# Patient Record
Sex: FEMALE | Race: WHITE | NOT HISPANIC OR LATINO | URBAN - METROPOLITAN AREA
[De-identification: names, ages, dates, MRNs, and addresses within clinical notes are randomized per-mention and may not be internally consistent; named-entity substitution may affect disease eponyms.]

---

## 2018-04-02 ENCOUNTER — INPATIENT (INPATIENT)
Facility: HOSPITAL | Age: 83
LOS: 6 days | Discharge: HOME | DRG: 552 | End: 2018-04-09
Attending: INTERNAL MEDICINE | Admitting: INTERNAL MEDICINE
Payer: MEDICARE

## 2018-04-02 VITALS
OXYGEN SATURATION: 94 % | TEMPERATURE: 98 F | WEIGHT: 130.07 LBS | RESPIRATION RATE: 19 BRPM | SYSTOLIC BLOOD PRESSURE: 203 MMHG | HEART RATE: 67 BPM | DIASTOLIC BLOOD PRESSURE: 84 MMHG

## 2018-04-02 DIAGNOSIS — Z90.89 ACQUIRED ABSENCE OF OTHER ORGANS: Chronic | ICD-10-CM

## 2018-04-02 DIAGNOSIS — Z98.89 OTHER SPECIFIED POSTPROCEDURAL STATES: Chronic | ICD-10-CM

## 2018-04-02 DIAGNOSIS — Z90.49 ACQUIRED ABSENCE OF OTHER SPECIFIED PARTS OF DIGESTIVE TRACT: Chronic | ICD-10-CM

## 2018-04-02 DIAGNOSIS — Z95.5 PRESENCE OF CORONARY ANGIOPLASTY IMPLANT AND GRAFT: Chronic | ICD-10-CM

## 2018-04-02 DIAGNOSIS — Z90.710 ACQUIRED ABSENCE OF BOTH CERVIX AND UTERUS: Chronic | ICD-10-CM

## 2018-04-02 PROCEDURE — 72131 CT LUMBAR SPINE W/O DYE: CPT | Mod: 26

## 2018-04-02 PROCEDURE — 70450 CT HEAD/BRAIN W/O DYE: CPT | Mod: 26

## 2018-04-02 PROCEDURE — 71045 X-RAY EXAM CHEST 1 VIEW: CPT | Mod: 26

## 2018-04-02 RX ORDER — SODIUM CHLORIDE 9 MG/ML
3 INJECTION INTRAMUSCULAR; INTRAVENOUS; SUBCUTANEOUS ONCE
Qty: 0 | Refills: 0 | Status: COMPLETED | OUTPATIENT
Start: 2018-04-02 | End: 2018-04-02

## 2018-04-02 RX ORDER — MORPHINE SULFATE 50 MG/1
4 CAPSULE, EXTENDED RELEASE ORAL ONCE
Qty: 0 | Refills: 0 | Status: DISCONTINUED | OUTPATIENT
Start: 2018-04-02 | End: 2018-04-02

## 2018-04-02 RX ADMIN — SODIUM CHLORIDE 3 MILLILITER(S): 9 INJECTION INTRAMUSCULAR; INTRAVENOUS; SUBCUTANEOUS at 23:43

## 2018-04-02 NOTE — ED PROVIDER NOTE - PSH
H/O abdominal hysterectomy    H/O heart artery stent  2004  History of appendectomy    History of tonsillectomy    S/P hernia repair  inguinal , right - February 2106

## 2018-04-02 NOTE — ED PROVIDER NOTE - OBJECTIVE STATEMENT
87 y/o F pt, who lives at home alone, with a significant PMHx of CAD, MI, HLD, stented coronary artery, and a significant PSHx of abd hysterectomy, appendectomy, tonsillectomy, hernia repair presents to the ED c/o back pain s/p mechanical slip and fall at 1200 today. Pt states she slipped and fell flat on her back, hitting her head. Pt notes she had a hard time getting up, but was sitting on the cough for the rest of the day, and was able to walk to the bathroom with difficulty. Pt states she feels as if she blacked out but denies LOC. Pt also denies dizziness, fever, chest pain, trouble breathing, abd pain or any other complaints. NKDA. 85 y/o F pt, who lives at home alone, with a significant PMHx of CAD, MI, HLD, stented coronary artery, and a significant PSHx of abd hysterectomy, appendectomy, tonsillectomy, hernia repair presents to the ED c/o back pain s/p mechanical slip and fall at 1200 today. Pt states she slipped and fell flat on her back, hitting her head. Pt notes she had a hard time getting up, but was sitting on the couch for the rest of the day, and was barely able to walk to the bathroom with difficulty. Pt states she feels as if she blacked out after fall. Pt denies dizziness, fever, chest pain, trouble breathing, abd pain or any other complaints. NKDA.

## 2018-04-02 NOTE — ED PROVIDER NOTE - PMH
CAD (coronary artery disease)    Heart attack  2004  HTN (hypertension)    Hyperlipidemia    Smoking greater than 25 pack years    Stenosis of left carotid artery    Stented coronary artery  x 1 - 2004  Vitamin D deficiency

## 2018-04-02 NOTE — ED PROVIDER NOTE - MEDICAL DECISION MAKING DETAILS
87 y/o F pt presents after mechanical fall with lower back pain and difficulty ambulating 2/2 pain. Will order CT head, CT spine, labs, pain control and reassess.

## 2018-04-02 NOTE — ED PROVIDER NOTE - PROGRESS NOTE DETAILS
Pt unable to ambulate secondary to lower back pain, no fracture noted on imaging, d/w Dr. Olivia who agreed to admit pt.

## 2018-04-03 DIAGNOSIS — I25.10 ATHEROSCLEROTIC HEART DISEASE OF NATIVE CORONARY ARTERY WITHOUT ANGINA PECTORIS: ICD-10-CM

## 2018-04-03 DIAGNOSIS — S39.92XA UNSPECIFIED INJURY OF LOWER BACK, INITIAL ENCOUNTER: ICD-10-CM

## 2018-04-03 DIAGNOSIS — I10 ESSENTIAL (PRIMARY) HYPERTENSION: ICD-10-CM

## 2018-04-03 DIAGNOSIS — F17.210 NICOTINE DEPENDENCE, CIGARETTES, UNCOMPLICATED: ICD-10-CM

## 2018-04-03 DIAGNOSIS — Z29.9 ENCOUNTER FOR PROPHYLACTIC MEASURES, UNSPECIFIED: ICD-10-CM
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
## 2018-04-03 DIAGNOSIS — R53.81 OTHER MALAISE: ICD-10-CM

## 2018-04-03 LAB
ALBUMIN SERPL ELPH-MCNC: 3.8 G/DL — SIGNIFICANT CHANGE UP (ref 3.5–5)
ALP SERPL-CCNC: 75 U/L — SIGNIFICANT CHANGE UP (ref 40–120)
ALT FLD-CCNC: 22 U/L DA — SIGNIFICANT CHANGE UP (ref 10–60)
ANION GAP SERPL CALC-SCNC: 7 MMOL/L — SIGNIFICANT CHANGE UP (ref 5–17)
ANION GAP SERPL CALC-SCNC: 8 MMOL/L — SIGNIFICANT CHANGE UP (ref 5–17)
APTT BLD: 29.1 SEC — SIGNIFICANT CHANGE UP (ref 27.5–37.4)
AST SERPL-CCNC: 24 U/L — SIGNIFICANT CHANGE UP (ref 10–40)
BASOPHILS # BLD AUTO: 0.1 K/UL — SIGNIFICANT CHANGE UP (ref 0–0.2)
BASOPHILS # BLD AUTO: 0.1 K/UL — SIGNIFICANT CHANGE UP (ref 0–0.2)
BASOPHILS NFR BLD AUTO: 0.8 % — SIGNIFICANT CHANGE UP (ref 0–2)
BASOPHILS NFR BLD AUTO: 1.1 % — SIGNIFICANT CHANGE UP (ref 0–2)
BILIRUB SERPL-MCNC: 0.5 MG/DL — SIGNIFICANT CHANGE UP (ref 0.2–1.2)
BUN SERPL-MCNC: 20 MG/DL — HIGH (ref 7–18)
BUN SERPL-MCNC: 21 MG/DL — HIGH (ref 7–18)
CALCIUM SERPL-MCNC: 9.1 MG/DL — SIGNIFICANT CHANGE UP (ref 8.4–10.5)
CALCIUM SERPL-MCNC: 9.4 MG/DL — SIGNIFICANT CHANGE UP (ref 8.4–10.5)
CHLORIDE SERPL-SCNC: 104 MMOL/L — SIGNIFICANT CHANGE UP (ref 96–108)
CHLORIDE SERPL-SCNC: 104 MMOL/L — SIGNIFICANT CHANGE UP (ref 96–108)
CHOLEST SERPL-MCNC: <50 MG/DL — SIGNIFICANT CHANGE UP (ref 10–199)
CK MB BLD-MCNC: 1.7 % — SIGNIFICANT CHANGE UP (ref 0–3.5)
CK MB BLD-MCNC: 1.9 % — SIGNIFICANT CHANGE UP (ref 0–3.5)
CK MB CFR SERPL CALC: 1.1 NG/ML — SIGNIFICANT CHANGE UP (ref 0–3.6)
CK MB CFR SERPL CALC: 1.3 NG/ML — SIGNIFICANT CHANGE UP (ref 0–3.6)
CK SERPL-CCNC: 66 U/L — SIGNIFICANT CHANGE UP (ref 21–215)
CK SERPL-CCNC: 70 U/L — SIGNIFICANT CHANGE UP (ref 21–215)
CO2 SERPL-SCNC: 27 MMOL/L — SIGNIFICANT CHANGE UP (ref 22–31)
CO2 SERPL-SCNC: 27 MMOL/L — SIGNIFICANT CHANGE UP (ref 22–31)
CREAT SERPL-MCNC: 0.71 MG/DL — SIGNIFICANT CHANGE UP (ref 0.5–1.3)
CREAT SERPL-MCNC: 0.85 MG/DL — SIGNIFICANT CHANGE UP (ref 0.5–1.3)
EOSINOPHIL # BLD AUTO: 0 K/UL — SIGNIFICANT CHANGE UP (ref 0–0.5)
EOSINOPHIL # BLD AUTO: 0.1 K/UL — SIGNIFICANT CHANGE UP (ref 0–0.5)
EOSINOPHIL NFR BLD AUTO: 0.4 % — SIGNIFICANT CHANGE UP (ref 0–6)
EOSINOPHIL NFR BLD AUTO: 0.7 % — SIGNIFICANT CHANGE UP (ref 0–6)
FOLATE SERPL-MCNC: >20 NG/ML — SIGNIFICANT CHANGE UP (ref 4.8–24.2)
GLUCOSE SERPL-MCNC: 89 MG/DL — SIGNIFICANT CHANGE UP (ref 70–99)
GLUCOSE SERPL-MCNC: 97 MG/DL — SIGNIFICANT CHANGE UP (ref 70–99)
HBA1C BLD-MCNC: 5.2 % — SIGNIFICANT CHANGE UP (ref 4–5.6)
HCT VFR BLD CALC: 37.1 % — SIGNIFICANT CHANGE UP (ref 34.5–45)
HCT VFR BLD CALC: 38.3 % — SIGNIFICANT CHANGE UP (ref 34.5–45)
HDLC SERPL-MCNC: 66 MG/DL — SIGNIFICANT CHANGE UP (ref 40–125)
HGB BLD-MCNC: 12.3 G/DL — SIGNIFICANT CHANGE UP (ref 11.5–15.5)
HGB BLD-MCNC: 12.7 G/DL — SIGNIFICANT CHANGE UP (ref 11.5–15.5)
INR BLD: 1.06 RATIO — SIGNIFICANT CHANGE UP (ref 0.88–1.16)
LIPID PNL WITH DIRECT LDL SERPL: SIGNIFICANT CHANGE UP MG/DL
LYMPHOCYTES # BLD AUTO: 1.6 K/UL — SIGNIFICANT CHANGE UP (ref 1–3.3)
LYMPHOCYTES # BLD AUTO: 1.7 K/UL — SIGNIFICANT CHANGE UP (ref 1–3.3)
LYMPHOCYTES # BLD AUTO: 13.3 % — SIGNIFICANT CHANGE UP (ref 13–44)
LYMPHOCYTES # BLD AUTO: 14.3 % — SIGNIFICANT CHANGE UP (ref 13–44)
MAGNESIUM SERPL-MCNC: 2 MG/DL — SIGNIFICANT CHANGE UP (ref 1.6–2.6)
MCHC RBC-ENTMCNC: 32.2 GM/DL — SIGNIFICANT CHANGE UP (ref 32–36)
MCHC RBC-ENTMCNC: 32.5 PG — SIGNIFICANT CHANGE UP (ref 27–34)
MCHC RBC-ENTMCNC: 33.9 PG — SIGNIFICANT CHANGE UP (ref 27–34)
MCHC RBC-ENTMCNC: 34.2 GM/DL — SIGNIFICANT CHANGE UP (ref 32–36)
MCV RBC AUTO: 101.2 FL — HIGH (ref 80–100)
MCV RBC AUTO: 99.1 FL — SIGNIFICANT CHANGE UP (ref 80–100)
MONOCYTES # BLD AUTO: 0.6 K/UL — SIGNIFICANT CHANGE UP (ref 0–0.9)
MONOCYTES # BLD AUTO: 1.1 K/UL — HIGH (ref 0–0.9)
MONOCYTES NFR BLD AUTO: 5.3 % — SIGNIFICANT CHANGE UP (ref 2–14)
MONOCYTES NFR BLD AUTO: 9.3 % — SIGNIFICANT CHANGE UP (ref 2–14)
NEUTROPHILS # BLD AUTO: 9.1 K/UL — HIGH (ref 1.8–7.4)
NEUTROPHILS # BLD AUTO: 9.4 K/UL — HIGH (ref 1.8–7.4)
NEUTROPHILS NFR BLD AUTO: 75.7 % — SIGNIFICANT CHANGE UP (ref 43–77)
NEUTROPHILS NFR BLD AUTO: 79.2 % — HIGH (ref 43–77)
PHOSPHATE SERPL-MCNC: 3.4 MG/DL — SIGNIFICANT CHANGE UP (ref 2.5–4.5)
PLATELET # BLD AUTO: 239 K/UL — SIGNIFICANT CHANGE UP (ref 150–400)
PLATELET # BLD AUTO: 252 K/UL — SIGNIFICANT CHANGE UP (ref 150–400)
POTASSIUM SERPL-MCNC: 3.7 MMOL/L — SIGNIFICANT CHANGE UP (ref 3.5–5.3)
POTASSIUM SERPL-MCNC: 4.1 MMOL/L — SIGNIFICANT CHANGE UP (ref 3.5–5.3)
POTASSIUM SERPL-SCNC: 3.7 MMOL/L — SIGNIFICANT CHANGE UP (ref 3.5–5.3)
POTASSIUM SERPL-SCNC: 4.1 MMOL/L — SIGNIFICANT CHANGE UP (ref 3.5–5.3)
PROT SERPL-MCNC: 8.2 G/DL — SIGNIFICANT CHANGE UP (ref 6–8.3)
PROTHROM AB SERPL-ACNC: 11.6 SEC — SIGNIFICANT CHANGE UP (ref 9.8–12.7)
RBC # BLD: 3.75 M/UL — LOW (ref 3.8–5.2)
RBC # BLD: 3.78 M/UL — LOW (ref 3.8–5.2)
RBC # FLD: 11.7 % — SIGNIFICANT CHANGE UP (ref 10.3–14.5)
RBC # FLD: 11.8 % — SIGNIFICANT CHANGE UP (ref 10.3–14.5)
SODIUM SERPL-SCNC: 138 MMOL/L — SIGNIFICANT CHANGE UP (ref 135–145)
SODIUM SERPL-SCNC: 139 MMOL/L — SIGNIFICANT CHANGE UP (ref 135–145)
TOTAL CHOLESTEROL/HDL RATIO MEASUREMENT: <0.8 RATIO — LOW (ref 3.3–7.1)
TRIGL SERPL-MCNC: 27 MG/DL — SIGNIFICANT CHANGE UP (ref 10–149)
TROPONIN I SERPL-MCNC: <0.015 NG/ML — SIGNIFICANT CHANGE UP (ref 0–0.04)
TROPONIN I SERPL-MCNC: <0.015 NG/ML — SIGNIFICANT CHANGE UP (ref 0–0.04)
TSH SERPL-MCNC: 2.38 UU/ML — SIGNIFICANT CHANGE UP (ref 0.34–4.82)
VIT B12 SERPL-MCNC: 1499 PG/ML — HIGH (ref 232–1245)
WBC # BLD: 11.9 K/UL — HIGH (ref 3.8–10.5)
WBC # BLD: 12.1 K/UL — HIGH (ref 3.8–10.5)
WBC # FLD AUTO: 11.9 K/UL — HIGH (ref 3.8–10.5)
WBC # FLD AUTO: 12.1 K/UL — HIGH (ref 3.8–10.5)

## 2018-04-03 PROCEDURE — 99285 EMERGENCY DEPT VISIT HI MDM: CPT | Mod: 25

## 2018-04-03 RX ORDER — SIMVASTATIN 20 MG/1
10 TABLET, FILM COATED ORAL AT BEDTIME
Qty: 0 | Refills: 0 | Status: DISCONTINUED | OUTPATIENT
Start: 2018-04-03 | End: 2018-04-09

## 2018-04-03 RX ORDER — TRAMADOL HYDROCHLORIDE 50 MG/1
25 TABLET ORAL
Qty: 0 | Refills: 0 | Status: DISCONTINUED | OUTPATIENT
Start: 2018-04-03 | End: 2018-04-09

## 2018-04-03 RX ORDER — CHOLECALCIFEROL (VITAMIN D3) 125 MCG
1000 CAPSULE ORAL DAILY
Qty: 0 | Refills: 0 | Status: DISCONTINUED | OUTPATIENT
Start: 2018-04-03 | End: 2018-04-09

## 2018-04-03 RX ORDER — METOPROLOL TARTRATE 50 MG
50 TABLET ORAL
Qty: 0 | Refills: 0 | Status: DISCONTINUED | OUTPATIENT
Start: 2018-04-03 | End: 2018-04-09

## 2018-04-03 RX ORDER — ACETAMINOPHEN 500 MG
650 TABLET ORAL EVERY 6 HOURS
Qty: 0 | Refills: 0 | Status: DISCONTINUED | OUTPATIENT
Start: 2018-04-03 | End: 2018-04-09

## 2018-04-03 RX ORDER — PREGABALIN 225 MG/1
1000 CAPSULE ORAL DAILY
Qty: 0 | Refills: 0 | Status: DISCONTINUED | OUTPATIENT
Start: 2018-04-03 | End: 2018-04-09

## 2018-04-03 RX ORDER — HYDRALAZINE HCL 50 MG
25 TABLET ORAL EVERY 8 HOURS
Qty: 0 | Refills: 0 | Status: DISCONTINUED | OUTPATIENT
Start: 2018-04-03 | End: 2018-04-04

## 2018-04-03 RX ORDER — HEPARIN SODIUM 5000 [USP'U]/ML
5000 INJECTION INTRAVENOUS; SUBCUTANEOUS EVERY 8 HOURS
Qty: 0 | Refills: 0 | Status: DISCONTINUED | OUTPATIENT
Start: 2018-04-03 | End: 2018-04-09

## 2018-04-03 RX ORDER — ASPIRIN/CALCIUM CARB/MAGNESIUM 324 MG
81 TABLET ORAL DAILY
Qty: 0 | Refills: 0 | Status: DISCONTINUED | OUTPATIENT
Start: 2018-04-03 | End: 2018-04-09

## 2018-04-03 RX ADMIN — HEPARIN SODIUM 5000 UNIT(S): 5000 INJECTION INTRAVENOUS; SUBCUTANEOUS at 06:25

## 2018-04-03 RX ADMIN — Medication 25 MILLIGRAM(S): at 06:25

## 2018-04-03 RX ADMIN — TRAMADOL HYDROCHLORIDE 25 MILLIGRAM(S): 50 TABLET ORAL at 06:28

## 2018-04-03 RX ADMIN — SIMVASTATIN 10 MILLIGRAM(S): 20 TABLET, FILM COATED ORAL at 21:19

## 2018-04-03 RX ADMIN — Medication 50 MILLIGRAM(S): at 06:25

## 2018-04-03 RX ADMIN — HEPARIN SODIUM 5000 UNIT(S): 5000 INJECTION INTRAVENOUS; SUBCUTANEOUS at 15:29

## 2018-04-03 RX ADMIN — Medication 25 MILLIGRAM(S): at 15:29

## 2018-04-03 RX ADMIN — Medication 81 MILLIGRAM(S): at 15:29

## 2018-04-03 RX ADMIN — PREGABALIN 1000 MICROGRAM(S): 225 CAPSULE ORAL at 15:28

## 2018-04-03 RX ADMIN — Medication 1000 UNIT(S): at 15:28

## 2018-04-03 RX ADMIN — Medication 25 MILLIGRAM(S): at 21:20

## 2018-04-03 RX ADMIN — MORPHINE SULFATE 4 MILLIGRAM(S): 50 CAPSULE, EXTENDED RELEASE ORAL at 01:32

## 2018-04-03 RX ADMIN — TRAMADOL HYDROCHLORIDE 25 MILLIGRAM(S): 50 TABLET ORAL at 07:03

## 2018-04-03 RX ADMIN — MORPHINE SULFATE 4 MILLIGRAM(S): 50 CAPSULE, EXTENDED RELEASE ORAL at 00:19

## 2018-04-03 RX ADMIN — HEPARIN SODIUM 5000 UNIT(S): 5000 INJECTION INTRAVENOUS; SUBCUTANEOUS at 21:20

## 2018-04-03 RX ADMIN — Medication 50 MILLIGRAM(S): at 18:44

## 2018-04-03 NOTE — H&P ADULT - NSHPPHYSICALEXAM_GEN_ALL_CORE
PHYSICAL EXAM:  GENERAL: NAD, well-groomed, well-developed  HEAD:  Atraumatic, Normocephalic  EYES: EOMI, PERRLA, conjunctiva and sclera clear  ENMT: No tonsillar erythema, exudates, or enlargement; dry mucous membranes, poor hearing ability   NECK: Supple, No JVD, Normal thyroid  NERVOUS SYSTEM:  Alert & Oriented X3, poor concentration; Motor Strength 5/5 B/L upper and lower extremities; DTRs 2+ intact and symmetric  CHEST/LUNG: Clear to percussion bilaterally; No rales, rhonchi, wheezing, or rubs  HEART: Regular rate and rhythm; No murmurs, rubs, or gallops  ABDOMEN: Soft, Nontender, Nondistended; Bowel sounds present  EXTREMITIES:  2+ Peripheral Pulses bilaterally, No clubbing, cyanosis, or edema. Tenderness of the L1, lower T-spinal area.  LYMPH: No lymphadenopathy noted  SKIN: No rashes or lesions    T(C): 36.9 (04-03-18 @ 00:55), Max: 36.9 (04-03-18 @ 00:55)  HR: 80 (04-03-18 @ 00:55) (67 - 80)  BP: 173/68 (04-03-18 @ 00:55) (173/68 - 203/84)  RR: 18 (04-03-18 @ 00:55) (18 - 19)  SpO2: 98% (04-03-18 @ 00:55) (94% - 98%)  Wt(kg): --  I&O's Summary

## 2018-04-03 NOTE — PATIENT PROFILE ADULT. - FALL HARM RISK
age(85 years old or older)/hx of fall/bones(Osteoporosis,prev fx,steroid use,metastatic bone ca/other

## 2018-04-03 NOTE — H&P ADULT - PROBLEM SELECTOR PLAN 7
-IMPROVE VTE Individual Risk Assessment          RISK                                                          Points  [  ] Previous VTE                                                3  [  ] Thrombophilia                                             2  [  ] Lower limb paralysis                                   2        (unable to hold up >15 seconds)    [  ] Current Cancer                                            2         (within 6 months)  [ x ] Immobilization > 24 hrs                             1  [  ] ICU/CCU stay > 24 hours                           1  [ x ] Age > 60                                                       1  IMPROVE VTE Score ____2_____  -Heparin subcu for DVT PPx.

## 2018-04-03 NOTE — PHYSICAL THERAPY INITIAL EVALUATION ADULT - CRITERIA FOR SKILLED THERAPEUTIC INTERVENTIONS
therapy frequency/anticipated equipment needs at discharge/risk reduction/prevention/rehab potential/functional limitations in following categories/predicted duration of therapy intervention/anticipated discharge recommendation/impairments found

## 2018-04-03 NOTE — H&P ADULT - ASSESSMENT
Patient is a 86y old  Female who presents with a chief complaint of     INTERVAL HPI/OVERNIGHT EVENTS: Patient is a 86y old  Female who presents with a chief complaint of back pain after having a mechanical fall. Pt is admitted to the medicine floor for back pain due to fall, and inability to take care of herself with no proper social support. Patient is a 86y old  Female who presents with a chief complaint of back pain after having a mechanical fall. Pt is admitted to the medicine floor for back pain due to fall, and inability to take care of herself with no proper social support.     ** Plans discussed with Dr. Alarcon, who is covering Dr. Amador(Dr. Amador covers Dr. Olivia)

## 2018-04-03 NOTE — H&P ADULT - PROBLEM SELECTOR PLAN 2
-Pt has unstable gait, will need physical therapy and possible short term placement.  -No acute lesion found on the head or lumbar spine CT scan. Will give pain management PRN  -Physical therapy

## 2018-04-03 NOTE — H&P ADULT - PROBLEM SELECTOR PLAN 1
-No acute lesion found on the lumbar spine CT scan. Will give pain management PRN  -Physical therapy

## 2018-04-03 NOTE — H&P ADULT - PROBLEM SELECTOR PLAN 4
-Pt seems to be forgetful and has unstable gait, lives alone, no family members, no HHA  -Will need proper social support, possible need for short term rehab given unstable gait and fall.  -Case management consult

## 2018-04-03 NOTE — PHYSICAL THERAPY INITIAL EVALUATION ADULT - LEVEL OF INDEPENDENCE: STAIR NEGOTIATION, REHAB EVAL
Stairs not assessed at this time as pt. does not require stairs to access/negotiate home environment. Pt. states he does not negotiate stairs at baseline.

## 2018-04-03 NOTE — H&P ADULT - PROBLEM SELECTOR PLAN 3
-Uncontrolled BP, likely due to pain. Also suspecting poor medicine compliance as pt does not know the name of the medications and seems be forgetful.  -Giving hydralazine and metoprolol per outside pharmacy record; will monitor BP  -Primary team to complete med rec.

## 2018-04-03 NOTE — H&P ADULT - HISTORY OF PRESENT ILLNESS
86 year-old female lives alone, no HHA, walks independently but with cane intermittently, with PMH of HTN, CAD s/p stent in 2004, current smoker, HLD, h/o syncope in 2015, and PSH of abd hysterectomy, appendectomy, tonsillectomy, hernia repair came to ED c/o lower back pain after having a mechanical fall at home. Pt is a poor historian though she is AAOx3, she is forgetful and speaks very slow, mumbles. Pt was in her usual state of health till this morning, however during the day when she was in the kitchen she slipped over the fluid on the floor and fell on her back. She denies LOC, dizziness, chest pain, SOB, diarrhea, fever, chills, or any other symptoms before/during/after the fall. She has baseline unstable gait. She was able to get up by herself, watched TV for a while, and came out to her apartment. She had poor oral intake today because there was little food to eat and it was snowing badly outside. When she was on the stairs, she met one of the residents of the apartment who asked her about what happened. She told him that she fell down, and he recommended she go to the ED for further evaluation.     In ED, pt was hypertensive to 203/84, was in severe back pain so IV morphine 4mg was given in ED, f/u BP improved to 173/68, other VS WNL. EKG; NSR at 71BPM. CT head negative, CT L-spine: No CT evidence of acute lumbar spine fracture or traumatic malalignment.A chronic mild compression fracture at L5 is stable since 04/25/2015.Small disc bulges throughout the lumbar spine.  Diffuse mild spinal canal stenosis at L1-L2 through L5-L4-L5.  Moderate neural foraminal narrowing at L5-S1 on the right. Labs mild leukocytosis of 11.9K likely from reactive, otherwise grossly normal including cardiac enzymes.    Pt is admitted to the medicine floor for back pain due to fall, and inability to take care of herself with no proper social support.     ** GOC: full code  ** Primary team please call [facility] pharmacy and complete med rec, as pt does not know what medications she takes. Med rec was done based on the EMR outside pharmacy record. 86 year-old female lives alone, no HHA, walks independently but with cane intermittently, with PMH of HTN, CAD s/p stent in 2004, current smoker, HLD, h/o syncope in 2015, and PSH of abd hysterectomy, appendectomy, tonsillectomy, hernia repair came to ED c/o lower back pain after having a mechanical fall at home. Pt is a poor historian though she is AAOx3, she is forgetful and speaks very slow, mumbles. Pt was in her usual state of health till this morning, however during the day when she was in the kitchen she slipped over the fluid on the floor and fell on her back. She denies LOC, dizziness, chest pain, SOB, diarrhea, fever, chills, or any other symptoms before/during/after the fall. She has baseline unstable gait. She was able to get up by herself, watched TV for a while, and came out to her apartment. She had poor oral intake today because there was little food to eat and it was snowing badly outside. When she was on the stairs, she met one of the residents of the apartment who asked her about what happened. She told him that she fell down, and he recommended she go to the ED for further evaluation.     In ED, pt was hypertensive to 203/84, was in severe back pain so IV morphine 4mg was given in ED, f/u BP improved to 173/68, other VS WNL. EKG; NSR at 71BPM. CT head negative, CT L-spine: No CT evidence of acute lumbar spine fracture or traumatic malalignment.A chronic mild compression fracture at L5 is stable since 04/25/2015.Small disc bulges throughout the lumbar spine.  Diffuse mild spinal canal stenosis at L1-L2 through L5-L4-L5. Moderate neural foraminal narrowing at L5-S1 on the right. Labs mild leukocytosis of 11.9K likely from reactive, otherwise grossly normal including cardiac enzymes.    Pt is admitted to the medicine floor for back pain due to fall, and inability to take care of herself with no proper social support.     ** GOC: full code  ** Primary team please call [facility] pharmacy and complete med rec, as pt does not know what medications she takes. Med rec was done based on the EMR outside pharmacy record.

## 2018-04-03 NOTE — H&P ADULT - PROBLEM SELECTOR PLAN 6
-EKG NSR, normal cardiac enzyme, no chest pain  -c/w home med aspirin, statin, and metoprolol at home dose

## 2018-04-03 NOTE — H&P ADULT - PROBLEM SELECTOR PLAN 5
-Pt is not willing to quit smoking and refuses nicotine patch  -Smoking cessation education was given at the bedside.

## 2018-04-03 NOTE — H&P ADULT - ATTENDING COMMENTS
cov dr johnson  hpi  hosp course reviewed  pt examined  labs reviewed  management reviewed  d/w redident

## 2018-04-03 NOTE — H&P ADULT - NSHPLABSRESULTS_GEN_ALL_CORE
LABS:                        12.7   11.9  )-----------( 239      ( 03 Apr 2018 00:12 )             37.1     04-03    138  |  104  |  20<H>  ----------------------------<  97  4.1   |  27  |  0.85    Ca    9.4      03 Apr 2018 00:12    TPro  8.2  /  Alb  3.8  /  TBili  0.5  /  DBili  x   /  AST  24  /  ALT  22  /  AlkPhos  75  04-03        CAPILLARY BLOOD GLUCOSE        LIVER FUNCTIONS - ( 03 Apr 2018 00:12 )  Alb: 3.8 g/dL / Pro: 8.2 g/dL / ALK PHOS: 75 U/L / ALT: 22 U/L DA / AST: 24 U/L / GGT: x             CARDIAC MARKERS ( 03 Apr 2018 00:12 )  <0.015 ng/mL / x     / 70 U/L / x     / 1.3 ng/mL      < from: CT Lumbar Spine No Cont (04.02.18 @ 23:56) >      IMPRESSION:  No CT evidence of acute lumbar spine fracture or traumatic malalignment.  A chronic mild compression fracture at L5 is stable since 04/25/2015.  Small disc bulges throughout the lumbar spine.  Diffuse mild spinal canal   stenosis at L1-L2 through L5-L4-L5.  Moderate neural foraminal narrowing   at L5-S1 on the right.  Disc osteophyte complex may contact the right L5   nerve root as it exits the L5-S1 neural foramen.    < end of copied text >    < from: CT Head No Cont (04.02.18 @ 23:55) >      FINDINGS: No intracranial hemorrhage is seen. The grey-white   differentiation is maintained. Mild periventricular and subcortical white   matter hypoattenuation without mass effect is noted, non-specific, but   likely related to chronic small vessel ischemic changes.    Cerebral volume loss is present with proportional prominence of the sulci   and ventricles. . No mass effect or midline shift is seen. Basal cisterns   are not effaced.    The visualizedparanasal sinuses and tympanomastoid cavities are clear.     No displaced calvarial fracture is seen.    IMPRESSION: No intracranial hemorrhage or mass effect.    < end of copied text >

## 2018-04-04 LAB
ANION GAP SERPL CALC-SCNC: 5 MMOL/L — SIGNIFICANT CHANGE UP (ref 5–17)
BASOPHILS # BLD AUTO: 0.1 K/UL — SIGNIFICANT CHANGE UP (ref 0–0.2)
BASOPHILS NFR BLD AUTO: 1.1 % — SIGNIFICANT CHANGE UP (ref 0–2)
BUN SERPL-MCNC: 22 MG/DL — HIGH (ref 7–18)
CALCIUM SERPL-MCNC: 8.9 MG/DL — SIGNIFICANT CHANGE UP (ref 8.4–10.5)
CHLORIDE SERPL-SCNC: 105 MMOL/L — SIGNIFICANT CHANGE UP (ref 96–108)
CO2 SERPL-SCNC: 28 MMOL/L — SIGNIFICANT CHANGE UP (ref 22–31)
CREAT SERPL-MCNC: 0.8 MG/DL — SIGNIFICANT CHANGE UP (ref 0.5–1.3)
EOSINOPHIL # BLD AUTO: 0.2 K/UL — SIGNIFICANT CHANGE UP (ref 0–0.5)
EOSINOPHIL NFR BLD AUTO: 2.7 % — SIGNIFICANT CHANGE UP (ref 0–6)
GLUCOSE SERPL-MCNC: 96 MG/DL — SIGNIFICANT CHANGE UP (ref 70–99)
HCT VFR BLD CALC: 39.8 % — SIGNIFICANT CHANGE UP (ref 34.5–45)
HGB BLD-MCNC: 12.6 G/DL — SIGNIFICANT CHANGE UP (ref 11.5–15.5)
LYMPHOCYTES # BLD AUTO: 2.2 K/UL — SIGNIFICANT CHANGE UP (ref 1–3.3)
LYMPHOCYTES # BLD AUTO: 24.6 % — SIGNIFICANT CHANGE UP (ref 13–44)
MAGNESIUM SERPL-MCNC: 2.2 MG/DL — SIGNIFICANT CHANGE UP (ref 1.6–2.6)
MCHC RBC-ENTMCNC: 31.6 GM/DL — LOW (ref 32–36)
MCHC RBC-ENTMCNC: 31.9 PG — SIGNIFICANT CHANGE UP (ref 27–34)
MCV RBC AUTO: 101.2 FL — HIGH (ref 80–100)
MONOCYTES # BLD AUTO: 0.9 K/UL — SIGNIFICANT CHANGE UP (ref 0–0.9)
MONOCYTES NFR BLD AUTO: 10.2 % — SIGNIFICANT CHANGE UP (ref 2–14)
NEUTROPHILS # BLD AUTO: 5.5 K/UL — SIGNIFICANT CHANGE UP (ref 1.8–7.4)
NEUTROPHILS NFR BLD AUTO: 61.4 % — SIGNIFICANT CHANGE UP (ref 43–77)
PHOSPHATE SERPL-MCNC: 3.3 MG/DL — SIGNIFICANT CHANGE UP (ref 2.5–4.5)
PLATELET # BLD AUTO: 239 K/UL — SIGNIFICANT CHANGE UP (ref 150–400)
POTASSIUM SERPL-MCNC: 4.1 MMOL/L — SIGNIFICANT CHANGE UP (ref 3.5–5.3)
POTASSIUM SERPL-SCNC: 4.1 MMOL/L — SIGNIFICANT CHANGE UP (ref 3.5–5.3)
RBC # BLD: 3.94 M/UL — SIGNIFICANT CHANGE UP (ref 3.8–5.2)
RBC # FLD: 12 % — SIGNIFICANT CHANGE UP (ref 10.3–14.5)
SODIUM SERPL-SCNC: 138 MMOL/L — SIGNIFICANT CHANGE UP (ref 135–145)
WBC # BLD: 8.9 K/UL — SIGNIFICANT CHANGE UP (ref 3.8–10.5)
WBC # FLD AUTO: 8.9 K/UL — SIGNIFICANT CHANGE UP (ref 3.8–10.5)

## 2018-04-04 PROCEDURE — 71250 CT THORAX DX C-: CPT | Mod: 26

## 2018-04-04 RX ORDER — HYDRALAZINE HCL 50 MG
50 TABLET ORAL EVERY 8 HOURS
Qty: 0 | Refills: 0 | Status: DISCONTINUED | OUTPATIENT
Start: 2018-04-04 | End: 2018-04-09

## 2018-04-04 RX ORDER — ACETAMINOPHEN 500 MG
1000 TABLET ORAL ONCE
Qty: 0 | Refills: 0 | Status: COMPLETED | OUTPATIENT
Start: 2018-04-05 | End: 2018-04-05

## 2018-04-04 RX ORDER — LIDOCAINE 4 G/100G
1 CREAM TOPICAL DAILY
Qty: 0 | Refills: 0 | Status: DISCONTINUED | OUTPATIENT
Start: 2018-04-04 | End: 2018-04-09

## 2018-04-04 RX ORDER — ACETAMINOPHEN 500 MG
1000 TABLET ORAL ONCE
Qty: 0 | Refills: 0 | Status: COMPLETED | OUTPATIENT
Start: 2018-04-04 | End: 2018-04-04

## 2018-04-04 RX ADMIN — Medication 50 MILLIGRAM(S): at 17:48

## 2018-04-04 RX ADMIN — Medication 650 MILLIGRAM(S): at 14:22

## 2018-04-04 RX ADMIN — HEPARIN SODIUM 5000 UNIT(S): 5000 INJECTION INTRAVENOUS; SUBCUTANEOUS at 21:08

## 2018-04-04 RX ADMIN — PREGABALIN 1000 MICROGRAM(S): 225 CAPSULE ORAL at 13:15

## 2018-04-04 RX ADMIN — Medication 1000 UNIT(S): at 13:15

## 2018-04-04 RX ADMIN — Medication 50 MILLIGRAM(S): at 05:28

## 2018-04-04 RX ADMIN — TRAMADOL HYDROCHLORIDE 25 MILLIGRAM(S): 50 TABLET ORAL at 10:19

## 2018-04-04 RX ADMIN — TRAMADOL HYDROCHLORIDE 25 MILLIGRAM(S): 50 TABLET ORAL at 09:51

## 2018-04-04 RX ADMIN — Medication 250 MILLIGRAM(S): at 21:53

## 2018-04-04 RX ADMIN — Medication 400 MILLIGRAM(S): at 18:28

## 2018-04-04 RX ADMIN — Medication 650 MILLIGRAM(S): at 13:16

## 2018-04-04 RX ADMIN — LIDOCAINE 1 PATCH: 4 CREAM TOPICAL at 17:44

## 2018-04-04 RX ADMIN — Medication 81 MILLIGRAM(S): at 13:15

## 2018-04-04 RX ADMIN — Medication 250 MILLIGRAM(S): at 21:07

## 2018-04-04 RX ADMIN — TRAMADOL HYDROCHLORIDE 25 MILLIGRAM(S): 50 TABLET ORAL at 19:02

## 2018-04-04 RX ADMIN — HEPARIN SODIUM 5000 UNIT(S): 5000 INJECTION INTRAVENOUS; SUBCUTANEOUS at 05:28

## 2018-04-04 RX ADMIN — Medication 1000 MILLIGRAM(S): at 19:45

## 2018-04-04 RX ADMIN — Medication 50 MILLIGRAM(S): at 21:07

## 2018-04-04 RX ADMIN — HEPARIN SODIUM 5000 UNIT(S): 5000 INJECTION INTRAVENOUS; SUBCUTANEOUS at 13:15

## 2018-04-04 RX ADMIN — TRAMADOL HYDROCHLORIDE 25 MILLIGRAM(S): 50 TABLET ORAL at 17:53

## 2018-04-04 RX ADMIN — SIMVASTATIN 10 MILLIGRAM(S): 20 TABLET, FILM COATED ORAL at 21:07

## 2018-04-04 RX ADMIN — Medication 50 MILLIGRAM(S): at 13:18

## 2018-04-04 RX ADMIN — Medication 25 MILLIGRAM(S): at 05:28

## 2018-04-04 NOTE — PROGRESS NOTE ADULT - SUBJECTIVE AND OBJECTIVE BOX
PGY-3 NOTE:    Patient is an 83 year-old female from home with PMH of HTN, CAD with stent, current smoker, HLD who presented with lower back pain after a mechanical fall and is admitted for further management.       INTERVAL HPI/OVERNIGHT EVENTS: No events overnight. Patient seen and examined at bedside. Denies complaints except for persistent back pain.       T(C): 36.3 (04-04-18 @ 12:25), Max: 37.7 (04-03-18 @ 21:07)  HR: 62 (04-04-18 @ 12:25) (61 - 68)  BP: 160/76 (04-04-18 @ 12:25) (151/68 - 171/70)  RR: 16 (04-04-18 @ 12:25) (16 - 16)  SpO2: 95% (04-04-18 @ 12:25) (95% - 97%)  Wt(kg): --  I&O's Summary      REVIEW OF SYSTEMS: denies fever, chills, SOB, palpitations, chest pain, abdominal pain, nausea, vomiting, diarrhea, constipation, dizziness    MEDICATIONS  (STANDING):  aspirin  chewable 81 milliGRAM(s) Oral daily  cholecalciferol 1000 Unit(s) Oral daily  cyanocobalamin 1000 MICROGram(s) Oral daily  heparin  Injectable 5000 Unit(s) SubCutaneous every 8 hours  hydrALAZINE 50 milliGRAM(s) Oral every 8 hours  lidocaine   Patch 1 Patch Transdermal daily  metoprolol tartrate 50 milliGRAM(s) Oral two times a day  simvastatin 10 milliGRAM(s) Oral at bedtime    MEDICATIONS  (PRN):  acetaminophen   Tablet. 650 milliGRAM(s) Oral every 6 hours PRN Moderate Pain (4 - 6)  traMADol 25 milliGRAM(s) Oral four times a day PRN Severe Pain (7 - 10)      PHYSICAL EXAM:  GENERAL: no distress, thin elderly female   HEAD:  Atraumatic, Normocephalic  EYES: PERRLA  ENMT: moist mucus membranes   NECK: Supple  NERVOUS SYSTEM:  Alert & Oriented X3  CHEST/LUNG: CTAB  HEART: Regular rate and rhythm; No murmurs, rubs, or gallops  ABDOMEN: Soft, Nontender, Nondistended; Bowel sounds present  EXTREMITIES:  no clubbing or cyanosis  BACK: no tenderness to palpation of spine or paraspinal muscles; ROM limited on flexion and extension due to lower back pain   LYMPH: No lymphadenopathy noted  SKIN: No rashes or lesions      LABS:                        12.6   8.9   )-----------( 239      ( 04 Apr 2018 12:47 )             39.8     04-04    138  |  105  |  22<H>  ----------------------------<  96  4.1   |  28  |  0.80    Ca    8.9      04 Apr 2018 12:47  Phos  3.3     04-04  Mg     2.2     04-04    TPro  8.2  /  Alb  3.8  /  TBili  0.5  /  DBili  x   /  AST  24  /  ALT  22  /  AlkPhos  75  04-03    PT/INR - ( 03 Apr 2018 06:12 )   PT: 11.6 sec;   INR: 1.06 ratio         PTT - ( 03 Apr 2018 06:12 )  PTT:29.1 sec    CAPILLARY BLOOD GLUCOSE

## 2018-04-04 NOTE — PROGRESS NOTE ADULT - PROBLEM SELECTOR PLAN 3
-Pt seems to be forgetful and has unstable gait, lives alone, no family members, no HHA  -Will need proper social support, possible need for short term rehab given unstable gait and fall.  -Case management consult -Pt seems to be forgetful and has unstable gait, lives alone, no family members, no HHA  -Will need proper social support unstable gait and fall.  -Case management consult

## 2018-04-04 NOTE — PROGRESS NOTE ADULT - ASSESSMENT
Patient is an 86 year-old female with PMH as above with persistent back pain after mechanical fall, no fractures.

## 2018-04-04 NOTE — PROGRESS NOTE ADULT - SUBJECTIVE AND OBJECTIVE BOX
Patient is a 86y old  Female who presents with a chief complaint of s/p fall, back pain (03 Apr 2018 02:21)      INTERVAL HPI/OVERNIGHT EVENTS:  low back discomfort    VITAL SIGNS:  T(F): 97.4 (04-04-18 @ 04:53)  HR: 61 (04-04-18 @ 04:53)  BP: 171/70 (04-04-18 @ 04:53)  RR: 16 (04-04-18 @ 04:53)  SpO2: 97% (04-04-18 @ 04:53)  Wt(kg): --  I&O's Detail          REVIEW OF SYSTEMS:    CONSTITUTIONAL:  No fevers, chills, sweats    HEENT:  Eyes:  No diplopia or blurred vision. ENT:  No earache, sore throat or runny nose.    CARDIOVASCULAR:  No pressure, squeezing, tightness, or heaviness about the chest; no palpitations.    RESPIRATORY:  no sob    GASTROINTESTINAL:  No abdominal pain, nausea, vomiting or diarrhea.    GENITOURINARY:  No dysuria, frequency or urgency.    NEUROLOGIC:  No paresthesias, fasciculations, seizures or weakness.    PSYCHIATRIC:  No disorder of thought or mood.      PHYSICAL EXAM:    Constitutional:no complaints  ENMT:atnc  Neck:supple  Respiratory:clear  Cardiovascular:rr  Gastrointestinal:soft  Extremities:no edema  Vascular:intact  Neurological:non focal  Musculoskeletal:no edema, normal rom    MEDICATIONS  (STANDING):  aspirin  chewable 81 milliGRAM(s) Oral daily  cholecalciferol 1000 Unit(s) Oral daily  cyanocobalamin 1000 MICROGram(s) Oral daily  heparin  Injectable 5000 Unit(s) SubCutaneous every 8 hours  hydrALAZINE 50 milliGRAM(s) Oral every 8 hours  metoprolol tartrate 50 milliGRAM(s) Oral two times a day  simvastatin 10 milliGRAM(s) Oral at bedtime    MEDICATIONS  (PRN):  acetaminophen   Tablet. 650 milliGRAM(s) Oral every 6 hours PRN Moderate Pain (4 - 6)  traMADol 25 milliGRAM(s) Oral four times a day PRN Severe Pain (7 - 10)      Allergies    No Known Allergies            LABS:                        12.3   12.1  )-----------( 252      ( 03 Apr 2018 06:12 )             38.3     04-03    139  |  104  |  21<H>  ----------------------------<  89  3.7   |  27  |  0.71    Ca    9.1      03 Apr 2018 06:12  Phos  3.4     04-03  Mg     2.0     04-03    TPro  8.2  /  Alb  3.8  /  TBili  0.5  /  DBili  x   /  AST  24  /  ALT  22  /  AlkPhos  75  04-03    PT/INR - ( 03 Apr 2018 06:12 )   PT: 11.6 sec;   INR: 1.06 ratio         PTT - ( 03 Apr 2018 06:12 )  PTT:29.1 sec      CARDIAC MARKERS ( 03 Apr 2018 06:12 )  <0.015 ng/mL / x     / 66 U/L / x     / 1.1 ng/mL  CARDIAC MARKERS ( 03 Apr 2018 00:12 )  <0.015 ng/mL / x     / 70 U/L / x     / 1.3 ng/mL                RADIOLOGY & ADDITIONAL TESTS:    CXR:  EXAM:  XR CHEST AP OR PA 1V                            PROCEDURE DATE:  04/02/2018          INTERPRETATION:  PORTABLE CHEST X-RAY    HISTORY: Chest Pain.    COMPARISON: Chest CT 4/26/2015.    FINDINGS:  Mild bibasilar atelectasis or scarring. Left midlung opacity and pleural   calcification.  No focal lung consolidation.  No pneumothorax or pleural effusion.   The cardiac silhouette is not accurately assessed by AP technique.  Calcifications project over both breasts.  Chronic deformity of the right humeral head.    IMPRESSION:  Left midlung opacity and pleural calcification.      ct l-s spine;  IMPRESSION:  No CT evidence of acute lumbar spine fracture or traumatic malalignment.  A chronic mild compression fracture at L5 is stable since 04/25/2015.  Small disc bulges throughout the lumbar spine.  Diffuse mild spinal canal   stenosis at L1-L2 through L5-L4-L5.  Moderate neural foraminal narrowing   at L5-S1 on the right.  Disc osteophyte complex may contact the right L5   nerve root as it exits the L5-S1 neural foramen.        echo:

## 2018-04-04 NOTE — PROGRESS NOTE ADULT - PROBLEM SELECTOR PLAN 1
- no fracture seen on CT lumbar spine  - was getting tylenol and tramadol; added lidocaine patch  - pain management consulted  - physical therapy

## 2018-04-04 NOTE — PROGRESS NOTE ADULT - PROBLEM SELECTOR PLAN 2
-Uncontrolled BP, likely due to pain. Also suspecting poor medicine compliance as pt does not know the name of the medications and seems be forgetful.  -Giving hydralazine and metoprolol per outside pharmacy record; will monitor BP  -Primary team to complete med rec. -Uncontrolled BP, likely due to pain. Also suspecting poor medicine compliance as pt does not know the name of the medications and seems be forgetful.  -increased hydralazine to 50mg three times daily; suspect poor compliance at home  - continue metoprolol   -Primary team to complete med rec.

## 2018-04-04 NOTE — CHART NOTE - NSCHARTNOTEFT_GEN_A_CORE
PGY-3 NOTE:    Patient reports that she does not have next of kin or friends and has no one to appoint as a health care proxy. Her friend that is listed in her contacts, Tracy Huffman, lives in [place] and they do not keep in touch. Discussed advanced directives and what she would want her healthcare providers to do if she was unable to make decisions for herself regarding life-sustaining measures- patient says that she does not know and will think about it. Will consider getting psychiatric evaluation for depression.

## 2018-04-05 ENCOUNTER — TRANSCRIPTION ENCOUNTER (OUTPATIENT)
Age: 83
End: 2018-04-05

## 2018-04-05 DIAGNOSIS — M54.5 LOW BACK PAIN: ICD-10-CM

## 2018-04-05 PROCEDURE — 99223 1ST HOSP IP/OBS HIGH 75: CPT

## 2018-04-05 RX ORDER — PREGABALIN 225 MG/1
1 CAPSULE ORAL
Qty: 0 | Refills: 0 | COMMUNITY

## 2018-04-05 RX ORDER — EZETIMIBE 10 MG/1
1 TABLET ORAL
Qty: 30 | Refills: 0 | OUTPATIENT
Start: 2018-04-05 | End: 2018-05-04

## 2018-04-05 RX ORDER — CHOLECALCIFEROL (VITAMIN D3) 125 MCG
1 CAPSULE ORAL
Qty: 0 | Refills: 0 | COMMUNITY

## 2018-04-05 RX ORDER — HYDRALAZINE HCL 50 MG
1 TABLET ORAL
Qty: 90 | Refills: 0 | OUTPATIENT
Start: 2018-04-05 | End: 2018-05-04

## 2018-04-05 RX ORDER — CHOLECALCIFEROL (VITAMIN D3) 125 MCG
1 CAPSULE ORAL
Qty: 30 | Refills: 0 | OUTPATIENT
Start: 2018-04-05 | End: 2018-05-04

## 2018-04-05 RX ORDER — METOPROLOL TARTRATE 50 MG
1 TABLET ORAL
Qty: 60 | Refills: 0 | OUTPATIENT
Start: 2018-04-05 | End: 2018-05-04

## 2018-04-05 RX ORDER — EZETIMIBE 10 MG/1
1 TABLET ORAL
Qty: 0 | Refills: 0 | COMMUNITY

## 2018-04-05 RX ORDER — SIMVASTATIN 20 MG/1
0 TABLET, FILM COATED ORAL
Qty: 90 | Refills: 0 | COMMUNITY

## 2018-04-05 RX ORDER — METOPROLOL TARTRATE 50 MG
1 TABLET ORAL
Qty: 0 | Refills: 0 | COMMUNITY

## 2018-04-05 RX ORDER — ASPIRIN/CALCIUM CARB/MAGNESIUM 324 MG
0 TABLET ORAL
Qty: 0 | Refills: 0 | COMMUNITY

## 2018-04-05 RX ORDER — PREGABALIN 225 MG/1
1 CAPSULE ORAL
Qty: 30 | Refills: 0 | OUTPATIENT
Start: 2018-04-05 | End: 2018-05-04

## 2018-04-05 RX ORDER — EZETIMIBE 10 MG/1
0 TABLET ORAL
Qty: 90 | Refills: 0 | COMMUNITY

## 2018-04-05 RX ORDER — HYDRALAZINE HCL 50 MG
1 TABLET ORAL
Qty: 0 | Refills: 0 | COMMUNITY

## 2018-04-05 RX ORDER — SIMVASTATIN 20 MG/1
1 TABLET, FILM COATED ORAL
Qty: 30 | Refills: 0 | OUTPATIENT
Start: 2018-04-05 | End: 2018-05-04

## 2018-04-05 RX ORDER — HYDRALAZINE HCL 50 MG
0 TABLET ORAL
Qty: 90 | Refills: 0 | COMMUNITY

## 2018-04-05 RX ORDER — METOPROLOL TARTRATE 50 MG
0 TABLET ORAL
Qty: 180 | Refills: 0 | COMMUNITY

## 2018-04-05 RX ORDER — SIMVASTATIN 20 MG/1
1 TABLET, FILM COATED ORAL
Qty: 0 | Refills: 0 | COMMUNITY

## 2018-04-05 RX ORDER — LIDOCAINE 4 G/100G
1 CREAM TOPICAL
Qty: 1 | Refills: 0 | OUTPATIENT
Start: 2018-04-05 | End: 2018-04-11

## 2018-04-05 RX ORDER — TRAMADOL HYDROCHLORIDE 50 MG/1
0.5 TABLET ORAL
Qty: 14 | Refills: 0 | OUTPATIENT
Start: 2018-04-05 | End: 2018-04-11

## 2018-04-05 RX ORDER — ASPIRIN/CALCIUM CARB/MAGNESIUM 324 MG
1 TABLET ORAL
Qty: 30 | Refills: 0 | OUTPATIENT
Start: 2018-04-05 | End: 2018-05-04

## 2018-04-05 RX ADMIN — TRAMADOL HYDROCHLORIDE 25 MILLIGRAM(S): 50 TABLET ORAL at 18:04

## 2018-04-05 RX ADMIN — Medication 400 MILLIGRAM(S): at 01:59

## 2018-04-05 RX ADMIN — Medication 250 MILLIGRAM(S): at 14:03

## 2018-04-05 RX ADMIN — Medication 250 MILLIGRAM(S): at 05:45

## 2018-04-05 RX ADMIN — LIDOCAINE 1 PATCH: 4 CREAM TOPICAL at 05:09

## 2018-04-05 RX ADMIN — Medication 250 MILLIGRAM(S): at 22:16

## 2018-04-05 RX ADMIN — Medication 50 MILLIGRAM(S): at 05:05

## 2018-04-05 RX ADMIN — Medication 1000 UNIT(S): at 12:26

## 2018-04-05 RX ADMIN — Medication 81 MILLIGRAM(S): at 12:26

## 2018-04-05 RX ADMIN — Medication 1000 MILLIGRAM(S): at 02:16

## 2018-04-05 RX ADMIN — Medication 400 MILLIGRAM(S): at 09:43

## 2018-04-05 RX ADMIN — Medication 250 MILLIGRAM(S): at 22:53

## 2018-04-05 RX ADMIN — HEPARIN SODIUM 5000 UNIT(S): 5000 INJECTION INTRAVENOUS; SUBCUTANEOUS at 22:16

## 2018-04-05 RX ADMIN — PREGABALIN 1000 MICROGRAM(S): 225 CAPSULE ORAL at 12:26

## 2018-04-05 RX ADMIN — HEPARIN SODIUM 5000 UNIT(S): 5000 INJECTION INTRAVENOUS; SUBCUTANEOUS at 05:05

## 2018-04-05 RX ADMIN — Medication 50 MILLIGRAM(S): at 13:33

## 2018-04-05 RX ADMIN — Medication 50 MILLIGRAM(S): at 18:04

## 2018-04-05 RX ADMIN — Medication 1000 MILLIGRAM(S): at 12:27

## 2018-04-05 RX ADMIN — SIMVASTATIN 10 MILLIGRAM(S): 20 TABLET, FILM COATED ORAL at 22:16

## 2018-04-05 RX ADMIN — HEPARIN SODIUM 5000 UNIT(S): 5000 INJECTION INTRAVENOUS; SUBCUTANEOUS at 13:33

## 2018-04-05 RX ADMIN — Medication 50 MILLIGRAM(S): at 22:16

## 2018-04-05 RX ADMIN — LIDOCAINE 1 PATCH: 4 CREAM TOPICAL at 13:33

## 2018-04-05 RX ADMIN — Medication 250 MILLIGRAM(S): at 05:05

## 2018-04-05 RX ADMIN — TRAMADOL HYDROCHLORIDE 25 MILLIGRAM(S): 50 TABLET ORAL at 16:56

## 2018-04-05 RX ADMIN — Medication 250 MILLIGRAM(S): at 13:33

## 2018-04-05 NOTE — DISCHARGE NOTE ADULT - CARE PROVIDER_API CALL
Jonn Olivia), Internal Medicine  [address] [address] [place], [place] [address]  Phone: [phone]  Fax: [phone]

## 2018-04-05 NOTE — PROGRESS NOTE ADULT - SUBJECTIVE AND OBJECTIVE BOX
Patient is a 86y old  Female who presents with a chief complaint of s/p fall, back pain (03 Apr 2018 02:21)      INTERVAL HPI/OVERNIGHT EVENTS:  comfortable in bed    VITAL SIGNS:  T(F): 97.8 (04-05-18 @ 04:44)  HR: 57 (04-05-18 @ 04:44)  BP: 150/57 (04-05-18 @ 04:44)  RR: 18 (04-05-18 @ 04:44)  SpO2: 95% (04-05-18 @ 04:44)  Wt(kg): --  I&O's Detail          REVIEW OF SYSTEMS:    CONSTITUTIONAL:  No fevers, chills, sweats    HEENT:  Eyes:  No diplopia or blurred vision. ENT:  No earache, sore throat or runny nose.    CARDIOVASCULAR:  No pressure, squeezing, tightness, or heaviness about the chest; no palpitations.    RESPIRATORY:  no sob,cough    GASTROINTESTINAL:  No abdominal pain, nausea, vomiting or diarrhea.    GENITOURINARY:  No dysuria, frequency or urgency.    NEUROLOGIC:  No paresthesias, fasciculations, seizures or weakness.    PSYCHIATRIC:  No disorder of thought or mood.      PHYSICAL EXAM:    Constitutional:no complaints  ENMT:atnc  Neck:supple  Respiratory:clear  Cardiovascular:rr  Gastrointestinal:soft  Extremities:no edema  Vascular:intact  Neurological:non focal  Musculoskeletal:no edema, normal rom    MEDICATIONS  (STANDING):  acetaminophen  IVPB. 1000 milliGRAM(s) IV Intermittent once  aspirin  chewable 81 milliGRAM(s) Oral daily  cholecalciferol 1000 Unit(s) Oral daily  cyanocobalamin 1000 MICROGram(s) Oral daily  heparin  Injectable 5000 Unit(s) SubCutaneous every 8 hours  hydrALAZINE 50 milliGRAM(s) Oral every 8 hours  lidocaine   Patch 1 Patch Transdermal daily  metoprolol tartrate 50 milliGRAM(s) Oral two times a day  naproxen 250 milliGRAM(s) Oral three times a day  simvastatin 10 milliGRAM(s) Oral at bedtime    MEDICATIONS  (PRN):  acetaminophen   Tablet. 650 milliGRAM(s) Oral every 6 hours PRN Moderate Pain (4 - 6)  traMADol 25 milliGRAM(s) Oral four times a day PRN Severe Pain (7 - 10)      Allergies    No Known Allergies            LABS:                        12.6   8.9   )-----------( 239      ( 04 Apr 2018 12:47 )             39.8     04-04    138  |  105  |  22<H>  ----------------------------<  96  4.1   |  28  |  0.80    Ca    8.9      04 Apr 2018 12:47  Phos  3.3     04-04  Mg     2.2     04-04                CAPILLARY BLOOD GLUCOSE            RADIOLOGY & ADDITIONAL TESTS:      Ct scan chest:  EXAM:  CT CHEST                            PROCEDURE DATE:  04/04/2018          INTERPRETATION:  EXAMINATION: CT CHEST    CLINICAL INDICATION: Smoking, lung opacity.    TECHNIQUE: Noncontrast CT of the chest was obtained.      COMPARISON: 4/26/2015.    FINDINGS:     AIRWAYS AND LUNGS: The central tracheobronchial tree is patent.    Emphysema.  New right upper lobe subpleural 6 mm nodule (2, 37). Biapical   fibronodular scarring. Unchanged left lower lobe pleural   thickening/nodularity and calcification.    MEDIASTINUM AND PLEURA: There are no enlarged mediastinal, hilar or   axillary lymph nodes. The visualized portion of the thyroid gland is   heterogeneous. There is no pleural effusion. There is no pneumothorax.      HEART AND VESSELS: The heart is normal in size.  There are   atherosclerotic calcifications of the aorta and coronary arteries.  There   is no pericardial effusion.  Aortic valve calcifications.    UPPER ABDOMEN: Images of the upper abdomen demonstrate no abnormality.     BONES AND SOFT TISSUES: There are mild degenerative changes of the spine.    The soft tissues are unremarkable.    TUBES/LINES: None.    IMPRESSION:   Emphysema with new right upper lobe 6 mm nodule which is indeterminate.   Three-month follow-up CTneeded for complete evaluation.

## 2018-04-05 NOTE — DISCHARGE NOTE ADULT - PLAN OF CARE
to treat You do not have any fractures. Please take medications as prescribed- lidocaine patch once daily, tramadol as needed- for 7 days. Please follow up with your PCP within 3-5 days of discharge. You will have a visiting nurse come to your home to assess your needs for home care. Please increase hydralazine to 50mg three times daily instead of your usual 25. STOP taking furosemide. Continue metoprolol.

## 2018-04-05 NOTE — DISCHARGE NOTE ADULT - MEDICATION SUMMARY - MEDICATIONS TO CHANGE
I will SWITCH the dose or number of times a day I take the medications listed below when I get home from the hospital:    HYDRALAZINE HCL 25 MG TABS    hydrALAZINE 25 mg oral tablet  -- 1 tab(s) by mouth 3 times a day

## 2018-04-05 NOTE — CONSULT NOTE ADULT - SUBJECTIVE AND OBJECTIVE BOX
HPI:  86 year-old female lives alone, no HHA, walks independently but with cane intermittently, with PMH of HTN, CAD s/p stent in 2004, current smoker, HLD, h/o syncope in 2015, and PSH of abd hysterectomy, appendectomy, tonsillectomy, hernia repair came to ED c/o lower back pain after having a mechanical fall at home. Pt is a poor historian though she is AAOx3, she is forgetful and speaks very slow, mumbles. Pt was in her usual state of health till this morning, however during the day when she was in the kitchen she slipped over the fluid on the floor and fell on her back. She denies LOC, dizziness, chest pain, SOB, diarrhea, fever, chills, or any other symptoms before/during/after the fall. She has baseline unstable gait. She was able to get up by herself, watched TV for a while, and came out to her apartment. She had poor oral intake today because there was little food to eat and it was snowing badly outside. When she was on the stairs, she met one of the residents of the apartment who asked her about what happened. She told him that she fell down, and he recommended she go to the ED for further evaluation.     In ED, pt was hypertensive to 203/84, was in severe back pain so IV morphine 4mg was given in ED, f/u BP improved to 173/68, other VS WNL. EKG; NSR at 71BPM. CT head negative, CT L-spine: No CT evidence of acute lumbar spine fracture or traumatic malalignment. A chronic mild compression fracture at L5 is stable since 04/25/2015.Small disc bulges throughout the lumbar spine.  Diffuse mild spinal canal stenosis at L1-L2 through L5-L4-L5. Moderate neural foraminal narrowing at L5-S1 on the right. Labs mild leukocytosis of 11.9K likely from reactive, otherwise grossly normal including cardiac enzymes.    Pt is admitted to the medicine floor for back pain due to fall, and inability to take care of herself with no proper social support.     ** GOC: full code  ** Primary team please call [facility] pharmacy and complete med rec, as pt does not know what medications she takes. Med rec was done based on the EMR outside pharmacy record. (03 Apr 2018 02:21)        4/5/18 - 86 year old female lying in bed, nad.  Pt states that she has no pain at this time.  Pt was complaining of lower back pain after a mechanical fall.  Pt had a compression fracture in past.  Pt is a poor historian.  She lives alone at home and has no family or support system.  Pt was oob with pt.  Currently, pt has no HHA at this time.      PAIN SCORE:    5/10     SCALE USED: (1-10 VNRS)      PAST MEDICAL & SURGICAL HISTORY:  Smoking greater than 25 pack years  Stenosis of left carotid artery  Vitamin D deficiency  Heart attack: 2004  Hyperlipidemia  HTN (hypertension)  CAD (coronary artery disease)  Stented coronary artery: x 1 - 2004  S/P hernia repair: inguinal , right - February 2106  H/O heart artery stent: 2004  History of tonsillectomy  History of appendectomy  H/O abdominal hysterectomy      FAMILY HISTORY:  No pertinent family history in first degree relatives      SOCIAL HISTORY:  [ ] Denies Smoking, Alcohol, or Drug Use    Allergies    No Known Allergies    Intolerances        PAIN MEDICATIONS:  acetaminophen   Tablet. 650 milliGRAM(s) Oral every 6 hours PRN  naproxen 250 milliGRAM(s) Oral three times a day  traMADol 25 milliGRAM(s) Oral four times a day PRN    Heme:  aspirin  chewable 81 milliGRAM(s) Oral daily  heparin  Injectable 5000 Unit(s) SubCutaneous every 8 hours    Antibiotics:    Cardiovascular:  hydrALAZINE 50 milliGRAM(s) Oral every 8 hours  metoprolol tartrate 50 milliGRAM(s) Oral two times a day    GI:    Endocrine:  simvastatin 10 milliGRAM(s) Oral at bedtime    All Other Medications:  cholecalciferol 1000 Unit(s) Oral daily  cyanocobalamin 1000 MICROGram(s) Oral daily  lidocaine   Patch 1 Patch Transdermal daily          Vital Signs Last 24 Hrs  T(C): 36.6 (05 Apr 2018 04:44), Max: 37.1 (04 Apr 2018 20:51)  T(F): 97.8 (05 Apr 2018 04:44), Max: 98.8 (04 Apr 2018 20:51)  HR: 57 (05 Apr 2018 04:44) (57 - 83)  BP: 150/57 (05 Apr 2018 04:44) (150/57 - 179/63)  BP(mean): --  RR: 18 (05 Apr 2018 04:44) (16 - 18)  SpO2: 95% (05 Apr 2018 04:44) (95% - 95%)                       LABS:                          12.6   8.9   )-----------( 239      ( 04 Apr 2018 12:47 )             39.8     04-04    138  |  105  |  22<H>  ----------------------------<  96  4.1   |  28  |  0.80    Ca    8.9      04 Apr 2018 12:47  Phos  3.3     04-04  Mg     2.2     04-04            RADIOLOGY:  < from: CT Chest No Cont (04.04.18 @ 09:44) >    EXAM:  CT CHEST                            PROCEDURE DATE:  04/04/2018          INTERPRETATION:  EXAMINATION: CT CHEST    CLINICAL INDICATION: Smoking, lung opacity.    TECHNIQUE: Noncontrast CT of the chest was obtained.      COMPARISON: 4/26/2015.    FINDINGS:     AIRWAYS AND LUNGS: The central tracheobronchial tree is patent.    Emphysema.  New right upper lobe subpleural 6 mm nodule (2, 37). Biapical   fibronodular scarring. Unchanged left lower lobe pleural   thickening/nodularity and calcification.    MEDIASTINUM AND PLEURA: There are no enlarged mediastinal, hilar or   axillary lymph nodes. The visualized portion of the thyroid gland is   heterogeneous. There is no pleural effusion. There is no pneumothorax.      HEART AND VESSELS: The heart is normal in size.  There are   atherosclerotic calcifications of the aorta and coronary arteries.  There   is no pericardial effusion.  Aortic valve calcifications.    UPPER ABDOMEN: Images of the upper abdomen demonstrate no abnormality.     BONES AND SOFT TISSUES: There are mild degenerative changes of the spine.    The soft tissues are unremarkable.    TUBES/LINES: None.    IMPRESSION:   Emphysema with new right upper lobe 6 mm nodule which is indeterminate.   Three-month follow-up CTneeded for complete evaluation.    < end of copied text >      Drug Screen:        < from: CT Lumbar Spine No Cont (04.02.18 @ 23:56) >    EXAM:  CT LUMBAR SPINE                            PROCEDURE DATE:  04/02/2018          INTERPRETATION:  CLINICAL INFORMATION: Trauma.  Status post mechanical   fall.  Midline low back pain.    TECHNIQUE:  Axial CT images were part of the lumbar spine.  Intravenous contrast: None  2-dimensional reformats were generated.    COMPARISON STUDY: CT of the abdomen pelvis from 04/25/2015..    FINDINGS:   There are five lumbar-type bodies.  There is straightening of the lumbar lordosis.  Mild compression fracture in the superior endplate of L5 is unchanged   from 04/25/2015.  There is no CT evidence of acute lumbar spine fracture or traumatic   malalignment.  There is no evidence of a six insufficiency fracture.  The bones are diffusely osteopenic.There is no suspicious lytic or   blastic lesion.  the paraspinous soft tissues are grossly unremarkable, within limits of   CT scan.    Degenerative changes:  L1-L2: Small disc bulge.  Mild spinal canal stenosis.  Mild neural   foraminal narrowing bilaterally.    L2-L3: Small disc bulge.  Mild spinal canal stenosis.  Mild neural   foraminal narrowing bilaterally.    L3-L4: Small disc bulge.  Mild spinal canal stenosis.  Mild neural   foraminal narrowing bilaterally.    L4-L5: Small disc bulge.  Mild spinal canal stenosis.  Mild neural   foraminal narrowing bilaterally.   L5-S1:Small disc bulge with right-sided disc osteophyte complex   remission.  No clinically significant spinal canal stenosis.  Moderate   right and mild left neural foraminal narrowing.  Disc osteophyte complex   may contact the right L5 nerve root as it exits the L5-S1 neural foramen.    Incidental findings:  Sigmoid diverticulosis colon.  At this chronic calcifications of the visualized aortoiliac tree.    IMPRESSION:  No CT evidence of acute lumbar spine fracture or traumatic malalignment.  A chronic mild compression fracture at L5 is stable since 04/25/2015.  Small disc bulges throughout the lumbar spine.  Diffuse mild spinal canal   stenosis at L1-L2 through L5-L4-L5.  Moderate neural foraminal narrowing   at L5-S1 on the right.  Disc osteophyte complex may contact the right L5   nerve root as it exits the L5-S1 neural foramen.    < end of copied text >        [ ]  [place]  Reviewed and Copied to Chart

## 2018-04-05 NOTE — DISCHARGE NOTE ADULT - MEDICATION SUMMARY - MEDICATIONS TO TAKE
I will START or STAY ON the medications listed below when I get home from the hospital:    aspirin 81 mg oral tablet  -- 1 tab(s) by mouth once a day   -- Indication: For prophylaxis     traMADol 50 mg oral tablet  -- 0.5 tab(s) by mouth 4 times a day, As needed, Severe Pain (7 - 10) MDD:1 patch  -- Indication: For pain     ezetimibe 10 mg oral tablet  -- 1 tab(s) by mouth once a day  -- Indication: For Hypercholesterolemia    simvastatin 10 mg oral tablet  -- 1 tab(s) by mouth once a day (at bedtime)  -- Indication: For Hypercholesterolemia     metoprolol tartrate 50 mg oral tablet  -- 1 tab(s) by mouth 2 times a day  -- Indication: For Hypertension    lidocaine 5% topical film  -- Apply on skin to affected area once a day MDD:1 patch  -- Indication: For pain    hydrALAZINE 50 mg oral tablet  -- 1 tab(s) by mouth every 8 hours  -- Indication: For Hypertension    Vitamin D3 1000 intl units oral tablet  -- 1 tab(s) by mouth once a day  -- Indication: For Supplement    Vitamin B12 1000 mcg oral tablet  -- 1 tab(s) by mouth once a day  -- Indication: For Supplement

## 2018-04-05 NOTE — PROGRESS NOTE ADULT - ASSESSMENT
-s/p mechanical fall  -smoker  -rul 6 mm nodule-new since 4/2015  -hx; htn, cad      Plan; phy tx          pain consult          f/u ct 3 mo.

## 2018-04-05 NOTE — DISCHARGE NOTE ADULT - MEDICATION SUMMARY - MEDICATIONS TO STOP TAKING
I will STOP taking the medications listed below when I get home from the hospital:    furosemide 20 mg oral tablet  -- 1 tab(s) by mouth once a day    acetaminophen-codeine 300 mg-30 mg oral tablet  -- 1 tab(s) by mouth every 4 hours, As Needed, Moderate Pain MDD:4

## 2018-04-05 NOTE — CONSULT NOTE ADULT - PROBLEM SELECTOR RECOMMENDATION 9
- CT- No CT evidence of acute lumbar spine fracture or traumatic malalignment.  A chronic mild compression fracture at L5 is stable since 04/25/2015.  Small disc bulges throughout the lumbar spine.  Diffuse mild spinal canal   stenosis at L1-L2 through L5-L4-L5.  Moderate neural foraminal narrowing   at L5-S1 on the right.  Disc osteophyte complex may contact the right L5   nerve root as it exits the L5-S1 neural foramen.  - pt was put on 4 doses of IV acetaminophen   - continue with naproxen for 3 more days  - tylenol prn  - oob  - emotional support  -   - lidocaine patch td daily

## 2018-04-05 NOTE — DISCHARGE NOTE ADULT - HOSPITAL COURSE
Patient is an 86 year-old female (lives alone, no HHA) with PMH of HTN, CAD s/p stent in 2004, current smoker, HLD who presented with lower back pain after a mechanical fall at home. She reported that she slipped on something she spilled on the floor but denies hitting her head or losing consciousness before or after the fall. In the ED, she was noted to be hypertensive to 204/84 which improved to 173/68 after getting morphine for pain. CT of the head was negative. CT of the lumbar spine shows no evidence of lumbar fracture or traumatic malalignment. She did have a chronic mild compression fracture at L5 that has been stable since a scan in April 2015. Small disc bulges were noted throughout the lumbar spine and patient has mild spinal canal stenosis at L1-L2, and L4-L5. She was admitted to medicine for management of back pain and  as patient does not have any support.    Blood pressure remained stable on medical floor. Pain management was consulted- recommended tramadol, lidocaine patch, tylenol as needed. She also received 2 days of naprosyn.     Social work and case management set up visiting nurse service to assess patient's needs at home. Discussed advanced directives with patient and she does not know what she would want regarding end of life care and extraordinary life-sustaining measures. She does not have anyone to make decisions for her in the event that she lacks capacity. The friend, Tracy Huffman, in her contacts has moved to [place] and they are not in touch.    Patient is medically stable for discharge. Patient is an 86 year-old female (lives alone, no HHA) with PMH of HTN, CAD s/p stent in 2004, current smoker, HLD who presented with lower back pain after a mechanical fall at home. She reported that she slipped on something she spilled on the floor but denies hitting her head or losing consciousness before or after the fall. In the ED, she was noted to be hypertensive to 204/84 which improved to 173/68 after getting morphine for pain. CT of the head was negative. CT of the lumbar spine shows no evidence of lumbar fracture or traumatic malalignment. She did have a chronic mild compression fracture at L5 that has been stable since a scan in April 2015. Small disc bulges were noted throughout the lumbar spine and patient has mild spinal canal stenosis at L1-L2, and L4-L5. She was admitted to medicine for management of back pain and  as patient does not have any support.    Blood pressure remained stable on medical floor. Pain management was consulted- recommended tramadol, lidocaine patch, tylenol as needed. She also received 2 days of naprosyn.     Social work and case management set up visiting nurse service to assess patient's needs at home. Discussed advanced directives with patient and she does not know what she would want regarding end of life care and extraordinary life-sustaining measures. She does not have anyone to make decisions for her in the event that she lacks capacity. The friend, Tracy Huffman, in her contacts has moved to [place] and they are not in touch. She was evaluated by psychiatry and determined to have capacity to make decisions regarding her care.     Patient is medically stable for discharge.

## 2018-04-05 NOTE — DISCHARGE NOTE ADULT - PATIENT PORTAL LINK FT
You can access the [facility] Patient Portal, offered by [facility], by registering with the following website: [email or web address]

## 2018-04-05 NOTE — DISCHARGE NOTE ADULT - CARE PLAN
Principal Discharge DX:	Acute low back pain  Goal:	to treat  Assessment and plan of treatment:	You do not have any fractures. Please take medications as prescribed- lidocaine patch once daily, tramadol as needed- for 7 days. Please follow up with your PCP within 3-5 days of discharge. You will have a visiting nurse come to your home to assess your needs for home care.  Secondary Diagnosis:	HTN (hypertension)  Assessment and plan of treatment:	Please increase hydralazine to 50mg three times daily instead of your usual 25. STOP taking furosemide. Continue metoprolol.

## 2018-04-06 RX ORDER — TRAMADOL HYDROCHLORIDE 50 MG/1
0.5 TABLET ORAL
Qty: 14 | Refills: 0
Start: 2018-04-06 | End: 2018-04-12

## 2018-04-06 RX ORDER — LIDOCAINE 4 G/100G
1 CREAM TOPICAL
Qty: 7 | Refills: 0
Start: 2018-04-06 | End: 2018-04-12

## 2018-04-06 RX ORDER — PREGABALIN 225 MG/1
1 CAPSULE ORAL
Qty: 30 | Refills: 0
Start: 2018-04-06 | End: 2018-05-05

## 2018-04-06 RX ORDER — EZETIMIBE 10 MG/1
1 TABLET ORAL
Qty: 30 | Refills: 0
Start: 2018-04-06 | End: 2018-05-05

## 2018-04-06 RX ORDER — SIMVASTATIN 20 MG/1
1 TABLET, FILM COATED ORAL
Qty: 30 | Refills: 0
Start: 2018-04-06 | End: 2018-05-05

## 2018-04-06 RX ORDER — ASPIRIN/CALCIUM CARB/MAGNESIUM 324 MG
1 TABLET ORAL
Qty: 30 | Refills: 0
Start: 2018-04-06 | End: 2018-05-05

## 2018-04-06 RX ADMIN — Medication 250 MILLIGRAM(S): at 14:49

## 2018-04-06 RX ADMIN — HEPARIN SODIUM 5000 UNIT(S): 5000 INJECTION INTRAVENOUS; SUBCUTANEOUS at 13:18

## 2018-04-06 RX ADMIN — HEPARIN SODIUM 5000 UNIT(S): 5000 INJECTION INTRAVENOUS; SUBCUTANEOUS at 06:43

## 2018-04-06 RX ADMIN — Medication 250 MILLIGRAM(S): at 07:12

## 2018-04-06 RX ADMIN — LIDOCAINE 1 PATCH: 4 CREAM TOPICAL at 13:18

## 2018-04-06 RX ADMIN — HEPARIN SODIUM 5000 UNIT(S): 5000 INJECTION INTRAVENOUS; SUBCUTANEOUS at 22:02

## 2018-04-06 RX ADMIN — PREGABALIN 1000 MICROGRAM(S): 225 CAPSULE ORAL at 13:18

## 2018-04-06 RX ADMIN — Medication 50 MILLIGRAM(S): at 06:43

## 2018-04-06 RX ADMIN — LIDOCAINE 1 PATCH: 4 CREAM TOPICAL at 02:33

## 2018-04-06 RX ADMIN — Medication 50 MILLIGRAM(S): at 22:02

## 2018-04-06 RX ADMIN — SIMVASTATIN 10 MILLIGRAM(S): 20 TABLET, FILM COATED ORAL at 22:02

## 2018-04-06 RX ADMIN — Medication 1000 UNIT(S): at 13:18

## 2018-04-06 RX ADMIN — Medication 250 MILLIGRAM(S): at 13:19

## 2018-04-06 RX ADMIN — Medication 50 MILLIGRAM(S): at 13:27

## 2018-04-06 RX ADMIN — Medication 250 MILLIGRAM(S): at 06:42

## 2018-04-06 RX ADMIN — Medication 81 MILLIGRAM(S): at 13:18

## 2018-04-06 RX ADMIN — Medication 50 MILLIGRAM(S): at 17:36

## 2018-04-06 RX ADMIN — Medication 50 MILLIGRAM(S): at 06:44

## 2018-04-06 NOTE — PROGRESS NOTE ADULT - SUBJECTIVE AND OBJECTIVE BOX
Patient is a 86y old  Female who presents with a chief complaint of s/p fall, back pain (05 Apr 2018 14:58)      INTERVAL HPI/OVERNIGHT EVENTS:  improved    VITAL SIGNS:  T(F): 98.1 (04-06-18 @ 05:15)  HR: 56 (04-06-18 @ 05:15)  BP: 126/41 (04-06-18 @ 05:15)  RR: 18 (04-06-18 @ 05:15)  SpO2: 96% (04-06-18 @ 05:15)  Wt(kg): --  I&O's Detail          REVIEW OF SYSTEMS:    CONSTITUTIONAL:  No fevers, chills, sweats    HEENT:  Eyes:  No diplopia or blurred vision. ENT:  No earache, sore throat or runny nose.    CARDIOVASCULAR:  No pressure, squeezing, tightness, or heaviness about the chest; no palpitations.    RESPIRATORY:  Per HPI    GASTROINTESTINAL:  No abdominal pain, nausea, vomiting or diarrhea.    GENITOURINARY:  No dysuria, frequency or urgency.    NEUROLOGIC:  No paresthesias, fasciculations, seizures or weakness.    PSYCHIATRIC:  No disorder of thought or mood.      PHYSICAL EXAM:    Constitutional:no complaints  ENMT:atnc  Neck:supple  Respiratory:clear  Cardiovascular:rr  Gastrointestinal:soft  Extremities:no edema  Vascular:intact  Neurological:non focal  Musculoskeletal:no edema, normal rom    MEDICATIONS  (STANDING):  aspirin  chewable 81 milliGRAM(s) Oral daily  cholecalciferol 1000 Unit(s) Oral daily  cyanocobalamin 1000 MICROGram(s) Oral daily  heparin  Injectable 5000 Unit(s) SubCutaneous every 8 hours  hydrALAZINE 50 milliGRAM(s) Oral every 8 hours  lidocaine   Patch 1 Patch Transdermal daily  metoprolol tartrate 50 milliGRAM(s) Oral two times a day  naproxen 250 milliGRAM(s) Oral three times a day  simvastatin 10 milliGRAM(s) Oral at bedtime    MEDICATIONS  (PRN):  acetaminophen   Tablet. 650 milliGRAM(s) Oral every 6 hours PRN Moderate Pain (4 - 6)  traMADol 25 milliGRAM(s) Oral four times a day PRN Severe Pain (7 - 10)      Allergies    No Known Allergies            LABS:                        12.6   8.9   )-----------( 239      ( 04 Apr 2018 12:47 )             39.8     04-04    138  |  105  |  22<H>  ----------------------------<  96  4.1   |  28  |  0.80    Ca    8.9      04 Apr 2018 12:47  Phos  3.3     04-04  Mg     2.2     04-04

## 2018-04-06 NOTE — PROGRESS NOTE ADULT - ASSESSMENT
-s/p mechanical fall  -smoker  -rul 6 mm nodule-new since 4/2015  -hx; htn, cad  -s/p pain consult    Plan; phy tx          f/u ct 3 mo.- spoke to pcp          discharge today with home services

## 2018-04-07 RX ADMIN — Medication 50 MILLIGRAM(S): at 17:27

## 2018-04-07 RX ADMIN — HEPARIN SODIUM 5000 UNIT(S): 5000 INJECTION INTRAVENOUS; SUBCUTANEOUS at 21:34

## 2018-04-07 RX ADMIN — LIDOCAINE 1 PATCH: 4 CREAM TOPICAL at 11:26

## 2018-04-07 RX ADMIN — Medication 50 MILLIGRAM(S): at 05:26

## 2018-04-07 RX ADMIN — PREGABALIN 1000 MICROGRAM(S): 225 CAPSULE ORAL at 11:26

## 2018-04-07 RX ADMIN — Medication 81 MILLIGRAM(S): at 11:26

## 2018-04-07 RX ADMIN — HEPARIN SODIUM 5000 UNIT(S): 5000 INJECTION INTRAVENOUS; SUBCUTANEOUS at 13:53

## 2018-04-07 RX ADMIN — Medication 1000 UNIT(S): at 11:26

## 2018-04-07 RX ADMIN — LIDOCAINE 1 PATCH: 4 CREAM TOPICAL at 23:06

## 2018-04-07 RX ADMIN — TRAMADOL HYDROCHLORIDE 25 MILLIGRAM(S): 50 TABLET ORAL at 13:52

## 2018-04-07 RX ADMIN — TRAMADOL HYDROCHLORIDE 25 MILLIGRAM(S): 50 TABLET ORAL at 10:22

## 2018-04-07 RX ADMIN — Medication 50 MILLIGRAM(S): at 21:33

## 2018-04-07 RX ADMIN — Medication 50 MILLIGRAM(S): at 13:53

## 2018-04-07 RX ADMIN — LIDOCAINE 1 PATCH: 4 CREAM TOPICAL at 02:17

## 2018-04-07 RX ADMIN — SIMVASTATIN 10 MILLIGRAM(S): 20 TABLET, FILM COATED ORAL at 21:34

## 2018-04-07 RX ADMIN — HEPARIN SODIUM 5000 UNIT(S): 5000 INJECTION INTRAVENOUS; SUBCUTANEOUS at 05:26

## 2018-04-07 NOTE — PROGRESS NOTE ADULT - SUBJECTIVE AND OBJECTIVE BOX
HPI:  86 year-old female lives alone, no HHA, walks independently but with cane intermittently, with PMH of HTN, CAD s/p stent in 2004, current smoker, HLD, h/o syncope in 2015, and PSH of abd hysterectomy, appendectomy, tonsillectomy, hernia repair came to ED c/o lower back pain after having a mechanical fall at home. Pt is a poor historian though she is AAOx3, she is forgetful and speaks very slow, mumbles. Pt was in her usual state of health till this morning, however during the day when she was in the kitchen she slipped over the fluid on the floor and fell on her back. She denies LOC, dizziness, chest pain, SOB, diarrhea, fever, chills, or any other symptoms before/during/after the fall. She has baseline unstable gait. She was able to get up by herself, watched TV for a while, and came out to her apartment. She had poor oral intake today because there was little food to eat and it was snowing badly outside. When she was on the stairs, she met one of the residents of the apartment who asked her about what happened. She told him that she fell down, and he recommended she go to the ED for further evaluation.     In ED, pt was hypertensive to 203/84, was in severe back pain so IV morphine 4mg was given in ED, f/u BP improved to 173/68, other VS WNL. EKG; NSR at 71BPM. CT head negative, CT L-spine: No CT evidence of acute lumbar spine fracture or traumatic malalignment.A chronic mild compression fracture at L5 is stable since 04/25/2015.Small disc bulges throughout the lumbar spine.  Diffuse mild spinal canal stenosis at L1-L2 through L5-L4-L5. Moderate neural foraminal narrowing at L5-S1 on the right. Labs mild leukocytosis of 11.9K likely from reactive, otherwise grossly normal including cardiac enzymes.    Pt is admitted to the medicine floor for back pain due to fall, and inability to take care of herself with no proper social support.     ** GOC: full code  ** Primary team please call [email or web address] pharmacy and complete med rec, as pt does not know what medications she takes. Med rec was done based on the EMR outside pharmacy record. (03 Apr 2018 02:21)      Meds:    Allergies:  Allergies    No Known Allergies    Intolerances        REVIEW OF SYSTEMS:    CONSTITUTIONAL: No weakness, fevers or chills  EYES/ENT: No visual changes;  No vertigo or throat pain   NECK: No pain or stiffness  RESPIRATORY: No cough, wheezing, hemoptysis; No shortness of breath  CARDIOVASCULAR: No chest pain or palpitations  GASTROINTESTINAL: No abdominal or epigastric pain. No nausea, vomiting, or hematemesis; No diarrhea or constipation. No melena or hematochezia.  GENITOURINARY: No dysuria, frequency or hematuria  NEUROLOGICAL: No numbness or weakness  SKIN: No itching, burning, rashes, or lesions   All other review of systems is negative unless indicated above.    PHYSICAL EXAM:    Vital Signs Last 24 Hrs  T(C): 36.5 (07 Apr 2018 14:54), Max: 37.1 (07 Apr 2018 05:15)  T(F): 97.7 (07 Apr 2018 14:54), Max: 98.7 (07 Apr 2018 05:15)  HR: 57 (07 Apr 2018 14:54) (50 - 59)  BP: 149/48 (07 Apr 2018 14:54) (127/71 - 161/66)  BP(mean): --  RR: 17 (07 Apr 2018 14:54) (17 - 18)  SpO2: 98% (07 Apr 2018 14:54) (95% - 98%)    HEENT:    Neck:  [  ] Supple  [  ] Lymphadenopathy  [  ] JVD  [  ] Masses  [  ] WNL    CHEST/Respiratory: [ ] Clear to auscultation     [  ] Rales      [  ] Rhonchi      [  ] Wheezing       [  ] Chest Tenderness     [  ] WNL    Cardiovascular:  [  ]S1 S2  [  ] Reg  [  ] Irreg   [  ] No Murmurs   [  ] Murmurs  [  ] Systolic [  ] Diastolic    Gastrointestinal:  [  ] Bowel Sounds  [   ] ABD Soft  [  ] ABD Distention   [  ] No Tenderness [  ] Tenderness  [  ] Organomegaly  [  ] Guarding rigidity  [  ] No Guarding rigidity  [  ] Rebound Tenderness [  ] No Rebound Tenderness    Extremities: [  ] Edema  [  ] No Edema  [  ] Clubbing   [  ] Cyanosis  [  ] Palpable peripheral pulses                          [  ] Tender calf muscles    [  ] No Tender Calf Muscles    Neurological:  [  ] Alert  [  ] Awake  [  ] Oriented  x                              [  ] Focal weakness  [  ] No Focal Weakness    Skin:  [  ] Thrombophlebitis  [  ] Rashes  [  ] Dry  [  ] Ulcers    Ortho:  [  ] Joint Swelling  [  ] Joint erythema [  ] DJD [  ] Increased Temperature to Touch      LABS/DIAGNOSTIC TESTS                  CAPILLARY BLOOD GLUCOSE            Hemoglobin A1C, Whole Blood: 5.2 % (04-03 @ 08:40)    Thyroid Stimulating Hormone, Serum: 2.38 uU/mL (04-03 @ 06:12)    CULTURES:       RADIOLOGY  CXR:    acetaminophen   Tablet. 650 milliGRAM(s) Oral every 6 hours PRN  aspirin  chewable 81 milliGRAM(s) Oral daily  cholecalciferol 1000 Unit(s) Oral daily  cyanocobalamin 1000 MICROGram(s) Oral daily  heparin  Injectable 5000 Unit(s) SubCutaneous every 8 hours  hydrALAZINE 50 milliGRAM(s) Oral every 8 hours  lidocaine   Patch 1 Patch Transdermal daily  metoprolol tartrate 50 milliGRAM(s) Oral two times a day  simvastatin 10 milliGRAM(s) Oral at bedtime  traMADol 25 milliGRAM(s) Oral four times a day PRN

## 2018-04-08 RX ORDER — DOCUSATE SODIUM 100 MG
100 CAPSULE ORAL DAILY
Qty: 0 | Refills: 0 | Status: DISCONTINUED | OUTPATIENT
Start: 2018-04-08 | End: 2018-04-09

## 2018-04-08 RX ORDER — SENNA PLUS 8.6 MG/1
1 TABLET ORAL AT BEDTIME
Qty: 0 | Refills: 0 | Status: DISCONTINUED | OUTPATIENT
Start: 2018-04-08 | End: 2018-04-09

## 2018-04-08 RX ORDER — POLYETHYLENE GLYCOL 3350 17 G/17G
17 POWDER, FOR SOLUTION ORAL DAILY
Qty: 0 | Refills: 0 | Status: DISCONTINUED | OUTPATIENT
Start: 2018-04-08 | End: 2018-04-09

## 2018-04-08 RX ADMIN — HEPARIN SODIUM 5000 UNIT(S): 5000 INJECTION INTRAVENOUS; SUBCUTANEOUS at 06:01

## 2018-04-08 RX ADMIN — PREGABALIN 1000 MICROGRAM(S): 225 CAPSULE ORAL at 13:03

## 2018-04-08 RX ADMIN — SENNA PLUS 1 TABLET(S): 8.6 TABLET ORAL at 22:20

## 2018-04-08 RX ADMIN — Medication 50 MILLIGRAM(S): at 06:01

## 2018-04-08 RX ADMIN — Medication 100 MILLIGRAM(S): at 18:15

## 2018-04-08 RX ADMIN — Medication 50 MILLIGRAM(S): at 18:15

## 2018-04-08 RX ADMIN — Medication 81 MILLIGRAM(S): at 13:03

## 2018-04-08 RX ADMIN — Medication 50 MILLIGRAM(S): at 22:20

## 2018-04-08 RX ADMIN — LIDOCAINE 1 PATCH: 4 CREAM TOPICAL at 13:04

## 2018-04-08 RX ADMIN — Medication 1000 UNIT(S): at 13:03

## 2018-04-08 RX ADMIN — HEPARIN SODIUM 5000 UNIT(S): 5000 INJECTION INTRAVENOUS; SUBCUTANEOUS at 22:20

## 2018-04-08 RX ADMIN — Medication 50 MILLIGRAM(S): at 13:04

## 2018-04-08 RX ADMIN — SIMVASTATIN 10 MILLIGRAM(S): 20 TABLET, FILM COATED ORAL at 22:20

## 2018-04-08 RX ADMIN — HEPARIN SODIUM 5000 UNIT(S): 5000 INJECTION INTRAVENOUS; SUBCUTANEOUS at 13:03

## 2018-04-08 NOTE — PROGRESS NOTE ADULT - ASSESSMENT
1.s/p fall  lbp  pain control  pt  2.lives alone  social service eval  seen by psych  3.cad/htn  cont tx

## 2018-04-08 NOTE — PROGRESS NOTE ADULT - SUBJECTIVE AND OBJECTIVE BOX
HPI:  86 year-old female lives alone, no HHA, walks independently but with cane intermittently, with PMH of HTN, CAD s/p stent in 2004, current smoker, HLD, h/o syncope in 2015, and PSH of abd hysterectomy, appendectomy, tonsillectomy, hernia repair came to ED c/o lower back pain after having a mechanical fall at home. Pt is a poor historian though she is AAOx3, she is forgetful and speaks very slow, mumbles. Pt was in her usual state of health till this morning, however during the day when she was in the kitchen she slipped over the fluid on the floor and fell on her back. She denies LOC, dizziness, chest pain, SOB, diarrhea, fever, chills, or any other symptoms before/during/after the fall. She has baseline unstable gait. She was able to get up by herself, watched TV for a while, and came out to her apartment. She had poor oral intake today because there was little food to eat and it was snowing badly outside. When she was on the stairs, she met one of the residents of the apartment who asked her about what happened. She told him that she fell down, and he recommended she go to the ED for further evaluation.     In ED, pt was hypertensive to 203/84, was in severe back pain so IV morphine 4mg was given in ED, f/u BP improved to 173/68, other VS WNL. EKG; NSR at 71BPM. CT head negative, CT L-spine: No CT evidence of acute lumbar spine fracture or traumatic malalignment.A chronic mild compression fracture at L5 is stable since 04/25/2015.Small disc bulges throughout the lumbar spine.  Diffuse mild spinal canal stenosis at L1-L2 through L5-L4-L5. Moderate neural foraminal narrowing at L5-S1 on the right. Labs mild leukocytosis of 11.9K likely from reactive, otherwise grossly normal including cardiac enzymes.    Pt is admitted to the medicine floor for back pain due to fall, and inability to take care of herself with no proper social support.     ** GOC: full code  ** Primary team please call [facility] pharmacy and complete med rec, as pt does not know what medications she takes. Med rec was done based on the EMR outside pharmacy record. (03 Apr 2018 02:21)      Meds:    Allergies:  Allergies    No Known Allergies    Intolerances        REVIEW OF SYSTEMS:still has some pain  imani diet  no vomiting    CONSTITUTIONAL: No weakness, fevers or chills  EYES/ENT: No visual changes;  No vertigo or throat pain   NECK: No pain or stiffness  RESPIRATORY: No cough, wheezing, hemoptysis; No shortness of breath  CARDIOVASCULAR: No chest pain or palpitations  GASTROINTESTINAL: No abdominal or epigastric pain. No nausea, vomiting, or hematemesis; No diarrhea or constipation. No melena or hematochezia.  GENITOURINARY: No dysuria, frequency or hematuria  NEUROLOGICAL: No numbness or weakness  SKIN: No itching, burning, rashes, or lesions   All other review of systems is negative unless indicated above.    PHYSICAL EXAM:    Vital Signs Last 24 Hrs  T(C): 36.6 (08 Apr 2018 14:47), Max: 36.6 (08 Apr 2018 05:24)  T(F): 97.9 (08 Apr 2018 14:47), Max: 97.9 (08 Apr 2018 14:47)  HR: 52 (08 Apr 2018 14:47) (52 - 62)  BP: 166/80 (08 Apr 2018 14:47) (127/67 - 166/80)  BP(mean): --  RR: 16 (08 Apr 2018 14:47) (16 - 18)  SpO2: 97% (08 Apr 2018 14:47) (96% - 97%)    HEENT:elderly female  awake  in nad  lungs clear  cardia reg  abd soft  neuro moves all limbs    Neck:  [  ] Supple  [  ] Lymphadenopathy  [  ] JVD  [  ] Masses  [  ] WNL    CHEST/Respiratory: [ ] Clear to auscultation     [  ] Rales      [  ] Rhonchi      [  ] Wheezing       [  ] Chest Tenderness     [  ] WNL    Cardiovascular:  [  ]S1 S2  [  ] Reg  [  ] Irreg   [  ] No Murmurs   [  ] Murmurs  [  ] Systolic [  ] Diastolic    Gastrointestinal:  [  ] Bowel Sounds  [   ] ABD Soft  [  ] ABD Distention   [  ] No Tenderness [  ] Tenderness  [  ] Organomegaly  [  ] Guarding rigidity  [  ] No Guarding rigidity  [  ] Rebound Tenderness [  ] No Rebound Tenderness    Extremities: [  ] Edema  [  ] No Edema  [  ] Clubbing   [  ] Cyanosis  [  ] Palpable peripheral pulses                          [  ] Tender calf muscles    [  ] No Tender Calf Muscles    Neurological:  [  ] Alert  [  ] Awake  [  ] Oriented  x                              [  ] Focal weakness  [  ] No Focal Weakness    Skin:  [  ] Thrombophlebitis  [  ] Rashes  [  ] Dry  [  ] Ulcers    Ortho:  [  ] Joint Swelling  [  ] Joint erythema [  ] DJD [  ] Increased Temperature to Touch      LABS/DIAGNOSTIC TESTS                  CAPILLARY BLOOD GLUCOSE            Hemoglobin A1C, Whole Blood: 5.2 % (04-03 @ 08:40)    Thyroid Stimulating Hormone, Serum: 2.38 uU/mL (04-03 @ 06:12)    CULTURES:       RADIOLOGY  CXR:    acetaminophen   Tablet. 650 milliGRAM(s) Oral every 6 hours PRN  aspirin  chewable 81 milliGRAM(s) Oral daily  cholecalciferol 1000 Unit(s) Oral daily  cyanocobalamin 1000 MICROGram(s) Oral daily  heparin  Injectable 5000 Unit(s) SubCutaneous every 8 hours  hydrALAZINE 50 milliGRAM(s) Oral every 8 hours  lidocaine   Patch 1 Patch Transdermal daily  metoprolol tartrate 50 milliGRAM(s) Oral two times a day  simvastatin 10 milliGRAM(s) Oral at bedtime  traMADol 25 milliGRAM(s) Oral four times a day PRN

## 2018-04-08 NOTE — BEHAVIORAL HEALTH ASSESSMENT NOTE - SUMMARY
This is a 85 y/o single,w/f, from home with PMH of CAD,HTN was admitted with s/p fall.  Patient was interviewed,chart was reviewed,case was discussed with MD.  Patient is c/o back pain due to having a fall at home.  Stated living alone.has no children.  Denied psych history.  Denied using alcohol  Patient is a/o x2,cooperarive verbal,behaviorally controlled.  Speech is low pitched goal directed logical.  Mood-OK, affect-somewhat anxious.  Denied s/h thought,denied a/v hallucinations.  memory and cognition-mildly impaired.I&J-fair.

## 2018-04-09 VITALS
DIASTOLIC BLOOD PRESSURE: 65 MMHG | OXYGEN SATURATION: 96 % | HEART RATE: 56 BPM | SYSTOLIC BLOOD PRESSURE: 123 MMHG | RESPIRATION RATE: 17 BRPM | TEMPERATURE: 98 F

## 2018-04-09 PROCEDURE — 85610 PROTHROMBIN TIME: CPT

## 2018-04-09 PROCEDURE — 71250 CT THORAX DX C-: CPT

## 2018-04-09 PROCEDURE — 85027 COMPLETE CBC AUTOMATED: CPT

## 2018-04-09 PROCEDURE — 80061 LIPID PANEL: CPT

## 2018-04-09 PROCEDURE — 96374 THER/PROPH/DIAG INJ IV PUSH: CPT

## 2018-04-09 PROCEDURE — 82550 ASSAY OF CK (CPK): CPT

## 2018-04-09 PROCEDURE — 80053 COMPREHEN METABOLIC PANEL: CPT

## 2018-04-09 PROCEDURE — 83036 HEMOGLOBIN GLYCOSYLATED A1C: CPT

## 2018-04-09 PROCEDURE — 93005 ELECTROCARDIOGRAM TRACING: CPT

## 2018-04-09 PROCEDURE — 84443 ASSAY THYROID STIM HORMONE: CPT

## 2018-04-09 PROCEDURE — 84484 ASSAY OF TROPONIN QUANT: CPT

## 2018-04-09 PROCEDURE — 71045 X-RAY EXAM CHEST 1 VIEW: CPT

## 2018-04-09 PROCEDURE — 83735 ASSAY OF MAGNESIUM: CPT

## 2018-04-09 PROCEDURE — 70450 CT HEAD/BRAIN W/O DYE: CPT

## 2018-04-09 PROCEDURE — 80048 BASIC METABOLIC PNL TOTAL CA: CPT

## 2018-04-09 PROCEDURE — 85730 THROMBOPLASTIN TIME PARTIAL: CPT

## 2018-04-09 PROCEDURE — 82553 CREATINE MB FRACTION: CPT

## 2018-04-09 PROCEDURE — 97162 PT EVAL MOD COMPLEX 30 MIN: CPT

## 2018-04-09 PROCEDURE — 84100 ASSAY OF PHOSPHORUS: CPT

## 2018-04-09 PROCEDURE — 97530 THERAPEUTIC ACTIVITIES: CPT

## 2018-04-09 PROCEDURE — 72131 CT LUMBAR SPINE W/O DYE: CPT

## 2018-04-09 PROCEDURE — 82607 VITAMIN B-12: CPT

## 2018-04-09 PROCEDURE — 82746 ASSAY OF FOLIC ACID SERUM: CPT

## 2018-04-09 PROCEDURE — 99285 EMERGENCY DEPT VISIT HI MDM: CPT | Mod: 25

## 2018-04-09 PROCEDURE — G0378: CPT

## 2018-04-09 PROCEDURE — 97116 GAIT TRAINING THERAPY: CPT

## 2018-04-09 RX ADMIN — Medication 81 MILLIGRAM(S): at 11:45

## 2018-04-09 RX ADMIN — Medication 50 MILLIGRAM(S): at 05:18

## 2018-04-09 RX ADMIN — HEPARIN SODIUM 5000 UNIT(S): 5000 INJECTION INTRAVENOUS; SUBCUTANEOUS at 13:58

## 2018-04-09 RX ADMIN — Medication 1000 UNIT(S): at 14:00

## 2018-04-09 RX ADMIN — TRAMADOL HYDROCHLORIDE 25 MILLIGRAM(S): 50 TABLET ORAL at 12:58

## 2018-04-09 RX ADMIN — Medication 650 MILLIGRAM(S): at 05:18

## 2018-04-09 RX ADMIN — TRAMADOL HYDROCHLORIDE 25 MILLIGRAM(S): 50 TABLET ORAL at 11:46

## 2018-04-09 RX ADMIN — Medication 100 MILLIGRAM(S): at 11:45

## 2018-04-09 RX ADMIN — PREGABALIN 1000 MICROGRAM(S): 225 CAPSULE ORAL at 11:45

## 2018-04-09 RX ADMIN — LIDOCAINE 1 PATCH: 4 CREAM TOPICAL at 11:51

## 2018-04-09 RX ADMIN — Medication 50 MILLIGRAM(S): at 13:58

## 2018-04-09 RX ADMIN — Medication 650 MILLIGRAM(S): at 05:59

## 2018-04-09 RX ADMIN — LIDOCAINE 1 PATCH: 4 CREAM TOPICAL at 02:01

## 2018-04-09 RX ADMIN — HEPARIN SODIUM 5000 UNIT(S): 5000 INJECTION INTRAVENOUS; SUBCUTANEOUS at 05:18

## 2018-04-09 NOTE — PROGRESS NOTE ADULT - SUBJECTIVE AND OBJECTIVE BOX
Patient is a 86y old  Female who presents with a chief complaint of s/p fall, back pain (05 Apr 2018 14:58)      INTERVAL HPI/OVERNIGHT EVENTS:  c/o intermittent pain  forgetful as per resident    VITAL SIGNS:  T(F): 97.5 (04-09-18 @ 05:20)  HR: 53 (04-09-18 @ 05:20)  BP: 126/53 (04-09-18 @ 05:20)  RR: 17 (04-09-18 @ 05:20)  SpO2: 94% (04-09-18 @ 05:20)  Wt(kg): --  I&O's Detail          REVIEW OF SYSTEMS:    CONSTITUTIONAL:  No fevers, chills, sweats    HEENT:  Eyes:  No diplopia or blurred vision. ENT:  No earache, sore throat or runny nose.    CARDIOVASCULAR:  No pressure, squeezing, tightness, or heaviness about the chest; no palpitations.    RESPIRATORY:  no sob,cp    GASTROINTESTINAL:  No abdominal pain, nausea, vomiting or diarrhea.    GENITOURINARY:  No dysuria, frequency or urgency.    NEUROLOGIC:  No paresthesias, fasciculations, seizures or weakness.    PSYCHIATRIC:  No disorder of thought or mood.      PHYSICAL EXAM:    Constitutional:no complaints  ENMT:atnc  Neck:supple  Respiratory:clear  Cardiovascular:rr  Gastrointestinal:soft  Extremities:no edema  Vascular:intact  Neurological:non focal  Musculoskeletal:no edema, normal rom    MEDICATIONS  (STANDING):  aspirin  chewable 81 milliGRAM(s) Oral daily  cholecalciferol 1000 Unit(s) Oral daily  cyanocobalamin 1000 MICROGram(s) Oral daily  docusate sodium 100 milliGRAM(s) Oral daily  heparin  Injectable 5000 Unit(s) SubCutaneous every 8 hours  hydrALAZINE 50 milliGRAM(s) Oral every 8 hours  lidocaine   Patch 1 Patch Transdermal daily  metoprolol tartrate 50 milliGRAM(s) Oral two times a day  senna 1 Tablet(s) Oral at bedtime  simvastatin 10 milliGRAM(s) Oral at bedtime    MEDICATIONS  (PRN):  acetaminophen   Tablet. 650 milliGRAM(s) Oral every 6 hours PRN Moderate Pain (4 - 6)  polyethylene glycol 3350 17 Gram(s) Oral daily PRN Constipation  traMADol 25 milliGRAM(s) Oral four times a day PRN Severe Pain (7 - 10)      Allergies    No Known Allergies

## 2018-04-09 NOTE — PROGRESS NOTE ADULT - ASSESSMENT
-s/p mechanical fall  -smoker  -rul 6 mm nodule-new since 4/2015  -hx; htn, cad  -s/p pain consult    Plan; phy tx          f/u ct 3 mo.- spoke to pcp          psych eval re ? safety of discharge given forgetfullness  discussed with resident

## 2018-04-13 ENCOUNTER — INPATIENT (INPATIENT)
Facility: HOSPITAL | Age: 83
LOS: 24 days | Discharge: NURSING, REHAB OR LONG-TERM CARE | DRG: 544 | End: 2018-05-08
Attending: INTERNAL MEDICINE | Admitting: INTERNAL MEDICINE
Payer: MEDICARE

## 2018-04-13 VITALS
DIASTOLIC BLOOD PRESSURE: 72 MMHG | OXYGEN SATURATION: 100 % | SYSTOLIC BLOOD PRESSURE: 165 MMHG | RESPIRATION RATE: 17 BRPM | WEIGHT: 119.93 LBS | TEMPERATURE: 98 F | HEART RATE: 54 BPM

## 2018-04-13 DIAGNOSIS — Z90.49 ACQUIRED ABSENCE OF OTHER SPECIFIED PARTS OF DIGESTIVE TRACT: Chronic | ICD-10-CM

## 2018-04-13 DIAGNOSIS — Z29.9 ENCOUNTER FOR PROPHYLACTIC MEASURES, UNSPECIFIED: ICD-10-CM

## 2018-04-13 DIAGNOSIS — I25.10 ATHEROSCLEROTIC HEART DISEASE OF NATIVE CORONARY ARTERY WITHOUT ANGINA PECTORIS: ICD-10-CM

## 2018-04-13 DIAGNOSIS — R62.7 ADULT FAILURE TO THRIVE: ICD-10-CM

## 2018-04-13 DIAGNOSIS — E78.5 HYPERLIPIDEMIA, UNSPECIFIED: ICD-10-CM

## 2018-04-13 DIAGNOSIS — Z90.89 ACQUIRED ABSENCE OF OTHER ORGANS: Chronic | ICD-10-CM

## 2018-04-13 DIAGNOSIS — F17.210 NICOTINE DEPENDENCE, CIGARETTES, UNCOMPLICATED: ICD-10-CM

## 2018-04-13 DIAGNOSIS — Z98.89 OTHER SPECIFIED POSTPROCEDURAL STATES: Chronic | ICD-10-CM

## 2018-04-13 DIAGNOSIS — Z90.710 ACQUIRED ABSENCE OF BOTH CERVIX AND UTERUS: Chronic | ICD-10-CM

## 2018-04-13 DIAGNOSIS — Z95.5 PRESENCE OF CORONARY ANGIOPLASTY IMPLANT AND GRAFT: Chronic | ICD-10-CM

## 2018-04-13 DIAGNOSIS — I10 ESSENTIAL (PRIMARY) HYPERTENSION: ICD-10-CM

## 2018-04-13 DIAGNOSIS — M54.9 DORSALGIA, UNSPECIFIED: ICD-10-CM

## 2018-04-13 LAB
ALBUMIN SERPL ELPH-MCNC: 3.9 G/DL — SIGNIFICANT CHANGE UP (ref 3.5–5)
ALP SERPL-CCNC: 103 U/L — SIGNIFICANT CHANGE UP (ref 40–120)
ALT FLD-CCNC: 42 U/L DA — SIGNIFICANT CHANGE UP (ref 10–60)
ANION GAP SERPL CALC-SCNC: 10 MMOL/L — SIGNIFICANT CHANGE UP (ref 5–17)
APPEARANCE UR: CLEAR — SIGNIFICANT CHANGE UP
AST SERPL-CCNC: 20 U/L — SIGNIFICANT CHANGE UP (ref 10–40)
BASE EXCESS BLDV CALC-SCNC: 1.3 MMOL/L — SIGNIFICANT CHANGE UP (ref -2–2)
BASOPHILS # BLD AUTO: 0.1 K/UL — SIGNIFICANT CHANGE UP (ref 0–0.2)
BASOPHILS NFR BLD AUTO: 1 % — SIGNIFICANT CHANGE UP (ref 0–2)
BILIRUB SERPL-MCNC: 0.5 MG/DL — SIGNIFICANT CHANGE UP (ref 0.2–1.2)
BILIRUB UR-MCNC: NEGATIVE — SIGNIFICANT CHANGE UP
BUN SERPL-MCNC: 36 MG/DL — HIGH (ref 7–18)
CALCIUM SERPL-MCNC: 9.3 MG/DL — SIGNIFICANT CHANGE UP (ref 8.4–10.5)
CHLORIDE SERPL-SCNC: 105 MMOL/L — SIGNIFICANT CHANGE UP (ref 96–108)
CO2 SERPL-SCNC: 25 MMOL/L — SIGNIFICANT CHANGE UP (ref 22–31)
COLOR SPEC: YELLOW — SIGNIFICANT CHANGE UP
CREAT SERPL-MCNC: 1 MG/DL — SIGNIFICANT CHANGE UP (ref 0.5–1.3)
DIFF PNL FLD: NEGATIVE — SIGNIFICANT CHANGE UP
EOSINOPHIL # BLD AUTO: 0.1 K/UL — SIGNIFICANT CHANGE UP (ref 0–0.5)
EOSINOPHIL NFR BLD AUTO: 0.5 % — SIGNIFICANT CHANGE UP (ref 0–6)
GLUCOSE SERPL-MCNC: 103 MG/DL — HIGH (ref 70–99)
GLUCOSE UR QL: NEGATIVE — SIGNIFICANT CHANGE UP
HCO3 BLDV-SCNC: 27 MMOL/L — SIGNIFICANT CHANGE UP (ref 21–29)
HCT VFR BLD CALC: 38.7 % — SIGNIFICANT CHANGE UP (ref 34.5–45)
HGB BLD-MCNC: 12.8 G/DL — SIGNIFICANT CHANGE UP (ref 11.5–15.5)
HOROWITZ INDEX BLDV+IHG-RTO: 21 — SIGNIFICANT CHANGE UP
KETONES UR-MCNC: ABNORMAL
LACTATE SERPL-SCNC: 0.7 MMOL/L — SIGNIFICANT CHANGE UP (ref 0.7–2)
LEUKOCYTE ESTERASE UR-ACNC: NEGATIVE — SIGNIFICANT CHANGE UP
LIDOCAIN IGE QN: 82 U/L — SIGNIFICANT CHANGE UP (ref 73–393)
LYMPHOCYTES # BLD AUTO: 1.7 K/UL — SIGNIFICANT CHANGE UP (ref 1–3.3)
LYMPHOCYTES # BLD AUTO: 12.7 % — LOW (ref 13–44)
MCHC RBC-ENTMCNC: 33.2 GM/DL — SIGNIFICANT CHANGE UP (ref 32–36)
MCHC RBC-ENTMCNC: 33.7 PG — SIGNIFICANT CHANGE UP (ref 27–34)
MCV RBC AUTO: 101.6 FL — HIGH (ref 80–100)
MONOCYTES # BLD AUTO: 1 K/UL — HIGH (ref 0–0.9)
MONOCYTES NFR BLD AUTO: 7.3 % — SIGNIFICANT CHANGE UP (ref 2–14)
NEUTROPHILS # BLD AUTO: 10.3 K/UL — HIGH (ref 1.8–7.4)
NEUTROPHILS NFR BLD AUTO: 78.5 % — HIGH (ref 43–77)
NITRITE UR-MCNC: NEGATIVE — SIGNIFICANT CHANGE UP
PCO2 BLDV: 48 MMHG — SIGNIFICANT CHANGE UP (ref 35–50)
PH BLDV: 7.36 — SIGNIFICANT CHANGE UP (ref 7.35–7.45)
PH UR: 6 — SIGNIFICANT CHANGE UP (ref 5–8)
PLATELET # BLD AUTO: 268 K/UL — SIGNIFICANT CHANGE UP (ref 150–400)
PO2 BLDV: <44 MMHG — SIGNIFICANT CHANGE UP (ref 25–45)
POTASSIUM SERPL-MCNC: 4.2 MMOL/L — SIGNIFICANT CHANGE UP (ref 3.5–5.3)
POTASSIUM SERPL-SCNC: 4.2 MMOL/L — SIGNIFICANT CHANGE UP (ref 3.5–5.3)
PROT SERPL-MCNC: 8.1 G/DL — SIGNIFICANT CHANGE UP (ref 6–8.3)
PROT UR-MCNC: NEGATIVE — SIGNIFICANT CHANGE UP
RBC # BLD: 3.81 M/UL — SIGNIFICANT CHANGE UP (ref 3.8–5.2)
RBC # FLD: 12.3 % — SIGNIFICANT CHANGE UP (ref 10.3–14.5)
SAO2 % BLDV: 22 % — LOW (ref 67–88)
SODIUM SERPL-SCNC: 140 MMOL/L — SIGNIFICANT CHANGE UP (ref 135–145)
SP GR SPEC: 1.01 — SIGNIFICANT CHANGE UP (ref 1.01–1.02)
UROBILINOGEN FLD QL: NEGATIVE — SIGNIFICANT CHANGE UP
WBC # BLD: 13.2 K/UL — HIGH (ref 3.8–10.5)
WBC # FLD AUTO: 13.2 K/UL — HIGH (ref 3.8–10.5)

## 2018-04-13 PROCEDURE — 99285 EMERGENCY DEPT VISIT HI MDM: CPT

## 2018-04-13 PROCEDURE — 99222 1ST HOSP IP/OBS MODERATE 55: CPT

## 2018-04-13 PROCEDURE — 74177 CT ABD & PELVIS W/CONTRAST: CPT | Mod: 26

## 2018-04-13 PROCEDURE — 71045 X-RAY EXAM CHEST 1 VIEW: CPT | Mod: 26

## 2018-04-13 RX ORDER — NICOTINE POLACRILEX 2 MG
1 GUM BUCCAL DAILY
Qty: 0 | Refills: 0 | Status: DISCONTINUED | OUTPATIENT
Start: 2018-04-13 | End: 2018-05-08

## 2018-04-13 RX ORDER — SODIUM CHLORIDE 9 MG/ML
1000 INJECTION INTRAMUSCULAR; INTRAVENOUS; SUBCUTANEOUS ONCE
Qty: 0 | Refills: 0 | Status: COMPLETED | OUTPATIENT
Start: 2018-04-13 | End: 2018-04-13

## 2018-04-13 RX ORDER — HYDRALAZINE HCL 50 MG
50 TABLET ORAL EVERY 8 HOURS
Qty: 0 | Refills: 0 | Status: DISCONTINUED | OUTPATIENT
Start: 2018-04-13 | End: 2018-05-08

## 2018-04-13 RX ORDER — ASPIRIN/CALCIUM CARB/MAGNESIUM 324 MG
81 TABLET ORAL DAILY
Qty: 0 | Refills: 0 | Status: DISCONTINUED | OUTPATIENT
Start: 2018-04-13 | End: 2018-05-08

## 2018-04-13 RX ORDER — SENNA PLUS 8.6 MG/1
2 TABLET ORAL AT BEDTIME
Qty: 0 | Refills: 0 | Status: DISCONTINUED | OUTPATIENT
Start: 2018-04-13 | End: 2018-05-08

## 2018-04-13 RX ORDER — LIDOCAINE 4 G/100G
1 CREAM TOPICAL DAILY
Qty: 0 | Refills: 0 | Status: DISCONTINUED | OUTPATIENT
Start: 2018-04-13 | End: 2018-05-08

## 2018-04-13 RX ORDER — SIMVASTATIN 20 MG/1
10 TABLET, FILM COATED ORAL AT BEDTIME
Qty: 0 | Refills: 0 | Status: DISCONTINUED | OUTPATIENT
Start: 2018-04-13 | End: 2018-05-08

## 2018-04-13 RX ORDER — ENOXAPARIN SODIUM 100 MG/ML
40 INJECTION SUBCUTANEOUS DAILY
Qty: 0 | Refills: 0 | Status: DISCONTINUED | OUTPATIENT
Start: 2018-04-13 | End: 2018-05-08

## 2018-04-13 RX ORDER — POLYETHYLENE GLYCOL 3350 17 G/17G
17 POWDER, FOR SOLUTION ORAL DAILY
Qty: 0 | Refills: 0 | Status: DISCONTINUED | OUTPATIENT
Start: 2018-04-13 | End: 2018-05-08

## 2018-04-13 RX ORDER — METOPROLOL TARTRATE 50 MG
50 TABLET ORAL
Qty: 0 | Refills: 0 | Status: DISCONTINUED | OUTPATIENT
Start: 2018-04-13 | End: 2018-05-08

## 2018-04-13 RX ORDER — ACETAMINOPHEN 500 MG
1000 TABLET ORAL ONCE
Qty: 0 | Refills: 0 | Status: COMPLETED | OUTPATIENT
Start: 2018-04-14 | End: 2018-04-14

## 2018-04-13 RX ORDER — ACETAMINOPHEN 500 MG
1000 TABLET ORAL ONCE
Qty: 0 | Refills: 0 | Status: COMPLETED | OUTPATIENT
Start: 2018-04-13 | End: 2018-04-13

## 2018-04-13 RX ORDER — DOCUSATE SODIUM 100 MG
100 CAPSULE ORAL THREE TIMES A DAY
Qty: 0 | Refills: 0 | Status: DISCONTINUED | OUTPATIENT
Start: 2018-04-13 | End: 2018-05-08

## 2018-04-13 RX ORDER — ACETAMINOPHEN 500 MG
650 TABLET ORAL EVERY 6 HOURS
Qty: 0 | Refills: 0 | Status: DISCONTINUED | OUTPATIENT
Start: 2018-04-13 | End: 2018-04-13

## 2018-04-13 RX ADMIN — LIDOCAINE 1 PATCH: 4 CREAM TOPICAL at 19:05

## 2018-04-13 RX ADMIN — Medication 50 MILLIGRAM(S): at 19:03

## 2018-04-13 RX ADMIN — Medication 1000 MILLIGRAM(S): at 17:50

## 2018-04-13 RX ADMIN — Medication 50 MILLIGRAM(S): at 21:33

## 2018-04-13 RX ADMIN — SODIUM CHLORIDE 4000 MILLILITER(S): 9 INJECTION INTRAMUSCULAR; INTRAVENOUS; SUBCUTANEOUS at 13:43

## 2018-04-13 RX ADMIN — Medication 400 MILLIGRAM(S): at 16:56

## 2018-04-13 RX ADMIN — SENNA PLUS 2 TABLET(S): 8.6 TABLET ORAL at 21:33

## 2018-04-13 RX ADMIN — Medication 1 PATCH: at 19:05

## 2018-04-13 RX ADMIN — SODIUM CHLORIDE 4000 MILLILITER(S): 9 INJECTION INTRAMUSCULAR; INTRAVENOUS; SUBCUTANEOUS at 11:16

## 2018-04-13 RX ADMIN — Medication 400 MILLIGRAM(S): at 12:39

## 2018-04-13 RX ADMIN — Medication 81 MILLIGRAM(S): at 19:02

## 2018-04-13 RX ADMIN — SIMVASTATIN 10 MILLIGRAM(S): 20 TABLET, FILM COATED ORAL at 21:33

## 2018-04-13 RX ADMIN — Medication 375 MILLIGRAM(S): at 19:02

## 2018-04-13 RX ADMIN — Medication 375 MILLIGRAM(S): at 19:35

## 2018-04-13 RX ADMIN — Medication 100 MILLIGRAM(S): at 21:33

## 2018-04-13 RX ADMIN — ENOXAPARIN SODIUM 40 MILLIGRAM(S): 100 INJECTION SUBCUTANEOUS at 19:02

## 2018-04-13 RX ADMIN — Medication 1000 MILLIGRAM(S): at 14:07

## 2018-04-13 NOTE — H&P ADULT - HISTORY OF PRESENT ILLNESS
Patient is an 86 year-old female (lives alone, no HHA) with PMH of HTN, CAD s/p stent in 2004, current smoker, HLD s/p discharge from [facility] 4/9/18 for s/p fall, after CT of the head was negative, CT of the lumbar spine shows no evidence of lumbar fracture or traumatic malalignment, chronic mild compression fracture at L5 that has been stable since a scan in April 2015, small disc bulges were noted throughout the lumbar spine and mild spinal canal stenosis at L1-L2, and L4-L5, sent by visiting nurse for patient reporting diffuse back pain not resolving since prior admission. ROS + poor po intake since last admission and suprapubic pain as well as feeling like she has a sore throat. She denies that back pain has worsened, but it has not improved. Denies fever, chills, SOB, palpitations, chest pain, nausea, vomiting, diarrhea or constipation. Reports no other complaints. Denies urinary incontinence, constipation or diarrhea. Does not have family, only 1 friend, was deemed to have capacity on last admission to make medical decision, and is full code.

## 2018-04-13 NOTE — CONSULT NOTE ADULT - SUBJECTIVE AND OBJECTIVE BOX
87yo with LBP. She was recently discharger from [facility] on 4/4/18 with back pain which continued and came back.  Denies any new trauma. Denies radiating leg pains.  Walks with cane or walker independently.    PMH: HTN, CAD s/p stent 2004, HLD, Hysterectomy, Appendectomy, inguinal hernia repair, tonsillectomy    PE: Awake and alert following commands and answering questions.       Back with some diffuse lower back tenderness.       No groin pain on ROM of hips.       Good general strength and sensation legs        No clonus, Neg [place]    X-ray: CT of Abd and Pelvis shows a biconcave T12 compression fx ( increased from previous CT Chest and CT LS Spine) and Small superior endplate fx of L5 (seen previously)    A/P: Persistent LBP with CT showing old L5 compression fracture and old T12 compression fracture now increased with involvement in inferior endplate.        Recommend pain control and mobilization OOB to chair.        PT ambulation WBAT        MRI of LS Spine pending.        Discussed with pt and showed her some bed exercises for arms and legs.

## 2018-04-13 NOTE — ED ADULT NURSE NOTE - OBJECTIVE STATEMENT
pt is here for pain back.  pt stated that "My nurse sent me here".  c/o pain 9/10, pt calm at this time.

## 2018-04-13 NOTE — H&P ADULT - NSHPLABSRESULTS_GEN_ALL_CORE
LABS:                        12.8   13.2  )-----------( 268      ( 13 Apr 2018 11:24 )             38.7     04-13    140  |  105  |  36<H>  ----------------------------<  103<H>  4.2   |  25  |  1.00    Ca    9.3      13 Apr 2018 11:24    TPro  8.1  /  Alb  3.9  /  TBili  0.5  /  DBili  x   /  AST  20  /  ALT  42  /  AlkPhos  103  04-13    < from: Xray Chest 1 View-PORTABLE IMMEDIATE (04.13.18 @ 11:30) >    FINDINGS:   The heart is magnified by projection.   Persistent, mild bibasilar atelectasis or scarring.  Left mid chest pleural related calcifications are stable.  No bilateral focal infiltrates noted significant pleural effusion. No   pneumothorax.  Bilateral breast calcifications again noted  Chronic deformity of the right humeral head.    IMPRESSION:   Stable chest.  Negative for acute cardiopulmonary process.  Please refer to 4/4/18 report of CT chest - for discussion of small   bilateral lung nodules.    < end of copied text > LABS:                        12.8   13.2  )-----------( 268      ( 13 Apr 2018 11:24 )             38.7     04-13    140  |  105  |  36<H>  ----------------------------<  103<H>  4.2   |  25  |  1.00    Ca    9.3      13 Apr 2018 11:24    TPro  8.1  /  Alb  3.9  /  TBili  0.5  /  DBili  x   /  AST  20  /  ALT  42  /  AlkPhos  103  04-13    < from: Xray Chest 1 View-PORTABLE IMMEDIATE (04.13.18 @ 11:30) >    FINDINGS:   The heart is magnified by projection.   Persistent, mild bibasilar atelectasis or scarring.  Left mid chest pleural related calcifications are stable.  No bilateral focal infiltrates noted significant pleural effusion. No   pneumothorax.  Bilateral breast calcifications again noted  Chronic deformity of the right humeral head.    IMPRESSION:   Stable chest.  Negative for acute cardiopulmonary process.  Please refer to 4/4/18 report of CT chest - for discussion of small   bilateral lung nodules.    < end of copied text >    < from: CT Abdomen and Pelvis w/ IV Cont (04.13.18 @ 14:47) >    IMPRESSION:    1. There is wall thickening of the transverse colon without significant   infiltration of the pericolic fat. Differential considerations would   include infectious, ischemic and inflammatory etiologies. Correlate   clinically; consider endoscopic evaluation upon clinical improvement to   exclude an underlying lesion.  Fecalized distal small bowel loops are   identified which demonstrate prominent caliber, which may be related to a   paralytic ileus secondary to the suspected pathology of the transverse   colon.  2. Multiple gallstones, without evidence for gallbladder wall thickening   or pericholecystic fluid.  3. There is stable calcified plaque like thickening of the left pleura   with adjacent nodular opacity with comparison to prior evaluation dated   4/25/2015.  4. Compression fracture deformity of T12 identified, which has progressed   from prior evaluation 4/4/2018.     < end of copied text >

## 2018-04-13 NOTE — H&P ADULT - ASSESSMENT
Patient is an 86 year-old female (lives alone, no HHA) with PMH of HTN, CAD s/p stent in 2004, current smoker, HLD s/p discharge from [facility] 4/9/18 for s/p fall, after CT of the head was negative, CT of the lumbar spine shows no evidence of lumbar fracture or traumatic malalignment, chronic mild compression fracture at L5 that has been stable since a scan in April 2015, small disc bulges were noted throughout the lumbar spine and mild spinal canal stenosis at L1-L2, and L4-L5, sent by visiting nurse for patient reporting diffuse back pain not resolving since prior admission. Admitted for back pain and poor po intake.

## 2018-04-13 NOTE — H&P ADULT - PROBLEM SELECTOR PLAN 7
IMPROVE VTE Individual Risk Assessment          RISK                                                          Points  [  ] Previous VTE                                                3  [  ] Thrombophilia                                             2  [  ] Lower limb paralysis                                   2        (unable to hold up >15 seconds)    [  ] Current Cancer                                             2         (within 6 months)  [ x ] Immobilization > 24 hrs                              1  [  ] ICU/CCU stay > 24 hours                             1  [x  ] Age > 60                                                         1    IMPROVE VTE Score: 2, lovenox for dvt ppx

## 2018-04-13 NOTE — H&P ADULT - NSHPPHYSICALEXAM_GEN_ALL_CORE
INTERVAL HPI/OVERNIGHT EVENTS:  T(C): 36.6 (04-13-18 @ 10:21), Max: 36.6 (04-13-18 @ 10:21)  HR: 54 (04-13-18 @ 10:21) (54 - 54)  BP: 165/72 (04-13-18 @ 10:21) (165/72 - 165/72)  RR: 17 (04-13-18 @ 10:21) (17 - 17)  SpO2: 100% (04-13-18 @ 10:21) (100% - 100%)    Physical Exam:  GENERAL: NAD, well-groomed, well-developed  HEAD:  Atraumatic, Normocephalic  EYES: EOMI, PERRLA, conjunctiva and sclera clear  ENMT: No tonsillar erythema, exudates, or enlargement; Moist mucous membranes  NECK: Supple, No JVD, Normal thyroid  NERVOUS SYSTEM:  Alert & Oriented X3, Good concentration; Motor Strength 5/5 B/L upper and lower extremities; DTRs 2+ intact and symmetric  CHEST/LUNG: Clear to percussion bilaterally; No rales, rhonchi, wheezing, or rubs  HEART: Regular rate and rhythm; No murmurs, rubs, or gallops  ABDOMEN: Soft, suprapubic tenderness, Nondistended; Bowel sounds present; lower back tenderness along lumbar spine  EXTREMITIES:  2+ Peripheral Pulses, No clubbing, cyanosis, or edema  LYMPH: No lymphadenopathy noted  SKIN: No rashes or lesions

## 2018-04-13 NOTE — ED PROVIDER NOTE - OBJECTIVE STATEMENT
85 y/o F pt with a significant PMHx of CAD, MI (2004), HTN, HLD, coronary artery stent placement and a significant PSHx of abd hysterectomy and appendectomy sent to the ED by visiting nurse c/o back pain and R flank pain. Pt was recently in the ED for a fall. Pt denies fever, chills, nausea, vomiting, trouble urinating or any other complaints. NKDA.

## 2018-04-13 NOTE — H&P ADULT - PROBLEM SELECTOR PLAN 2
albumin 3.9  reports reduced appetite since last admission  in setting of leukocytosis and suprapubic tenderness may be 2/2 infection  cxr does not show acute process  f/u UA and urine lytes  f/u CT abd/pelvis  f/u RVP albumin 3.9  reports reduced appetite since last admission  in setting of leukocytosis and suprapubic tenderness may be 2/2 infection  cxr does not show acute process  neg UA  f/u urine lytes  f/u RVP

## 2018-04-13 NOTE — H&P ADULT - PROBLEM SELECTOR PLAN 1
s/p discharge from [facility] 4/9/18 for s/p fall, after CT of the head was negative, CT of the lumbar spine shows no evidence of lumbar fracture or traumatic malalignment, chronic mild compression fracture at L5 that has been stable since a scan in April 2015, small disc bulges were noted throughout the lumbar spine and mild spinal canal stenosis at L1-L2, and L4-L5  on exam: no neuro deficit, lumbar spine tenderness, + suprapubic tenderness  f/u UA and urine culture  CT abd/pelvis with contrast  MRI LS spine  pain management eval  may be 2/2 constipation as patient was on tramadol last admission  will start stool softeners however patient reports last BM was this morning  good rectal tone  PT eval, may need SANDHYA placement s/p discharge from [facility] 4/9/18 for s/p fall, after CT of the head was negative, CT of the lumbar spine shows no evidence of lumbar fracture or traumatic malalignment, chronic mild compression fracture at L5 that has been stable since a scan in April 2015, small disc bulges were noted throughout the lumbar spine and mild spinal canal stenosis at L1-L2, and L4-L5  on exam: no neuro deficit, lumbar spine tenderness, + suprapubic tenderness  f/u UA and urine culture  CT abd/pelvis: There is wall thickening of the transverse colon without significant   infiltration of the pericolic fat. Differential considerations would include infectious, ischemic and inflammatory etiologies. Correlate clinically; consider endoscopic evaluation upon clinical improvement to exclude an underlying lesion.  Fecalized distal small bowel loops are identified which demonstrate prominent caliber, which may be related to a paralytic ileus secondary to the suspected pathology of the transverse colon. Multiple gallstones, without evidence for gallbladder wall thickening or pericholecystic fluid. There is stable calcified plaque like thickening of the left pleura with adjacent nodular opacity with comparison to prior evaluation dated 4/25/2015. Compression fracture deformity of T12 identified, which has progressed from prior evaluation 4/4/2018.   f/u MRI LS spine  pain management eval  will start stool softeners however patient reports last BM was this morning  good rectal tone  PT eval, may need SANDHYA placement  ortho eval

## 2018-04-14 LAB
24R-OH-CALCIDIOL SERPL-MCNC: 29 NG/ML — LOW (ref 30–80)
ALBUMIN SERPL ELPH-MCNC: 3.1 G/DL — LOW (ref 3.5–5)
ALP SERPL-CCNC: 80 U/L — SIGNIFICANT CHANGE UP (ref 40–120)
ALT FLD-CCNC: 24 U/L DA — SIGNIFICANT CHANGE UP (ref 10–60)
ANION GAP SERPL CALC-SCNC: 11 MMOL/L — SIGNIFICANT CHANGE UP (ref 5–17)
AST SERPL-CCNC: 14 U/L — SIGNIFICANT CHANGE UP (ref 10–40)
BASOPHILS # BLD AUTO: 0.1 K/UL — SIGNIFICANT CHANGE UP (ref 0–0.2)
BASOPHILS NFR BLD AUTO: 1.5 % — SIGNIFICANT CHANGE UP (ref 0–2)
BILIRUB SERPL-MCNC: 0.5 MG/DL — SIGNIFICANT CHANGE UP (ref 0.2–1.2)
BUN SERPL-MCNC: 27 MG/DL — HIGH (ref 7–18)
CALCIUM SERPL-MCNC: 8.4 MG/DL — SIGNIFICANT CHANGE UP (ref 8.4–10.5)
CHLORIDE SERPL-SCNC: 108 MMOL/L — SIGNIFICANT CHANGE UP (ref 96–108)
CHOLEST SERPL-MCNC: 111 MG/DL — SIGNIFICANT CHANGE UP (ref 10–199)
CO2 SERPL-SCNC: 20 MMOL/L — LOW (ref 22–31)
CREAT SERPL-MCNC: 0.87 MG/DL — SIGNIFICANT CHANGE UP (ref 0.5–1.3)
EOSINOPHIL # BLD AUTO: 0.4 K/UL — SIGNIFICANT CHANGE UP (ref 0–0.5)
EOSINOPHIL NFR BLD AUTO: 3.6 % — SIGNIFICANT CHANGE UP (ref 0–6)
FOLATE SERPL-MCNC: >20 NG/ML — SIGNIFICANT CHANGE UP (ref 4.8–24.2)
GLUCOSE SERPL-MCNC: 68 MG/DL — LOW (ref 70–99)
HBA1C BLD-MCNC: 5.3 % — SIGNIFICANT CHANGE UP (ref 4–5.6)
HCT VFR BLD CALC: 32.1 % — LOW (ref 34.5–45)
HDLC SERPL-MCNC: 51 MG/DL — SIGNIFICANT CHANGE UP (ref 40–125)
HGB BLD-MCNC: 10.6 G/DL — LOW (ref 11.5–15.5)
INR BLD: 1.04 RATIO — SIGNIFICANT CHANGE UP (ref 0.88–1.16)
LIPID PNL WITH DIRECT LDL SERPL: 37 MG/DL — SIGNIFICANT CHANGE UP
LYMPHOCYTES # BLD AUTO: 2.9 K/UL — SIGNIFICANT CHANGE UP (ref 1–3.3)
LYMPHOCYTES # BLD AUTO: 29.4 % — SIGNIFICANT CHANGE UP (ref 13–44)
MAGNESIUM SERPL-MCNC: 2.4 MG/DL — SIGNIFICANT CHANGE UP (ref 1.6–2.6)
MCHC RBC-ENTMCNC: 32.9 GM/DL — SIGNIFICANT CHANGE UP (ref 32–36)
MCHC RBC-ENTMCNC: 33.7 PG — SIGNIFICANT CHANGE UP (ref 27–34)
MCV RBC AUTO: 102.3 FL — HIGH (ref 80–100)
MONOCYTES # BLD AUTO: 0.9 K/UL — SIGNIFICANT CHANGE UP (ref 0–0.9)
MONOCYTES NFR BLD AUTO: 9.2 % — SIGNIFICANT CHANGE UP (ref 2–14)
NEUTROPHILS # BLD AUTO: 5.6 K/UL — SIGNIFICANT CHANGE UP (ref 1.8–7.4)
NEUTROPHILS NFR BLD AUTO: 56.4 % — SIGNIFICANT CHANGE UP (ref 43–77)
PHOSPHATE SERPL-MCNC: 3.8 MG/DL — SIGNIFICANT CHANGE UP (ref 2.5–4.5)
PLATELET # BLD AUTO: 240 K/UL — SIGNIFICANT CHANGE UP (ref 150–400)
POTASSIUM SERPL-MCNC: 3.4 MMOL/L — LOW (ref 3.5–5.3)
POTASSIUM SERPL-SCNC: 3.4 MMOL/L — LOW (ref 3.5–5.3)
PROCALCITONIN SERPL-MCNC: <0.05 NG/ML — SIGNIFICANT CHANGE UP (ref 0–0.04)
PROT SERPL-MCNC: 6.3 G/DL — SIGNIFICANT CHANGE UP (ref 6–8.3)
PROTHROM AB SERPL-ACNC: 11.4 SEC — SIGNIFICANT CHANGE UP (ref 9.8–12.7)
RBC # BLD: 3.14 M/UL — LOW (ref 3.8–5.2)
RBC # FLD: 12.6 % — SIGNIFICANT CHANGE UP (ref 10.3–14.5)
SODIUM SERPL-SCNC: 139 MMOL/L — SIGNIFICANT CHANGE UP (ref 135–145)
TOTAL CHOLESTEROL/HDL RATIO MEASUREMENT: 2.2 RATIO — LOW (ref 3.3–7.1)
TRIGL SERPL-MCNC: 113 MG/DL — SIGNIFICANT CHANGE UP (ref 10–149)
TSH SERPL-MCNC: 1.62 UU/ML — SIGNIFICANT CHANGE UP (ref 0.34–4.82)
VIT B12 SERPL-MCNC: >2000 PG/ML — HIGH (ref 232–1245)
WBC # BLD: 9.9 K/UL — SIGNIFICANT CHANGE UP (ref 3.8–10.5)
WBC # FLD AUTO: 9.9 K/UL — SIGNIFICANT CHANGE UP (ref 3.8–10.5)

## 2018-04-14 PROCEDURE — 99232 SBSQ HOSP IP/OBS MODERATE 35: CPT

## 2018-04-14 RX ORDER — POTASSIUM CHLORIDE 20 MEQ
20 PACKET (EA) ORAL ONCE
Qty: 0 | Refills: 0 | Status: COMPLETED | OUTPATIENT
Start: 2018-04-14 | End: 2018-04-14

## 2018-04-14 RX ADMIN — Medication 400 MILLIGRAM(S): at 03:48

## 2018-04-14 RX ADMIN — Medication 100 MILLIGRAM(S): at 05:56

## 2018-04-14 RX ADMIN — ENOXAPARIN SODIUM 40 MILLIGRAM(S): 100 INJECTION SUBCUTANEOUS at 11:53

## 2018-04-14 RX ADMIN — Medication 375 MILLIGRAM(S): at 17:53

## 2018-04-14 RX ADMIN — Medication 1 PATCH: at 11:53

## 2018-04-14 RX ADMIN — LIDOCAINE 1 PATCH: 4 CREAM TOPICAL at 12:00

## 2018-04-14 RX ADMIN — SENNA PLUS 2 TABLET(S): 8.6 TABLET ORAL at 22:17

## 2018-04-14 RX ADMIN — Medication 375 MILLIGRAM(S): at 18:49

## 2018-04-14 RX ADMIN — Medication 400 MILLIGRAM(S): at 10:00

## 2018-04-14 RX ADMIN — Medication 50 MILLIGRAM(S): at 05:56

## 2018-04-14 RX ADMIN — LIDOCAINE 1 PATCH: 4 CREAM TOPICAL at 07:00

## 2018-04-14 RX ADMIN — Medication 1000 MILLIGRAM(S): at 10:01

## 2018-04-14 RX ADMIN — Medication 50 MILLIGRAM(S): at 15:20

## 2018-04-14 RX ADMIN — Medication 100 MILLIGRAM(S): at 22:17

## 2018-04-14 RX ADMIN — Medication 50 MILLIGRAM(S): at 22:17

## 2018-04-14 RX ADMIN — Medication 50 MILLIGRAM(S): at 17:53

## 2018-04-14 RX ADMIN — Medication 375 MILLIGRAM(S): at 05:56

## 2018-04-14 RX ADMIN — POLYETHYLENE GLYCOL 3350 17 GRAM(S): 17 POWDER, FOR SOLUTION ORAL at 12:01

## 2018-04-14 RX ADMIN — Medication 81 MILLIGRAM(S): at 11:53

## 2018-04-14 RX ADMIN — Medication 20 MILLIEQUIVALENT(S): at 22:17

## 2018-04-14 RX ADMIN — Medication 375 MILLIGRAM(S): at 07:00

## 2018-04-14 RX ADMIN — SIMVASTATIN 10 MILLIGRAM(S): 20 TABLET, FILM COATED ORAL at 22:17

## 2018-04-14 RX ADMIN — Medication 1 PATCH: at 17:54

## 2018-04-14 RX ADMIN — Medication 1000 MILLIGRAM(S): at 04:02

## 2018-04-14 NOTE — PROGRESS NOTE ADULT - SUBJECTIVE AND OBJECTIVE BOX
HPI:  Patient is an 86 year-old female (lives alone, no HHA) with PMH of HTN, CAD s/p stent in 2004, current smoker, HLD s/p discharge from [facility] 4/9/18 for s/p fall, after CT of the head was negative, CT of the lumbar spine shows no evidence of lumbar fracture or traumatic malalignment, chronic mild compression fracture at L5 that has been stable since a scan in April 2015, small disc bulges were noted throughout the lumbar spine and mild spinal canal stenosis at L1-L2, and L4-L5, sent by visiting nurse for patient reporting diffuse back pain not resolving since prior admission. ROS + poor po intake since last admission and suprapubic pain as well as feeling like she has a sore throat. She denies that back pain has worsened, but it has not improved. Denies fever, chills, SOB, palpitations, chest pain, nausea, vomiting, diarrhea or constipation. Reports no other complaints. Denies urinary incontinence, constipation or diarrhea. Does not have family, only 1 friend, was deemed to have capacity on last admission to make medical decision, and is full code. (13 Apr 2018 14:11)      Meds:    Allergies:  Allergies    No Known Allergies    Intolerances        REVIEW OF SYSTEMS:  feels better  imani diet  no vomiting  no cp  CONSTITUTIONAL: No weakness, fevers or chills  EYES/ENT: No visual changes;  No vertigo or throat pain   NECK: No pain or stiffness  RESPIRATORY: No cough, wheezing, hemoptysis; No shortness of breath  CARDIOVASCULAR: No chest pain or palpitations  GASTROINTESTINAL: No abdominal or epigastric pain. No nausea, vomiting, or hematemesis; No diarrhea or constipation. No melena or hematochezia.  GENITOURINARY: No dysuria, frequency or hematuria  NEUROLOGICAL: No numbness or weakness  SKIN: No itching, burning, rashes, or lesions   All other review of systems is negative unless indicated above.    PHYSICAL EXAM:    Vital Signs Last 24 Hrs  T(C): 36.8 (14 Apr 2018 13:52), Max: 36.8 (14 Apr 2018 13:52)  T(F): 98.3 (14 Apr 2018 13:52), Max: 98.3 (14 Apr 2018 13:52)  HR: 68 (14 Apr 2018 13:52) (50 - 68)  BP: 173/61 (14 Apr 2018 13:52) (115/60 - 173/61)  BP(mean): --  RR: 17 (14 Apr 2018 13:52) (17 - 17)  SpO2: 100% (14 Apr 2018 13:52) (96% - 100%)    HEENT:lderly female  awake responsive  in nad  lungs ae fair  cardia reg  abd soft  non tender  neuro moves all limbs  Neck:  [  ] Supple  [  ] Lymphadenopathy  [  ] JVD  [  ] Masses  [  ] WNL    CHEST/Respiratory: [ ] Clear to auscultation     [  ] Rales      [  ] Rhonchi      [  ] Wheezing       [  ] Chest Tenderness     [  ] WNL    Cardiovascular:  [  ]S1 S2  [  ] Reg  [  ] Irreg   [  ] No Murmurs   [  ] Murmurs  [  ] Systolic [  ] Diastolic    Gastrointestinal:  [  ] Bowel Sounds  [   ] ABD Soft  [  ] ABD Distention   [  ] No Tenderness [  ] Tenderness  [  ] Organomegaly  [  ] Guarding rigidity  [  ] No Guarding rigidity  [  ] Rebound Tenderness [  ] No Rebound Tenderness    Extremities: [  ] Edema  [  ] No Edema  [  ] Clubbing   [  ] Cyanosis  [  ] Palpable peripheral pulses                          [  ] Tender calf muscles    [  ] No Tender Calf Muscles    Neurological:  [  ] Alert  [  ] Awake  [  ] Oriented  x                              [  ] Focal weakness  [  ] No Focal Weakness    Skin:  [  ] Thrombophlebitis  [  ] Rashes  [  ] Dry  [  ] Ulcers    Ortho:  [  ] Joint Swelling  [  ] Joint erythema [  ] DJD [  ] Increased Temperature to Touch      LABS/DIAGNOSTIC TESTS                          10.6   9.9   )-----------( 240      ( 14 Apr 2018 07:53 )             32.1         04-14    139  |  108  |  27<H>  ----------------------------<  68<L>  3.4<L>   |  20<L>  |  0.87    Ca    8.4      14 Apr 2018 07:53  Phos  3.8     04-14  Mg     2.4     04-14    TPro  6.3  /  Alb  3.1<L>  /  TBili  0.5  /  DBili  x   /  AST  14  /  ALT  24  /  AlkPhos  80  04-14      CAPILLARY BLOOD GLUCOSE          LIVER FUNCTIONS - ( 14 Apr 2018 07:53 )  Alb: 3.1 g/dL / Pro: 6.3 g/dL / ALK PHOS: 80 U/L / ALT: 24 U/L DA / AST: 14 U/L / GGT: x           Hemoglobin A1C, Whole Blood: 5.3 % (04-14 @ 09:55)  Hemoglobin A1C, Whole Blood: 5.2 % (04-03 @ 08:40)    Thyroid Stimulating Hormone, Serum: 1.62 uU/mL (04-14 @ 07:53)  Thyroid Stimulating Hormone, Serum: 2.38 uU/mL (04-03 @ 06:12)    CULTURES:       RADIOLOGY  CXR:    aspirin  chewable 81 milliGRAM(s) Oral daily  docusate sodium 100 milliGRAM(s) Oral three times a day  enoxaparin Injectable 40 milliGRAM(s) SubCutaneous daily  hydrALAZINE 50 milliGRAM(s) Oral every 8 hours  lidocaine   Patch 1 Patch Transdermal daily  metoprolol tartrate 50 milliGRAM(s) Oral two times a day  naproxen 375 milliGRAM(s) Oral two times a day  nicotine -  14 mG/24Hr(s) Patch 1 patch Transdermal daily  polyethylene glycol 3350 17 Gram(s) Oral daily  senna 2 Tablet(s) Oral at bedtime  simvastatin 10 milliGRAM(s) Oral at bedtime

## 2018-04-14 NOTE — PROGRESS NOTE ADULT - ASSESSMENT
1.low back pain  acute vert fracture  pain control  ortho eval  2.s/p abd pain  ct findings noted  clinically improving  follow clinical course  3.gall stones  4.cad  5.low potassium  replace

## 2018-04-15 LAB
ALBUMIN SERPL ELPH-MCNC: 3.1 G/DL — LOW (ref 3.5–5)
ALP SERPL-CCNC: 81 U/L — SIGNIFICANT CHANGE UP (ref 40–120)
ALT FLD-CCNC: 25 U/L DA — SIGNIFICANT CHANGE UP (ref 10–60)
ANION GAP SERPL CALC-SCNC: 6 MMOL/L — SIGNIFICANT CHANGE UP (ref 5–17)
AST SERPL-CCNC: 19 U/L — SIGNIFICANT CHANGE UP (ref 10–40)
BASOPHILS # BLD AUTO: 0.1 K/UL — SIGNIFICANT CHANGE UP (ref 0–0.2)
BASOPHILS NFR BLD AUTO: 2 % — SIGNIFICANT CHANGE UP (ref 0–2)
BILIRUB SERPL-MCNC: 0.5 MG/DL — SIGNIFICANT CHANGE UP (ref 0.2–1.2)
BUN SERPL-MCNC: 22 MG/DL — HIGH (ref 7–18)
CALCIUM SERPL-MCNC: 8.6 MG/DL — SIGNIFICANT CHANGE UP (ref 8.4–10.5)
CHLORIDE SERPL-SCNC: 110 MMOL/L — HIGH (ref 96–108)
CO2 SERPL-SCNC: 24 MMOL/L — SIGNIFICANT CHANGE UP (ref 22–31)
CREAT SERPL-MCNC: 0.94 MG/DL — SIGNIFICANT CHANGE UP (ref 0.5–1.3)
CULTURE RESULTS: NO GROWTH — SIGNIFICANT CHANGE UP
EOSINOPHIL # BLD AUTO: 0.5 K/UL — SIGNIFICANT CHANGE UP (ref 0–0.5)
EOSINOPHIL NFR BLD AUTO: 6.6 % — HIGH (ref 0–6)
GLUCOSE SERPL-MCNC: 85 MG/DL — SIGNIFICANT CHANGE UP (ref 70–99)
HCT VFR BLD CALC: 32.2 % — LOW (ref 34.5–45)
HGB BLD-MCNC: 11.2 G/DL — LOW (ref 11.5–15.5)
LYMPHOCYTES # BLD AUTO: 2.1 K/UL — SIGNIFICANT CHANGE UP (ref 1–3.3)
LYMPHOCYTES # BLD AUTO: 31.2 % — SIGNIFICANT CHANGE UP (ref 13–44)
MAGNESIUM SERPL-MCNC: 2.4 MG/DL — SIGNIFICANT CHANGE UP (ref 1.6–2.6)
MCHC RBC-ENTMCNC: 34.8 GM/DL — SIGNIFICANT CHANGE UP (ref 32–36)
MCHC RBC-ENTMCNC: 35.3 PG — HIGH (ref 27–34)
MCV RBC AUTO: 101.6 FL — HIGH (ref 80–100)
MONOCYTES # BLD AUTO: 0.6 K/UL — SIGNIFICANT CHANGE UP (ref 0–0.9)
MONOCYTES NFR BLD AUTO: 8.2 % — SIGNIFICANT CHANGE UP (ref 2–14)
NEUTROPHILS # BLD AUTO: 3.6 K/UL — SIGNIFICANT CHANGE UP (ref 1.8–7.4)
NEUTROPHILS NFR BLD AUTO: 52 % — SIGNIFICANT CHANGE UP (ref 43–77)
PHOSPHATE SERPL-MCNC: 2.9 MG/DL — SIGNIFICANT CHANGE UP (ref 2.5–4.5)
PLATELET # BLD AUTO: 240 K/UL — SIGNIFICANT CHANGE UP (ref 150–400)
POTASSIUM SERPL-MCNC: 4.2 MMOL/L — SIGNIFICANT CHANGE UP (ref 3.5–5.3)
POTASSIUM SERPL-SCNC: 4.2 MMOL/L — SIGNIFICANT CHANGE UP (ref 3.5–5.3)
PROT SERPL-MCNC: 6.4 G/DL — SIGNIFICANT CHANGE UP (ref 6–8.3)
RBC # BLD: 3.17 M/UL — LOW (ref 3.8–5.2)
RBC # FLD: 12.2 % — SIGNIFICANT CHANGE UP (ref 10.3–14.5)
SODIUM SERPL-SCNC: 140 MMOL/L — SIGNIFICANT CHANGE UP (ref 135–145)
SPECIMEN SOURCE: SIGNIFICANT CHANGE UP
WBC # BLD: 6.9 K/UL — SIGNIFICANT CHANGE UP (ref 3.8–10.5)
WBC # FLD AUTO: 6.9 K/UL — SIGNIFICANT CHANGE UP (ref 3.8–10.5)

## 2018-04-15 PROCEDURE — 99232 SBSQ HOSP IP/OBS MODERATE 35: CPT

## 2018-04-15 RX ADMIN — ENOXAPARIN SODIUM 40 MILLIGRAM(S): 100 INJECTION SUBCUTANEOUS at 13:35

## 2018-04-15 RX ADMIN — SENNA PLUS 2 TABLET(S): 8.6 TABLET ORAL at 22:39

## 2018-04-15 RX ADMIN — Medication 50 MILLIGRAM(S): at 05:40

## 2018-04-15 RX ADMIN — LIDOCAINE 1 PATCH: 4 CREAM TOPICAL at 13:35

## 2018-04-15 RX ADMIN — LIDOCAINE 1 PATCH: 4 CREAM TOPICAL at 01:00

## 2018-04-15 RX ADMIN — Medication 100 MILLIGRAM(S): at 13:35

## 2018-04-15 RX ADMIN — Medication 100 MILLIGRAM(S): at 05:40

## 2018-04-15 RX ADMIN — Medication 375 MILLIGRAM(S): at 05:40

## 2018-04-15 RX ADMIN — Medication 375 MILLIGRAM(S): at 07:06

## 2018-04-15 RX ADMIN — SIMVASTATIN 10 MILLIGRAM(S): 20 TABLET, FILM COATED ORAL at 22:39

## 2018-04-15 RX ADMIN — Medication 100 MILLIGRAM(S): at 22:39

## 2018-04-15 RX ADMIN — Medication 50 MILLIGRAM(S): at 18:13

## 2018-04-15 RX ADMIN — Medication 50 MILLIGRAM(S): at 22:39

## 2018-04-15 RX ADMIN — Medication 81 MILLIGRAM(S): at 13:35

## 2018-04-15 RX ADMIN — Medication 50 MILLIGRAM(S): at 13:39

## 2018-04-15 NOTE — PHYSICAL THERAPY INITIAL EVALUATION ADULT - LIVES WITH, PROFILE
in an apartment, elevator present, no stairs to enter. Pt. stated she does not negotiate stairs at baseline/alone

## 2018-04-15 NOTE — PHYSICAL THERAPY INITIAL EVALUATION ADULT - GENERAL OBSERVATIONS, REHAB EVAL
Consult received, chart reviewed. Patient received ambulating independently in room. NAD. Patient agreed to EVALUATION from Physical Therapist.

## 2018-04-15 NOTE — PROGRESS NOTE ADULT - SUBJECTIVE AND OBJECTIVE BOX
HPI:  Patient is an 86 year-old female (lives alone, no HHA) with PMH of HTN, CAD s/p stent in 2004, current smoker, HLD s/p discharge from [facility] 4/9/18 for s/p fall, after CT of the head was negative, CT of the lumbar spine shows no evidence of lumbar fracture or traumatic malalignment, chronic mild compression fracture at L5 that has been stable since a scan in April 2015, small disc bulges were noted throughout the lumbar spine and mild spinal canal stenosis at L1-L2, and L4-L5, sent by visiting nurse for patient reporting diffuse back pain not resolving since prior admission. ROS + poor po intake since last admission and suprapubic pain as well as feeling like she has a sore throat. She denies that back pain has worsened, but it has not improved. Denies fever, chills, SOB, palpitations, chest pain, nausea, vomiting, diarrhea or constipation. Reports no other complaints. Denies urinary incontinence, constipation or diarrhea. Does not have family, only 1 friend, was deemed to have capacity on last admission to make medical decision, and is full code. (13 Apr 2018 14:11)      Meds:    Allergies:  Allergies    No Known Allergies    Intolerances        REVIEW OF SYSTEMS:imani diet  still symptomatic  no vomiting    CONSTITUTIONAL: No weakness, fevers or chills  EYES/ENT: No visual changes;  No vertigo or throat pain   NECK: No pain or stiffness  RESPIRATORY: No cough, wheezing, hemoptysis; No shortness of breath  CARDIOVASCULAR: No chest pain or palpitations  GASTROINTESTINAL: No abdominal or epigastric pain. No nausea, vomiting, or hematemesis; No diarrhea or constipation. No melena or hematochezia.  GENITOURINARY: No dysuria, frequency or hematuria  NEUROLOGICAL: No numbness or weakness  SKIN: No itching, burning, rashes, or lesions   All other review of systems is negative unless indicated above.    PHYSICAL EXAM:    Vital Signs Last 24 Hrs  T(C): 36.4 (15 Apr 2018 13:39), Max: 37.1 (15 Apr 2018 04:30)  T(F): 97.5 (15 Apr 2018 13:39), Max: 98.8 (15 Apr 2018 04:30)  HR: 57 (15 Apr 2018 13:39) (57 - 64)  BP: 146/44 (15 Apr 2018 13:39) (139/51 - 180/45)  BP(mean): --  RR: 17 (15 Apr 2018 13:39) (16 - 17)  SpO2: 100% (15 Apr 2018 13:39) (98% - 100%)    HEENT: awake  lungs ae fair  cardia reg  abd soft/non tender  neuro moves all limbs    Neck:  [  ] Supple  [  ] Lymphadenopathy  [  ] JVD  [  ] Masses  [  ] WNL    CHEST/Respiratory: [ ] Clear to auscultation     [  ] Rales      [  ] Rhonchi      [  ] Wheezing       [  ] Chest Tenderness     [  ] WNL    Cardiovascular:  [  ]S1 S2  [  ] Reg  [  ] Irreg   [  ] No Murmurs   [  ] Murmurs  [  ] Systolic [  ] Diastolic    Gastrointestinal:  [  ] Bowel Sounds  [   ] ABD Soft  [  ] ABD Distention   [  ] No Tenderness [  ] Tenderness  [  ] Organomegaly  [  ] Guarding rigidity  [  ] No Guarding rigidity  [  ] Rebound Tenderness [  ] No Rebound Tenderness    Extremities: [  ] Edema  [  ] No Edema  [  ] Clubbing   [  ] Cyanosis  [  ] Palpable peripheral pulses                          [  ] Tender calf muscles    [  ] No Tender Calf Muscles    Neurological:  [  ] Alert  [  ] Awake  [  ] Oriented  x                              [  ] Focal weakness  [  ] No Focal Weakness    Skin:  [  ] Thrombophlebitis  [  ] Rashes  [  ] Dry  [  ] Ulcers    Ortho:  [  ] Joint Swelling  [  ] Joint erythema [  ] DJD [  ] Increased Temperature to Touch      LABS/DIAGNOSTIC TESTS                          11.2   6.9   )-----------( 240      ( 15 Apr 2018 07:23 )             32.2         04-15    140  |  110<H>  |  22<H>  ----------------------------<  85  4.2   |  24  |  0.94    Ca    8.6      15 Apr 2018 07:23  Phos  2.9     04-15  Mg     2.4     04-15    TPro  6.4  /  Alb  3.1<L>  /  TBili  0.5  /  DBili  x   /  AST  19  /  ALT  25  /  AlkPhos  81  04-15      CAPILLARY BLOOD GLUCOSE          LIVER FUNCTIONS - ( 15 Apr 2018 07:23 )  Alb: 3.1 g/dL / Pro: 6.4 g/dL / ALK PHOS: 81 U/L / ALT: 25 U/L DA / AST: 19 U/L / GGT: x           Hemoglobin A1C, Whole Blood: 5.3 % (04-14 @ 09:55)  Hemoglobin A1C, Whole Blood: 5.2 % (04-03 @ 08:40)    Thyroid Stimulating Hormone, Serum: 1.62 uU/mL (04-14 @ 07:53)  Thyroid Stimulating Hormone, Serum: 2.38 uU/mL (04-03 @ 06:12)    CULTURES:       RADIOLOGY  CXR:    aspirin  chewable 81 milliGRAM(s) Oral daily  docusate sodium 100 milliGRAM(s) Oral three times a day  enoxaparin Injectable 40 milliGRAM(s) SubCutaneous daily  hydrALAZINE 50 milliGRAM(s) Oral every 8 hours  lidocaine   Patch 1 Patch Transdermal daily  metoprolol tartrate 50 milliGRAM(s) Oral two times a day  nicotine -  14 mG/24Hr(s) Patch 1 patch Transdermal daily  polyethylene glycol 3350 17 Gram(s) Oral daily  senna 2 Tablet(s) Oral at bedtime  simvastatin 10 milliGRAM(s) Oral at bedtime

## 2018-04-15 NOTE — PROGRESS NOTE ADULT - ASSESSMENT
1.low back pain  vert fx  pain rx  ortho f/up  rehab  2.htn  cont rx  3.gall stones  4.s/p abd pain  ct findings noted  clinically stable

## 2018-04-15 NOTE — PHYSICAL THERAPY INITIAL EVALUATION ADULT - PERTINENT HX OF CURRENT PROBLEM, REHAB EVAL
Previous T12 and L5 comp. fx. Failure to thrive, recent fall leading to previous admission in April.

## 2018-04-15 NOTE — PHYSICAL THERAPY INITIAL EVALUATION ADULT - CRITERIA FOR SKILLED THERAPEUTIC INTERVENTIONS
anticipated discharge recommendation/rehab potential/impairments found/therapy frequency/functional limitations in following categories/predicted duration of therapy intervention

## 2018-04-15 NOTE — PHYSICAL THERAPY INITIAL EVALUATION ADULT - LEVEL OF INDEPENDENCE: STAIR NEGOTIATION, REHAB EVAL
Stairs not assessed at this time as pt. does not require stairs to access/negotiate home environment and pt. states she does not negotiate stairs at baseline. May assess at future session

## 2018-04-16 LAB
ALBUMIN SERPL ELPH-MCNC: 3.3 G/DL — LOW (ref 3.5–5)
ALP SERPL-CCNC: 84 U/L — SIGNIFICANT CHANGE UP (ref 40–120)
ALT FLD-CCNC: 25 U/L DA — SIGNIFICANT CHANGE UP (ref 10–60)
ANION GAP SERPL CALC-SCNC: 7 MMOL/L — SIGNIFICANT CHANGE UP (ref 5–17)
AST SERPL-CCNC: 17 U/L — SIGNIFICANT CHANGE UP (ref 10–40)
BASOPHILS # BLD AUTO: 0.1 K/UL — SIGNIFICANT CHANGE UP (ref 0–0.2)
BASOPHILS NFR BLD AUTO: 1.8 % — SIGNIFICANT CHANGE UP (ref 0–2)
BILIRUB SERPL-MCNC: 0.4 MG/DL — SIGNIFICANT CHANGE UP (ref 0.2–1.2)
BUN SERPL-MCNC: 11 MG/DL — SIGNIFICANT CHANGE UP (ref 7–18)
CALCIUM SERPL-MCNC: 9 MG/DL — SIGNIFICANT CHANGE UP (ref 8.4–10.5)
CHLORIDE SERPL-SCNC: 107 MMOL/L — SIGNIFICANT CHANGE UP (ref 96–108)
CO2 SERPL-SCNC: 26 MMOL/L — SIGNIFICANT CHANGE UP (ref 22–31)
CREAT SERPL-MCNC: 0.83 MG/DL — SIGNIFICANT CHANGE UP (ref 0.5–1.3)
EOSINOPHIL # BLD AUTO: 0.5 K/UL — SIGNIFICANT CHANGE UP (ref 0–0.5)
EOSINOPHIL NFR BLD AUTO: 7 % — HIGH (ref 0–6)
GLUCOSE SERPL-MCNC: 86 MG/DL — SIGNIFICANT CHANGE UP (ref 70–99)
HCT VFR BLD CALC: 34.6 % — SIGNIFICANT CHANGE UP (ref 34.5–45)
HGB BLD-MCNC: 11.2 G/DL — LOW (ref 11.5–15.5)
LYMPHOCYTES # BLD AUTO: 2.2 K/UL — SIGNIFICANT CHANGE UP (ref 1–3.3)
LYMPHOCYTES # BLD AUTO: 28.1 % — SIGNIFICANT CHANGE UP (ref 13–44)
MAGNESIUM SERPL-MCNC: 2.3 MG/DL — SIGNIFICANT CHANGE UP (ref 1.6–2.6)
MCHC RBC-ENTMCNC: 32.5 GM/DL — SIGNIFICANT CHANGE UP (ref 32–36)
MCHC RBC-ENTMCNC: 33.1 PG — SIGNIFICANT CHANGE UP (ref 27–34)
MCV RBC AUTO: 101.8 FL — HIGH (ref 80–100)
MONOCYTES # BLD AUTO: 0.7 K/UL — SIGNIFICANT CHANGE UP (ref 0–0.9)
MONOCYTES NFR BLD AUTO: 8.8 % — SIGNIFICANT CHANGE UP (ref 2–14)
NEUTROPHILS # BLD AUTO: 4.2 K/UL — SIGNIFICANT CHANGE UP (ref 1.8–7.4)
NEUTROPHILS NFR BLD AUTO: 54.2 % — SIGNIFICANT CHANGE UP (ref 43–77)
PHOSPHATE SERPL-MCNC: 2.6 MG/DL — SIGNIFICANT CHANGE UP (ref 2.5–4.5)
PLATELET # BLD AUTO: 275 K/UL — SIGNIFICANT CHANGE UP (ref 150–400)
POTASSIUM SERPL-MCNC: 4.3 MMOL/L — SIGNIFICANT CHANGE UP (ref 3.5–5.3)
POTASSIUM SERPL-SCNC: 4.3 MMOL/L — SIGNIFICANT CHANGE UP (ref 3.5–5.3)
PROT SERPL-MCNC: 6.9 G/DL — SIGNIFICANT CHANGE UP (ref 6–8.3)
RBC # BLD: 3.39 M/UL — LOW (ref 3.8–5.2)
RBC # FLD: 12.3 % — SIGNIFICANT CHANGE UP (ref 10.3–14.5)
SODIUM SERPL-SCNC: 140 MMOL/L — SIGNIFICANT CHANGE UP (ref 135–145)
WBC # BLD: 7.7 K/UL — SIGNIFICANT CHANGE UP (ref 3.8–10.5)
WBC # FLD AUTO: 7.7 K/UL — SIGNIFICANT CHANGE UP (ref 3.8–10.5)

## 2018-04-16 RX ADMIN — Medication 50 MILLIGRAM(S): at 13:17

## 2018-04-16 RX ADMIN — LIDOCAINE 1 PATCH: 4 CREAM TOPICAL at 13:16

## 2018-04-16 RX ADMIN — Medication 50 MILLIGRAM(S): at 18:01

## 2018-04-16 RX ADMIN — SIMVASTATIN 10 MILLIGRAM(S): 20 TABLET, FILM COATED ORAL at 22:05

## 2018-04-16 RX ADMIN — Medication 100 MILLIGRAM(S): at 13:16

## 2018-04-16 RX ADMIN — Medication 50 MILLIGRAM(S): at 22:05

## 2018-04-16 RX ADMIN — Medication 50 MILLIGRAM(S): at 05:36

## 2018-04-16 RX ADMIN — POLYETHYLENE GLYCOL 3350 17 GRAM(S): 17 POWDER, FOR SOLUTION ORAL at 13:17

## 2018-04-16 RX ADMIN — SENNA PLUS 2 TABLET(S): 8.6 TABLET ORAL at 22:05

## 2018-04-16 RX ADMIN — LIDOCAINE 1 PATCH: 4 CREAM TOPICAL at 01:00

## 2018-04-16 RX ADMIN — Medication 81 MILLIGRAM(S): at 13:17

## 2018-04-16 RX ADMIN — ENOXAPARIN SODIUM 40 MILLIGRAM(S): 100 INJECTION SUBCUTANEOUS at 13:16

## 2018-04-16 RX ADMIN — Medication 100 MILLIGRAM(S): at 05:36

## 2018-04-16 RX ADMIN — Medication 100 MILLIGRAM(S): at 22:05

## 2018-04-16 NOTE — DIETITIAN INITIAL EVALUATION ADULT. - PROBLEM SELECTOR PLAN 2
albumin 3.9  reports reduced appetite since last admission  in setting of leukocytosis and suprapubic tenderness may be 2/2 infection  cxr does not show acute process  neg UA  f/u urine lytes  f/u RVP

## 2018-04-16 NOTE — DIETITIAN INITIAL EVALUATION ADULT. - MD RECOMMEND
Advance diet to solids, if diet cannot be advanced, consider nutrition support , spoke with MD.  in the interim, add ensure clear to clear liquid diet

## 2018-04-16 NOTE — PROGRESS NOTE ADULT - ASSESSMENT
-low back pain  -s mm nodule rt ul  -transverse colon thickening  -hx; cad/stenting,htn,hld,smoker    plan; mri l-s spine-pain re consult          ct chest 3 mo          colonoscopy in near future          phys tx

## 2018-04-16 NOTE — PROGRESS NOTE ADULT - SUBJECTIVE AND OBJECTIVE BOX
Patient is a 86y old  Female who presents with a chief complaint of back pain (13 Apr 2018 14:11)      INTERVAL HPI/OVERNIGHT EVENTS:  lying comfortably in bed, no complaints    VITAL SIGNS:  T(F): 97.6 (04-16-18 @ 05:25)  HR: 72 (04-16-18 @ 05:25)  BP: 156/51 (04-16-18 @ 05:25)  RR: 17 (04-16-18 @ 05:25)  SpO2: 100% (04-16-18 @ 05:25)  Wt(kg): --  I&O's Detail          REVIEW OF SYSTEMS:    CONSTITUTIONAL:  No fevers, chills, sweats    HEENT:  Eyes:  No diplopia or blurred vision. ENT:  No earache, sore throat or runny nose.    CARDIOVASCULAR:  No pressure, squeezing, tightness, or heaviness about the chest; no palpitations.    RESPIRATORY:  no sob/cough    GASTROINTESTINAL:  No abdominal pain, nausea, vomiting or diarrhea.    GENITOURINARY:  No dysuria, frequency or urgency.    NEUROLOGIC:  No paresthesias, fasciculations, seizures or weakness.    PSYCHIATRIC:  No disorder of thought or mood.      PHYSICAL EXAM:    Constitutional:no complaints  ENMT:atnc  Neck:supple  Respiratory:clear  Cardiovascular:rr  Gastrointestinal:soft  Extremities:no edema  Vascular:intact  Neurological:non focal  Musculoskeletal:no edema, normal rom    MEDICATIONS  (STANDING):  aspirin  chewable 81 milliGRAM(s) Oral daily  docusate sodium 100 milliGRAM(s) Oral three times a day  enoxaparin Injectable 40 milliGRAM(s) SubCutaneous daily  hydrALAZINE 50 milliGRAM(s) Oral every 8 hours  lidocaine   Patch 1 Patch Transdermal daily  metoprolol tartrate 50 milliGRAM(s) Oral two times a day  nicotine -  14 mG/24Hr(s) Patch 1 patch Transdermal daily  polyethylene glycol 3350 17 Gram(s) Oral daily  senna 2 Tablet(s) Oral at bedtime  simvastatin 10 milliGRAM(s) Oral at bedtime    MEDICATIONS  (PRN):      Allergies    No Known Allergies            LABS:                        11.2   7.7   )-----------( 275      ( 16 Apr 2018 06:59 )             34.6     04-16    140  |  107  |  11  ----------------------------<  86  4.3   |  26  |  0.83    Ca    9.0      16 Apr 2018 06:59  Phos  2.6     04-16  Mg     2.3     04-16    TPro  6.9  /  Alb  3.3<L>  /  TBili  0.4  /  DBili  x   /  AST  17  /  ALT  25  /  AlkPhos  84  04-16                    RADIOLOGY & ADDITIONAL TESTS:    CXR:  EXAM:  XR CHEST PORTABLE IMMED 1V                            PROCEDURE DATE:  04/13/2018          INTERPRETATION:    DATE OF STUDY: 4/13/18.    PRIOR: 4/3/18 plain chest; had 4/4/18 CT scan of chest.    CLINICAL INDICATION: 86-yo-female with abnormal chest sounds.    TECHNIQUE: portable chest.    FINDINGS:   The heart is magnified by projection.   Persistent, mild bibasilar atelectasis or scarring.  Left mid chest pleural related calcifications are stable.  No bilateral focal infiltrates noted significant pleural effusion. No   pneumothorax.  Bilateral breast calcifications again noted  Chronic deformity of the right humeral head.    IMPRESSION:   Stable chest.  Negative for acute cardiopulmonary process.  Please refer to 4/4/18 report of CT chest - for discussion of small   bilateral lung nodules.        Ct scan chest:  NTERPRETATION:  EXAMINATION: CT CHEST    CLINICAL INDICATION: Smoking, lung opacity.    TECHNIQUE: Noncontrast CT of the chest was obtained.      COMPARISON: 4/26/2015.    FINDINGS:     AIRWAYS AND LUNGS: The central tracheobronchial tree is patent.    Emphysema.  New right upper lobe subpleural 6 mm nodule (2, 37). Biapical   fibronodular scarring. Unchanged left lower lobe pleural   thickening/nodularity and calcification.    MEDIASTINUM AND PLEURA: There are no enlarged mediastinal, hilar or   axillary lymph nodes. The visualized portion of the thyroid gland is   heterogeneous. There is no pleural effusion. There is no pneumothorax.      HEART AND VESSELS: The heart is normal in size.  There are   atherosclerotic calcifications of the aorta and coronary arteries.  There   is no pericardial effusion.  Aortic valve calcifications.    UPPER ABDOMEN: Images of the upper abdomen demonstrate no abnormality.     BONES AND SOFT TISSUES: There are mild degenerative changes of the spine.    The soft tissues are unremarkable.    TUBES/LINES: None.    IMPRESSION:   Emphysema with new right upper lobe 6 mm nodule which is indeterminate.   Three-month follow-up CTneeded for complete evaluation.      ct abd;  COMPARISON: 4/25/2015    FINDINGS:    The liver is normal in attenuation and size. No focal hepatic masses are   identified. There is no evidence for intrahepatic or extrahepatic biliary   dilatation. Multiple gallstones are identified, without evidence for   gallbladder wall thickening or pericholecystic fluid. The spleen is not   visualized. The pancreas and adrenal glands appear within normal limits.    There is no evidence for hydronephrosis. No renal calculi are identified.   Cystic type foci are identified within the bilateral renal parenchyma   measuring up to 1.5 cm on the right and 1.9 cm on the left, increased in   size. There are additional scattered subcentimeter hypodense foci noted   bilaterally within the renal parenchyma, too small to characterize. The   abdominal aorta isnormal in course and caliber. No abnormally enlarged   retroperitoneal or pelvic lymphadenopathy is noted.    Abundant fecal material is scattered throughout the colon. Colonic   diverticulosis is noted. There is no evidence for definite mechanical   bowel obstruction. There is wall thickening of the transverse colon   without significant infiltration of the pericolic fat. Fecalized distal   small bowel loops are identified which demonstrate prominent caliber,   which may be related to a paralytic ileus secondary to the suspected   pathology of the transverse colon. There is no evidence for free   intraperitoneal air or fluid.    The is not visualized, likely related to prior hysterectomy. The urinary   bladder appears unremarkable.     Degenerative changes of the thoracic and lumbar spine noted. Compression   fracture deformity of T12 identified, which appears progressed from prior   evaluation 4/4/2018. There is stable calcified plaque like thickening of   the left pleura with adjacent nodular opacity with comparison to prior   evaluation dated 4/25/2015.     IMPRESSION:    1. There is wall thickening of the transverse colon without significant   infiltration of the pericolic fat. Differential considerations would   include infectious, ischemic and inflammatory etiologies. Correlate   clinically; consider endoscopic evaluation upon clinical improvement to   exclude an underlying lesion.  Fecalized distal small bowel loops are   identified which demonstrate prominent caliber, which may be related to a   paralytic ileus secondary to the suspected pathology of the transverse   colon.

## 2018-04-16 NOTE — PROGRESS NOTE ADULT - SUBJECTIVE AND OBJECTIVE BOX
PGY-3 NOTE:    Patient is an 86 year-old female with PMH of HTN, CAD, current smoker, HLD,  chronic compression fracture of L5, recent admission for fall/back pain who presents with back pain.         INTERVAL HPI/OVERNIGHT EVENTS: No events overnight. Patient seen and examined at bedside. Reports back pain. Denies other complaints.       T(C): 36.4 (04-16-18 @ 05:25), Max: 36.4 (04-15-18 @ 13:39)  HR: 72 (04-16-18 @ 05:25) (57 - 72)  BP: 156/51 (04-16-18 @ 05:25) (139/45 - 180/45)  RR: 17 (04-16-18 @ 05:25) (17 - 18)  SpO2: 100% (04-16-18 @ 05:25) (96% - 100%)  Wt(kg): --  I&O's Summary      REVIEW OF SYSTEMS: denies fever, chills, SOB, palpitations, chest pain, abdominal pain, nausea, vomiting, diarrhea, constipation, dizziness    MEDICATIONS  (STANDING):  aspirin  chewable 81 milliGRAM(s) Oral daily  docusate sodium 100 milliGRAM(s) Oral three times a day  enoxaparin Injectable 40 milliGRAM(s) SubCutaneous daily  hydrALAZINE 50 milliGRAM(s) Oral every 8 hours  lidocaine   Patch 1 Patch Transdermal daily  metoprolol tartrate 50 milliGRAM(s) Oral two times a day  nicotine -  14 mG/24Hr(s) Patch 1 patch Transdermal daily  polyethylene glycol 3350 17 Gram(s) Oral daily  senna 2 Tablet(s) Oral at bedtime  simvastatin 10 milliGRAM(s) Oral at bedtime        PHYSICAL EXAM:  GENERAL: no distress, elderly female   HEAD:  Atraumatic, Normocephalic  EYES: PERRLA  ENMT: moist mucus membranes   NECK: Supple  NERVOUS SYSTEM:  Alert & Oriented X3  CHEST/LUNG: CTAB  HEART: Regular rate and rhythm; No murmurs, rubs, or gallops  ABDOMEN: Soft, Nontender, Nondistended; Bowel sounds present  EXTREMITIES:  2+ Peripheral Pulses, No clubbing, cyanosis, or edema  LYMPH: No lymphadenopathy noted  SKIN: No rashes or lesions  BACK: tenderness to palpation of lumbar spine       LABS:                        11.2   7.7   )-----------( 275      ( 16 Apr 2018 06:59 )             34.6     04-16    140  |  107  |  11  ----------------------------<  86  4.3   |  26  |  0.83    Ca    9.0      16 Apr 2018 06:59  Phos  2.6     04-16  Mg     2.3     04-16    TPro  6.9  /  Alb  3.3<L>  /  TBili  0.4  /  DBili  x   /  AST  17  /  ALT  25  /  AlkPhos  84  04-16    CAPILLARY BLOOD GLUCOSE

## 2018-04-16 NOTE — PROGRESS NOTE ADULT - PROBLEM SELECTOR PLAN 1
- follow up MRI lumbar spine  - will consider pain management reconsult if pain does not improve  - continue lidocaine patch

## 2018-04-17 DIAGNOSIS — M54.5 LOW BACK PAIN: ICD-10-CM

## 2018-04-17 DIAGNOSIS — S32.050D WEDGE COMPRESSION FRACTURE OF FIFTH LUMBAR VERTEBRA, SUBSEQUENT ENCOUNTER FOR FRACTURE WITH ROUTINE HEALING: ICD-10-CM

## 2018-04-17 DIAGNOSIS — S22.080A WEDGE COMPRESSION FRACTURE OF T11-T12 VERTEBRA, INITIAL ENCOUNTER FOR CLOSED FRACTURE: ICD-10-CM

## 2018-04-17 LAB
ANION GAP SERPL CALC-SCNC: 8 MMOL/L — SIGNIFICANT CHANGE UP (ref 5–17)
BASOPHILS # BLD AUTO: 0.1 K/UL — SIGNIFICANT CHANGE UP (ref 0–0.2)
BASOPHILS NFR BLD AUTO: 1.5 % — SIGNIFICANT CHANGE UP (ref 0–2)
BUN SERPL-MCNC: 12 MG/DL — SIGNIFICANT CHANGE UP (ref 7–18)
CALCIUM SERPL-MCNC: 8.7 MG/DL — SIGNIFICANT CHANGE UP (ref 8.4–10.5)
CHLORIDE SERPL-SCNC: 106 MMOL/L — SIGNIFICANT CHANGE UP (ref 96–108)
CO2 SERPL-SCNC: 23 MMOL/L — SIGNIFICANT CHANGE UP (ref 22–31)
CREAT SERPL-MCNC: 0.93 MG/DL — SIGNIFICANT CHANGE UP (ref 0.5–1.3)
EOSINOPHIL # BLD AUTO: 0.3 K/UL — SIGNIFICANT CHANGE UP (ref 0–0.5)
EOSINOPHIL NFR BLD AUTO: 4.4 % — SIGNIFICANT CHANGE UP (ref 0–6)
GLUCOSE SERPL-MCNC: 86 MG/DL — SIGNIFICANT CHANGE UP (ref 70–99)
HCT VFR BLD CALC: 35 % — SIGNIFICANT CHANGE UP (ref 34.5–45)
HGB BLD-MCNC: 11.3 G/DL — LOW (ref 11.5–15.5)
LYMPHOCYTES # BLD AUTO: 2.3 K/UL — SIGNIFICANT CHANGE UP (ref 1–3.3)
LYMPHOCYTES # BLD AUTO: 29.1 % — SIGNIFICANT CHANGE UP (ref 13–44)
MAGNESIUM SERPL-MCNC: 2.2 MG/DL — SIGNIFICANT CHANGE UP (ref 1.6–2.6)
MCHC RBC-ENTMCNC: 32.2 GM/DL — SIGNIFICANT CHANGE UP (ref 32–36)
MCHC RBC-ENTMCNC: 32.8 PG — SIGNIFICANT CHANGE UP (ref 27–34)
MCV RBC AUTO: 102.1 FL — HIGH (ref 80–100)
MONOCYTES # BLD AUTO: 0.8 K/UL — SIGNIFICANT CHANGE UP (ref 0–0.9)
MONOCYTES NFR BLD AUTO: 10.2 % — SIGNIFICANT CHANGE UP (ref 2–14)
NEUTROPHILS # BLD AUTO: 4.3 K/UL — SIGNIFICANT CHANGE UP (ref 1.8–7.4)
NEUTROPHILS NFR BLD AUTO: 54.7 % — SIGNIFICANT CHANGE UP (ref 43–77)
PHOSPHATE SERPL-MCNC: 2.8 MG/DL — SIGNIFICANT CHANGE UP (ref 2.5–4.5)
PLATELET # BLD AUTO: 273 K/UL — SIGNIFICANT CHANGE UP (ref 150–400)
POTASSIUM SERPL-MCNC: 4.5 MMOL/L — SIGNIFICANT CHANGE UP (ref 3.5–5.3)
POTASSIUM SERPL-SCNC: 4.5 MMOL/L — SIGNIFICANT CHANGE UP (ref 3.5–5.3)
RBC # BLD: 3.43 M/UL — LOW (ref 3.8–5.2)
RBC # FLD: 12.1 % — SIGNIFICANT CHANGE UP (ref 10.3–14.5)
SODIUM SERPL-SCNC: 137 MMOL/L — SIGNIFICANT CHANGE UP (ref 135–145)
WBC # BLD: 7.8 K/UL — SIGNIFICANT CHANGE UP (ref 3.8–10.5)
WBC # FLD AUTO: 7.8 K/UL — SIGNIFICANT CHANGE UP (ref 3.8–10.5)

## 2018-04-17 PROCEDURE — 72148 MRI LUMBAR SPINE W/O DYE: CPT | Mod: 26

## 2018-04-17 PROCEDURE — 99223 1ST HOSP IP/OBS HIGH 75: CPT

## 2018-04-17 RX ORDER — ACETAMINOPHEN 500 MG
1000 TABLET ORAL ONCE
Qty: 0 | Refills: 0 | Status: COMPLETED | OUTPATIENT
Start: 2018-04-18 | End: 2018-04-18

## 2018-04-17 RX ORDER — GABAPENTIN 400 MG/1
100 CAPSULE ORAL AT BEDTIME
Qty: 0 | Refills: 0 | Status: DISCONTINUED | OUTPATIENT
Start: 2018-04-17 | End: 2018-04-18

## 2018-04-17 RX ORDER — TRAMADOL HYDROCHLORIDE 50 MG/1
25 TABLET ORAL EVERY 6 HOURS
Qty: 0 | Refills: 0 | Status: DISCONTINUED | OUTPATIENT
Start: 2018-04-17 | End: 2018-04-17

## 2018-04-17 RX ORDER — ACETAMINOPHEN 500 MG
1000 TABLET ORAL ONCE
Qty: 0 | Refills: 0 | Status: COMPLETED | OUTPATIENT
Start: 2018-04-17 | End: 2018-04-17

## 2018-04-17 RX ORDER — TRAMADOL HYDROCHLORIDE 50 MG/1
50 TABLET ORAL EVERY 6 HOURS
Qty: 0 | Refills: 0 | Status: DISCONTINUED | OUTPATIENT
Start: 2018-04-17 | End: 2018-04-23

## 2018-04-17 RX ADMIN — Medication 100 MILLIGRAM(S): at 13:03

## 2018-04-17 RX ADMIN — ENOXAPARIN SODIUM 40 MILLIGRAM(S): 100 INJECTION SUBCUTANEOUS at 12:58

## 2018-04-17 RX ADMIN — LIDOCAINE 1 PATCH: 4 CREAM TOPICAL at 12:58

## 2018-04-17 RX ADMIN — LIDOCAINE 1 PATCH: 4 CREAM TOPICAL at 01:01

## 2018-04-17 RX ADMIN — Medication 50 MILLIGRAM(S): at 05:06

## 2018-04-17 RX ADMIN — Medication 100 MILLIGRAM(S): at 05:06

## 2018-04-17 RX ADMIN — Medication 1000 MILLIGRAM(S): at 18:59

## 2018-04-17 RX ADMIN — Medication 400 MILLIGRAM(S): at 18:10

## 2018-04-17 RX ADMIN — Medication 50 MILLIGRAM(S): at 18:13

## 2018-04-17 RX ADMIN — GABAPENTIN 100 MILLIGRAM(S): 400 CAPSULE ORAL at 21:37

## 2018-04-17 RX ADMIN — SIMVASTATIN 10 MILLIGRAM(S): 20 TABLET, FILM COATED ORAL at 21:37

## 2018-04-17 RX ADMIN — Medication 50 MILLIGRAM(S): at 21:37

## 2018-04-17 RX ADMIN — Medication 50 MILLIGRAM(S): at 13:04

## 2018-04-17 RX ADMIN — POLYETHYLENE GLYCOL 3350 17 GRAM(S): 17 POWDER, FOR SOLUTION ORAL at 12:54

## 2018-04-17 RX ADMIN — Medication 81 MILLIGRAM(S): at 12:58

## 2018-04-17 RX ADMIN — SENNA PLUS 2 TABLET(S): 8.6 TABLET ORAL at 21:37

## 2018-04-17 RX ADMIN — Medication 100 MILLIGRAM(S): at 21:37

## 2018-04-17 NOTE — CONSULT NOTE ADULT - SUBJECTIVE AND OBJECTIVE BOX
HPI:  Patient is an 86 year-old female (lives alone, no HHA) with PMH of HTN, CAD s/p stent in 2004, current smoker, HLD s/p discharge from [facility] 4/9/18 for s/p fall, after CT of the head was negative, CT of the lumbar spine shows no evidence of lumbar fracture or traumatic malalignment, chronic mild compression fracture at L5 that has been stable since a scan in April 2015, small disc bulges were noted throughout the lumbar spine and mild spinal canal stenosis at L1-L2, and L4-L5, sent by visiting nurse for patient reporting diffuse back pain not resolving since prior admission. ROS + poor po intake since last admission and suprapubic pain as well as feeling like she has a sore throat. She denies that back pain has worsened, but it has not improved. Denies fever, chills, SOB, palpitations, chest pain, nausea, vomiting, diarrhea or constipation. Reports no other complaints. Denies urinary incontinence, constipation or diarrhea. Does not have family, only 1 friend, was deemed to have capacity on last admission to make medical decision, and is full code. (13 Apr 2018 14:11)      4/17/18 - 86 year old female s/p fall on 4/9/18, lying in bed, nad.  + complaints of lower back pain. No radiation of pain down legs.  On previous admission, pt complained of lower back pain which worsens on exertion.  CT showed mild compression fracture at L5.  Pt also lives at home alone and does not have family.  Pt was sent home with home care services.  Pt recently came back due to worsening back pain.  MRI L/S shows acute T12 compression fracture. Pt denies upper back pain.      PAIN SCORE:    5/10     SCALE USED: (1-10 VNRS)      PAST MEDICAL & SURGICAL HISTORY:  Smoking greater than 25 pack years  Stenosis of left carotid artery  Vitamin D deficiency  Heart attack: 2004  Hyperlipidemia  HTN (hypertension)  CAD (coronary artery disease)  Stented coronary artery: x 1 - 2004  S/P hernia repair: inguinal , right - February 2106  H/O heart artery stent: 2004  History of tonsillectomy  History of appendectomy  H/O abdominal hysterectomy      FAMILY HISTORY:  No pertinent family history in first degree relatives      SOCIAL HISTORY:  [ ] Denies Smoking, Alcohol, or Drug Use    Allergies    No Known Allergies    Intolerances        PAIN MEDICATIONS:  gabapentin 100 milliGRAM(s) Oral at bedtime  traMADol 25 milliGRAM(s) Oral every 6 hours PRN    Heme:  aspirin  chewable 81 milliGRAM(s) Oral daily  enoxaparin Injectable 40 milliGRAM(s) SubCutaneous daily    Antibiotics:    Cardiovascular:  hydrALAZINE 50 milliGRAM(s) Oral every 8 hours  metoprolol tartrate 50 milliGRAM(s) Oral two times a day    GI:  docusate sodium 100 milliGRAM(s) Oral three times a day  polyethylene glycol 3350 17 Gram(s) Oral daily  senna 2 Tablet(s) Oral at bedtime    Endocrine:  simvastatin 10 milliGRAM(s) Oral at bedtime    All Other Medications:  lidocaine   Patch 1 Patch Transdermal daily  nicotine -  14 mG/24Hr(s) Patch 1 patch Transdermal daily          Vital Signs Last 24 Hrs  T(C): 36.4 (17 Apr 2018 13:02), Max: 37.1 (16 Apr 2018 20:53)  T(F): 97.6 (17 Apr 2018 13:02), Max: 98.7 (16 Apr 2018 20:53)  HR: 81 (17 Apr 2018 18:14) (61 - 85)  BP: 140/52 (17 Apr 2018 18:14) (140/52 - 149/45)  BP(mean): --  RR: 17 (17 Apr 2018 13:02) (17 - 18)  SpO2: 97% (17 Apr 2018 13:02) (93% - 99%)                       LABS:                          11.3   7.8   )-----------( 273      ( 17 Apr 2018 07:47 )             35.0     04-17    137  |  106  |  12  ----------------------------<  86  4.5   |  23  |  0.93    Ca    8.7      17 Apr 2018 07:47  Phos  2.8     04-17  Mg     2.2     04-17    TPro  6.9  /  Alb  3.3<L>  /  TBili  0.4  /  DBili  x   /  AST  17  /  ALT  25  /  AlkPhos  84  04-16          RADIOLOGY:  < from: MR Lumbar Spine No Cont (04.17.18 @ 12:29) >  EXAM:  MR SPINE LUMBAR                            PROCEDURE DATE:  04/17/2018          INTERPRETATION:  CLINICAL STATEMENT: Fracture    TECHNIQUE: MRI of the lumbar spine was performed without gadolinium.    COMPARISON: CT abdomen pelvis 4/13/2018    FINDINGS:  Severe acute compression fracture T12 vertebral body noted. No   significant posterior retropulsion.    Mild chronic compression fracture L5 noted. Sagittal alignment intact    Indeterminate 8 mm iso-T1, high T2 signal lesion noted in the L5   vertebral body    Partially visualized bone marrow edema noted in the lower sacrum.    The conus terminates at L1-2    Multilevel osteophytes multilevel degenerative disc disease with loss of   signal. Mild disc space narrowing T11-T12. Mild disc space narrowing   L1-2, L2-3, L5-S1.    T11-T12: Mild disc bulge noted without significant spinal canal stenosis.   Incidental elongated low T1 high T2 signal noted in the left neural   foramen likely related to nerve root cyst.    T12-L1: Disc osteophyte complex noted resulting in effacement of ventral   thecal sac. No significant neural foraminal narrowing.    L1-2: Disc bulge and facet hypertrophy/ligament flavum enfolding noted   resulting in mild-to-moderate spinal canal stenosis. Mild bilateral   neural foraminal narrowing.    L2-3: Disc bulge with posterior annular tear and facet   hypertrophy/ligamentum flavum enfolding noted resulting in   mild-to-moderate spinal canal stenosis. Mild bilateral neural foraminal   narrowing.    L3-4:Disc bulge with posterior annular tear and facet   hypertrophy/ligamentum flavum enfolding noted resulting in mild spinal   canal stenosis.    L4-5: Disc bulge and facet hypertrophy/ligament flavum enfolding noted   resulting in mild spinal canal stenosis. Mild to moderate bilateral   neural foraminal narrowing.    L5-S1: Disc bulge and facet hypertrophy noted without significant spinal   canal stenosis. Moderate right, mild left neural foraminal narrowing.   Small nerve root cyst noted in the left neural foramen.    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    Small high T2 signal lesions noted in the kidneys which may represent   cysts.    IMPRESSION:  Acute severe T12 compression fracture.    Indeterminate subcentimeter lesion in the posterior L5 vertebral body.   Although this may be related to atypical hemangioma, other lesion such as   metastasis not excluded. Follow-up MRI exam could be obtained in 3 months   to ensure stability.    Partially visualized edema in the lower sacrum may representfracture.    < end of copied text >      Drug Screen:            [ ]  [place]  Reviewed and Copied to Chart

## 2018-04-17 NOTE — PATIENT PROFILE ADULT. - FALL HARM RISK
hx of fall/bones(Osteoporosis,prev fx,steroid use,metastatic bone ca/age(85 years old or older)/other

## 2018-04-17 NOTE — PROGRESS NOTE ADULT - SUBJECTIVE AND OBJECTIVE BOX
Patient is a 86y old  Female who presents with a chief complaint of back pain (13 Apr 2018 14:11)      INTERVAL HPI/OVERNIGHT EVENTS:  less back pain    VITAL SIGNS:  T(F): 97.3 (04-17-18 @ 05:05)  HR: 61 (04-17-18 @ 05:05)  BP: 148/68 (04-17-18 @ 05:05)  RR: 17 (04-17-18 @ 05:05)  SpO2: 99% (04-17-18 @ 05:05)  Wt(kg): --  I&O's Detail          REVIEW OF SYSTEMS:    CONSTITUTIONAL:  No fevers, chills, sweats    HEENT:  Eyes:  No diplopia or blurred vision. ENT:  No earache, sore throat or runny nose.    CARDIOVASCULAR:  No pressure, squeezing, tightness, or heaviness about the chest; no palpitations.    RESPIRATORY:  Per HPI    GASTROINTESTINAL:  No abdominal pain, nausea, vomiting or diarrhea.    GENITOURINARY:  No dysuria, frequency or urgency.    NEUROLOGIC:  No paresthesias, fasciculations, seizures or weakness.    PSYCHIATRIC:  No disorder of thought or mood.      PHYSICAL EXAM:    Constitutional:no complaints  ENMT:atnc  Neck:supple  Respiratory:clear  Cardiovascular:rr  Gastrointestinal:soft  Extremities:no edema  Vascular:intact  Neurological:non focal  Musculoskeletal:no edema, normal rom    MEDICATIONS  (STANDING):  aspirin  chewable 81 milliGRAM(s) Oral daily  docusate sodium 100 milliGRAM(s) Oral three times a day  enoxaparin Injectable 40 milliGRAM(s) SubCutaneous daily  hydrALAZINE 50 milliGRAM(s) Oral every 8 hours  lidocaine   Patch 1 Patch Transdermal daily  metoprolol tartrate 50 milliGRAM(s) Oral two times a day  nicotine -  14 mG/24Hr(s) Patch 1 patch Transdermal daily  polyethylene glycol 3350 17 Gram(s) Oral daily  senna 2 Tablet(s) Oral at bedtime  simvastatin 10 milliGRAM(s) Oral at bedtime    MEDICATIONS  (PRN):      Allergies    No Known Allergies            LABS:                        11.2   7.7   )-----------( 275      ( 16 Apr 2018 06:59 )             34.6     04-17    137  |  106  |  12  ----------------------------<  86  4.5   |  23  |  0.93    Ca    8.7      17 Apr 2018 07:47  Phos  2.8     04-17  Mg     2.2     04-17    TPro  6.9  /  Alb  3.3<L>  /  TBili  0.4  /  DBili  x   /  AST  17  /  ALT  25  /  AlkPhos  84  04-16              CAPILLARY BLOOD GLUCOSE            RADIOLOGY & ADDITIONAL TESTS:

## 2018-04-17 NOTE — PROGRESS NOTE ADULT - SUBJECTIVE AND OBJECTIVE BOX
PGY-3 NOTE:    Patient is an 86 year-old female with PMH of HTN, CAD, current smoker, HLD,  chronic compression fracture of L5, recent admission for fall/back pain who presents with back pain.       INTERVAL HPI/OVERNIGHT EVENTS: No events overnight. Patient seen and examined at bedside. Reports back pain. Denies other complaints.       Vital Signs Last 24 Hrs  T(C): 36.3 (17 Apr 2018 05:05), Max: 37.1 (16 Apr 2018 20:53)  T(F): 97.3 (17 Apr 2018 05:05), Max: 98.7 (16 Apr 2018 20:53)  HR: 61 (17 Apr 2018 05:05) (61 - 73)  BP: 148/68 (17 Apr 2018 05:05) (133/47 - 148/68)  BP(mean): --  RR: 17 (17 Apr 2018 05:05) (17 - 18)  SpO2: 99% (17 Apr 2018 05:05) (93% - 99%)      REVIEW OF SYSTEMS: denies fever, chills, SOB, palpitations, chest pain, abdominal pain, nausea, vomiting, diarrhea, constipation, dizziness    MEDICATIONS  (STANDING):  aspirin  chewable 81 milliGRAM(s) Oral daily  docusate sodium 100 milliGRAM(s) Oral three times a day  enoxaparin Injectable 40 milliGRAM(s) SubCutaneous daily  hydrALAZINE 50 milliGRAM(s) Oral every 8 hours  lidocaine   Patch 1 Patch Transdermal daily  metoprolol tartrate 50 milliGRAM(s) Oral two times a day  nicotine -  14 mG/24Hr(s) Patch 1 patch Transdermal daily  polyethylene glycol 3350 17 Gram(s) Oral daily  senna 2 Tablet(s) Oral at bedtime  simvastatin 10 milliGRAM(s) Oral at bedtime        PHYSICAL EXAM:  GENERAL: no distress, elderly female   HEAD:  Atraumatic, Normocephalic  EYES: PERRLA  ENMT: moist mucus membranes   NECK: Supple  NERVOUS SYSTEM:  Alert & Oriented X3  CHEST/LUNG: CTAB  HEART: Regular rate and rhythm; No murmurs, rubs, or gallops  ABDOMEN: Soft, Nontender, Nondistended; Bowel sounds present  EXTREMITIES:  2+ Peripheral Pulses, No clubbing, cyanosis, or edema  LYMPH: No lymphadenopathy noted  SKIN: No rashes or lesions  BACK: tenderness to palpation of lumbar spine       LABS:                                11.3   7.8   )-----------( 273      ( 17 Apr 2018 07:47 )             35.0             04-17    137  |  106  |  12  ----------------------------<  86  4.5   |  23  |  0.93    Ca    8.7      17 Apr 2018 07:47  Phos  2.8     04-17  Mg     2.2     04-17    TPro  6.9  /  Alb  3.3<L>  /  TBili  0.4  /  DBili  x   /  AST  17  /  ALT  25  /  AlkPhos  84  04-16

## 2018-04-17 NOTE — PROGRESS NOTE ADULT - ASSESSMENT
-low back pain  -s mm nodule rt ul  -transverse colon thickening  -hx; cad/stenting,htn,hld,smoker    plan; mri l-s spine-pain re consult          ct chest 3 mo          colonoscopy in near future          phys tx          oob          discharge planning

## 2018-04-17 NOTE — CONSULT NOTE ADULT - PROBLEM SELECTOR RECOMMENDATION 9
- pt with chronic compression fracture of L5.  - +acute T12 compression fracture  - ortho spine consulted  - recommend TLSO brace and possible follow up with ortho spine surgeon or pain mgt for kyphoplasty. (pain mgt - dr. cabral)  - iv acetaminophen for 2 doses  - gabapentin 100mg po q hs  - lidocaine patch daily  - pt was on naproxen for 2 days   - consider increasing tramadol to 50mg po q 6 hours prn  - stool softeners

## 2018-04-18 ENCOUNTER — TRANSCRIPTION ENCOUNTER (OUTPATIENT)
Age: 83
End: 2018-04-18

## 2018-04-18 DIAGNOSIS — F02.80 DEMENTIA IN OTHER DISEASES CLASSIFIED ELSEWHERE, UNSPECIFIED SEVERITY, WITHOUT BEHAVIORAL DISTURBANCE, PSYCHOTIC DISTURBANCE, MOOD DISTURBANCE, AND ANXIETY: ICD-10-CM

## 2018-04-18 PROCEDURE — 99233 SBSQ HOSP IP/OBS HIGH 50: CPT

## 2018-04-18 RX ORDER — GABAPENTIN 400 MG/1
200 CAPSULE ORAL AT BEDTIME
Qty: 0 | Refills: 0 | Status: DISCONTINUED | OUTPATIENT
Start: 2018-04-18 | End: 2018-04-20

## 2018-04-18 RX ORDER — CYCLOBENZAPRINE HYDROCHLORIDE 10 MG/1
5 TABLET, FILM COATED ORAL THREE TIMES A DAY
Qty: 0 | Refills: 0 | Status: DISCONTINUED | OUTPATIENT
Start: 2018-04-18 | End: 2018-05-08

## 2018-04-18 RX ADMIN — Medication 1000 MILLIGRAM(S): at 12:33

## 2018-04-18 RX ADMIN — Medication 50 MILLIGRAM(S): at 22:31

## 2018-04-18 RX ADMIN — ENOXAPARIN SODIUM 40 MILLIGRAM(S): 100 INJECTION SUBCUTANEOUS at 13:46

## 2018-04-18 RX ADMIN — Medication 400 MILLIGRAM(S): at 11:33

## 2018-04-18 RX ADMIN — Medication 375 MILLIGRAM(S): at 17:38

## 2018-04-18 RX ADMIN — Medication 50 MILLIGRAM(S): at 05:43

## 2018-04-18 RX ADMIN — SENNA PLUS 2 TABLET(S): 8.6 TABLET ORAL at 22:30

## 2018-04-18 RX ADMIN — POLYETHYLENE GLYCOL 3350 17 GRAM(S): 17 POWDER, FOR SOLUTION ORAL at 13:47

## 2018-04-18 RX ADMIN — LIDOCAINE 1 PATCH: 4 CREAM TOPICAL at 13:46

## 2018-04-18 RX ADMIN — LIDOCAINE 1 PATCH: 4 CREAM TOPICAL at 00:58

## 2018-04-18 RX ADMIN — SIMVASTATIN 10 MILLIGRAM(S): 20 TABLET, FILM COATED ORAL at 22:30

## 2018-04-18 RX ADMIN — GABAPENTIN 200 MILLIGRAM(S): 400 CAPSULE ORAL at 22:30

## 2018-04-18 RX ADMIN — Medication 100 MILLIGRAM(S): at 22:30

## 2018-04-18 RX ADMIN — Medication 100 MILLIGRAM(S): at 13:46

## 2018-04-18 RX ADMIN — Medication 100 MILLIGRAM(S): at 05:43

## 2018-04-18 RX ADMIN — Medication 50 MILLIGRAM(S): at 13:46

## 2018-04-18 RX ADMIN — Medication 81 MILLIGRAM(S): at 13:46

## 2018-04-18 RX ADMIN — Medication 50 MILLIGRAM(S): at 19:02

## 2018-04-18 RX ADMIN — Medication 375 MILLIGRAM(S): at 18:03

## 2018-04-18 NOTE — PROGRESS NOTE ADULT - SUBJECTIVE AND OBJECTIVE BOX
F/U for LBP and T12 and L5 compression fractures.  Reports some improvement with back pain and still no radiating leg pain.    PE: Awake and alert       Moving much better in bed, sitting up and twisting.        Moving legs with good gross strength and sensation.    X-rays: MRI of LS SPine shows acute T12 fx with edema and not causing any canal compromise.             L5 older compression fx with no edema but area of increased signal posterior body possible hemangioma recommend repeat MRI in 3 months. (See report)    A/P: Acute increased LBP with acute T12 compression fx and chronic L5 copression fx.        Lesion posterior L5 body possible hemangioma F/U MRI 3 months        No radiculopathy        Recommend pain control and mobilization with PT ambulation        Pt told about T12 fracture and pain should improve as fracture heals.

## 2018-04-18 NOTE — DISCHARGE NOTE ADULT - PATIENT PORTAL LINK FT
You can access the [facility], offered by [facility], by registering with the following website: [email or web address]

## 2018-04-18 NOTE — PROGRESS NOTE ADULT - PROBLEM SELECTOR PLAN 1
- MRI spine has acute T12 compression fracture  - continue lidocaine patch, tramadol, gabapentin, IV tylenol  - back brace placed  - follow up with orthopedics - MRI spine has acute T12 compression fracture  - continue lidocaine patch, tramadol, gabapentin, IV tylenol  - back brace placed  - follow up PT reevaluation   - follow up with orthopedic consult

## 2018-04-18 NOTE — DISCHARGE NOTE ADULT - HOSPITAL COURSE
Patient is an 86 year-old female with PMH of HTN, CAD (stent in 2004), current smoker, HLD who presented with back pain, abdominal pain. She had recently been discharged from [facility] on 4/9/18 after a mechanical fall, found to have chronic mild compression fracture of L5 that had been stable since April 2015, small disc bulges in the lumbar spine, mild spinal canal stenosis at L1-L2 and L4-L5. This time, she was sent to the hospital by visiting nurse for unresolving back pain. She was admitted to medicine for further management. Patient is an 86 year-old female with PMH of HTN, CAD (stent in 2004), current smoker, HLD who presented with back pain, abdominal pain. She had recently been discharged from [facility] on 4/9/18 after a mechanical fall, found to have chronic mild compression fracture of L5 that had been stable since April 2015, small disc bulges in the lumbar spine, mild spinal canal stenosis at L1-L2 and L4-L5. This time, she was sent to the hospital by visiting nurse for unresolving back pain. She was admitted to medicine for further management. MRI lumbar spine showed new finding of acute T12 compression fracture. Patient was evaluated by orthopedic surgery who recommended nonsurgical management with physical therapy, pain control. Pain management optimized pain medications and symptoms improved. Physical therapy evaluated her and she was deemed appropriate for outpatient physical therapy. Social work had concerns that patient is unable to take care of herself, her finances, food shopping, ADLs etc. Psychiatry was consulted and patient was deemed not to have capacity.

## 2018-04-18 NOTE — PROGRESS NOTE ADULT - ASSESSMENT
-low back pain 2/2 acute t 12 fx  -s mm nodule rt ul  -transverse colon thickening  -hx; cad/stenting,htn,hld,smoker    plan;  ortho/spine--brace          f/u with ortho-? kyphoplasty          ct chest 3 mo          colonoscopy in near future          phys tx          analgesia per pain consult          oob          discharge planning

## 2018-04-18 NOTE — DISCHARGE NOTE ADULT - CARE PLAN
Principal Discharge DX:	Compression fracture of T12 vertebra  Goal:	pain control  Assessment and plan of treatment:	you have cord compression fracture in t12 vertebre ; Orthopedic doctor does not recommend surgery for that, c/w pain meds as prescribed and c/w physical therapy treatment  Secondary Diagnosis:	Failure to thrive in adult  Assessment and plan of treatment:	c/w diet with protein supplements  Secondary Diagnosis:	Vitamin D deficiency  Assessment and plan of treatment:	last vitamin d level - 29.0  c/w supplements 1000 units daily  Secondary Diagnosis:	Hyperlipidemia  Assessment and plan of treatment:	f/u Lipid profile 3-4 months  low salt and low chol Diet compliance  c/w above meds as prescribed  Secondary Diagnosis:	HTN (hypertension)  Assessment and plan of treatment:	c/w home meds for high BP  c/w above meds as prescribed

## 2018-04-18 NOTE — PROGRESS NOTE ADULT - PROBLEM SELECTOR PLAN 1
- pt finishing up iv acetaminophen doses  - tramadol increased to 50mg q 6  - lidocaine patch daily  - gabapentin increased to 200mg po q hs  - naproxen 375mg po q 12 hours  - TLSO brace as pt can tolerate  - stool softeners  - oob - pt finishing up iv acetaminophen doses  - tramadol increased to 50mg q 6  - lidocaine patch daily  - gabapentin increased to 200mg po q hs  - naproxen 375mg po q 12 hours  - TLSO brace as pt can tolerate  - stool softeners  - oob  - if pain cannot be controlled - consider kyphoplasty

## 2018-04-18 NOTE — DISCHARGE NOTE ADULT - MEDICATION SUMMARY - MEDICATIONS TO TAKE
I will START or STAY ON the medications listed below when I get home from the hospital:    aspirin 81 mg oral tablet  -- 1 tab(s) by mouth once a day   -- Indication: For CAD (coronary artery disease)    traMADol 50 mg oral tablet  -- 0.5 tab(s) by mouth 4 times a day, As needed, Severe Pain (7 - 10) MDD:1 patch  -- Indication: For Back pain    gabapentin 300 mg oral capsule  -- 1 cap(s) by mouth every 12 hours  -- Indication: For Back pain    ezetimibe 10 mg oral tablet  -- 1 tab(s) by mouth once a day  -- Indication: For Hyperlipidemia    simvastatin 10 mg oral tablet  -- 1 tab(s) by mouth once a day (at bedtime)  -- Indication: For Hyperlipidemia    metoprolol tartrate 50 mg oral tablet  -- 1 tab(s) by mouth 2 times a day  -- Indication: For HTN (hypertension)    lidocaine 5% topical film  -- Apply on skin to affected area once a day MDD:1 patch  -- Indication: For Back pain    senna oral tablet  -- 2 tab(s) by mouth once a day (at bedtime)  -- Indication: For Constipation     docusate sodium 100 mg oral capsule  -- 1 cap(s) by mouth 3 times a day  -- Indication: For Constipation     cyclobenzaprine 5 mg oral tablet  -- 1 tab(s) by mouth 3 times a day, As needed, Muscle Spasm  -- Indication: For Back pain    nicotine 14 mg/24 hr transdermal film, extended release  --  by transdermal patch   -- Indication: For Smoking greater than 25 pack years    hydrALAZINE 50 mg oral tablet  -- 1 tab(s) by mouth every 8 hours  -- Indication: For HTN (hypertension)    Vitamin D3 1000 intl units oral tablet  -- 1 tab(s) by mouth once a day  -- Indication: For vitamin d deficiency    Vitamin B12 1000 mcg oral tablet  -- 1 tab(s) by mouth once a day  -- Indication: For vitamin b12 deficiency

## 2018-04-18 NOTE — PROGRESS NOTE ADULT - SUBJECTIVE AND OBJECTIVE BOX
PGY-3 NOTE:    Patient is an 86 year-old female with PMH of HTN, CAD, current smoker, HLD,  chronic compression fracture of L5, recent admission for fall/back pain who presents with back pain.       INTERVAL HPI/OVERNIGHT EVENTS: No events overnight. Back brace placed. Patient seen and examined at bedside. Reports back pain is improved.       Vital Signs Last 24 Hrs  T(C): 36.7 (18 Apr 2018 05:22), Max: 36.7 (17 Apr 2018 20:20)  T(F): 98.1 (18 Apr 2018 05:22), Max: 98.1 (18 Apr 2018 05:22)  HR: 59 (18 Apr 2018 05:22) (59 - 85)  BP: 148/59 (18 Apr 2018 05:22) (140/52 - 151/59)  BP(mean): --  RR: 17 (18 Apr 2018 05:22) (17 - 18)  SpO2: 99% (18 Apr 2018 05:22) (94% - 99%)      REVIEW OF SYSTEMS: denies fever, chills, SOB, palpitations, chest pain, abdominal pain, nausea, vomiting, diarrhea, constipation, dizziness    MEDICATIONS  (STANDING):  acetaminophen  IVPB. 1000 milliGRAM(s) IV Intermittent once  aspirin  chewable 81 milliGRAM(s) Oral daily  docusate sodium 100 milliGRAM(s) Oral three times a day  enoxaparin Injectable 40 milliGRAM(s) SubCutaneous daily  gabapentin 100 milliGRAM(s) Oral at bedtime  hydrALAZINE 50 milliGRAM(s) Oral every 8 hours  lidocaine   Patch 1 Patch Transdermal daily  metoprolol tartrate 50 milliGRAM(s) Oral two times a day  nicotine -  14 mG/24Hr(s) Patch 1 patch Transdermal daily  polyethylene glycol 3350 17 Gram(s) Oral daily  senna 2 Tablet(s) Oral at bedtime  simvastatin 10 milliGRAM(s) Oral at bedtime    MEDICATIONS  (PRN):  traMADol 50 milliGRAM(s) Oral every 6 hours PRN Severe Pain (7 - 10)      PHYSICAL EXAM:  GENERAL: no distress, elderly female   HEAD:  Atraumatic, Normocephalic  EYES: PERRLA  ENMT: moist mucus membranes   NECK: Supple  NERVOUS SYSTEM:  Alert & Oriented X3  CHEST/LUNG: CTAB  HEART: Regular rate and rhythm; No murmurs, rubs, or gallops  ABDOMEN: Soft, Nontender, Nondistended; Bowel sounds present  EXTREMITIES:  2+ Peripheral Pulses, No clubbing, cyanosis, or edema  LYMPH: No lymphadenopathy noted  SKIN: No rashes or lesions  BACK: back brace      LABS:                                                     11.3   7.8   )-----------( 273      ( 17 Apr 2018 07:47 )             35.0             04-17    137  |  106  |  12  ----------------------------<  86  4.5   |  23  |  0.93    Ca    8.7      17 Apr 2018 07:47  Phos  2.8     04-17  Mg     2.2     04-17

## 2018-04-18 NOTE — DISCHARGE NOTE ADULT - PLAN OF CARE
pain control you have cord compression fracture in t12 vertebre ; Orthopedic doctor does not recommend surgery for that, c/w pain meds as prescribed and c/w physical therapy treatment c/w diet with protein supplements last vitamin d level - 29.0  c/w supplements 1000 units daily f/u Lipid profile 3-4 months  low salt and low chol Diet compliance  c/w above meds as prescribed c/w home meds for high BP  c/w above meds as prescribed

## 2018-04-18 NOTE — BEHAVIORAL HEALTH ASSESSMENT NOTE - SUMMARY
This is a 85 y/o  ,retired,w/f from home with PMH of CAD,s/p stent placement,HTN was admitted with failure to thrive.  Patient was evaluated ,chart was reviewed,case was discussed with MD.  Patient stated:" I don't know why they brought me here".  She is unable to give her PMH,name her meds,reason for curent hospitalisation.  Patient is a/o x2,minimally verbal,behaviorally controlled.  Speech is low pitched goal diredted.Denied feeling depressedm or anxious.  Denied s/h thought.Memory and cognition-limited.I&J-limited.

## 2018-04-18 NOTE — PROGRESS NOTE ADULT - SUBJECTIVE AND OBJECTIVE BOX
Patient is a 86y old  Female who presents with a chief complaint of back pain (13 Apr 2018 14:11)      INTERVAL HPI/OVERNIGHT EVENTS:  still has some pain    VITAL SIGNS:  T(F): 98.1 (04-18-18 @ 05:22)  HR: 59 (04-18-18 @ 05:22)  BP: 148/59 (04-18-18 @ 05:22)  RR: 17 (04-18-18 @ 05:22)  SpO2: 99% (04-18-18 @ 05:22)  Wt(kg): --  I&O's Detail          REVIEW OF SYSTEMS:    CONSTITUTIONAL:  No fevers, chills, sweats    HEENT:  Eyes:  No diplopia or blurred vision. ENT:  No earache, sore throat or runny nose.    CARDIOVASCULAR:  No pressure, squeezing, tightness, or heaviness about the chest; no palpitations.    RESPIRATORY:  no sob    GASTROINTESTINAL:  No abdominal pain, nausea, vomiting or diarrhea.    GENITOURINARY:  No dysuria, frequency or urgency.    NEUROLOGIC:  No paresthesias, fasciculations, seizures or weakness.    PSYCHIATRIC:  No disorder of thought or mood.      PHYSICAL EXAM:    Constitutional:no complaints  ENMT:atnc  Neck:supple  Respiratory:clear  Cardiovascular:rr  Gastrointestinal:soft  Extremities:no edema  Vascular:intact  Neurological:non focal  Musculoskeletal:no edema, normal rom    MEDICATIONS  (STANDING):  acetaminophen  IVPB. 1000 milliGRAM(s) IV Intermittent once  aspirin  chewable 81 milliGRAM(s) Oral daily  docusate sodium 100 milliGRAM(s) Oral three times a day  enoxaparin Injectable 40 milliGRAM(s) SubCutaneous daily  gabapentin 100 milliGRAM(s) Oral at bedtime  hydrALAZINE 50 milliGRAM(s) Oral every 8 hours  lidocaine   Patch 1 Patch Transdermal daily  metoprolol tartrate 50 milliGRAM(s) Oral two times a day  nicotine -  14 mG/24Hr(s) Patch 1 patch Transdermal daily  polyethylene glycol 3350 17 Gram(s) Oral daily  senna 2 Tablet(s) Oral at bedtime  simvastatin 10 milliGRAM(s) Oral at bedtime    MEDICATIONS  (PRN):  traMADol 50 milliGRAM(s) Oral every 6 hours PRN Severe Pain (7 - 10)      Allergies    No Known Allergies        LABS:                        11.3   7.8   )-----------( 273      ( 17 Apr 2018 07:47 )             35.0     04-17    137  |  106  |  12  ----------------------------<  86  4.5   |  23  |  0.93    Ca    8.7      17 Apr 2018 07:47  Phos  2.8     04-17  Mg     2.2     04-17                    RADIOLOGY & ADDITIONAL TESTS  EXAM:  MR SPINE LUMBAR                            PROCEDURE DATE:  04/17/2018          INTERPRETATION:  CLINICAL STATEMENT: Fracture    TECHNIQUE: MRI of the lumbar spine was performed without gadolinium.    COMPARISON: CT abdomen pelvis 4/13/2018    FINDINGS:  Severe acute compression fracture T12 vertebral body noted. No   significant posterior retropulsion.    Mild chronic compression fracture L5 noted. Sagittal alignment intact    Indeterminate 8 mm iso-T1, high T2 signal lesion noted in the L5   vertebral body    Partially visualized bone marrow edema noted in the lower sacrum.    The conus terminates at L1-2    Multilevel osteophytes multilevel degenerative disc disease with loss of   signal. Mild disc space narrowing T11-T12. Mild disc space narrowing   L1-2, L2-3, L5-S1.    T11-T12: Mild disc bulge noted without significant spinal canal stenosis.   Incidental elongated low T1 high T2 signal noted in the left neural   foramen likely related to nerve root cyst.    T12-L1: Disc osteophyte complex noted resulting in effacement of ventral   thecal sac. No significant neural foraminal narrowing.    L1-2: Disc bulge and facet hypertrophy/ligament flavum enfolding noted   resulting in mild-to-moderate spinal canal stenosis. Mild bilateral   neural foraminal narrowing.    L2-3: Disc bulge with posterior annular tear and facet   hypertrophy/ligamentum flavum enfolding noted resulting in   mild-to-moderate spinal canal stenosis. Mild bilateral neural foraminal   narrowing.    L3-4:Disc bulge with posterior annular tear and facet   hypertrophy/ligamentum flavum enfolding noted resulting in mild spinal   canal stenosis.    L4-5: Disc bulge and facet hypertrophy/ligament flavum enfolding noted   resulting in mild spinal canal stenosis. Mild to moderate bilateral   neural foraminal narrowing.    L5-S1: Disc bulge and facet hypertrophy noted without significant spinal   canal stenosis. Moderate right, mild left neural foraminal narrowing.   Small nerve root cyst noted in the left neural foramen.    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    Small high T2 signal lesions noted in the kidneys which may represent   cysts.    IMPRESSION:  Acute severe T12 compression fracture.    Indeterminate subcentimeter lesion in the posterior L5 vertebral body.   Although this may be related to atypical hemangioma, other lesion such as   metastasis not excluded. Follow-up MRI exam could be obtained in 3 months   to ensure stability.    Partially visualized edema in the lower sacrum may representfracture.

## 2018-04-18 NOTE — PROGRESS NOTE ADULT - SUBJECTIVE AND OBJECTIVE BOX
Chief Complaint: lower back pain    HPI:   86y Female with chronic compression fracture L5 and acute T12 compression fracture, sitting up in chair, nad. + complaints of lower back pain.  Pain does not radiate down legs.  Pt received TLSO brace today and has it in place.       PAIN SCORE:    5/10     SCALE USED: (1-10 VNRS)    Allergies    No Known Allergies    Intolerances      MEDICATIONS  (STANDING):  acetaminophen  IVPB. 1000 milliGRAM(s) IV Intermittent once  aspirin  chewable 81 milliGRAM(s) Oral daily  docusate sodium 100 milliGRAM(s) Oral three times a day  enoxaparin Injectable 40 milliGRAM(s) SubCutaneous daily  gabapentin 200 milliGRAM(s) Oral at bedtime  hydrALAZINE 50 milliGRAM(s) Oral every 8 hours  lidocaine   Patch 1 Patch Transdermal daily  metoprolol tartrate 50 milliGRAM(s) Oral two times a day  nicotine -  14 mG/24Hr(s) Patch 1 patch Transdermal daily  polyethylene glycol 3350 17 Gram(s) Oral daily  senna 2 Tablet(s) Oral at bedtime  simvastatin 10 milliGRAM(s) Oral at bedtime    MEDICATIONS  (PRN):  cyclobenzaprine 5 milliGRAM(s) Oral three times a day PRN Muscle Spasm  traMADol 50 milliGRAM(s) Oral every 6 hours PRN Severe Pain (7 - 10)      PHYSICAL EXAM:    Vital Signs Last 24 Hrs  T(C): 36.7 (18 Apr 2018 05:22), Max: 36.7 (17 Apr 2018 20:20)  T(F): 98.1 (18 Apr 2018 05:22), Max: 98.1 (18 Apr 2018 05:22)  HR: 79 (18 Apr 2018 11:11) (59 - 85)  BP: 149/66 (18 Apr 2018 11:11) (140/52 - 151/59)  BP(mean): --  RR: 17 (18 Apr 2018 05:22) (17 - 18)  SpO2: 99% (18 Apr 2018 05:22) (94% - 99%)             LABS:                          11.3   7.8   )-----------( 273      ( 17 Apr 2018 07:47 )             35.0     04-17    137  |  106  |  12  ----------------------------<  86  4.5   |  23  |  0.93    Ca    8.7      17 Apr 2018 07:47  Phos  2.8     04-17  Mg     2.2     04-17            Drug Screen:        RADIOLOGY:

## 2018-04-19 RX ADMIN — Medication 375 MILLIGRAM(S): at 06:04

## 2018-04-19 RX ADMIN — LIDOCAINE 1 PATCH: 4 CREAM TOPICAL at 23:21

## 2018-04-19 RX ADMIN — Medication 100 MILLIGRAM(S): at 05:33

## 2018-04-19 RX ADMIN — Medication 50 MILLIGRAM(S): at 22:03

## 2018-04-19 RX ADMIN — Medication 50 MILLIGRAM(S): at 13:47

## 2018-04-19 RX ADMIN — Medication 100 MILLIGRAM(S): at 22:03

## 2018-04-19 RX ADMIN — SIMVASTATIN 10 MILLIGRAM(S): 20 TABLET, FILM COATED ORAL at 22:03

## 2018-04-19 RX ADMIN — Medication 81 MILLIGRAM(S): at 11:17

## 2018-04-19 RX ADMIN — Medication 375 MILLIGRAM(S): at 17:24

## 2018-04-19 RX ADMIN — GABAPENTIN 200 MILLIGRAM(S): 400 CAPSULE ORAL at 22:03

## 2018-04-19 RX ADMIN — Medication 1 PATCH: at 11:17

## 2018-04-19 RX ADMIN — Medication 375 MILLIGRAM(S): at 05:34

## 2018-04-19 RX ADMIN — POLYETHYLENE GLYCOL 3350 17 GRAM(S): 17 POWDER, FOR SOLUTION ORAL at 11:17

## 2018-04-19 RX ADMIN — Medication 375 MILLIGRAM(S): at 19:24

## 2018-04-19 RX ADMIN — Medication 50 MILLIGRAM(S): at 17:25

## 2018-04-19 RX ADMIN — LIDOCAINE 1 PATCH: 4 CREAM TOPICAL at 01:10

## 2018-04-19 RX ADMIN — ENOXAPARIN SODIUM 40 MILLIGRAM(S): 100 INJECTION SUBCUTANEOUS at 11:22

## 2018-04-19 RX ADMIN — LIDOCAINE 1 PATCH: 4 CREAM TOPICAL at 11:21

## 2018-04-19 RX ADMIN — Medication 100 MILLIGRAM(S): at 13:46

## 2018-04-19 RX ADMIN — SENNA PLUS 2 TABLET(S): 8.6 TABLET ORAL at 22:03

## 2018-04-19 RX ADMIN — Medication 50 MILLIGRAM(S): at 05:34

## 2018-04-19 NOTE — PROGRESS NOTE ADULT - PROBLEM SELECTOR PLAN 1
- MRI spine has acute T12 compression fracture  - continue lidocaine patch, tramadol, gabapentin, IV tylenol  - back brace placed  - PT recommended outpatient PT  - no surgical intervention as per orthopedics

## 2018-04-19 NOTE — PROGRESS NOTE ADULT - SUBJECTIVE AND OBJECTIVE BOX
PGY-3 NOTE:    Patient is an 86 year-old female with PMH of HTN, CAD, current smoker, HLD,  chronic compression fracture of L5, recent admission for fall/back pain who presents with back pain.       INTERVAL HPI/OVERNIGHT EVENTS: No events overnight. Back brace placed. Patient seen and examined at bedside. Reports back pain is "better".     Vital Signs Last 24 Hrs  T(C): 36.4 (19 Apr 2018 05:24), Max: 36.7 (18 Apr 2018 20:21)  T(F): 97.6 (19 Apr 2018 05:24), Max: 98.1 (18 Apr 2018 20:21)  HR: 63 (19 Apr 2018 05:24) (63 - 102)  BP: 129/53 (19 Apr 2018 05:24) (123/54 - 149/66)  BP(mean): --  RR: 17 (19 Apr 2018 05:24) (17 - 19)  SpO2: 100% (19 Apr 2018 05:24) (96% - 100%)      REVIEW OF SYSTEMS: denies fever, chills, SOB, palpitations, chest pain, abdominal pain, nausea, vomiting, diarrhea, constipation, dizziness    MEDICATIONS  (STANDING):  aspirin  chewable 81 milliGRAM(s) Oral daily  docusate sodium 100 milliGRAM(s) Oral three times a day  enoxaparin Injectable 40 milliGRAM(s) SubCutaneous daily  gabapentin 200 milliGRAM(s) Oral at bedtime  hydrALAZINE 50 milliGRAM(s) Oral every 8 hours  lidocaine   Patch 1 Patch Transdermal daily  metoprolol tartrate 50 milliGRAM(s) Oral two times a day  naproxen 375 milliGRAM(s) Oral every 12 hours  nicotine -  14 mG/24Hr(s) Patch 1 patch Transdermal daily  polyethylene glycol 3350 17 Gram(s) Oral daily  senna 2 Tablet(s) Oral at bedtime  simvastatin 10 milliGRAM(s) Oral at bedtime    MEDICATIONS  (PRN):  cyclobenzaprine 5 milliGRAM(s) Oral three times a day PRN Muscle Spasm  traMADol 50 milliGRAM(s) Oral every 6 hours PRN Severe Pain (7 - 10)        PHYSICAL EXAM:  GENERAL: no distress, elderly female   HEAD:  Atraumatic, Normocephalic  EYES: PERRLA  ENMT: moist mucus membranes   NECK: Supple  NERVOUS SYSTEM:  Alert & Oriented X3  CHEST/LUNG: CTAB  HEART: Regular rate and rhythm; No murmurs, rubs, or gallops  ABDOMEN: Soft, Nontender, Nondistended; Bowel sounds present  EXTREMITIES:  2+ Peripheral Pulses, No clubbing, cyanosis, or edema  LYMPH: No lymphadenopathy noted  SKIN: No rashes or lesions  BACK: back brace      LABS:       no labs today

## 2018-04-19 NOTE — PROGRESS NOTE ADULT - SUBJECTIVE AND OBJECTIVE BOX
Patient is a 86y old  Female who presents with a chief complaint of back pain (18 Apr 2018 16:21)      INTERVAL HPI/OVERNIGHT EVENTS:  some localized pain    VITAL SIGNS:  T(F): 97.6 (04-19-18 @ 05:24)  HR: 63 (04-19-18 @ 05:24)  BP: 129/53 (04-19-18 @ 05:24)  RR: 17 (04-19-18 @ 05:24)  SpO2: 100% (04-19-18 @ 05:24)  Wt(kg): --  I&O's Detail          REVIEW OF SYSTEMS:    CONSTITUTIONAL:  No fevers, chills, sweats    HEENT:  Eyes:  No diplopia or blurred vision. ENT:  No earache, sore throat or runny nose.    CARDIOVASCULAR:  No pressure, squeezing, tightness, or heaviness about the chest; no palpitations.    RESPIRATORY:  no sob    GASTROINTESTINAL:  No abdominal pain, nausea, vomiting or diarrhea.    GENITOURINARY:  No dysuria, frequency or urgency.    NEUROLOGIC:  No paresthesias, fasciculations, seizures or weakness.    PSYCHIATRIC:  No disorder of thought or mood.      PHYSICAL EXAM:    Constitutional:no complaints  ENMT:atnc  Neck:supple  Respiratory:clear  Cardiovascular:rr  Gastrointestinal:soft  Extremities:no edema  Vascular:intact  Neurological:non focal  Musculoskeletal:no edema, normal rom    MEDICATIONS  (STANDING):  aspirin  chewable 81 milliGRAM(s) Oral daily  docusate sodium 100 milliGRAM(s) Oral three times a day  enoxaparin Injectable 40 milliGRAM(s) SubCutaneous daily  gabapentin 200 milliGRAM(s) Oral at bedtime  hydrALAZINE 50 milliGRAM(s) Oral every 8 hours  lidocaine   Patch 1 Patch Transdermal daily  metoprolol tartrate 50 milliGRAM(s) Oral two times a day  naproxen 375 milliGRAM(s) Oral every 12 hours  nicotine -  14 mG/24Hr(s) Patch 1 patch Transdermal daily  polyethylene glycol 3350 17 Gram(s) Oral daily  senna 2 Tablet(s) Oral at bedtime  simvastatin 10 milliGRAM(s) Oral at bedtime    MEDICATIONS  (PRN):  cyclobenzaprine 5 milliGRAM(s) Oral three times a day PRN Muscle Spasm  traMADol 50 milliGRAM(s) Oral every 6 hours PRN Severe Pain (7 - 10)

## 2018-04-19 NOTE — PROGRESS NOTE ADULT - ASSESSMENT
-low back pain 2/2 acute t 12 fx  -s mm nodule rt ul  -transverse colon thickening  -hx; cad/stenting,htn,hld,smoker    plan; mr lumbar spine 3 mo -per ortho          ct chest 3 mo          colonoscopy in near future          phys tx          analgesia per pain consult          oob          discharge planning

## 2018-04-20 PROCEDURE — 99233 SBSQ HOSP IP/OBS HIGH 50: CPT

## 2018-04-20 RX ORDER — GABAPENTIN 400 MG/1
200 CAPSULE ORAL EVERY 12 HOURS
Qty: 0 | Refills: 0 | Status: DISCONTINUED | OUTPATIENT
Start: 2018-04-20 | End: 2018-04-24

## 2018-04-20 RX ADMIN — Medication 1 PATCH: at 11:22

## 2018-04-20 RX ADMIN — Medication 100 MILLIGRAM(S): at 13:50

## 2018-04-20 RX ADMIN — Medication 50 MILLIGRAM(S): at 13:50

## 2018-04-20 RX ADMIN — Medication 50 MILLIGRAM(S): at 05:18

## 2018-04-20 RX ADMIN — Medication 1 PATCH: at 11:16

## 2018-04-20 RX ADMIN — SIMVASTATIN 10 MILLIGRAM(S): 20 TABLET, FILM COATED ORAL at 23:23

## 2018-04-20 RX ADMIN — Medication 375 MILLIGRAM(S): at 05:48

## 2018-04-20 RX ADMIN — Medication 100 MILLIGRAM(S): at 05:18

## 2018-04-20 RX ADMIN — LIDOCAINE 1 PATCH: 4 CREAM TOPICAL at 11:16

## 2018-04-20 RX ADMIN — ENOXAPARIN SODIUM 40 MILLIGRAM(S): 100 INJECTION SUBCUTANEOUS at 11:15

## 2018-04-20 RX ADMIN — GABAPENTIN 200 MILLIGRAM(S): 400 CAPSULE ORAL at 18:21

## 2018-04-20 RX ADMIN — Medication 50 MILLIGRAM(S): at 23:23

## 2018-04-20 RX ADMIN — Medication 50 MILLIGRAM(S): at 17:35

## 2018-04-20 RX ADMIN — Medication 81 MILLIGRAM(S): at 11:15

## 2018-04-20 RX ADMIN — Medication 375 MILLIGRAM(S): at 05:18

## 2018-04-20 RX ADMIN — POLYETHYLENE GLYCOL 3350 17 GRAM(S): 17 POWDER, FOR SOLUTION ORAL at 11:15

## 2018-04-20 RX ADMIN — LIDOCAINE 1 PATCH: 4 CREAM TOPICAL at 23:16

## 2018-04-20 RX ADMIN — SENNA PLUS 2 TABLET(S): 8.6 TABLET ORAL at 23:23

## 2018-04-20 RX ADMIN — Medication 100 MILLIGRAM(S): at 23:23

## 2018-04-20 RX ADMIN — CYCLOBENZAPRINE HYDROCHLORIDE 5 MILLIGRAM(S): 10 TABLET, FILM COATED ORAL at 23:25

## 2018-04-20 NOTE — PROGRESS NOTE ADULT - SUBJECTIVE AND OBJECTIVE BOX
Chief Complaint: lower back pain    HPI:   86y Female with acute T12 compression fracture and chronic L5 compression fracture, lying in bed, nad.  + lower back pain.  Pt states that pain is well controlled at this time.  No nausea or vomiting.  Pt is able to get oob to chair.  Pt currently has no capacity and dispo is under review.        PAIN SCORE:   3/10  SCALE USED: (1-10 VNRS)    Allergies    No Known Allergies    Intolerances      MEDICATIONS  (STANDING):  aspirin  chewable 81 milliGRAM(s) Oral daily  docusate sodium 100 milliGRAM(s) Oral three times a day  enoxaparin Injectable 40 milliGRAM(s) SubCutaneous daily  gabapentin 200 milliGRAM(s) Oral at bedtime  hydrALAZINE 50 milliGRAM(s) Oral every 8 hours  lidocaine   Patch 1 Patch Transdermal daily  metoprolol tartrate 50 milliGRAM(s) Oral two times a day  nicotine -  14 mG/24Hr(s) Patch 1 patch Transdermal daily  polyethylene glycol 3350 17 Gram(s) Oral daily  senna 2 Tablet(s) Oral at bedtime  simvastatin 10 milliGRAM(s) Oral at bedtime    MEDICATIONS  (PRN):  cyclobenzaprine 5 milliGRAM(s) Oral three times a day PRN Muscle Spasm  traMADol 50 milliGRAM(s) Oral every 6 hours PRN Severe Pain (7 - 10)      PHYSICAL EXAM:    Vital Signs Last 24 Hrs  T(C): 36.7 (20 Apr 2018 05:10), Max: 36.8 (19 Apr 2018 20:51)  T(F): 98.1 (20 Apr 2018 05:10), Max: 98.3 (19 Apr 2018 20:51)  HR: 73 (20 Apr 2018 05:10) (60 - 83)  BP: 133/53 (20 Apr 2018 05:10) (123/46 - 144/49)  BP(mean): --  RR: 17 (20 Apr 2018 05:10) (17 - 18)  SpO2: 96% (20 Apr 2018 05:10) (95% - 96%)             LABS:                  Drug Screen:        RADIOLOGY:

## 2018-04-20 NOTE — PROGRESS NOTE ADULT - SUBJECTIVE AND OBJECTIVE BOX
Patient is a 86y old  Female who presents with a chief complaint of back pain (18 Apr 2018 16:21)      INTERVAL HPI/OVERNIGHT EVENTS:  less pain    VITAL SIGNS:  T(F): 98.1 (04-20-18 @ 05:10)  HR: 73 (04-20-18 @ 05:10)  BP: 133/53 (04-20-18 @ 05:10)  RR: 17 (04-20-18 @ 05:10)  SpO2: 96% (04-20-18 @ 05:10)  Wt(kg): --  I&O's Detail    19 Apr 2018 07:01  -  20 Apr 2018 07:00  --------------------------------------------------------  IN:  Total IN: 0 mL    OUT:    Stool: 1 mL  Total OUT: 1 mL    Total NET: -1 mL              REVIEW OF SYSTEMS:    CONSTITUTIONAL:  No fevers, chills, sweats    HEENT:  Eyes:  No diplopia or blurred vision. ENT:  No earache, sore throat or runny nose.    CARDIOVASCULAR:  No pressure, squeezing, tightness, or heaviness about the chest; no palpitations.    RESPIRATORY:  Per HPI    GASTROINTESTINAL:  No abdominal pain, nausea, vomiting or diarrhea.    GENITOURINARY:  No dysuria, frequency or urgency.    NEUROLOGIC:  No paresthesias, fasciculations, seizures or weakness.    PSYCHIATRIC:  No disorder of thought or mood.      PHYSICAL EXAM:    Constitutional:no complaints  ENMT:atnc  Neck:supple  Respiratory:clear  Cardiovascular:rr  Gastrointestinal:soft  Extremities:no edema  Vascular:intact  Neurological:non focal  Musculoskeletal:no edema, normal rom    MEDICATIONS  (STANDING):  aspirin  chewable 81 milliGRAM(s) Oral daily  docusate sodium 100 milliGRAM(s) Oral three times a day  enoxaparin Injectable 40 milliGRAM(s) SubCutaneous daily  gabapentin 200 milliGRAM(s) Oral at bedtime  hydrALAZINE 50 milliGRAM(s) Oral every 8 hours  lidocaine   Patch 1 Patch Transdermal daily  metoprolol tartrate 50 milliGRAM(s) Oral two times a day  nicotine -  14 mG/24Hr(s) Patch 1 patch Transdermal daily  polyethylene glycol 3350 17 Gram(s) Oral daily  senna 2 Tablet(s) Oral at bedtime  simvastatin 10 milliGRAM(s) Oral at bedtime    MEDICATIONS  (PRN):  cyclobenzaprine 5 milliGRAM(s) Oral three times a day PRN Muscle Spasm  traMADol 50 milliGRAM(s) Oral every 6 hours PRN Severe Pain (7 - 10)      Allergies    No Known Allergies

## 2018-04-20 NOTE — PROGRESS NOTE ADULT - SUBJECTIVE AND OBJECTIVE BOX
PGY-3 NOTE:    Patient is an 86 year-old female with PMH of HTN, CAD, current smoker, HLD,  chronic compression fracture of L5, recent admission for fall/back pain who presents with back pain.       INTERVAL HPI/OVERNIGHT EVENTS: No events overnight. Back brace placed. Patient seen and examined at bedside. Reports back pain is resolved.     Vital Signs Last 24 Hrs  T(C): 36.7 (20 Apr 2018 05:10), Max: 36.8 (19 Apr 2018 20:51)  T(F): 98.1 (20 Apr 2018 05:10), Max: 98.3 (19 Apr 2018 20:51)  HR: 73 (20 Apr 2018 05:10) (60 - 83)  BP: 133/53 (20 Apr 2018 05:10) (123/46 - 144/49)  BP(mean): --  RR: 17 (20 Apr 2018 05:10) (17 - 18)  SpO2: 96% (20 Apr 2018 05:10) (95% - 96%)    REVIEW OF SYSTEMS: denies fever, chills, SOB, palpitations, chest pain, abdominal pain, nausea, vomiting, diarrhea, constipation, dizziness    MEDICATIONS  (STANDING):  aspirin  chewable 81 milliGRAM(s) Oral daily  docusate sodium 100 milliGRAM(s) Oral three times a day  enoxaparin Injectable 40 milliGRAM(s) SubCutaneous daily  gabapentin 200 milliGRAM(s) Oral at bedtime  hydrALAZINE 50 milliGRAM(s) Oral every 8 hours  lidocaine   Patch 1 Patch Transdermal daily  metoprolol tartrate 50 milliGRAM(s) Oral two times a day  nicotine -  14 mG/24Hr(s) Patch 1 patch Transdermal daily  polyethylene glycol 3350 17 Gram(s) Oral daily  senna 2 Tablet(s) Oral at bedtime  simvastatin 10 milliGRAM(s) Oral at bedtime    MEDICATIONS  (PRN):  cyclobenzaprine 5 milliGRAM(s) Oral three times a day PRN Muscle Spasm  traMADol 50 milliGRAM(s) Oral every 6 hours PRN Severe Pain (7 - 10)        PHYSICAL EXAM:  GENERAL: no distress, elderly female   HEAD:  Atraumatic, Normocephalic  EYES: PERRLA  ENMT: moist mucus membranes   NECK: Supple  NERVOUS SYSTEM:  Alert & Oriented X3  CHEST/LUNG: CTAB  HEART: Regular rate and rhythm; No murmurs, rubs, or gallops  ABDOMEN: Soft, Nontender, Nondistended; Bowel sounds present  EXTREMITIES:  2+ Peripheral Pulses, No clubbing, cyanosis, or edema  LYMPH: No lymphadenopathy noted  SKIN: No rashes or lesions        LABS:       no labs today

## 2018-04-20 NOTE — PROGRESS NOTE ADULT - ASSESSMENT
-low back pain 2/2 acute t 12 fx  -s mm nodule rt ul  -transverse colon thickening  -hx; cad/stenting,htn,hld,smoker    plan; mr lumbar spine 3 mo -per ortho          ct chest 3 mo          colonoscopy in near future          phys tx          analgesia per pain consult          discharge  today with outpt f/u

## 2018-04-20 NOTE — PROGRESS NOTE ADULT - PROBLEM SELECTOR PLAN 1
- MRI spine has acute T12 compression fracture  - continue lidocaine patch, tramadol, gabapentin, IV tylenol  - PT recommended outpatient PT  - no surgical intervention as per orthopedics

## 2018-04-21 PROCEDURE — 99232 SBSQ HOSP IP/OBS MODERATE 35: CPT

## 2018-04-21 RX ADMIN — ENOXAPARIN SODIUM 40 MILLIGRAM(S): 100 INJECTION SUBCUTANEOUS at 12:14

## 2018-04-21 RX ADMIN — POLYETHYLENE GLYCOL 3350 17 GRAM(S): 17 POWDER, FOR SOLUTION ORAL at 12:14

## 2018-04-21 RX ADMIN — Medication 50 MILLIGRAM(S): at 13:59

## 2018-04-21 RX ADMIN — Medication 1 PATCH: at 12:15

## 2018-04-21 RX ADMIN — GABAPENTIN 200 MILLIGRAM(S): 400 CAPSULE ORAL at 05:48

## 2018-04-21 RX ADMIN — Medication 50 MILLIGRAM(S): at 05:48

## 2018-04-21 RX ADMIN — Medication 100 MILLIGRAM(S): at 22:06

## 2018-04-21 RX ADMIN — SENNA PLUS 2 TABLET(S): 8.6 TABLET ORAL at 22:06

## 2018-04-21 RX ADMIN — SIMVASTATIN 10 MILLIGRAM(S): 20 TABLET, FILM COATED ORAL at 22:06

## 2018-04-21 RX ADMIN — GABAPENTIN 200 MILLIGRAM(S): 400 CAPSULE ORAL at 18:46

## 2018-04-21 RX ADMIN — Medication 100 MILLIGRAM(S): at 13:59

## 2018-04-21 RX ADMIN — Medication 81 MILLIGRAM(S): at 12:14

## 2018-04-21 RX ADMIN — Medication 50 MILLIGRAM(S): at 18:46

## 2018-04-21 RX ADMIN — LIDOCAINE 1 PATCH: 4 CREAM TOPICAL at 12:14

## 2018-04-21 RX ADMIN — Medication 100 MILLIGRAM(S): at 05:48

## 2018-04-21 NOTE — PROGRESS NOTE ADULT - ASSESSMENT
1.low back pain  vert fx  pain rx  pt  2.dementia  failure to thrive  seen by psych  will need placement  3.htn/cad

## 2018-04-21 NOTE — PROGRESS NOTE BEHAVIORAL HEALTH - NSBHCONSULTRECOMMENDOTHER_PSY_A_CORE FT
PATIENT LACKS CAPACITY MAKING DECISION REGARDING HER TREATMENT AT THIS TIME.      Guardianship application completed

## 2018-04-21 NOTE — PROGRESS NOTE ADULT - SUBJECTIVE AND OBJECTIVE BOX
HPI:  Patient is an 86 year-old female (lives alone, no HHA) with PMH of HTN, CAD s/p stent in 2004, current smoker, HLD s/p discharge from [facility] 4/9/18 for s/p fall, after CT of the head was negative, CT of the lumbar spine shows no evidence of lumbar fracture or traumatic malalignment, chronic mild compression fracture at L5 that has been stable since a scan in April 2015, small disc bulges were noted throughout the lumbar spine and mild spinal canal stenosis at L1-L2, and L4-L5, sent by visiting nurse for patient reporting diffuse back pain not resolving since prior admission. ROS + poor po intake since last admission and suprapubic pain as well as feeling like she has a sore throat. She denies that back pain has worsened, but it has not improved. Denies fever, chills, SOB, palpitations, chest pain, nausea, vomiting, diarrhea or constipation. Reports no other complaints. Denies urinary incontinence, constipation or diarrhea. Does not have family, only 1 friend, was deemed to have capacity on last admission to make medical decision, and is full code. (13 Apr 2018 14:11)      Meds:    Allergies:  Allergies    No Known Allergies    Intolerances        REVIEW OF SYSTEMS: feels better/    CONSTITUTIONAL: No weakness, fevers or chills  EYES/ENT: No visual changes;  No vertigo or throat pain   NECK: No pain or stiffness  RESPIRATORY: No cough, wheezing, hemoptysis; No shortness of breath  CARDIOVASCULAR: No chest pain or palpitations  GASTROINTESTINAL: No abdominal or epigastric pain. No nausea, vomiting, or hematemesis; No diarrhea or constipation. No melena or hematochezia.  GENITOURINARY: No dysuria, frequency or hematuria  NEUROLOGICAL: No numbness or weakness  SKIN: No itching, burning, rashes, or lesions   All other review of systems is negative unless indicated above.    PHYSICAL EXAM:    Vital Signs Last 24 Hrs  T(C): 36.8 (21 Apr 2018 13:58), Max: 36.9 (21 Apr 2018 05:20)  T(F): 98.3 (21 Apr 2018 13:58), Max: 98.5 (21 Apr 2018 05:20)  HR: 73 (21 Apr 2018 18:23) (63 - 78)  BP: 149/38 (21 Apr 2018 18:23) (123/57 - 149/38)  BP(mean): --  RR: 17 (21 Apr 2018 13:58) (16 - 17)  SpO2: 99% (21 Apr 2018 13:58) (98% - 99%)    HEENT:elderly female  awake responsive  lungs ae fair  cardia reg  abd soft  moves all limbs    Neck:  [  ] Supple  [  ] Lymphadenopathy  [  ] JVD  [  ] Masses  [  ] WNL    CHEST/Respiratory: [ ] Clear to auscultation     [  ] Rales      [  ] Rhonchi      [  ] Wheezing       [  ] Chest Tenderness     [  ] WNL    Cardiovascular:  [  ]S1 S2  [  ] Reg  [  ] Irreg   [  ] No Murmurs   [  ] Murmurs  [  ] Systolic [  ] Diastolic    Gastrointestinal:  [  ] Bowel Sounds  [   ] ABD Soft  [  ] ABD Distention   [  ] No Tenderness [  ] Tenderness  [  ] Organomegaly  [  ] Guarding rigidity  [  ] No Guarding rigidity  [  ] Rebound Tenderness [  ] No Rebound Tenderness    Extremities: [  ] Edema  [  ] No Edema  [  ] Clubbing   [  ] Cyanosis  [  ] Palpable peripheral pulses                          [  ] Tender calf muscles    [  ] No Tender Calf Muscles    Neurological:  [  ] Alert  [  ] Awake  [  ] Oriented  x                              [  ] Focal weakness  [  ] No Focal Weakness    Skin:  [  ] Thrombophlebitis  [  ] Rashes  [  ] Dry  [  ] Ulcers    Ortho:  [  ] Joint Swelling  [  ] Joint erythema [  ] DJD [  ] Increased Temperature to Touch      LABS/DIAGNOSTIC TESTS                  CAPILLARY BLOOD GLUCOSE            Hemoglobin A1C, Whole Blood: 5.3 % (04-14 @ 09:55)  Hemoglobin A1C, Whole Blood: 5.2 % (04-03 @ 08:40)    Thyroid Stimulating Hormone, Serum: 1.62 uU/mL (04-14 @ 07:53)  Thyroid Stimulating Hormone, Serum: 2.38 uU/mL (04-03 @ 06:12)    CULTURES:       RADIOLOGY  CXR:    aspirin  chewable 81 milliGRAM(s) Oral daily  cyclobenzaprine 5 milliGRAM(s) Oral three times a day PRN  docusate sodium 100 milliGRAM(s) Oral three times a day  enoxaparin Injectable 40 milliGRAM(s) SubCutaneous daily  gabapentin 200 milliGRAM(s) Oral every 12 hours  hydrALAZINE 50 milliGRAM(s) Oral every 8 hours  lidocaine   Patch 1 Patch Transdermal daily  metoprolol tartrate 50 milliGRAM(s) Oral two times a day  nicotine -  14 mG/24Hr(s) Patch 1 patch Transdermal daily  polyethylene glycol 3350 17 Gram(s) Oral daily  senna 2 Tablet(s) Oral at bedtime  simvastatin 10 milliGRAM(s) Oral at bedtime  traMADol 50 milliGRAM(s) Oral every 6 hours PRN

## 2018-04-21 NOTE — PROGRESS NOTE BEHAVIORAL HEALTH - SUMMARY
Patient was evaluated,chart was reviewed,case was discussed with MD.  Initial psych notes appreciated.Patient stated: "My memory not good".  Patient is a/o x2,minimally verbal, behaviorally controlled. Speech is low pitched goal directed,illogical at times.  Denied feeling depressed.Denied s/h thought.Denied a/v hallucinations.memory and cognition-limited.

## 2018-04-22 PROCEDURE — 99232 SBSQ HOSP IP/OBS MODERATE 35: CPT

## 2018-04-22 RX ADMIN — Medication 50 MILLIGRAM(S): at 06:28

## 2018-04-22 RX ADMIN — SENNA PLUS 2 TABLET(S): 8.6 TABLET ORAL at 22:10

## 2018-04-22 RX ADMIN — LIDOCAINE 1 PATCH: 4 CREAM TOPICAL at 00:14

## 2018-04-22 RX ADMIN — Medication 100 MILLIGRAM(S): at 06:28

## 2018-04-22 RX ADMIN — Medication 100 MILLIGRAM(S): at 22:11

## 2018-04-22 RX ADMIN — ENOXAPARIN SODIUM 40 MILLIGRAM(S): 100 INJECTION SUBCUTANEOUS at 12:43

## 2018-04-22 RX ADMIN — Medication 1 PATCH: at 12:44

## 2018-04-22 RX ADMIN — Medication 1 PATCH: at 12:43

## 2018-04-22 RX ADMIN — Medication 50 MILLIGRAM(S): at 14:48

## 2018-04-22 RX ADMIN — LIDOCAINE 1 PATCH: 4 CREAM TOPICAL at 12:43

## 2018-04-22 RX ADMIN — SIMVASTATIN 10 MILLIGRAM(S): 20 TABLET, FILM COATED ORAL at 22:10

## 2018-04-22 RX ADMIN — Medication 100 MILLIGRAM(S): at 14:48

## 2018-04-22 RX ADMIN — Medication 50 MILLIGRAM(S): at 22:10

## 2018-04-22 RX ADMIN — GABAPENTIN 200 MILLIGRAM(S): 400 CAPSULE ORAL at 06:28

## 2018-04-22 RX ADMIN — GABAPENTIN 200 MILLIGRAM(S): 400 CAPSULE ORAL at 19:03

## 2018-04-22 RX ADMIN — Medication 50 MILLIGRAM(S): at 19:03

## 2018-04-22 RX ADMIN — Medication 81 MILLIGRAM(S): at 12:43

## 2018-04-22 NOTE — PROGRESS NOTE ADULT - ASSESSMENT
1.low back pain  vert fx  pain rx  pt  2.cad  cont tx  3.dementia   needs [facility] supp care  social service for placement

## 2018-04-22 NOTE — PROGRESS NOTE ADULT - SUBJECTIVE AND OBJECTIVE BOX
HPI:  Patient is an 86 year-old female (lives alone, no HHA) with PMH of HTN, CAD s/p stent in 2004, current smoker, [facility], after CT of the head was negative, CT of the lumbar spine shows no evidence of lumbar fracture or traumatic malalignment, chronic mild compression fracture at L5 that has been stable since a scan in April 2015, small disc bulges were noted throughout the lumbar spine and mild spinal canal stenosis at L1-L2, and L4-L5, sent by visiting nurse for patient reporting diffuse back pain not resolving since prior admission. ROS + poor po intake since last admission and suprapubic pain as well as feeling like she has a sore throat. She denies that back pain has worsened, but it has not improved. Denies fever, chills, SOB, palpitations, chest pain, nausea, vomiting, diarrhea or constipation. Reports no other complaints. Denies urinary incontinence, constipation or diarrhea. Does not have family, only 1 friend, was deemed to have capacity on last admission to make medical decision, and is full code. (13 Apr 2018 14:11)      Meds:    Allergies:  Allergies    No Known Allergies    Intolerances        REVIEW OF SYSTEMS:imani diet  pain improving    CONSTITUTIONAL: No weakness, fevers or chills  EYES/ENT: No visual changes;  No vertigo or throat pain   NECK: No pain or stiffness  RESPIRATORY: No cough, wheezing, hemoptysis; No shortness of breath  CARDIOVASCULAR: No chest pain or palpitations  GASTROINTESTINAL: No abdominal or epigastric pain. No nausea, vomiting, or hematemesis; No diarrhea or constipation. No melena or hematochezia.  GENITOURINARY: No dysuria, frequency or hematuria  NEUROLOGICAL: No numbness or weakness  SKIN: No itching, burning, rashes, or lesions   All other review of systems is negative unless indicated above.    PHYSICAL EXAM:    Vital Signs Last 24 Hrs  T(C): 37.4 (22 Apr 2018 14:46), Max: 37.4 (22 Apr 2018 14:46)  T(F): 99.4 (22 Apr 2018 14:46), Max: 99.4 (22 Apr 2018 14:46)  HR: 73 (22 Apr 2018 14:46) (55 - 74)  BP: 131/49 (22 Apr 2018 14:46) (108/50 - 131/49)  BP(mean): --  RR: 18 (22 Apr 2018 14:46) (17 - 18)  SpO2: 97% (22 Apr 2018 14:46) (96% - 98%)    HEENT:elderly female  awake responsive  in nad  lungs ae fair  cardia sis2 reg  abd soft  neuro moves all limbs    Neck:  [  ] Supple  [  ] Lymphadenopathy  [  ] JVD  [  ] Masses  [  ] WNL    CHEST/Respiratory: [ ] Clear to auscultation     [  ] Rales      [  ] Rhonchi      [  ] Wheezing       [  ] Chest Tenderness     [  ] WNL    Cardiovascular:  [  ]S1 S2  [  ] Reg  [  ] Irreg   [  ] No Murmurs   [  ] Murmurs  [  ] Systolic [  ] Diastolic    Gastrointestinal:  [  ] Bowel Sounds  [   ] ABD Soft  [  ] ABD Distention   [  ] No Tenderness [  ] Tenderness  [  ] Organomegaly  [  ] Guarding rigidity  [  ] No Guarding rigidity  [  ] Rebound Tenderness [  ] No Rebound Tenderness    Extremities: [  ] Edema  [  ] No Edema  [  ] Clubbing   [  ] Cyanosis  [  ] Palpable peripheral pulses                          [  ] Tender calf muscles    [  ] No Tender Calf Muscles    Neurological:  [  ] Alert  [  ] Awake  [  ] Oriented  x                              [  ] Focal weakness  [  ] No Focal Weakness    Skin:  [  ] Thrombophlebitis  [  ] Rashes  [  ] Dry  [  ] Ulcers    Ortho:  [  ] Joint Swelling  [  ] Joint erythema [  ] DJD [  ] Increased Temperature to Touch      LABS/DIAGNOSTIC TESTS                  CAPILLARY BLOOD GLUCOSE            Hemoglobin A1C, Whole Blood: 5.3 % (04-14 @ 09:55)  Hemoglobin A1C, Whole Blood: 5.2 % (04-03 @ 08:40)    Thyroid Stimulating Hormone, Serum: 1.62 uU/mL (04-14 @ 07:53)  Thyroid Stimulating Hormone, Serum: 2.38 uU/mL (04-03 @ 06:12)    CULTURES:       RADIOLOGY  CXR:    aspirin  chewable 81 milliGRAM(s) Oral daily  cyclobenzaprine 5 milliGRAM(s) Oral three times a day PRN  docusate sodium 100 milliGRAM(s) Oral three times a day  enoxaparin Injectable 40 milliGRAM(s) SubCutaneous daily  gabapentin 200 milliGRAM(s) Oral every 12 hours  hydrALAZINE 50 milliGRAM(s) Oral every 8 hours  lidocaine   Patch 1 Patch Transdermal daily  metoprolol tartrate 50 milliGRAM(s) Oral two times a day  nicotine -  14 mG/24Hr(s) Patch 1 patch Transdermal daily  polyethylene glycol 3350 17 Gram(s) Oral daily  senna 2 Tablet(s) Oral at bedtime  simvastatin 10 milliGRAM(s) Oral at bedtime  traMADol 50 milliGRAM(s) Oral every 6 hours PRN

## 2018-04-23 RX ADMIN — Medication 50 MILLIGRAM(S): at 14:00

## 2018-04-23 RX ADMIN — Medication 1 PATCH: at 11:55

## 2018-04-23 RX ADMIN — Medication 81 MILLIGRAM(S): at 11:55

## 2018-04-23 RX ADMIN — Medication 50 MILLIGRAM(S): at 21:59

## 2018-04-23 RX ADMIN — Medication 100 MILLIGRAM(S): at 21:59

## 2018-04-23 RX ADMIN — TRAMADOL HYDROCHLORIDE 50 MILLIGRAM(S): 50 TABLET ORAL at 23:00

## 2018-04-23 RX ADMIN — Medication 100 MILLIGRAM(S): at 14:00

## 2018-04-23 RX ADMIN — LIDOCAINE 1 PATCH: 4 CREAM TOPICAL at 00:43

## 2018-04-23 RX ADMIN — GABAPENTIN 200 MILLIGRAM(S): 400 CAPSULE ORAL at 05:55

## 2018-04-23 RX ADMIN — Medication 50 MILLIGRAM(S): at 05:55

## 2018-04-23 RX ADMIN — GABAPENTIN 200 MILLIGRAM(S): 400 CAPSULE ORAL at 17:51

## 2018-04-23 RX ADMIN — TRAMADOL HYDROCHLORIDE 50 MILLIGRAM(S): 50 TABLET ORAL at 09:56

## 2018-04-23 RX ADMIN — SENNA PLUS 2 TABLET(S): 8.6 TABLET ORAL at 21:59

## 2018-04-23 RX ADMIN — ENOXAPARIN SODIUM 40 MILLIGRAM(S): 100 INJECTION SUBCUTANEOUS at 11:55

## 2018-04-23 RX ADMIN — Medication 1 PATCH: at 11:56

## 2018-04-23 RX ADMIN — TRAMADOL HYDROCHLORIDE 50 MILLIGRAM(S): 50 TABLET ORAL at 22:03

## 2018-04-23 RX ADMIN — LIDOCAINE 1 PATCH: 4 CREAM TOPICAL at 23:49

## 2018-04-23 RX ADMIN — SIMVASTATIN 10 MILLIGRAM(S): 20 TABLET, FILM COATED ORAL at 21:59

## 2018-04-23 RX ADMIN — Medication 100 MILLIGRAM(S): at 05:55

## 2018-04-23 RX ADMIN — TRAMADOL HYDROCHLORIDE 50 MILLIGRAM(S): 50 TABLET ORAL at 08:36

## 2018-04-23 RX ADMIN — LIDOCAINE 1 PATCH: 4 CREAM TOPICAL at 11:55

## 2018-04-23 RX ADMIN — POLYETHYLENE GLYCOL 3350 17 GRAM(S): 17 POWDER, FOR SOLUTION ORAL at 11:55

## 2018-04-23 NOTE — PROGRESS NOTE ADULT - PROBLEM SELECTOR PLAN 1
- MRI spine has acute T12 compression fracture  - continue lidocaine patch, tramadol, gabapentin,  - PT recommended outpatient PT  - no surgical intervention as per orthopedics  -pending guardinship

## 2018-04-23 NOTE — CHART NOTE - NSCHARTNOTEFT_GEN_A_CORE
Assessment:   86yFemalePatient is a 86y old  Female who presents with a chief complaint of back pain (2018 16:21)      Factors impacting intake: [ ] none [ ] nausea  [ ] vomiting [ ] diarrhea [ ] constipation  [ ]chewing problems [ ] swallowing issues  [ ] other:     Diet Presciption: Diet, DASH/TLC:   Sodium & Cholesterol Restricted (18 @ 12:24)    Intake: Visited pt. report appetite fair, intake 40% & tolerating, complaints of ongoing back pain, observed lunch tray, willing to have oral supplement with meals, encourage po intake, no reports of GI distress, on pain management, awaiting placement.  Daily Weight in k.4 (2018 13:27), Bed scale wt. = 56 Kg on 18    % Weight Change: 3%    Pertinent Medications: MEDICATIONS  (STANDING):  aspirin  chewable 81 milliGRAM(s) Oral daily  docusate sodium 100 milliGRAM(s) Oral three times a day  enoxaparin Injectable 40 milliGRAM(s) SubCutaneous daily  gabapentin 200 milliGRAM(s) Oral every 12 hours  hydrALAZINE 50 milliGRAM(s) Oral every 8 hours  lidocaine   Patch 1 Patch Transdermal daily  metoprolol tartrate 50 milliGRAM(s) Oral two times a day  nicotine -  14 mG/24Hr(s) Patch 1 patch Transdermal daily  polyethylene glycol 3350 17 Gram(s) Oral daily  senna 2 Tablet(s) Oral at bedtime  simvastatin 10 milliGRAM(s) Oral at bedtime    MEDICATIONS  (PRN):  cyclobenzaprine 5 milliGRAM(s) Oral three times a day PRN Muscle Spasm  traMADol 50 milliGRAM(s) Oral every 6 hours PRN Severe Pain (7 - 10)    Pertinent Labs:   Phos 2.8 mg/dL  [id number] 5.3 %  Chol 111 mg/dL LDL 37 mg/dL HDL 51 mg/dL Trig 113 mg/dL     CAPILLARY BLOOD GLUCOSE        Skin: Intact     Estimated Needs:   [X ] no change since previous assessment  [ ] recalculated:     Previous Nutrition Diagnosis:   [X ] Inadequate Energy Intake [ ]Inadequate Oral Intake [ ] Excessive Energy Intake   [ ] Underweight [ ] Increased Nutrient Needs [ ] Overweight/Obesity   [ ] Altered GI Function [ ] Unintended Weight Loss [ ] Food & Nutrition Related Knowledge Deficit [ ] Malnutrition     Nutrition Diagnosis is [X ] ongoing  [ ] resolved [ ] not applicable     New Nutrition Diagnosis: [ ] not applicable       Interventions: Add nutrition supplement to diet & Nursing to continue feeding assistance and encouragement.  Recommend  [ ] Change Diet To:  [X ] Nutrition Supplement: Ensure Enlive 1 can BID (700 Kcal, Protein 40 grams) chocolate flavor only, as medically feasibo  [ ] Nutrition Support  [ ] Other:     Monitoring and Evaluation:   [ ] PO intake [ x ] Tolerance to diet prescription [ x ] weights [ x ] labs[ x ] follow up per protocol  [ ] other: Assessment: Reassessment    86yFemalePatient is a 86y old  Female who presents with a chief complaint of back pain (2018 16:21)      Factors impacting intake: [ ] none [ ] nausea  [ ] vomiting [ ] diarrhea [ ] constipation  [X ]chewing problems [ ] swallowing issues  [X ] other:     Diet Presciption: Diet, DASH/TLC:   Sodium & Cholesterol Restricted (18 @ 12:24)    Intake: Visited pt. report appetite fair, intake 40% & tolerating, complaints of ongoing back pain, observed lunch tray, willing to have oral supplement with meals, encourage po intake, no reports of GI distress, on pain management, awaiting placement.  Daily Weight in k.4 (2018 13:27), Bed scale wt. = 56 Kg on 18    % Weight Change: 3%    Pertinent Medications: MEDICATIONS  (STANDING):  aspirin  chewable 81 milliGRAM(s) Oral daily  docusate sodium 100 milliGRAM(s) Oral three times a day  enoxaparin Injectable 40 milliGRAM(s) SubCutaneous daily  gabapentin 200 milliGRAM(s) Oral every 12 hours  hydrALAZINE 50 milliGRAM(s) Oral every 8 hours  lidocaine   Patch 1 Patch Transdermal daily  metoprolol tartrate 50 milliGRAM(s) Oral two times a day  nicotine -  14 mG/24Hr(s) Patch 1 patch Transdermal daily  polyethylene glycol 3350 17 Gram(s) Oral daily  senna 2 Tablet(s) Oral at bedtime  simvastatin 10 milliGRAM(s) Oral at bedtime    MEDICATIONS  (PRN):  cyclobenzaprine 5 milliGRAM(s) Oral three times a day PRN Muscle Spasm  traMADol 50 milliGRAM(s) Oral every 6 hours PRN Severe Pain (7 - 10)    Pertinent Labs:   Phos 2.8 mg/dL  [id number] 5.3 %  Chol 111 mg/dL LDL 37 mg/dL HDL 51 mg/dL Trig 113 mg/dL     CAPILLARY BLOOD GLUCOSE        Skin: Intact     Estimated Needs:   [X ] no change since previous assessment  [ ] recalculated:     Previous Nutrition Diagnosis:   [X ] Inadequate Energy Intake [ ]Inadequate Oral Intake [ ] Excessive Energy Intake   [ ] Underweight [ ] Increased Nutrient Needs [ ] Overweight/Obesity   [ ] Altered GI Function [ ] Unintended Weight Loss [ ] Food & Nutrition Related Knowledge Deficit [ ] Malnutrition     Nutrition Diagnosis is [X ] ongoing  [ ] resolved [ ] not applicable     New Nutrition Diagnosis: [ ] not applicable       Interventions: Add nutrition supplement to diet & Nursing to continue feeding assistance and encouragement.  Recommend  [ ] Change Diet To:  [X ] Nutrition Supplement: Ensure Enlive 1 can BID (700 Kcal, Protein 40 grams) chocolate flavor only, as medically feasible.   [ ] Nutrition Support  [ ] Other:     Monitoring and Evaluation:   [ X] PO intake [ x ] Tolerance to diet prescription [ x ] weights [ x ] labs[ x ] follow up per protocol  [ ] other:

## 2018-04-23 NOTE — PROGRESS NOTE ADULT - SUBJECTIVE AND OBJECTIVE BOX
PGY-3 NOTE:    Patient is an 86 year-old female with PMH of HTN, CAD, current smoker, HLD, chronic compression fracture of L5, recent admission for fall/back pain who presents with back pain admitted for vertebral fracture    Interval history:  Patient still complaining back pain and is very anxious. She denies fever, chills, SOB, palpitations, chest pain.    Vital Signs Last 24 Hrs  T(C): 36.7 (20 Apr 2018 05:10), Max: 36.8 (19 Apr 2018 20:51)  T(F): 98.1 (20 Apr 2018 05:10), Max: 98.3 (19 Apr 2018 20:51)  HR: 73 (20 Apr 2018 05:10) (60 - 83)  BP: 133/53 (20 Apr 2018 05:10) (123/46 - 144/49)  RR: 17 (20 Apr 2018 05:10) (17 - 18)  SpO2: 96% (20 Apr 2018 05:10) (95% - 96%)    REVIEW OF SYSTEMS: denies   MEDICATIONS  (STANDING):  aspirin  chewable 81 milliGRAM(s) Oral daily  docusate sodium 100 milliGRAM(s) Oral three times a day  enoxaparin Injectable 40 milliGRAM(s) SubCutaneous daily  gabapentin 200 milliGRAM(s) Oral at bedtime  hydrALAZINE 50 milliGRAM(s) Oral every 8 hours  lidocaine   Patch 1 Patch Transdermal daily  metoprolol tartrate 50 milliGRAM(s) Oral two times a day  nicotine -  14 mG/24Hr(s) Patch 1 patch Transdermal daily  polyethylene glycol 3350 17 Gram(s) Oral daily  senna 2 Tablet(s) Oral at bedtime  simvastatin 10 milliGRAM(s) Oral at bedtime    MEDICATIONS  (PRN):  cyclobenzaprine 5 milliGRAM(s) Oral three times a day PRN Muscle Spasm  traMADol 50 milliGRAM(s) Oral every 6 hours PRN Severe Pain (7 - 10)        PHYSICAL EXAM:  GENERAL: no distress, elderly female   HEAD:  Atraumatic, Normocephalic  EYES: PERRLA  ENMT: moist mucus membranes   NECK: Supple  NERVOUS SYSTEM:  Alert & Oriented X2  CHEST/LUNG: CTABl  HEART: Regular rate and rhythm; No murmurs, rubs, or gallops  ABDOMEN: Soft, Nontender, Nondistended; Bowel sounds present  EXTREMITIES:  2+ Peripheral Pulses, No clubbing, cyanosis, or edema  LYMPH: No lymphadenopathy noted  SKIN: No rashes or lesions        LABS:       no labs today PGY-3 NOTE:    Patient is an 86 year-old female with PMH of HTN, CAD, current smoker, HLD, chronic compression fracture of L5, recent admission for fall/back pain who presents with back pain admitted for vertebral fracture    Interval history:  Patient still complaining back pain and is very anxious. She denies fever, chills, SOB, palpitations, chest pain.    Vital Signs Last 24 Hrs  T(C): 36.4 (23 Apr 2018 14:32), Max: 37.1 (22 Apr 2018 20:44)  T(F): 97.6 (23 Apr 2018 14:32), Max: 98.7 (22 Apr 2018 20:44)  HR: 87 (23 Apr 2018 14:32) (62 - 91)  BP: 131/79 (23 Apr 2018 14:32) (117/67 - 135/76)  RR: 18 (23 Apr 2018 14:32) (18 - 18)  SpO2: 95% (23 Apr 2018 14:32) (95% - 98%)    REVIEW OF SYSTEMS: denies   MEDICATIONS  (STANDING):  aspirin  chewable 81 milliGRAM(s) Oral daily  docusate sodium 100 milliGRAM(s) Oral three times a day  enoxaparin Injectable 40 milliGRAM(s) SubCutaneous daily  gabapentin 200 milliGRAM(s) Oral at bedtime  hydrALAZINE 50 milliGRAM(s) Oral every 8 hours  lidocaine   Patch 1 Patch Transdermal daily  metoprolol tartrate 50 milliGRAM(s) Oral two times a day  nicotine -  14 mG/24Hr(s) Patch 1 patch Transdermal daily  polyethylene glycol 3350 17 Gram(s) Oral daily  senna 2 Tablet(s) Oral at bedtime  simvastatin 10 milliGRAM(s) Oral at bedtime    MEDICATIONS  (PRN):  cyclobenzaprine 5 milliGRAM(s) Oral three times a day PRN Muscle Spasm  traMADol 50 milliGRAM(s) Oral every 6 hours PRN Severe Pain (7 - 10)        PHYSICAL EXAM:  GENERAL: no distress, elderly female   HEAD:  Atraumatic, Normocephalic  EYES: PERRLA  ENMT: moist mucus membranes   NECK: Supple  NERVOUS SYSTEM:  Alert & Oriented X2  CHEST/LUNG: CTABl  HEART: Regular rate and rhythm; No murmurs, rubs, or gallops  ABDOMEN: Soft, Nontender, Nondistended; Bowel sounds present  EXTREMITIES:  2+ Peripheral Pulses, No clubbing, cyanosis, or edema  LYMPH: No lymphadenopathy noted  SKIN: No rashes or lesions        LABS:       no labs today

## 2018-04-23 NOTE — PROGRESS NOTE ADULT - SUBJECTIVE AND OBJECTIVE BOX
Patient is a 86y old  Female who presents with a chief complaint of back pain (18 Apr 2018 16:21)      INTERVAL HPI/OVERNIGHT EVENTS:  still having back pain    VITAL SIGNS:  T(F): 98.2 (04-23-18 @ 05:06)  HR: 62 (04-23-18 @ 05:06)  BP: 131/47 (04-23-18 @ 05:06)  RR: 18 (04-23-18 @ 05:06)  SpO2: 98% (04-23-18 @ 05:06)  Wt(kg): --  I&O's Detail    22 Apr 2018 07:01  -  23 Apr 2018 07:00  --------------------------------------------------------  IN:  Total IN: 0 mL    OUT:  Total OUT: 0 mL    Total NET: 0 mL              REVIEW OF SYSTEMS:    CONSTITUTIONAL:  No fevers, chills, sweats.back pain    HEENT:  Eyes:  No diplopia or blurred vision. ENT:  No earache, sore throat or runny nose.    CARDIOVASCULAR:  No pressure, squeezing, tightness, or heaviness about the chest; no palpitations.    RESPIRATORY: no sob    GASTROINTESTINAL:  No abdominal pain, nausea, vomiting or diarrhea.    GENITOURINARY:  No dysuria, frequency or urgency.    NEUROLOGIC:  No paresthesias, fasciculations, seizures or weakness.    PSYCHIATRIC:  No disorder of thought or mood.      PHYSICAL EXAM:    Constitutional:no complaints  ENMT:atnc  Neck:supple  Respiratory:clear  Cardiovascular:rr  Gastrointestinal:soft  Extremities:no edema  Vascular:intact  Neurological:non focal  Musculoskeletal:no edema, normal rom    MEDICATIONS  (STANDING):  aspirin  chewable 81 milliGRAM(s) Oral daily  docusate sodium 100 milliGRAM(s) Oral three times a day  enoxaparin Injectable 40 milliGRAM(s) SubCutaneous daily  gabapentin 200 milliGRAM(s) Oral every 12 hours  hydrALAZINE 50 milliGRAM(s) Oral every 8 hours  lidocaine   Patch 1 Patch Transdermal daily  metoprolol tartrate 50 milliGRAM(s) Oral two times a day  nicotine -  14 mG/24Hr(s) Patch 1 patch Transdermal daily  polyethylene glycol 3350 17 Gram(s) Oral daily  senna 2 Tablet(s) Oral at bedtime  simvastatin 10 milliGRAM(s) Oral at bedtime    MEDICATIONS  (PRN):  cyclobenzaprine 5 milliGRAM(s) Oral three times a day PRN Muscle Spasm  traMADol 50 milliGRAM(s) Oral every 6 hours PRN Severe Pain (7 - 10)      Allergies    No Known Allergies

## 2018-04-23 NOTE — PROGRESS NOTE ADULT - ASSESSMENT
-low back pain 2/2 acute t 12 fx  -s mm nodule rt ul  -transverse colon thickening  -hx; cad/stenting,htn,hld,smoker  -dmentia-lacks capacity    plan; mr lumbar spine 3 mo -per ortho          ct chest 3 mo          colonoscopy in near future          phys tx          analgesia per pain consult          awaiting placement

## 2018-04-24 LAB
ANION GAP SERPL CALC-SCNC: 5 MMOL/L — SIGNIFICANT CHANGE UP (ref 5–17)
BUN SERPL-MCNC: 38 MG/DL — HIGH (ref 7–18)
CALCIUM SERPL-MCNC: 9.2 MG/DL — SIGNIFICANT CHANGE UP (ref 8.4–10.5)
CHLORIDE SERPL-SCNC: 102 MMOL/L — SIGNIFICANT CHANGE UP (ref 96–108)
CO2 SERPL-SCNC: 29 MMOL/L — SIGNIFICANT CHANGE UP (ref 22–31)
CREAT SERPL-MCNC: 0.95 MG/DL — SIGNIFICANT CHANGE UP (ref 0.5–1.3)
GLUCOSE SERPL-MCNC: 84 MG/DL — SIGNIFICANT CHANGE UP (ref 70–99)
HCT VFR BLD CALC: 32 % — LOW (ref 34.5–45)
HGB BLD-MCNC: 10.5 G/DL — LOW (ref 11.5–15.5)
MAGNESIUM SERPL-MCNC: 2.4 MG/DL — SIGNIFICANT CHANGE UP (ref 1.6–2.6)
MCHC RBC-ENTMCNC: 32.9 GM/DL — SIGNIFICANT CHANGE UP (ref 32–36)
MCHC RBC-ENTMCNC: 33.8 PG — SIGNIFICANT CHANGE UP (ref 27–34)
MCV RBC AUTO: 102.7 FL — HIGH (ref 80–100)
PHOSPHATE SERPL-MCNC: 4.4 MG/DL — SIGNIFICANT CHANGE UP (ref 2.5–4.5)
PLATELET # BLD AUTO: 230 K/UL — SIGNIFICANT CHANGE UP (ref 150–400)
POTASSIUM SERPL-MCNC: 4.6 MMOL/L — SIGNIFICANT CHANGE UP (ref 3.5–5.3)
POTASSIUM SERPL-SCNC: 4.6 MMOL/L — SIGNIFICANT CHANGE UP (ref 3.5–5.3)
RBC # BLD: 3.12 M/UL — LOW (ref 3.8–5.2)
RBC # FLD: 12.1 % — SIGNIFICANT CHANGE UP (ref 10.3–14.5)
SODIUM SERPL-SCNC: 136 MMOL/L — SIGNIFICANT CHANGE UP (ref 135–145)
WBC # BLD: 11.4 K/UL — HIGH (ref 3.8–10.5)
WBC # FLD AUTO: 11.4 K/UL — HIGH (ref 3.8–10.5)

## 2018-04-24 RX ORDER — GABAPENTIN 400 MG/1
300 CAPSULE ORAL EVERY 12 HOURS
Qty: 0 | Refills: 0 | Status: DISCONTINUED | OUTPATIENT
Start: 2018-04-24 | End: 2018-05-08

## 2018-04-24 RX ADMIN — LIDOCAINE 1 PATCH: 4 CREAM TOPICAL at 23:23

## 2018-04-24 RX ADMIN — LIDOCAINE 1 PATCH: 4 CREAM TOPICAL at 11:16

## 2018-04-24 RX ADMIN — SIMVASTATIN 10 MILLIGRAM(S): 20 TABLET, FILM COATED ORAL at 21:14

## 2018-04-24 RX ADMIN — Medication 1 PATCH: at 11:23

## 2018-04-24 RX ADMIN — Medication 100 MILLIGRAM(S): at 05:35

## 2018-04-24 RX ADMIN — Medication 50 MILLIGRAM(S): at 18:52

## 2018-04-24 RX ADMIN — GABAPENTIN 200 MILLIGRAM(S): 400 CAPSULE ORAL at 05:35

## 2018-04-24 RX ADMIN — Medication 100 MILLIGRAM(S): at 21:14

## 2018-04-24 RX ADMIN — Medication 81 MILLIGRAM(S): at 11:16

## 2018-04-24 RX ADMIN — SENNA PLUS 2 TABLET(S): 8.6 TABLET ORAL at 21:14

## 2018-04-24 RX ADMIN — Medication 50 MILLIGRAM(S): at 21:14

## 2018-04-24 RX ADMIN — Medication 100 MILLIGRAM(S): at 13:50

## 2018-04-24 RX ADMIN — ENOXAPARIN SODIUM 40 MILLIGRAM(S): 100 INJECTION SUBCUTANEOUS at 11:16

## 2018-04-24 RX ADMIN — Medication 1 PATCH: at 11:16

## 2018-04-24 RX ADMIN — GABAPENTIN 300 MILLIGRAM(S): 400 CAPSULE ORAL at 18:52

## 2018-04-24 RX ADMIN — POLYETHYLENE GLYCOL 3350 17 GRAM(S): 17 POWDER, FOR SOLUTION ORAL at 11:16

## 2018-04-24 RX ADMIN — Medication 50 MILLIGRAM(S): at 13:50

## 2018-04-24 NOTE — PROGRESS NOTE ADULT - SUBJECTIVE AND OBJECTIVE BOX
Patient is a 86y old  Female who presents with a chief complaint of back pain (18 Apr 2018 16:21)      INTERVAL HPI/OVERNIGHT EVENTS:  ess pain this am    VITAL SIGNS:  T(F): 97.9 (04-24-18 @ 05:06)  HR: 79 (04-24-18 @ 05:06)  BP: 108/55 (04-24-18 @ 05:06)  RR: 16 (04-24-18 @ 05:06)  SpO2: 95% (04-24-18 @ 05:06)  Wt(kg): --  I&O's Detail          REVIEW OF SYSTEMS:    CONSTITUTIONAL:  No fevers, chills, sweats    HEENT:  Eyes:  No diplopia or blurred vision. ENT:  No earache, sore throat or runny nose.    CARDIOVASCULAR:  No pressure, squeezing, tightness, or heaviness about the chest; no palpitations.    RESPIRATORY:  Per HPI    GASTROINTESTINAL:  No abdominal pain, nausea, vomiting or diarrhea.    GENITOURINARY:  No dysuria, frequency or urgency.    NEUROLOGIC:  No paresthesias, fasciculations, seizures or weakness.    PSYCHIATRIC:  No disorder of thought or mood.      PHYSICAL EXAM:    Constitutional:no complaints  ENMT:atnc  Neck:supple  Respiratory:clear  Cardiovascular:rr  Gastrointestinal:soft  Extremities:no edema  Vascular:intact  Neurological:non focal  Musculoskeletal:no edema, normal rom    MEDICATIONS  (STANDING):  aspirin  chewable 81 milliGRAM(s) Oral daily  docusate sodium 100 milliGRAM(s) Oral three times a day  enoxaparin Injectable 40 milliGRAM(s) SubCutaneous daily  gabapentin 200 milliGRAM(s) Oral every 12 hours  hydrALAZINE 50 milliGRAM(s) Oral every 8 hours  lidocaine   Patch 1 Patch Transdermal daily  metoprolol tartrate 50 milliGRAM(s) Oral two times a day  nicotine -  14 mG/24Hr(s) Patch 1 patch Transdermal daily  polyethylene glycol 3350 17 Gram(s) Oral daily  senna 2 Tablet(s) Oral at bedtime  simvastatin 10 milliGRAM(s) Oral at bedtime    MEDICATIONS  (PRN):  cyclobenzaprine 5 milliGRAM(s) Oral three times a day PRN Muscle Spasm  traMADol 50 milliGRAM(s) Oral every 6 hours PRN Severe Pain (7 - 10)      Allergies    No Known Allergies          LABS:    04-24    136  |  102  |  38<H>  ----------------------------<  84  4.6   |  29  |  0.95    Ca    9.2      24 Apr 2018 07:28  Phos  4.4     04-24  Mg     2.4     04-24

## 2018-04-24 NOTE — PROGRESS NOTE ADULT - ASSESSMENT
-low back pain 2/2 acute t 12 fx  -s mm nodule rt ul  -transverse colon thickening  -hx; cad/stenting,htn,hld,smoker  -dmentia-lacks capacity  -azotemia    plan; mr lumbar spine 3 mo -per ortho          ct chest 3 mo          colonoscopy in  future          phys tx          analgesia per pain consult          awaiting placement/          encourage fluids-f/u bmp

## 2018-04-24 NOTE — PROGRESS NOTE ADULT - SUBJECTIVE AND OBJECTIVE BOX
PGY-3 NOTE:    Patient is an 86 year-old female with PMH of HTN, CAD, current smoker, HLD, chronic compression fracture of L5, recent admission for fall/back pain who presents with back pain admitted for vertebral fracture    Interval history:  Patient still complaining back pain and is very anxious. She denies fever, chills, SOB, palpitations, chest pain.    Vital Signs Last 24 Hrs  T(C): 37.1 (24 Apr 2018 14:13), Max: 37.1 (24 Apr 2018 14:13)  T(F): 98.7 (24 Apr 2018 14:13), Max: 98.7 (24 Apr 2018 14:13)  HR: 101 (24 Apr 2018 14:13) (79 - 101)  BP: 121/67 (24 Apr 2018 14:13) (108/55 - 124/63)  RR: 18 (24 Apr 2018 14:13) (16 - 18)  SpO2: 96% (24 Apr 2018 14:13) (95% - 96%)    REVIEW OF SYSTEMS: denies   MEDICATIONS  (STANDING):  aspirin  chewable 81 milliGRAM(s) Oral daily  docusate sodium 100 milliGRAM(s) Oral three times a day  enoxaparin Injectable 40 milliGRAM(s) SubCutaneous daily  gabapentin 200 milliGRAM(s) Oral at bedtime  hydrALAZINE 50 milliGRAM(s) Oral every 8 hours  lidocaine   Patch 1 Patch Transdermal daily  metoprolol tartrate 50 milliGRAM(s) Oral two times a day  nicotine -  14 mG/24Hr(s) Patch 1 patch Transdermal daily  polyethylene glycol 3350 17 Gram(s) Oral daily  senna 2 Tablet(s) Oral at bedtime  simvastatin 10 milliGRAM(s) Oral at bedtime    MEDICATIONS  (PRN):  cyclobenzaprine 5 milliGRAM(s) Oral three times a day PRN Muscle Spasm  traMADol 50 milliGRAM(s) Oral every 6 hours PRN Severe Pain (7 - 10)        PHYSICAL EXAM:  GENERAL: no distress, elderly female   HEAD:  Atraumatic, Normocephalic  EYES: PERRLA  ENMT: moist mucus membranes   NECK: Supple  NERVOUS SYSTEM:  Alert & Oriented X2  CHEST/LUNG: CTABl  HEART: Regular rate and rhythm; No murmurs, rubs, or gallops  ABDOMEN: Soft, Nontender, Nondistended; Bowel sounds present  EXTREMITIES:  2+ Peripheral Pulses, No clubbing, cyanosis, or edema  LYMPH: No lymphadenopathy noted  SKIN: No rashes or lesions        LABS:                             10.5   11.4  )-----------( 230      ( 24 Apr 2018 07:28 )             32.0   04-24    136  |  102  |  38<H>  ----------------------------<  84  4.6   |  29  |  0.95    Ca    9.2      24 Apr 2018 07:28  Phos  4.4     04-24  Mg     2.4     04-24

## 2018-04-24 NOTE — PROGRESS NOTE ADULT - SUBJECTIVE AND OBJECTIVE BOX
Chief Complaint: lower back pain    HPI:   86y Female with thoracic compression fracture and a chronic lumbar compression fracture.  Pt is oob and in chair.  Pt was ambulating with PT today in halls. Pt states that pain is well controlled today and pt ambulated with brace which was helpful.  Pt was deemed to have no capacity and is now awaiting guardianship.        PAIN SCORE: 3/10 SCALE USED: (1-10 VNRS)    Allergies    No Known Allergies    Intolerances      MEDICATIONS  (STANDING):  aspirin  chewable 81 milliGRAM(s) Oral daily  docusate sodium 100 milliGRAM(s) Oral three times a day  enoxaparin Injectable 40 milliGRAM(s) SubCutaneous daily  gabapentin 200 milliGRAM(s) Oral every 12 hours  hydrALAZINE 50 milliGRAM(s) Oral every 8 hours  lidocaine   Patch 1 Patch Transdermal daily  metoprolol tartrate 50 milliGRAM(s) Oral two times a day  nicotine -  14 mG/24Hr(s) Patch 1 patch Transdermal daily  polyethylene glycol 3350 17 Gram(s) Oral daily  senna 2 Tablet(s) Oral at bedtime  simvastatin 10 milliGRAM(s) Oral at bedtime    MEDICATIONS  (PRN):  cyclobenzaprine 5 milliGRAM(s) Oral three times a day PRN Muscle Spasm  traMADol 50 milliGRAM(s) Oral every 6 hours PRN Severe Pain (7 - 10)      PHYSICAL EXAM:    Vital Signs Last 24 Hrs  T(C): 36.6 (24 Apr 2018 05:06), Max: 36.8 (23 Apr 2018 21:44)  T(F): 97.9 (24 Apr 2018 05:06), Max: 98.3 (23 Apr 2018 21:44)  HR: 82 (24 Apr 2018 09:35) (79 - 87)  BP: 115/77 (24 Apr 2018 09:35) (108/55 - 131/79)  BP(mean): --  RR: 16 (24 Apr 2018 05:06) (16 - 18)  SpO2: 96% (24 Apr 2018 09:35) (95% - 96%)             LABS:                          10.5   11.4  )-----------( 230      ( 24 Apr 2018 07:28 )             32.0     04-24    136  |  102  |  38<H>  ----------------------------<  84  4.6   |  29  |  0.95    Ca    9.2      24 Apr 2018 07:28  Phos  4.4     04-24  Mg     2.4     04-24            Drug Screen:        RADIOLOGY:

## 2018-04-24 NOTE — PROGRESS NOTE ADULT - PROBLEM SELECTOR PLAN 1
- pt finishing up iv acetaminophen doses  - tramadol increased to 50mg q 6  - lidocaine patch daily  - gabapentin increased to 300mg po q 2x a day  - TLSO brace as pt can tolerate  - stool softeners  - oob  - if pain cannot be controlled - consider kyphoplasty

## 2018-04-25 LAB
ANION GAP SERPL CALC-SCNC: 5 MMOL/L — SIGNIFICANT CHANGE UP (ref 5–17)
BUN SERPL-MCNC: 27 MG/DL — HIGH (ref 7–18)
CALCIUM SERPL-MCNC: 9.3 MG/DL — SIGNIFICANT CHANGE UP (ref 8.4–10.5)
CHLORIDE SERPL-SCNC: 100 MMOL/L — SIGNIFICANT CHANGE UP (ref 96–108)
CO2 SERPL-SCNC: 31 MMOL/L — SIGNIFICANT CHANGE UP (ref 22–31)
CREAT SERPL-MCNC: 0.86 MG/DL — SIGNIFICANT CHANGE UP (ref 0.5–1.3)
GLUCOSE SERPL-MCNC: 86 MG/DL — SIGNIFICANT CHANGE UP (ref 70–99)
POTASSIUM SERPL-MCNC: 4.6 MMOL/L — SIGNIFICANT CHANGE UP (ref 3.5–5.3)
POTASSIUM SERPL-SCNC: 4.6 MMOL/L — SIGNIFICANT CHANGE UP (ref 3.5–5.3)
SODIUM SERPL-SCNC: 136 MMOL/L — SIGNIFICANT CHANGE UP (ref 135–145)

## 2018-04-25 PROCEDURE — 99233 SBSQ HOSP IP/OBS HIGH 50: CPT

## 2018-04-25 RX ADMIN — SIMVASTATIN 10 MILLIGRAM(S): 20 TABLET, FILM COATED ORAL at 21:47

## 2018-04-25 RX ADMIN — Medication 50 MILLIGRAM(S): at 17:14

## 2018-04-25 RX ADMIN — GABAPENTIN 300 MILLIGRAM(S): 400 CAPSULE ORAL at 06:07

## 2018-04-25 RX ADMIN — POLYETHYLENE GLYCOL 3350 17 GRAM(S): 17 POWDER, FOR SOLUTION ORAL at 11:25

## 2018-04-25 RX ADMIN — Medication 100 MILLIGRAM(S): at 21:47

## 2018-04-25 RX ADMIN — Medication 1 PATCH: at 11:24

## 2018-04-25 RX ADMIN — ENOXAPARIN SODIUM 40 MILLIGRAM(S): 100 INJECTION SUBCUTANEOUS at 11:24

## 2018-04-25 RX ADMIN — LIDOCAINE 1 PATCH: 4 CREAM TOPICAL at 11:24

## 2018-04-25 RX ADMIN — Medication 100 MILLIGRAM(S): at 06:07

## 2018-04-25 RX ADMIN — SENNA PLUS 2 TABLET(S): 8.6 TABLET ORAL at 21:47

## 2018-04-25 RX ADMIN — LIDOCAINE 1 PATCH: 4 CREAM TOPICAL at 23:38

## 2018-04-25 RX ADMIN — Medication 50 MILLIGRAM(S): at 06:07

## 2018-04-25 RX ADMIN — Medication 50 MILLIGRAM(S): at 13:58

## 2018-04-25 RX ADMIN — Medication 81 MILLIGRAM(S): at 11:24

## 2018-04-25 RX ADMIN — Medication 1 PATCH: at 11:23

## 2018-04-25 RX ADMIN — GABAPENTIN 300 MILLIGRAM(S): 400 CAPSULE ORAL at 17:14

## 2018-04-25 RX ADMIN — Medication 50 MILLIGRAM(S): at 21:47

## 2018-04-25 RX ADMIN — Medication 100 MILLIGRAM(S): at 13:58

## 2018-04-25 NOTE — PROGRESS NOTE ADULT - SUBJECTIVE AND OBJECTIVE BOX
PGY-3 NOTE:    Patient is an 86 year-old female with PMH of HTN, CAD, current smoker, HLD, chronic compression fracture of L5, recent admission for fall/back pain who presents with back pain admitted for vertebral fracture    Interval history:  Patient still complaining back pain and is very anxious. She denies fever, chills, SOB, palpitations, chest pain.    Vital Signs Last 24 Hrs  T(C): 36.6 (25 Apr 2018 05:10), Max: 37.4 (24 Apr 2018 20:24)  T(F): 97.8 (25 Apr 2018 05:10), Max: 99.3 (24 Apr 2018 20:24)  HR: 70 (25 Apr 2018 05:10) (63 - 101)  BP: 115/56 (25 Apr 2018 05:10) (115/56 - 142/63)  RR: 16 (25 Apr 2018 05:10) (16 - 18)  SpO2: 95% (25 Apr 2018 05:10) (95% - 96%)    REVIEW OF SYSTEMS: denies   MEDICATIONS  (STANDING):  aspirin  chewable 81 milliGRAM(s) Oral daily  docusate sodium 100 milliGRAM(s) Oral three times a day  enoxaparin Injectable 40 milliGRAM(s) SubCutaneous daily  gabapentin 200 milliGRAM(s) Oral at bedtime  hydrALAZINE 50 milliGRAM(s) Oral every 8 hours  lidocaine   Patch 1 Patch Transdermal daily  metoprolol tartrate 50 milliGRAM(s) Oral two times a day  nicotine -  14 mG/24Hr(s) Patch 1 patch Transdermal daily  polyethylene glycol 3350 17 Gram(s) Oral daily  senna 2 Tablet(s) Oral at bedtime  simvastatin 10 milliGRAM(s) Oral at bedtime    MEDICATIONS  (PRN):  cyclobenzaprine 5 milliGRAM(s) Oral three times a day PRN Muscle Spasm  traMADol 50 milliGRAM(s) Oral every 6 hours PRN Severe Pain (7 - 10)        PHYSICAL EXAM:  GENERAL: no distress, elderly female   HEAD:  Atraumatic, Normocephalic  EYES: PERRLA  ENMT: moist mucus membranes   NECK: Supple  NERVOUS SYSTEM:  Alert & Oriented X2  CHEST/LUNG: CTABl  HEART: Regular rate and rhythm; No murmurs, rubs, or gallops  ABDOMEN: Soft, Nontender, Nondistended; Bowel sounds present  EXTREMITIES:  2+ Peripheral Pulses, No clubbing, cyanosis, or edema  LYMPH: No lymphadenopathy noted  SKIN: No rashes or lesions        LABS:                                                   10.5   11.4  )-----------( 230      ( 24 Apr 2018 07:28 )             32.0   04-25    136  |  100  |  27<H>  ----------------------------<  86  4.6   |  31  |  0.86    Ca    9.3      25 Apr 2018 08:01  Phos  4.4     04-24  Mg     2.4     04-24

## 2018-04-25 NOTE — PROGRESS NOTE ADULT - SUBJECTIVE AND OBJECTIVE BOX
Patient is a 87y old  Female who presents with a chief complaint of back pain (18 Apr 2018 16:21)      INTERVAL HPI/OVERNIGHT EVENTS:  better    VITAL SIGNS:  T(F): 97.8 (04-25-18 @ 05:10)  HR: 70 (04-25-18 @ 05:10)  BP: 115/56 (04-25-18 @ 05:10)  RR: 16 (04-25-18 @ 05:10)  SpO2: 95% (04-25-18 @ 05:10)  Wt(kg): --  I&O's Detail          REVIEW OF SYSTEMS:    CONSTITUTIONAL:  No fevers, chills, sweats    HEENT:  Eyes:  No diplopia or blurred vision. ENT:  No earache, sore throat or runny nose.    CARDIOVASCULAR:  No pressure, squeezing, tightness, or heaviness about the chest; no palpitations.    RESPIRATORY:  Per HPI    GASTROINTESTINAL:  No abdominal pain, nausea, vomiting or diarrhea.    GENITOURINARY:  No dysuria, frequency or urgency.    NEUROLOGIC:  No paresthesias, fasciculations, seizures or weakness.    PSYCHIATRIC:  No disorder of thought or mood.      PHYSICAL EXAM:    Constitutional:no complaints  ENMT:atnc  Neck:supple  Respiratory:clear  Cardiovascular:rr  Gastrointestinal:soft  Extremities:no edema  Vascular:intact  Neurological:non focal  Musculoskeletal:no edema, normal rom    MEDICATIONS  (STANDING):  aspirin  chewable 81 milliGRAM(s) Oral daily  docusate sodium 100 milliGRAM(s) Oral three times a day  enoxaparin Injectable 40 milliGRAM(s) SubCutaneous daily  gabapentin 300 milliGRAM(s) Oral every 12 hours  hydrALAZINE 50 milliGRAM(s) Oral every 8 hours  lidocaine   Patch 1 Patch Transdermal daily  metoprolol tartrate 50 milliGRAM(s) Oral two times a day  nicotine -  14 mG/24Hr(s) Patch 1 patch Transdermal daily  polyethylene glycol 3350 17 Gram(s) Oral daily  senna 2 Tablet(s) Oral at bedtime  simvastatin 10 milliGRAM(s) Oral at bedtime    MEDICATIONS  (PRN):  cyclobenzaprine 5 milliGRAM(s) Oral three times a day PRN Muscle Spasm      Allergies    No Known Allergies          LABS:                        10.5   11.4  )-----------( 230      ( 24 Apr 2018 07:28 )             32.0     04-25    136  |  100  |  27<H>  ----------------------------<  86  4.6   |  31  |  0.86    Ca    9.3      25 Apr 2018 08:01  Phos  4.4     04-24  Mg     2.4     04-24

## 2018-04-25 NOTE — PROGRESS NOTE ADULT - ASSESSMENT
-low back pain 2/2 acute t 12 fx  -s mm nodule rt ul  -transverse colon thickening  -hx; cad/stenting,htn,hld,smoker  -dmentia-lacks capacity  -azotemia-improving    plan; mr lumbar spine 3 mo -per ortho          ct chest 3 mo          colonoscopy in  future          phys tx          analgesia per pain consult-medication dosing increased          awaiting placement/          encourage fluids-f/u bmp -low back pain 2/2 acute t 12 fx  -s mm nodule rt ul  -transverse colon thickening  -hx; cad/stenting,htn,hld,smoker  -dmentia-lacks capacity  -azotemia-improving    plan; mr lumbar spine 3 mo -per ortho          ct chest 3 mo          colonoscopy in  future          phys tx          analgesia per pain consult-medication dosing increased-pain improved          awaiting placement/          encourage fluids-f/u bmp

## 2018-04-26 RX ADMIN — ENOXAPARIN SODIUM 40 MILLIGRAM(S): 100 INJECTION SUBCUTANEOUS at 11:30

## 2018-04-26 RX ADMIN — LIDOCAINE 1 PATCH: 4 CREAM TOPICAL at 11:30

## 2018-04-26 RX ADMIN — SENNA PLUS 2 TABLET(S): 8.6 TABLET ORAL at 21:37

## 2018-04-26 RX ADMIN — Medication 100 MILLIGRAM(S): at 21:36

## 2018-04-26 RX ADMIN — Medication 1 PATCH: at 11:30

## 2018-04-26 RX ADMIN — Medication 100 MILLIGRAM(S): at 05:39

## 2018-04-26 RX ADMIN — GABAPENTIN 300 MILLIGRAM(S): 400 CAPSULE ORAL at 05:39

## 2018-04-26 RX ADMIN — Medication 50 MILLIGRAM(S): at 05:39

## 2018-04-26 RX ADMIN — GABAPENTIN 300 MILLIGRAM(S): 400 CAPSULE ORAL at 18:22

## 2018-04-26 RX ADMIN — POLYETHYLENE GLYCOL 3350 17 GRAM(S): 17 POWDER, FOR SOLUTION ORAL at 11:30

## 2018-04-26 RX ADMIN — Medication 50 MILLIGRAM(S): at 15:22

## 2018-04-26 RX ADMIN — Medication 81 MILLIGRAM(S): at 11:29

## 2018-04-26 RX ADMIN — Medication 50 MILLIGRAM(S): at 18:22

## 2018-04-26 RX ADMIN — Medication 100 MILLIGRAM(S): at 15:22

## 2018-04-26 RX ADMIN — SIMVASTATIN 10 MILLIGRAM(S): 20 TABLET, FILM COATED ORAL at 21:36

## 2018-04-26 RX ADMIN — Medication 1 PATCH: at 11:37

## 2018-04-26 NOTE — PROGRESS NOTE ADULT - ASSESSMENT
-low back pain 2/2 acute t 12 fx  -s mm nodule rt ul  -transverse colon thickening  -hx; cad/stenting,htn,hld,smoker  -dmentia-lacks capacity  -azotemia-improving    plan; mr lumbar spine 3 mo -per ortho          ct chest 3 mo          colonoscopy in  future          phys tx          analgesia per pain consult-medication dosing increased-pain improved          awaiting placement//guardianship          encourage fluids-f/u bmp

## 2018-04-26 NOTE — PROGRESS NOTE ADULT - PROBLEM SELECTOR PLAN 3
- continue metoprolol, hydralazine

## 2018-04-26 NOTE — PROGRESS NOTE ADULT - PROBLEM SELECTOR PROBLEM 2
Failure to thrive in adult
Closed compression fracture of L5 lumbar vertebra, with routine healing, subsequent encounter

## 2018-04-26 NOTE — PROGRESS NOTE ADULT - PROBLEM SELECTOR PLAN 4
- continue aspirin, statin

## 2018-04-26 NOTE — PROGRESS NOTE ADULT - PROBLEM SELECTOR PROBLEM 6
Smoking greater than 25 pack years

## 2018-04-26 NOTE — PROGRESS NOTE ADULT - SUBJECTIVE AND OBJECTIVE BOX
PGY-3 NOTE:    Patient is an 86 year-old female with PMH of HTN, CAD, current smoker, HLD, chronic compression fracture of L5, recent admission for fall/back pain who presents with back pain admitted for vertebral fracture    Interval history:  Patient complaining back pain improves with tramadol. She denies fever, chills, SOB, palpitations, chest pain.    Vital Signs Last 24 Hrs  T(C): 36.6 (26 Apr 2018 05:20), Max: 36.8 (25 Apr 2018 14:20)  T(F): 97.9 (26 Apr 2018 05:20), Max: 98.3 (25 Apr 2018 14:20)  HR: 73 (26 Apr 2018 05:20) (68 - 78)  BP: 125/72 (26 Apr 2018 05:20) (118/53 - 138/49)  BP(mean): --  RR: 16 (26 Apr 2018 05:20) (16 - 17)  SpO2: 96% (26 Apr 2018 05:20) (96% - 100%)  	    REVIEW OF SYSTEMS: denies   MEDICATIONS  (STANDING):  aspirin  chewable 81 milliGRAM(s) Oral daily  docusate sodium 100 milliGRAM(s) Oral three times a day  enoxaparin Injectable 40 milliGRAM(s) SubCutaneous daily  gabapentin 200 milliGRAM(s) Oral at bedtime  hydrALAZINE 50 milliGRAM(s) Oral every 8 hours  lidocaine   Patch 1 Patch Transdermal daily  metoprolol tartrate 50 milliGRAM(s) Oral two times a day  nicotine -  14 mG/24Hr(s) Patch 1 patch Transdermal daily  polyethylene glycol 3350 17 Gram(s) Oral daily  senna 2 Tablet(s) Oral at bedtime  simvastatin 10 milliGRAM(s) Oral at bedtime    MEDICATIONS  (PRN):  cyclobenzaprine 5 milliGRAM(s) Oral three times a day PRN Muscle Spasm  traMADol 50 milliGRAM(s) Oral every 6 hours PRN Severe Pain (7 - 10)        PHYSICAL EXAM:  GENERAL: no distress, elderly female   HEAD:  Atraumatic, Normocephalic  EYES: PERRLA  ENMT: moist mucus membranes   NECK: Supple  NERVOUS SYSTEM:  Alert & Oriented X2  CHEST/LUNG: CTABl  HEART: Regular rate and rhythm; No murmurs, rubs, or gallops  ABDOMEN: Soft, Nontender, Nondistended; Bowel sounds present  EXTREMITIES:  2+ Peripheral Pulses, No clubbing, cyanosis, or edema  LYMPH: No lymphadenopathy noted  SKIN: No rashes or lesions        LABS:                                    no labs today

## 2018-04-26 NOTE — PROGRESS NOTE ADULT - PROBLEM SELECTOR PLAN 2
- no infectious etiology noted  - continue diet  - evaluated by psychiatry and deemed not to have capacity to make medical decisions for herself  - pending social work follow up
- no infectious etiology noted  - continue diet  - evaluated by psychiatry and deemed not to have capacity to make medical decisions for herself  - pending social work follow up
- no infectious etiology noted  - continue diet
- no infectious etiology noted  - continue diet  - evaluated by psychiatry and deemed not to have capacity to make medical decisions for herself
- no infectious etiology noted  - continue diet  - evaluated by psychiatry and deemed not to have capacity to make medical decisions for herself  - pending social work follow up
- no infectious etiology noted  - resumed diet  - nutrition consult
- no infectious etiology noted  - continue diet

## 2018-04-26 NOTE — PROGRESS NOTE ADULT - PROBLEM SELECTOR PLAN 6
- nicotine patch

## 2018-04-26 NOTE — PROGRESS NOTE ADULT - SUBJECTIVE AND OBJECTIVE BOX
Patient is a 87y old  Female who presents with a chief complaint of back pain (18 Apr 2018 16:21)      INTERVAL HPI/OVERNIGHT EVENTS:  feeling well, no complaints    VITAL SIGNS:  T(F): 97.9 (04-26-18 @ 05:20)  HR: 73 (04-26-18 @ 05:20)  BP: 125/72 (04-26-18 @ 05:20)  RR: 16 (04-26-18 @ 05:20)  SpO2: 96% (04-26-18 @ 05:20)  Wt(kg): --  I&O's Detail          REVIEW OF SYSTEMS:    CONSTITUTIONAL:  No fevers, chills, sweats    HEENT:  Eyes:  No diplopia or blurred vision. ENT:  No earache, sore throat or runny nose.    CARDIOVASCULAR:  No pressure, squeezing, tightness, or heaviness about the chest; no palpitations.    RESPIRATORY:  Per HPI    GASTROINTESTINAL:  No abdominal pain, nausea, vomiting or diarrhea.    GENITOURINARY:  No dysuria, frequency or urgency.    NEUROLOGIC:  No paresthesias, fasciculations, seizures or weakness.    PSYCHIATRIC:  No disorder of thought or mood.      PHYSICAL EXAM:    Constitutional:no complaints  ENMT:atnc  Neck:supple  Respiratory:clear  Cardiovascular:rr  Gastrointestinal:soft  Extremities:no edema  Vascular:intact  Neurological:non focal  Musculoskeletal:no edema, normal rom    MEDICATIONS  (STANDING):  aspirin  chewable 81 milliGRAM(s) Oral daily  docusate sodium 100 milliGRAM(s) Oral three times a day  enoxaparin Injectable 40 milliGRAM(s) SubCutaneous daily  gabapentin 300 milliGRAM(s) Oral every 12 hours  hydrALAZINE 50 milliGRAM(s) Oral every 8 hours  lidocaine   Patch 1 Patch Transdermal daily  metoprolol tartrate 50 milliGRAM(s) Oral two times a day  nicotine -  14 mG/24Hr(s) Patch 1 patch Transdermal daily  polyethylene glycol 3350 17 Gram(s) Oral daily  senna 2 Tablet(s) Oral at bedtime  simvastatin 10 milliGRAM(s) Oral at bedtime    MEDICATIONS  (PRN):  cyclobenzaprine 5 milliGRAM(s) Oral three times a day PRN Muscle Spasm      Allergies    No Known Allergies            LABS:    04-25    136  |  100  |  27<H>  ----------------------------<  86  4.6   |  31  |  0.86    Ca    9.3      25 Apr 2018 08:01

## 2018-04-26 NOTE — PROGRESS NOTE ADULT - PROBLEM SELECTOR PLAN 5
- continue simvastatin

## 2018-04-26 NOTE — PROGRESS NOTE ADULT - PROBLEM SELECTOR PROBLEM 3
HTN (hypertension)
Compression fracture of T12 vertebra

## 2018-04-26 NOTE — PROGRESS NOTE ADULT - PROBLEM SELECTOR PROBLEM 1
Back pain
Acute bilateral low back pain without sciatica

## 2018-04-27 LAB
ANION GAP SERPL CALC-SCNC: 6 MMOL/L — SIGNIFICANT CHANGE UP (ref 5–17)
BUN SERPL-MCNC: 26 MG/DL — HIGH (ref 7–18)
CALCIUM SERPL-MCNC: 8.6 MG/DL — SIGNIFICANT CHANGE UP (ref 8.4–10.5)
CHLORIDE SERPL-SCNC: 102 MMOL/L — SIGNIFICANT CHANGE UP (ref 96–108)
CO2 SERPL-SCNC: 29 MMOL/L — SIGNIFICANT CHANGE UP (ref 22–31)
CREAT SERPL-MCNC: 0.95 MG/DL — SIGNIFICANT CHANGE UP (ref 0.5–1.3)
GLUCOSE SERPL-MCNC: 84 MG/DL — SIGNIFICANT CHANGE UP (ref 70–99)
POTASSIUM SERPL-MCNC: 3.9 MMOL/L — SIGNIFICANT CHANGE UP (ref 3.5–5.3)
POTASSIUM SERPL-SCNC: 3.9 MMOL/L — SIGNIFICANT CHANGE UP (ref 3.5–5.3)
SODIUM SERPL-SCNC: 137 MMOL/L — SIGNIFICANT CHANGE UP (ref 135–145)

## 2018-04-27 RX ADMIN — Medication 50 MILLIGRAM(S): at 05:47

## 2018-04-27 RX ADMIN — LIDOCAINE 1 PATCH: 4 CREAM TOPICAL at 13:50

## 2018-04-27 RX ADMIN — ENOXAPARIN SODIUM 40 MILLIGRAM(S): 100 INJECTION SUBCUTANEOUS at 13:50

## 2018-04-27 RX ADMIN — SIMVASTATIN 10 MILLIGRAM(S): 20 TABLET, FILM COATED ORAL at 21:13

## 2018-04-27 RX ADMIN — Medication 100 MILLIGRAM(S): at 21:12

## 2018-04-27 RX ADMIN — Medication 1 PATCH: at 13:50

## 2018-04-27 RX ADMIN — LIDOCAINE 1 PATCH: 4 CREAM TOPICAL at 00:47

## 2018-04-27 RX ADMIN — Medication 81 MILLIGRAM(S): at 13:50

## 2018-04-27 RX ADMIN — GABAPENTIN 300 MILLIGRAM(S): 400 CAPSULE ORAL at 05:47

## 2018-04-27 RX ADMIN — SENNA PLUS 2 TABLET(S): 8.6 TABLET ORAL at 21:12

## 2018-04-27 RX ADMIN — POLYETHYLENE GLYCOL 3350 17 GRAM(S): 17 POWDER, FOR SOLUTION ORAL at 13:51

## 2018-04-27 RX ADMIN — Medication 100 MILLIGRAM(S): at 13:51

## 2018-04-27 RX ADMIN — Medication 50 MILLIGRAM(S): at 17:41

## 2018-04-27 RX ADMIN — GABAPENTIN 300 MILLIGRAM(S): 400 CAPSULE ORAL at 17:41

## 2018-04-27 RX ADMIN — Medication 100 MILLIGRAM(S): at 05:47

## 2018-04-27 NOTE — PROGRESS NOTE ADULT - ASSESSMENT
-low back pain 2/2 acute t 12 fx  -s mm nodule rt ul  -transverse colon thickening  -hx; cad/stenting,htn,hld,smoker  -dmentia-lacks capacity  -azotemia-improving    plan; mr lumbar spine 3 mo -per ortho          ct chest 3 mo          colonoscopy in  future          phys tx          analgesia           awaiting placement//guardianship          encourage fluids-f/u bmp

## 2018-04-27 NOTE — PROGRESS NOTE ADULT - SUBJECTIVE AND OBJECTIVE BOX
Patient is a 87y old  Female who presents with a chief complaint of back pain (18 Apr 2018 16:21)      INTERVAL HPI/OVERNIGHT EVENTS:  no new complaints    VITAL SIGNS:  T(F): 97.5 (04-27-18 @ 05:00)  HR: 72 (04-27-18 @ 05:00)  BP: 134/50 (04-27-18 @ 05:00)  RR: 16 (04-27-18 @ 05:00)  SpO2: 96% (04-27-18 @ 05:00)  Wt(kg): --  I&O's Detail          REVIEW OF SYSTEMS:    CONSTITUTIONAL:  No fevers, chills, sweats    HEENT:  Eyes:  No diplopia or blurred vision. ENT:  No earache, sore throat or runny nose.    CARDIOVASCULAR:  No pressure, squeezing, tightness, or heaviness about the chest; no palpitations.    RESPIRATORY:  no sob,cough    GASTROINTESTINAL:  No abdominal pain, nausea, vomiting or diarrhea.    GENITOURINARY:  No dysuria, frequency or urgency.    NEUROLOGIC:  No paresthesias, fasciculations, seizures or weakness.    PSYCHIATRIC:  No disorder of thought or mood.      PHYSICAL EXAM:    Constitutional:no complaints  ENMT:atnc  Neck:supple  Respiratory:clear  Cardiovascular:rr  Gastrointestinal:soft  Extremities:no edema  Vascular:intact  Neurological:non focal  Musculoskeletal:no edema, normal rom    MEDICATIONS  (STANDING):  aspirin  chewable 81 milliGRAM(s) Oral daily  docusate sodium 100 milliGRAM(s) Oral three times a day  enoxaparin Injectable 40 milliGRAM(s) SubCutaneous daily  gabapentin 300 milliGRAM(s) Oral every 12 hours  hydrALAZINE 50 milliGRAM(s) Oral every 8 hours  lidocaine   Patch 1 Patch Transdermal daily  metoprolol tartrate 50 milliGRAM(s) Oral two times a day  nicotine -  14 mG/24Hr(s) Patch 1 patch Transdermal daily  polyethylene glycol 3350 17 Gram(s) Oral daily  senna 2 Tablet(s) Oral at bedtime  simvastatin 10 milliGRAM(s) Oral at bedtime    MEDICATIONS  (PRN):  cyclobenzaprine 5 milliGRAM(s) Oral three times a day PRN Muscle Spasm      Allergies    No Known Allergies            :

## 2018-04-28 LAB
ANION GAP SERPL CALC-SCNC: 7 MMOL/L — SIGNIFICANT CHANGE UP (ref 5–17)
BUN SERPL-MCNC: 32 MG/DL — HIGH (ref 7–18)
CALCIUM SERPL-MCNC: 8.8 MG/DL — SIGNIFICANT CHANGE UP (ref 8.4–10.5)
CHLORIDE SERPL-SCNC: 101 MMOL/L — SIGNIFICANT CHANGE UP (ref 96–108)
CO2 SERPL-SCNC: 28 MMOL/L — SIGNIFICANT CHANGE UP (ref 22–31)
CREAT SERPL-MCNC: 0.97 MG/DL — SIGNIFICANT CHANGE UP (ref 0.5–1.3)
GLUCOSE SERPL-MCNC: 84 MG/DL — SIGNIFICANT CHANGE UP (ref 70–99)
HCT VFR BLD CALC: 31.6 % — LOW (ref 34.5–45)
HGB BLD-MCNC: 10.2 G/DL — LOW (ref 11.5–15.5)
MAGNESIUM SERPL-MCNC: 2.7 MG/DL — HIGH (ref 1.6–2.6)
MCHC RBC-ENTMCNC: 32.2 GM/DL — SIGNIFICANT CHANGE UP (ref 32–36)
MCHC RBC-ENTMCNC: 33.1 PG — SIGNIFICANT CHANGE UP (ref 27–34)
MCV RBC AUTO: 102.9 FL — HIGH (ref 80–100)
PHOSPHATE SERPL-MCNC: 3.7 MG/DL — SIGNIFICANT CHANGE UP (ref 2.5–4.5)
PLATELET # BLD AUTO: 242 K/UL — SIGNIFICANT CHANGE UP (ref 150–400)
POTASSIUM SERPL-MCNC: 4.7 MMOL/L — SIGNIFICANT CHANGE UP (ref 3.5–5.3)
POTASSIUM SERPL-SCNC: 4.7 MMOL/L — SIGNIFICANT CHANGE UP (ref 3.5–5.3)
RBC # BLD: 3.07 M/UL — LOW (ref 3.8–5.2)
RBC # FLD: 12.1 % — SIGNIFICANT CHANGE UP (ref 10.3–14.5)
SODIUM SERPL-SCNC: 136 MMOL/L — SIGNIFICANT CHANGE UP (ref 135–145)
WBC # BLD: 9.2 K/UL — SIGNIFICANT CHANGE UP (ref 3.8–10.5)
WBC # FLD AUTO: 9.2 K/UL — SIGNIFICANT CHANGE UP (ref 3.8–10.5)

## 2018-04-28 RX ADMIN — Medication 1 PATCH: at 13:10

## 2018-04-28 RX ADMIN — Medication 100 MILLIGRAM(S): at 22:28

## 2018-04-28 RX ADMIN — POLYETHYLENE GLYCOL 3350 17 GRAM(S): 17 POWDER, FOR SOLUTION ORAL at 13:00

## 2018-04-28 RX ADMIN — Medication 1 PATCH: at 12:59

## 2018-04-28 RX ADMIN — GABAPENTIN 300 MILLIGRAM(S): 400 CAPSULE ORAL at 05:41

## 2018-04-28 RX ADMIN — Medication 50 MILLIGRAM(S): at 17:17

## 2018-04-28 RX ADMIN — SENNA PLUS 2 TABLET(S): 8.6 TABLET ORAL at 22:28

## 2018-04-28 RX ADMIN — Medication 50 MILLIGRAM(S): at 13:06

## 2018-04-28 RX ADMIN — Medication 50 MILLIGRAM(S): at 05:40

## 2018-04-28 RX ADMIN — Medication 100 MILLIGRAM(S): at 13:06

## 2018-04-28 RX ADMIN — Medication 50 MILLIGRAM(S): at 22:28

## 2018-04-28 RX ADMIN — LIDOCAINE 1 PATCH: 4 CREAM TOPICAL at 01:50

## 2018-04-28 RX ADMIN — LIDOCAINE 1 PATCH: 4 CREAM TOPICAL at 12:59

## 2018-04-28 RX ADMIN — Medication 100 MILLIGRAM(S): at 05:41

## 2018-04-28 RX ADMIN — ENOXAPARIN SODIUM 40 MILLIGRAM(S): 100 INJECTION SUBCUTANEOUS at 12:59

## 2018-04-28 RX ADMIN — GABAPENTIN 300 MILLIGRAM(S): 400 CAPSULE ORAL at 17:17

## 2018-04-28 RX ADMIN — SIMVASTATIN 10 MILLIGRAM(S): 20 TABLET, FILM COATED ORAL at 22:28

## 2018-04-28 RX ADMIN — Medication 81 MILLIGRAM(S): at 12:59

## 2018-04-28 NOTE — PROGRESS NOTE ADULT - ASSESSMENT
-low back pain 2/2 acute t 12 fx  -s mm nodule rt ul  -transverse colon thickening  -hx; cad/stenting,htn,hld,smoker  -dmentia-lacks capacity  -azotemia-improving    plan; mr lumbar spine 3 mo -per ortho          ct chest 3 mo          colonoscopy in  future          phys tx          analgesia - no great indication for kyphoplasty           awaiting placement//guardianship          encourage fluids-f/u bmp  	GI/DVT PPX

## 2018-04-28 NOTE — PROGRESS NOTE ADULT - SUBJECTIVE AND OBJECTIVE BOX
IRMTRANATHAN STEELE  MR# [id number]  87yFemale        Patient is a 87y old  Female who presents with a chief complaint of back pain (18 Apr 2018 16:21)      INTERVAL HPI/OVERNIGHT EVENTS:  Patient seen and examined at bedside. No complaints of new.    ALLERGIES  No Known Allergies      MEDICATIONS  aspirin  chewable 81 milliGRAM(s) Oral daily  cyclobenzaprine 5 milliGRAM(s) Oral three times a day PRN Muscle Spasm  docusate sodium 100 milliGRAM(s) Oral three times a day  enoxaparin Injectable 40 milliGRAM(s) SubCutaneous daily  gabapentin 300 milliGRAM(s) Oral every 12 hours  hydrALAZINE 50 milliGRAM(s) Oral every 8 hours  lidocaine   Patch 1 Patch Transdermal daily  metoprolol tartrate 50 milliGRAM(s) Oral two times a day  nicotine -  14 mG/24Hr(s) Patch 1 patch Transdermal daily  polyethylene glycol 3350 17 Gram(s) Oral daily  senna 2 Tablet(s) Oral at bedtime  simvastatin 10 milliGRAM(s) Oral at bedtime              REVIEW OF SYSTEMS:  CONSTITUTIONAL: No fever, weight loss, or fatigue  EYES: No eye pain, visual disturbances, or discharge  ENT:  No difficulty hearing, tinnitus, vertigo; No sinus or throat pain  NECK: No pain or stiffness  RESPIRATORY: No cough, wheezing, chills or hemoptysis; No Shortness of Breath  CARDIOVASCULAR: No chest pain, palpitations, passing out, dizziness, or leg swelling  GASTROINTESTINAL: No abdominal or epigastric pain. No nausea, vomiting, or hematemesis; No diarrhea or constipation. No melena or hematochezia.  GENITOURINARY: No dysuria, frequency, hematuria, or incontinence  NEUROLOGICAL: No headaches, memory loss, loss of strength, numbness, or tremors  SKIN: No itching, burning, rashes, or lesions   LYMPH Nodes: No enlarged glands  ENDOCRINE: No heat or cold intolerance; No hair loss  MUSCULOSKELETAL: No joint pain or swelling; No muscle, back, or extremity pain  PSYCHIATRIC: No depression, anxiety, mood swings, or difficulty sleeping  HEME/LYMPH: No easy bruising, or bleeding gums  ALLERGY AND IMMUNOLOGIC: No hives or eczema	    [ ] All others negative	  [ ] Unable to obtain      T(C): 36.7 (04-28-18 @ 20:38), Max: 36.7 (04-28-18 @ 14:29)  T(F): 98 (04-28-18 @ 20:38), Max: 98 (04-28-18 @ 14:29)  HR: 64 (04-28-18 @ 20:38) (61 - 92)  BP: 124/49 (04-28-18 @ 20:38) (124/49 - 140/58)  RR: 18 (04-28-18 @ 20:38) (18 - 18)  SpO2: 93% (04-28-18 @ 20:38) (93% - 98%)  Wt(kg): --    I&O's Summary        PHYSICAL EXAM:  A X O x  HEAD:  Atraumatic, Normocephalic  EYES: EOMI, PERRLA, conjunctiva and sclera clear  NECK: Supple, No JVD, Normal thyroid  Resp: CTAB, No crackles, wheezing,   CVS: Regular rate and rhythm; No discernable murmurs, rubs, or gallops  ABD: Soft, Nontender, Nondistended; Bowel sounds present  EXTREMITIES:  2+ Peripheral Pulses, No edema  LYMPH: No dicernable lymphadenopathy noted  GENERAL: NAD, well-groomed, well-developed      LABS:                        10.2   9.2   )-----------( 242      ( 28 Apr 2018 06:08 )             31.6     04-28    136  |  101  |  32<H>  ----------------------------<  84  4.7   |  28  |  0.97    Ca    8.8      28 Apr 2018 06:08  Phos  3.7     04-28  Mg     2.7     04-28          CAPILLARY BLOOD GLUCOSE          Troponins:  ProBNP:  Lipid Profile:   HgA1c:  TSH:           RADIOLOGY & ADDITIONAL TESTS:    Imaging Personally Reviewed:  [ ] YES  [ ] NO      Consultant(s) Notes Reviewed:  [x ] YES  [ ] NO    Care Discussed with Consultants/Other Providers [ x] YES  [ ] NO          PAST MEDICAL & SURGICAL HISTORY:  Smoking greater than 25 pack years  Stenosis of left carotid artery  Vitamin D deficiency  Heart attack: 2004  Hyperlipidemia  HTN (hypertension)  CAD (coronary artery disease)  Stented coronary artery: x 1 - 2004  S/P hernia repair: inguinal , right - February 2106  H/O heart artery stent: 2004  History of tonsillectomy  History of appendectomy  H/O abdominal hysterectomy        Failure to thrive in adult  No h/o HF  Unknown h/o HF  No pertinent family history in first degree relatives  Handoff  MEWS Score  Smoking greater than 25 pack years  Stenosis of left carotid artery  Vitamin D deficiency  Heart attack  Hyperlipidemia  HTN (hypertension)  CAD (coronary artery disease)  Stented coronary artery  MI, old  Failure to thrive in adult  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

## 2018-04-29 RX ADMIN — Medication 50 MILLIGRAM(S): at 05:41

## 2018-04-29 RX ADMIN — POLYETHYLENE GLYCOL 3350 17 GRAM(S): 17 POWDER, FOR SOLUTION ORAL at 11:46

## 2018-04-29 RX ADMIN — LIDOCAINE 1 PATCH: 4 CREAM TOPICAL at 23:14

## 2018-04-29 RX ADMIN — LIDOCAINE 1 PATCH: 4 CREAM TOPICAL at 11:46

## 2018-04-29 RX ADMIN — Medication 100 MILLIGRAM(S): at 05:41

## 2018-04-29 RX ADMIN — GABAPENTIN 300 MILLIGRAM(S): 400 CAPSULE ORAL at 05:41

## 2018-04-29 RX ADMIN — SIMVASTATIN 10 MILLIGRAM(S): 20 TABLET, FILM COATED ORAL at 23:12

## 2018-04-29 RX ADMIN — Medication 100 MILLIGRAM(S): at 14:02

## 2018-04-29 RX ADMIN — Medication 1 PATCH: at 12:39

## 2018-04-29 RX ADMIN — Medication 100 MILLIGRAM(S): at 23:12

## 2018-04-29 RX ADMIN — Medication 50 MILLIGRAM(S): at 23:12

## 2018-04-29 RX ADMIN — Medication 1 PATCH: at 11:46

## 2018-04-29 RX ADMIN — SENNA PLUS 2 TABLET(S): 8.6 TABLET ORAL at 23:13

## 2018-04-29 RX ADMIN — LIDOCAINE 1 PATCH: 4 CREAM TOPICAL at 01:00

## 2018-04-29 RX ADMIN — GABAPENTIN 300 MILLIGRAM(S): 400 CAPSULE ORAL at 17:38

## 2018-04-29 RX ADMIN — Medication 50 MILLIGRAM(S): at 14:02

## 2018-04-29 RX ADMIN — Medication 81 MILLIGRAM(S): at 11:46

## 2018-04-29 RX ADMIN — ENOXAPARIN SODIUM 40 MILLIGRAM(S): 100 INJECTION SUBCUTANEOUS at 11:46

## 2018-04-29 NOTE — PROGRESS NOTE ADULT - ASSESSMENT
-low back pain 2/2 acute t 12 fx  -s mm nodule rt ul  -transverse colon thickening  -hx; cad/stenting,htn,hld,smoker  -dmentia-lacks capacity  -azotemia-improving    plan; mr lumbar spine 3 mo -per ortho          ct chest 3 mo          colonoscopy in  future - to f/u as outpatient           phys tx          analgesia - no great indication for kyphoplasty           awaiting placement//guardianship          encourage fluids-f/u bmp  	GI/DVT PPX

## 2018-04-29 NOTE — PROGRESS NOTE ADULT - SUBJECTIVE AND OBJECTIVE BOX
IRMTIWONA STEELE  MR# [id number]  87yFemale        Patient is a 87y old  Female who presents with a chief complaint of back pain (18 Apr 2018 16:21)      INTERVAL HPI/OVERNIGHT EVENTS:  Patient seen and examined at bedside. No complaints of new. comfortable at rest.    ALLERGIES  No Known Allergies      MEDICATIONS  aspirin  chewable 81 milliGRAM(s) Oral daily  cyclobenzaprine 5 milliGRAM(s) Oral three times a day PRN Muscle Spasm  docusate sodium 100 milliGRAM(s) Oral three times a day  enoxaparin Injectable 40 milliGRAM(s) SubCutaneous daily  gabapentin 300 milliGRAM(s) Oral every 12 hours  hydrALAZINE 50 milliGRAM(s) Oral every 8 hours  lidocaine   Patch 1 Patch Transdermal daily  metoprolol tartrate 50 milliGRAM(s) Oral two times a day  nicotine -  14 mG/24Hr(s) Patch 1 patch Transdermal daily  polyethylene glycol 3350 17 Gram(s) Oral daily  senna 2 Tablet(s) Oral at bedtime  simvastatin 10 milliGRAM(s) Oral at bedtime              REVIEW OF SYSTEMS:  CONSTITUTIONAL: No fever, weight loss, or fatigue  EYES: No eye pain, visual disturbances, or discharge  ENT:  No difficulty hearing, tinnitus, vertigo; No sinus or throat pain  NECK: No pain or stiffness  RESPIRATORY: No cough, wheezing, chills or hemoptysis; No Shortness of Breath  CARDIOVASCULAR: No chest pain, palpitations, passing out, dizziness, or leg swelling  GASTROINTESTINAL: No abdominal or epigastric pain. No nausea, vomiting, or hematemesis; No diarrhea or constipation. No melena or hematochezia.  GENITOURINARY: No dysuria, frequency, hematuria, or incontinence  NEUROLOGICAL: No headaches, memory loss, loss of strength, numbness, or tremors  SKIN: No itching, burning, rashes, or lesions   LYMPH Nodes: No enlarged glands  ENDOCRINE: No heat or cold intolerance; No hair loss  MUSCULOSKELETAL: No joint pain or swelling; No muscle, back, or extremity pain  PSYCHIATRIC: No depression, anxiety, mood swings, or difficulty sleeping  HEME/LYMPH: No easy bruising, or bleeding gums  ALLERGY AND IMMUNOLOGIC: No hives or eczema	    [ ] All others negative	  [ ] Unable to obtain      T(C): 36.8 (04-29-18 @ 05:17), Max: 36.8 (04-29-18 @ 05:17)  T(F): 98.2 (04-29-18 @ 05:17), Max: 98.2 (04-29-18 @ 05:17)  HR: 66 (04-29-18 @ 05:17) (64 - 92)  BP: 122/68 (04-29-18 @ 05:17) (122/68 - 140/58)  RR: 16 (04-29-18 @ 05:17) (16 - 18)  SpO2: 96% (04-29-18 @ 05:17) (93% - 97%)  Wt(kg): --    I&O's Summary        PHYSICAL EXAM:  A X O x  HEAD:  Atraumatic, Normocephalic  EYES: EOMI, PERRLA, conjunctiva and sclera clear  NECK: Supple, No JVD, Normal thyroid  Resp: CTAB, No crackles, wheezing,   CVS: Regular rate and rhythm; No discernable murmurs, rubs, or gallops  ABD: Soft, Nontender, Nondistended; Bowel sounds present  EXTREMITIES:  2+ Peripheral Pulses, No edema  LYMPH: No dicernable lymphadenopathy noted  GENERAL: NAD, well-groomed, well-developed      LABS:                        10.2   9.2   )-----------( 242      ( 28 Apr 2018 06:08 )             31.6     04-28    136  |  101  |  32<H>  ----------------------------<  84  4.7   |  28  |  0.97    Ca    8.8      28 Apr 2018 06:08  Phos  3.7     04-28  Mg     2.7     04-28          CAPILLARY BLOOD GLUCOSE          Troponins:  ProBNP:  Lipid Profile:   HgA1c:  TSH:           RADIOLOGY & ADDITIONAL TESTS:    Imaging Personally Reviewed:  [ ] YES  [ ] NO      Consultant(s) Notes Reviewed:  [x ] YES  [ ] NO    Care Discussed with Consultants/Other Providers [ x] YES  [ ] NO          PAST MEDICAL & SURGICAL HISTORY:  Smoking greater than 25 pack years  Stenosis of left carotid artery  Vitamin D deficiency  Heart attack: 2004  Hyperlipidemia  HTN (hypertension)  CAD (coronary artery disease)  Stented coronary artery: x 1 - 2004  S/P hernia repair: inguinal , right - February 2106  H/O heart artery stent: 2004  History of tonsillectomy  History of appendectomy  H/O abdominal hysterectomy        Failure to thrive in adult  No h/o HF  Unknown h/o HF  No pertinent family history in first degree relatives  Handoff  MEWS Score  Smoking greater than 25 pack years  Stenosis of left carotid artery  Vitamin D deficiency  Heart attack  Hyperlipidemia  HTN (hypertension)  CAD (coronary artery disease)  Stented coronary artery  MI, old  Failure to thrive in adult  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

## 2018-04-30 LAB
ANION GAP SERPL CALC-SCNC: 7 MMOL/L — SIGNIFICANT CHANGE UP (ref 5–17)
BUN SERPL-MCNC: 26 MG/DL — HIGH (ref 7–18)
CALCIUM SERPL-MCNC: 8.9 MG/DL — SIGNIFICANT CHANGE UP (ref 8.4–10.5)
CHLORIDE SERPL-SCNC: 101 MMOL/L — SIGNIFICANT CHANGE UP (ref 96–108)
CO2 SERPL-SCNC: 28 MMOL/L — SIGNIFICANT CHANGE UP (ref 22–31)
CREAT SERPL-MCNC: 0.83 MG/DL — SIGNIFICANT CHANGE UP (ref 0.5–1.3)
GLUCOSE SERPL-MCNC: 87 MG/DL — SIGNIFICANT CHANGE UP (ref 70–99)
POTASSIUM SERPL-MCNC: 3.7 MMOL/L — SIGNIFICANT CHANGE UP (ref 3.5–5.3)
POTASSIUM SERPL-SCNC: 3.7 MMOL/L — SIGNIFICANT CHANGE UP (ref 3.5–5.3)
SODIUM SERPL-SCNC: 136 MMOL/L — SIGNIFICANT CHANGE UP (ref 135–145)

## 2018-04-30 RX ORDER — CYCLOBENZAPRINE HYDROCHLORIDE 10 MG/1
1 TABLET, FILM COATED ORAL
Qty: 0 | Refills: 0 | DISCHARGE
Start: 2018-04-30

## 2018-04-30 RX ORDER — DOCUSATE SODIUM 100 MG
1 CAPSULE ORAL
Qty: 0 | Refills: 0 | DISCHARGE
Start: 2018-04-30

## 2018-04-30 RX ORDER — NICOTINE POLACRILEX 2 MG
0 GUM BUCCAL
Qty: 0 | Refills: 0 | DISCHARGE
Start: 2018-04-30

## 2018-04-30 RX ORDER — SODIUM CHLORIDE 9 MG/ML
1000 INJECTION INTRAMUSCULAR; INTRAVENOUS; SUBCUTANEOUS
Qty: 0 | Refills: 0 | Status: DISCONTINUED | OUTPATIENT
Start: 2018-04-30 | End: 2018-05-07

## 2018-04-30 RX ORDER — GABAPENTIN 400 MG/1
1 CAPSULE ORAL
Qty: 0 | Refills: 0 | DISCHARGE
Start: 2018-04-30

## 2018-04-30 RX ADMIN — SIMVASTATIN 10 MILLIGRAM(S): 20 TABLET, FILM COATED ORAL at 22:22

## 2018-04-30 RX ADMIN — Medication 100 MILLIGRAM(S): at 13:17

## 2018-04-30 RX ADMIN — Medication 50 MILLIGRAM(S): at 05:37

## 2018-04-30 RX ADMIN — Medication 1 PATCH: at 13:17

## 2018-04-30 RX ADMIN — GABAPENTIN 300 MILLIGRAM(S): 400 CAPSULE ORAL at 05:37

## 2018-04-30 RX ADMIN — Medication 50 MILLIGRAM(S): at 13:19

## 2018-04-30 RX ADMIN — Medication 50 MILLIGRAM(S): at 22:22

## 2018-04-30 RX ADMIN — LIDOCAINE 1 PATCH: 4 CREAM TOPICAL at 13:17

## 2018-04-30 RX ADMIN — Medication 1 PATCH: at 13:19

## 2018-04-30 RX ADMIN — Medication 81 MILLIGRAM(S): at 13:17

## 2018-04-30 RX ADMIN — SODIUM CHLORIDE 75 MILLILITER(S): 9 INJECTION INTRAMUSCULAR; INTRAVENOUS; SUBCUTANEOUS at 18:21

## 2018-04-30 RX ADMIN — ENOXAPARIN SODIUM 40 MILLIGRAM(S): 100 INJECTION SUBCUTANEOUS at 13:17

## 2018-04-30 RX ADMIN — GABAPENTIN 300 MILLIGRAM(S): 400 CAPSULE ORAL at 18:21

## 2018-04-30 RX ADMIN — SENNA PLUS 2 TABLET(S): 8.6 TABLET ORAL at 22:22

## 2018-04-30 RX ADMIN — Medication 100 MILLIGRAM(S): at 05:37

## 2018-04-30 RX ADMIN — Medication 100 MILLIGRAM(S): at 22:22

## 2018-04-30 RX ADMIN — POLYETHYLENE GLYCOL 3350 17 GRAM(S): 17 POWDER, FOR SOLUTION ORAL at 13:19

## 2018-04-30 NOTE — PROGRESS NOTE ADULT - ASSESSMENT
-low back pain 2/2 acute t 12 fx  -s mm nodule rt ul  -transverse colon thickening  -hx; cad/stenting,htn,hld,smoker  -dmentia-lacks capacity  -azotemia    plan; mr lumbar spine 3 mo -per ortho          ct chest 3 mo          colonoscopy in  future          phys tx          analgesia           awaiting placement//guardianship          encourage fluids-f/u bmp

## 2018-04-30 NOTE — PROGRESS NOTE ADULT - ASSESSMENT
86 year-old female with PMH of HTN, CAD (stent in 2004), current smoker, HLD who presented with back pain, abdominal pain. She had recently been discharged from [facility] on 4/9/18 after a mechanical fall, found to have chronic mild compression fracture of L5 that had been stable since April 2015, small disc bulges in the lumbar spine, mild spinal canal stenosis at L1-L2 and L4-L5.     This time, she was sent to the hospital by visiting nurse for nonresolving back pain. She was admitted to medicine for further management. MRI lumbar spine showed new finding of acute T12 compression fracture.        1. Back pain.  Plan: - MRI spine has acute T12 compression fracture  - continue lidocaine patch, tramadol, gabapentin,  - PT recommended outpatient PT  - no surgical intervention as per orthopedics  -pending guardinship.      2: Failure to thrive in adult.  Plan: - no infectious etiology noted  - continue diet  - evaluated by psychiatry and deemed not to have capacity to make medical decisions for herself  - pending social work follow up.     3:HTN (hypertension).  Plan: - continue metoprolol, hydralazine.     4.  AD (coronary artery disease).  Plan: - continue aspirin, statin.     5: Hyperlipidemia.  Plan: - continue simvastatin.     6 .Smoking greater than 25 pack years. Plan: - nicotine patch.    7.Need for prophylactic measure.  Plan: IMPROVE VTE Individual Risk Assessment          RISK                                                          Points  [  ] Previous VTE                                                3  [  ] Thrombophilia                                             2  [  ] Lower limb paralysis                                   2        (unable to hold up >15 seconds)    [  ] Current Cancer                                             2         (within 6 months)  [ x ] Immobilization > 24 hrs                              1  [  ] ICU/CCU stay > 24 hours                             1  [x  ] Age > 60                                                         1    IMPROVE VTE Score: 2, lovenox for dvt ppx.     Dc planning - ct chest 3 mo; Colonoscopy in  future - to f/u as outpatient

## 2018-04-30 NOTE — PROGRESS NOTE ADULT - SUBJECTIVE AND OBJECTIVE BOX
Patient is a 87y old  Female who presents with a chief complaint of back pain (18 Apr 2018 16:21)      INTERVAL HPI/OVERNIGHT EVENTS:  no back pain    VITAL SIGNS:  T(F): 98.2 (04-30-18 @ 05:08)  HR: 67 (04-30-18 @ 05:08)  BP: 126/63 (04-30-18 @ 05:08)  RR: 17 (04-30-18 @ 05:08)  SpO2: 97% (04-30-18 @ 05:08)  Wt(kg): --  I&O's Detail          REVIEW OF SYSTEMS:    CONSTITUTIONAL:  No fevers, chills, sweats    HEENT:  Eyes:  No diplopia or blurred vision. ENT:  No earache, sore throat or runny nose.    CARDIOVASCULAR:  No pressure, squeezing, tightness, or heaviness about the chest; no palpitations.    RESPIRATORY:  Per HPI    GASTROINTESTINAL:  No abdominal pain, nausea, vomiting or diarrhea.    GENITOURINARY:  No dysuria, frequency or urgency.    NEUROLOGIC:  No paresthesias, fasciculations, seizures or weakness.    PSYCHIATRIC:  No disorder of thought or mood.      PHYSICAL EXAM:    Constitutional:no complaints  ENMT:atnc  Neck:supple  Respiratory:clear  Cardiovascular:rr  Gastrointestinal:soft  Extremities:no edema  Vascular:intact  Neurological:non focal  Musculoskeletal:no edema, normal rom    MEDICATIONS  (STANDING):  aspirin  chewable 81 milliGRAM(s) Oral daily  docusate sodium 100 milliGRAM(s) Oral three times a day  enoxaparin Injectable 40 milliGRAM(s) SubCutaneous daily  gabapentin 300 milliGRAM(s) Oral every 12 hours  hydrALAZINE 50 milliGRAM(s) Oral every 8 hours  lidocaine   Patch 1 Patch Transdermal daily  metoprolol tartrate 50 milliGRAM(s) Oral two times a day  nicotine -  14 mG/24Hr(s) Patch 1 patch Transdermal daily  polyethylene glycol 3350 17 Gram(s) Oral daily  senna 2 Tablet(s) Oral at bedtime  simvastatin 10 milliGRAM(s) Oral at bedtime    MEDICATIONS  (PRN):  cyclobenzaprine 5 milliGRAM(s) Oral three times a day PRN Muscle Spasm      Allergies    No Known Allergies

## 2018-04-30 NOTE — PROGRESS NOTE ADULT - SUBJECTIVE AND OBJECTIVE BOX
INTERVAL HPI/OVERNIGHT EVENTS:  Subjective -  Pt seen and examined at bedside.  [1] Overnight Acute events ? Y/N  RRT - Y/N    [2]Old Complaints -   1.                                  unchanged/ improved/ worsened/ not present     2.                                  unchanged/ improved/ worsened/ not present       VITAL SIGNS:  T(F): 97.5 (04-30-18 @ 13:29)  HR: 63 (04-30-18 @ 13:29)  BP: 133/49 (04-30-18 @ 13:29)  RR: 18 (04-30-18 @ 13:29)  SpO2: 100% (04-30-18 @ 13:20    PHYSICAL EXAM:    Constitutional:  Eyes:  ENMT:  Neck:  Respiratory:  Cardiovascular:  Gastrointestinal:  Extremities:  Vascular:  Neurological:  Musculoskeletal:    MEDICATIONS  (STANDING):  aspirin  chewable 81 milliGRAM(s) Oral daily  docusate sodium 100 milliGRAM(s) Oral three times a day  enoxaparin Injectable 40 milliGRAM(s) SubCutaneous daily  gabapentin 300 milliGRAM(s) Oral every 12 hours  hydrALAZINE 50 milliGRAM(s) Oral every 8 hours  lidocaine   Patch 1 Patch Transdermal daily  metoprolol tartrate 50 milliGRAM(s) Oral two times a day  nicotine -  14 mG/24Hr(s) Patch 1 patch Transdermal daily  polyethylene glycol 3350 17 Gram(s) Oral daily  senna 2 Tablet(s) Oral at bedtime  simvastatin 10 milliGRAM(s) Oral at bedtime  sodium chloride 0.9%. 1000 milliLiter(s) (75 mL/Hr) IV Continuous <Continuous>    MEDICATIONS  (PRN):  cyclobenzaprine 5 milliGRAM(s) Oral three times a day PRN Muscle Spasm      Allergies    No Known Allergies    Intolerances        LABS:    04-30    136  |  101  |  26<H>  ----------------------------<  87  3.7   |  28  |  0.83    Ca    8.9      30 Apr 2018 10:51            RADIOLOGY & ADDITIONAL TESTS:

## 2018-05-01 RX ORDER — MAGNESIUM HYDROXIDE 400 MG/1
30 TABLET, CHEWABLE ORAL DAILY
Qty: 0 | Refills: 0 | Status: DISCONTINUED | OUTPATIENT
Start: 2018-05-01 | End: 2018-05-07

## 2018-05-01 RX ADMIN — LIDOCAINE 1 PATCH: 4 CREAM TOPICAL at 01:26

## 2018-05-01 RX ADMIN — GABAPENTIN 300 MILLIGRAM(S): 400 CAPSULE ORAL at 05:21

## 2018-05-01 RX ADMIN — Medication 1 PATCH: at 11:51

## 2018-05-01 RX ADMIN — Medication 50 MILLIGRAM(S): at 22:21

## 2018-05-01 RX ADMIN — Medication 50 MILLIGRAM(S): at 15:38

## 2018-05-01 RX ADMIN — POLYETHYLENE GLYCOL 3350 17 GRAM(S): 17 POWDER, FOR SOLUTION ORAL at 11:52

## 2018-05-01 RX ADMIN — Medication 100 MILLIGRAM(S): at 05:21

## 2018-05-01 RX ADMIN — Medication 81 MILLIGRAM(S): at 11:51

## 2018-05-01 RX ADMIN — LIDOCAINE 1 PATCH: 4 CREAM TOPICAL at 11:51

## 2018-05-01 RX ADMIN — Medication 50 MILLIGRAM(S): at 05:21

## 2018-05-01 RX ADMIN — SIMVASTATIN 10 MILLIGRAM(S): 20 TABLET, FILM COATED ORAL at 22:21

## 2018-05-01 RX ADMIN — Medication 1 PATCH: at 11:52

## 2018-05-01 RX ADMIN — Medication 50 MILLIGRAM(S): at 18:26

## 2018-05-01 RX ADMIN — ENOXAPARIN SODIUM 40 MILLIGRAM(S): 100 INJECTION SUBCUTANEOUS at 11:51

## 2018-05-01 RX ADMIN — Medication 100 MILLIGRAM(S): at 15:38

## 2018-05-01 RX ADMIN — GABAPENTIN 300 MILLIGRAM(S): 400 CAPSULE ORAL at 18:26

## 2018-05-01 NOTE — PROGRESS NOTE ADULT - SUBJECTIVE AND OBJECTIVE BOX
INTERVAL HPI/OVERNIGHT EVENTS:  Subjective -  Pt seen and examined at bedside. complains of constipation   [1] Overnight Acute events ? Y/N  RRT - Y/N      VITAL SIGNS:  Vital Signs Last 24 Hrs  T(C): 36.4 (01 May 2018 05:00), Max: 36.6 (30 Apr 2018 20:53)  T(F): 97.5 (01 May 2018 05:00), Max: 97.9 (30 Apr 2018 20:53)  HR: 68 (01 May 2018 05:00) (63 - 76)  BP: 130/63 (01 May 2018 05:00) (124/58 - 133/49)  BP(mean): --  RR: 17 (01 May 2018 05:00) (17 - 18)  SpO2: 99% (01 May 2018 05:00) (98% - 100%)PHYSICAL EXAM:    Constitutional: WD WN  Eyes: PERRLA, EOMI  Neck: supple  Respiratory: CTA b/l clear  Cardiovascular: S1 S2 +nt , No M/R/G  Gastrointestinal: NT ND, BS+nt in all 4Qs      MEDICATIONS  (STANDING):  aspirin  chewable 81 milliGRAM(s) Oral daily  docusate sodium 100 milliGRAM(s) Oral three times a day  enoxaparin Injectable 40 milliGRAM(s) SubCutaneous daily  gabapentin 300 milliGRAM(s) Oral every 12 hours  hydrALAZINE 50 milliGRAM(s) Oral every 8 hours  lidocaine   Patch 1 Patch Transdermal daily  metoprolol tartrate 50 milliGRAM(s) Oral two times a day  nicotine -  14 mG/24Hr(s) Patch 1 patch Transdermal daily  polyethylene glycol 3350 17 Gram(s) Oral daily  senna 2 Tablet(s) Oral at bedtime  simvastatin 10 milliGRAM(s) Oral at bedtime  sodium chloride 0.9%. 1000 milliLiter(s) (75 mL/Hr) IV Continuous <Continuous>    MEDICATIONS  (PRN):  cyclobenzaprine 5 milliGRAM(s) Oral three times a day PRN Muscle Spasm    Allergies    No Known Allergies    Intolerances        LABS:          04-30    136  |  101  |  26<H>  ----------------------------<  87  3.7   |  28  |  0.83    Ca    8.9      30 Apr 2018 10:51                        Lactate Trend            CAPILLARY BLOOD GLUCOSE            Culture Results:   No growth (04-14 @ 00:49)

## 2018-05-01 NOTE — PROGRESS NOTE ADULT - ASSESSMENT
86 year-old female with PMH of HTN, CAD (stent in 2004), current smoker, HLD who presented with back pain, abdominal pain. She had recently been discharged from [facility] on 4/9/18 after a mechanical fall, found to have chronic mild compression fracture of L5 that had been stable since April 2015, small disc bulges in the lumbar spine, mild spinal canal stenosis at L1-L2 and L4-L5.     This time, she was sent to the hospital by visiting nurse for nonresolving back pain. She was admitted to medicine for further management. MRI lumbar spine showed new finding of acute T12 compression fracture.        1. Back pain.  Plan: - MRI spine has acute T12 compression fracture  - continue lidocaine patch, tramadol, gabapentin,  - PT recommended outpatient PT  - no surgical intervention as per orthopedics  -pending guardinship.      2: Failure to thrive in adult.  Plan: - no infectious etiology noted  - continue diet  - evaluated by psychiatry and deemed not to have capacity to make medical decisions for herself  - pending social work follow up.     3:HTN (hypertension).  Plan: - continue metoprolol, hydralazine.     4.  AD (coronary artery disease).  Plan: - continue aspirin, statin.     5: Hyperlipidemia.  Plan: - continue simvastatin.     6 .Smoking greater than 25 pack years. Plan: - nicotine patch.    7.Need for prophylactic measure.  Plan: IMPROVE VTE Individual Risk Assessment          RISK                                                          Points  [  ] Previous VTE                                                3  [  ] Thrombophilia                                             2  [  ] Lower limb paralysis                                   2        (unable to hold up >15 seconds)    [  ] Current Cancer                                             2         (within 6 months)  [ x ] Immobilization > 24 hrs                              1  [  ] ICU/CCU stay > 24 hours                             1  [x  ] Age > 60                                                         1    IMPROVE VTE Score: 2, lovenox for dvt ppx.     Dc planning - ct chest 3 mo; Colonoscopy in  future - to f/u as outpatient

## 2018-05-01 NOTE — PROGRESS NOTE ADULT - ASSESSMENT
-low back pain 2/2 acute t 12 fx  -s mm nodule rt ul  -transverse colon thickening  -hx; cad/stenting,htn,hld,smoker  -dementia-lacks capacity  -azotemia-improving    plan; mr lumbar spine 3 mo -per ortho          ct chest 3 mo          colonoscopy in  future          phys tx          analgesia           awaiting placement//guardianship          encourage fluids

## 2018-05-01 NOTE — PROGRESS NOTE ADULT - SUBJECTIVE AND OBJECTIVE BOX
Patient is a 87y old  Female who presents with a chief complaint of back pain (18 Apr 2018 16:21)      INTERVAL HPI/OVERNIGHT EVENTS:  no complaints    VITAL SIGNS:  T(F): 97.5 (05-01-18 @ 05:00)  HR: 68 (05-01-18 @ 05:00)  BP: 130/63 (05-01-18 @ 05:00)  RR: 17 (05-01-18 @ 05:00)  SpO2: 99% (05-01-18 @ 05:00)  Wt(kg): --  I&O's Detail          REVIEW OF SYSTEMS:    CONSTITUTIONAL:  No fevers, chills, sweats    HEENT:  Eyes:  No diplopia or blurred vision. ENT:  No earache, sore throat or runny nose.    CARDIOVASCULAR:  No pressure, squeezing, tightness, or heaviness about the chest; no palpitations.    RESPIRATORY:  Per HPI    GASTROINTESTINAL:  No abdominal pain, nausea, vomiting or diarrhea.    GENITOURINARY:  No dysuria, frequency or urgency.    NEUROLOGIC:  No paresthesias, fasciculations, seizures or weakness.    PSYCHIATRIC:  No disorder of thought or mood.      PHYSICAL EXAM:    Constitutional:no complaints  ENMT:atnc  Neck:supple  Respiratory:clear  Cardiovascular:rr  Gastrointestinal:soft  Extremities:no edema  Vascular:intact  Neurological:non focal  Musculoskeletal:no edema, normal rom    MEDICATIONS  (STANDING):  aspirin  chewable 81 milliGRAM(s) Oral daily  docusate sodium 100 milliGRAM(s) Oral three times a day  enoxaparin Injectable 40 milliGRAM(s) SubCutaneous daily  gabapentin 300 milliGRAM(s) Oral every 12 hours  hydrALAZINE 50 milliGRAM(s) Oral every 8 hours  lidocaine   Patch 1 Patch Transdermal daily  metoprolol tartrate 50 milliGRAM(s) Oral two times a day  nicotine -  14 mG/24Hr(s) Patch 1 patch Transdermal daily  polyethylene glycol 3350 17 Gram(s) Oral daily  senna 2 Tablet(s) Oral at bedtime  simvastatin 10 milliGRAM(s) Oral at bedtime  sodium chloride 0.9%. 1000 milliLiter(s) (75 mL/Hr) IV Continuous <Continuous>    MEDICATIONS  (PRN):  cyclobenzaprine 5 milliGRAM(s) Oral three times a day PRN Muscle Spasm      Allergies    No Known Allergies            LABS:    04-30    136  |  101  |  26<H>  ----------------------------<  87  3.7   |  28  |  0.83    Ca    8.9      30 Apr 2018 10:51

## 2018-05-02 RX ADMIN — LIDOCAINE 1 PATCH: 4 CREAM TOPICAL at 00:06

## 2018-05-02 RX ADMIN — SENNA PLUS 2 TABLET(S): 8.6 TABLET ORAL at 22:13

## 2018-05-02 RX ADMIN — GABAPENTIN 300 MILLIGRAM(S): 400 CAPSULE ORAL at 05:06

## 2018-05-02 RX ADMIN — POLYETHYLENE GLYCOL 3350 17 GRAM(S): 17 POWDER, FOR SOLUTION ORAL at 12:56

## 2018-05-02 RX ADMIN — Medication 1 PATCH: at 12:56

## 2018-05-02 RX ADMIN — Medication 100 MILLIGRAM(S): at 22:13

## 2018-05-02 RX ADMIN — CYCLOBENZAPRINE HYDROCHLORIDE 5 MILLIGRAM(S): 10 TABLET, FILM COATED ORAL at 22:12

## 2018-05-02 RX ADMIN — Medication 81 MILLIGRAM(S): at 12:56

## 2018-05-02 RX ADMIN — Medication 50 MILLIGRAM(S): at 05:06

## 2018-05-02 RX ADMIN — SIMVASTATIN 10 MILLIGRAM(S): 20 TABLET, FILM COATED ORAL at 22:13

## 2018-05-02 RX ADMIN — Medication 1 PATCH: at 12:57

## 2018-05-02 RX ADMIN — Medication 50 MILLIGRAM(S): at 14:34

## 2018-05-02 RX ADMIN — Medication 50 MILLIGRAM(S): at 22:13

## 2018-05-02 RX ADMIN — LIDOCAINE 1 PATCH: 4 CREAM TOPICAL at 12:56

## 2018-05-02 RX ADMIN — CYCLOBENZAPRINE HYDROCHLORIDE 5 MILLIGRAM(S): 10 TABLET, FILM COATED ORAL at 09:44

## 2018-05-02 RX ADMIN — Medication 100 MILLIGRAM(S): at 14:34

## 2018-05-02 RX ADMIN — GABAPENTIN 300 MILLIGRAM(S): 400 CAPSULE ORAL at 19:30

## 2018-05-02 NOTE — PROGRESS NOTE ADULT - SUBJECTIVE AND OBJECTIVE BOX
Patient is a 87y old  Female who presents with a chief complaint of back pain (18 Apr 2018 16:21)      INTERVAL HPI/OVERNIGHT EVENTS:  no new events, no pain    VITAL SIGNS:  T(F): 98.6 (05-02-18 @ 05:02)  HR: 68 (05-02-18 @ 05:02)  BP: 122/61 (05-02-18 @ 05:02)  RR: 68 (05-02-18 @ 05:02)  SpO2: 95% (05-02-18 @ 05:02)  Wt(kg): --  I&O's Detail          REVIEW OF SYSTEMS:    CONSTITUTIONAL:  No fevers, chills, sweats    HEENT:  Eyes:  No diplopia or blurred vision. ENT:  No earache, sore throat or runny nose.    CARDIOVASCULAR:  No pressure, squeezing, tightness, or heaviness about the chest; no palpitations.    RESPIRATORY:  no sob    GASTROINTESTINAL:  No abdominal pain, nausea, vomiting or diarrhea.    GENITOURINARY:  No dysuria, frequency or urgency.    NEUROLOGIC:  No paresthesias, fasciculations, seizures or weakness.    PSYCHIATRIC:  No disorder of thought or mood.      PHYSICAL EXAM:    Constitutional:no complaints  ENMT:atnc  Neck:supple  Respiratory:clear  Cardiovascular:rr  Gastrointestinal:soft  Extremities:no edema  Vascular:intact  Neurological:non focal  Musculoskeletal:no edema, normal rom    MEDICATIONS  (STANDING):  aspirin  chewable 81 milliGRAM(s) Oral daily  docusate sodium 100 milliGRAM(s) Oral three times a day  enoxaparin Injectable 40 milliGRAM(s) SubCutaneous daily  gabapentin 300 milliGRAM(s) Oral every 12 hours  hydrALAZINE 50 milliGRAM(s) Oral every 8 hours  lidocaine   Patch 1 Patch Transdermal daily  metoprolol tartrate 50 milliGRAM(s) Oral two times a day  nicotine -  14 mG/24Hr(s) Patch 1 patch Transdermal daily  polyethylene glycol 3350 17 Gram(s) Oral daily  senna 2 Tablet(s) Oral at bedtime  simvastatin 10 milliGRAM(s) Oral at bedtime  sodium chloride 0.9%. 1000 milliLiter(s) (75 mL/Hr) IV Continuous <Continuous>    MEDICATIONS  (PRN):  cyclobenzaprine 5 milliGRAM(s) Oral three times a day PRN Muscle Spasm  magnesium hydroxide Suspension 30 milliLiter(s) Oral daily PRN Constipation      Allergies    No Known Allergies            LABS:    04-30    136  |  101  |  26<H>  ----------------------------<  87  3.7   |  28  |  0.83    Ca    8.9      30 Apr 2018 10:51                CAPILLARY BLOOD GLUCOSE

## 2018-05-02 NOTE — PROGRESS NOTE ADULT - SUBJECTIVE AND OBJECTIVE BOX
INTERVAL HPI/OVERNIGHT EVENTS:  Subjective -  Pt seen and examined at bedside. no new complaints  [1] Overnight Acute events ? Y/N  RRT - Y/N      VITAL SIGNS:  Vital Signs Last 24 Hrs  T(C): 37 (02 May 2018 05:02), Max: 37 (02 May 2018 05:02)  T(F): 98.6 (02 May 2018 05:02), Max: 98.6 (02 May 2018 05:02)  HR: 68 (02 May 2018 05:02) (63 - 80)  BP: 122/61 (02 May 2018 05:02) (122/61 - 168/75)  BP(mean): --  RR: 68 (02 May 2018 05:02) (16 - 68)  SpO2: 95% (02 May 2018 05:02) (95% - 100%)    PHYSICAL EXAM:    Constitutional: WD WN  Eyes: PERRLA, EOMI  Neck: supple  Respiratory: CTA b/l clear  Cardiovascular: S1 S2 +nt , No M/R/G  Gastrointestinal: NT ND, BS+nt in all 4Qs      MEDICATIONS  (STANDING):  aspirin  chewable 81 milliGRAM(s) Oral daily  docusate sodium 100 milliGRAM(s) Oral three times a day  enoxaparin Injectable 40 milliGRAM(s) SubCutaneous daily  gabapentin 300 milliGRAM(s) Oral every 12 hours  hydrALAZINE 50 milliGRAM(s) Oral every 8 hours  lidocaine   Patch 1 Patch Transdermal daily  metoprolol tartrate 50 milliGRAM(s) Oral two times a day  nicotine -  14 mG/24Hr(s) Patch 1 patch Transdermal daily  polyethylene glycol 3350 17 Gram(s) Oral daily  senna 2 Tablet(s) Oral at bedtime  simvastatin 10 milliGRAM(s) Oral at bedtime  sodium chloride 0.9%. 1000 milliLiter(s) (75 mL/Hr) IV Continuous <Continuous>    MEDICATIONS  (PRN):  cyclobenzaprine 5 milliGRAM(s) Oral three times a day PRN Muscle Spasm  magnesium hydroxide Suspension 30 milliLiter(s) Oral daily PRN Constipation  No Known Allergies    Intolerances        LABS:            NOT following labs

## 2018-05-02 NOTE — PROGRESS NOTE ADULT - ASSESSMENT
-low back pain 2/2 acute t 12 fx  -s mm nodule rt ul  -transverse colon thickening  -hx; cad/stenting,htn,hld,smoker  -dementia-lacks capacity  -azotemia-improving    plan; mr lumbar spine 3 mo -per ortho          ct chest 3 mo          colonoscopy in  future          phys tx          analgesia           awaiting placement//guardianship          encourage fluids          no acute care

## 2018-05-03 RX ADMIN — Medication 50 MILLIGRAM(S): at 14:56

## 2018-05-03 RX ADMIN — POLYETHYLENE GLYCOL 3350 17 GRAM(S): 17 POWDER, FOR SOLUTION ORAL at 11:32

## 2018-05-03 RX ADMIN — SIMVASTATIN 10 MILLIGRAM(S): 20 TABLET, FILM COATED ORAL at 21:51

## 2018-05-03 RX ADMIN — Medication 1 PATCH: at 11:33

## 2018-05-03 RX ADMIN — SENNA PLUS 2 TABLET(S): 8.6 TABLET ORAL at 21:51

## 2018-05-03 RX ADMIN — Medication 1 PATCH: at 11:32

## 2018-05-03 RX ADMIN — LIDOCAINE 1 PATCH: 4 CREAM TOPICAL at 23:30

## 2018-05-03 RX ADMIN — Medication 50 MILLIGRAM(S): at 21:51

## 2018-05-03 RX ADMIN — GABAPENTIN 300 MILLIGRAM(S): 400 CAPSULE ORAL at 17:58

## 2018-05-03 RX ADMIN — CYCLOBENZAPRINE HYDROCHLORIDE 5 MILLIGRAM(S): 10 TABLET, FILM COATED ORAL at 21:51

## 2018-05-03 RX ADMIN — GABAPENTIN 300 MILLIGRAM(S): 400 CAPSULE ORAL at 05:33

## 2018-05-03 RX ADMIN — Medication 100 MILLIGRAM(S): at 05:33

## 2018-05-03 RX ADMIN — CYCLOBENZAPRINE HYDROCHLORIDE 5 MILLIGRAM(S): 10 TABLET, FILM COATED ORAL at 11:34

## 2018-05-03 RX ADMIN — ENOXAPARIN SODIUM 40 MILLIGRAM(S): 100 INJECTION SUBCUTANEOUS at 11:32

## 2018-05-03 RX ADMIN — LIDOCAINE 1 PATCH: 4 CREAM TOPICAL at 11:32

## 2018-05-03 RX ADMIN — Medication 81 MILLIGRAM(S): at 11:31

## 2018-05-03 RX ADMIN — Medication 100 MILLIGRAM(S): at 14:56

## 2018-05-03 RX ADMIN — LIDOCAINE 1 PATCH: 4 CREAM TOPICAL at 00:30

## 2018-05-03 RX ADMIN — Medication 100 MILLIGRAM(S): at 21:51

## 2018-05-03 NOTE — PROGRESS NOTE ADULT - ASSESSMENT
86 year-old female with PMH of HTN, CAD (stent in 2004), current smoker, HLD who presented with back pain, abdominal pain. She had recently been discharged from [facility] on 4/9/18 after a mechanical fall, found to have chronic mild compression fracture of L5 that had been stable since April 2015, small disc bulges in the lumbar spine, mild spinal canal stenosis at L1-L2 and L4-L5.     This time, she was sent to the hospital by visiting nurse for nonresolving back pain. She was admitted to medicine for further management. MRI lumbar spine showed new finding of acute T12 compression fracture.        1. Back pain.  Plan: - MRI spine has acute T12 compression fracture  - continue lidocaine patch, tramadol, gabapentin,  - PT recommended outpatient PT  - no surgical intervention as per orthopedics  -pending guardinship.      2: Failure to thrive in adult.  Plan: - no infectious etiology noted  - continue diet  - evaluated by psychiatry and deemed not to have capacity to make medical decisions for herself  - pending social work follow up.     3:HTN (hypertension).  Plan: - continue metoprolol, hydralazine.     4.CAD (coronary artery disease).  Plan: - continue aspirin, statin.     5: Hyperlipidemia.  Plan: - continue simvastatin.     6 .Smoking greater than 25 pack years. Plan: - nicotine patch.    7.Need for prophylactic measure.  Plan: IMPROVE VTE Individual Risk Assessment          RISK                                                          Points  [  ] Previous VTE                                                3  [  ] Thrombophilia                                             2  [  ] Lower limb paralysis                                   2        (unable to hold up >15 seconds)    [  ] Current Cancer                                             2         (within 6 months)  [ x ] Immobilization > 24 hrs                              1  [  ] ICU/CCU stay > 24 hours                             1  [x  ] Age > 60                                                         1    IMPROVE VTE Score: 2, lovenox for dvt ppx.     Dc planning - ct chest 3 mo; Colonoscopy in  future - to f/u as outpatient

## 2018-05-03 NOTE — PROGRESS NOTE ADULT - SUBJECTIVE AND OBJECTIVE BOX
INTERVAL HPI/OVERNIGHT EVENTS:  Subjective -  Pt seen and examined at bedside. no new complaints  [1] Overnight Acute events ? Y/N  RRT - Y/N      VITAL SIGNS:  Vital Signs Last 24 Hrs  T(C): 36.6 (03 May 2018 05:12), Max: 36.8 (02 May 2018 14:26)  T(F): 97.8 (03 May 2018 05:12), Max: 98.2 (02 May 2018 14:26)  HR: 69 (03 May 2018 05:12) (65 - 77)  BP: 104/62 (03 May 2018 05:12) (104/62 - 146/49)  BP(mean): --  RR: 16 (03 May 2018 05:12) (16 - 16)  SpO2: 96% (03 May 2018 05:12) (96% - 100%)  PHYSICAL EXAM:    Constitutional: WD WN  Eyes: PERRLA, EOMI  Neck: supple  Respiratory: CTA b/l clear  Cardiovascular: S1 S2 +nt , No M/R/G  Gastrointestinal: NT ND, BS+nt in all 4Qs      MEDICATIONS  (STANDING):  aspirin  chewable 81 milliGRAM(s) Oral daily  docusate sodium 100 milliGRAM(s) Oral three times a day  enoxaparin Injectable 40 milliGRAM(s) SubCutaneous daily  gabapentin 300 milliGRAM(s) Oral every 12 hours  hydrALAZINE 50 milliGRAM(s) Oral every 8 hours  lidocaine   Patch 1 Patch Transdermal daily  metoprolol tartrate 50 milliGRAM(s) Oral two times a day  nicotine -  14 mG/24Hr(s) Patch 1 patch Transdermal daily  polyethylene glycol 3350 17 Gram(s) Oral daily  senna 2 Tablet(s) Oral at bedtime  simvastatin 10 milliGRAM(s) Oral at bedtime  sodium chloride 0.9%. 1000 milliLiter(s) (75 mL/Hr) IV Continuous <Continuous>    MEDICATIONS  (PRN):  cyclobenzaprine 5 milliGRAM(s) Oral three times a day PRN Muscle Spasm  magnesium hydroxide Suspension 30 milliLiter(s) Oral daily PRN Constipation    Intolerances        LABS:        NOT following labs

## 2018-05-03 NOTE — PROGRESS NOTE ADULT - ASSESSMENT
-low back pain 2/2 acute t 12 fx  -s mm nodule rt ul  -transverse colon thickening  -hx; cad/stenting,htn,hld,smoker  -dementia-lacks capacity  -azotemia-improving    plan; mr lumbar spine 3 mo -per ortho          ct chest 3 mo          colonoscopy in  future          phys tx          analgesia           awaiting placement//guardianship          encourage fluids          no acute care -low back pain 2/2 acute t 12 fx  -s mm nodule rt ul  -transverse colon thickening  -hx; cad/stenting,htn,hld,smoker  -dementia-lacks capacity  -azotemia-improving    plan; mr lumbar spine 3 mo -per ortho          ct chest 3 mo          colonoscopy in  future          phys tx          analgesia           awaiting placement//guardianship          encourage fluids          no acute care          alc

## 2018-05-03 NOTE — PROGRESS NOTE ADULT - SUBJECTIVE AND OBJECTIVE BOX
Patient is a 87y old  Female who presents with a chief complaint of back pain (18 Apr 2018 16:21)      INTERVAL HPI/OVERNIGHT EVENTS:  no new issues    VITAL SIGNS:  T(F): 97.8 (05-03-18 @ 05:12)  HR: 69 (05-03-18 @ 05:12)  BP: 104/62 (05-03-18 @ 05:12)  RR: 16 (05-03-18 @ 05:12)  SpO2: 96% (05-03-18 @ 05:12)  Wt(kg): --  I&O's Detail          REVIEW OF SYSTEMS:    CONSTITUTIONAL:  No fevers, chills, sweats    HEENT:  Eyes:  No diplopia or blurred vision. ENT:  No earache, sore throat or runny nose.    CARDIOVASCULAR:  No pressure, squeezing, tightness, or heaviness about the chest; no palpitations.    RESPIRATORY:  Per HPI    GASTROINTESTINAL:  No abdominal pain, nausea, vomiting or diarrhea.    GENITOURINARY:  No dysuria, frequency or urgency.    NEUROLOGIC:  No paresthesias, fasciculations, seizures or weakness.    PSYCHIATRIC:  No disorder of thought or mood.      PHYSICAL EXAM:    Constitutional:no complaints  ENMT:atnc  Neck:supple  Respiratory:clear  Cardiovascular:rr  Gastrointestinal:soft  Extremities:no edema  Vascular:intact  Neurological:non focal  Musculoskeletal:no edema, normal rom    MEDICATIONS  (STANDING):  aspirin  chewable 81 milliGRAM(s) Oral daily  docusate sodium 100 milliGRAM(s) Oral three times a day  enoxaparin Injectable 40 milliGRAM(s) SubCutaneous daily  gabapentin 300 milliGRAM(s) Oral every 12 hours  hydrALAZINE 50 milliGRAM(s) Oral every 8 hours  lidocaine   Patch 1 Patch Transdermal daily  metoprolol tartrate 50 milliGRAM(s) Oral two times a day  nicotine -  14 mG/24Hr(s) Patch 1 patch Transdermal daily  polyethylene glycol 3350 17 Gram(s) Oral daily  senna 2 Tablet(s) Oral at bedtime  simvastatin 10 milliGRAM(s) Oral at bedtime  sodium chloride 0.9%. 1000 milliLiter(s) (75 mL/Hr) IV Continuous <Continuous>    MEDICATIONS  (PRN):  cyclobenzaprine 5 milliGRAM(s) Oral three times a day PRN Muscle Spasm  magnesium hydroxide Suspension 30 milliLiter(s) Oral daily PRN Constipation      Allergies    No Known Allergies

## 2018-05-04 LAB
ANION GAP SERPL CALC-SCNC: 6 MMOL/L — SIGNIFICANT CHANGE UP (ref 5–17)
ANION GAP SERPL CALC-SCNC: 6 MMOL/L — SIGNIFICANT CHANGE UP (ref 5–17)
BASOPHILS # BLD AUTO: 0.1 K/UL — SIGNIFICANT CHANGE UP (ref 0–0.2)
BASOPHILS NFR BLD AUTO: 1.3 % — SIGNIFICANT CHANGE UP (ref 0–2)
BUN SERPL-MCNC: 18 MG/DL — SIGNIFICANT CHANGE UP (ref 7–18)
BUN SERPL-MCNC: 19 MG/DL — HIGH (ref 7–18)
CALCIUM SERPL-MCNC: 8.9 MG/DL — SIGNIFICANT CHANGE UP (ref 8.4–10.5)
CALCIUM SERPL-MCNC: 9.1 MG/DL — SIGNIFICANT CHANGE UP (ref 8.4–10.5)
CEA SERPL-MCNC: 1.8 NG/ML — SIGNIFICANT CHANGE UP (ref 0–3.8)
CHLORIDE SERPL-SCNC: 102 MMOL/L — SIGNIFICANT CHANGE UP (ref 96–108)
CHLORIDE SERPL-SCNC: 103 MMOL/L — SIGNIFICANT CHANGE UP (ref 96–108)
CO2 SERPL-SCNC: 26 MMOL/L — SIGNIFICANT CHANGE UP (ref 22–31)
CO2 SERPL-SCNC: 28 MMOL/L — SIGNIFICANT CHANGE UP (ref 22–31)
CREAT SERPL-MCNC: 0.78 MG/DL — SIGNIFICANT CHANGE UP (ref 0.5–1.3)
CREAT SERPL-MCNC: 0.82 MG/DL — SIGNIFICANT CHANGE UP (ref 0.5–1.3)
EOSINOPHIL # BLD AUTO: 0.5 K/UL — SIGNIFICANT CHANGE UP (ref 0–0.5)
EOSINOPHIL NFR BLD AUTO: 7.2 % — HIGH (ref 0–6)
GLUCOSE SERPL-MCNC: 82 MG/DL — SIGNIFICANT CHANGE UP (ref 70–99)
GLUCOSE SERPL-MCNC: 95 MG/DL — SIGNIFICANT CHANGE UP (ref 70–99)
HCT VFR BLD CALC: 31.2 % — LOW (ref 34.5–45)
HGB BLD-MCNC: 10 G/DL — LOW (ref 11.5–15.5)
LYMPHOCYTES # BLD AUTO: 2.3 K/UL — SIGNIFICANT CHANGE UP (ref 1–3.3)
LYMPHOCYTES # BLD AUTO: 29.8 % — SIGNIFICANT CHANGE UP (ref 13–44)
MAGNESIUM SERPL-MCNC: 2.5 MG/DL — SIGNIFICANT CHANGE UP (ref 1.6–2.6)
MCHC RBC-ENTMCNC: 31.9 GM/DL — LOW (ref 32–36)
MCHC RBC-ENTMCNC: 32.8 PG — SIGNIFICANT CHANGE UP (ref 27–34)
MCV RBC AUTO: 102.7 FL — HIGH (ref 80–100)
MONOCYTES # BLD AUTO: 0.7 K/UL — SIGNIFICANT CHANGE UP (ref 0–0.9)
MONOCYTES NFR BLD AUTO: 9.8 % — SIGNIFICANT CHANGE UP (ref 2–14)
NEUTROPHILS # BLD AUTO: 3.9 K/UL — SIGNIFICANT CHANGE UP (ref 1.8–7.4)
NEUTROPHILS NFR BLD AUTO: 52 % — SIGNIFICANT CHANGE UP (ref 43–77)
PHOSPHATE SERPL-MCNC: 4.5 MG/DL — SIGNIFICANT CHANGE UP (ref 2.5–4.5)
PLATELET # BLD AUTO: 321 K/UL — SIGNIFICANT CHANGE UP (ref 150–400)
POTASSIUM SERPL-MCNC: 4.4 MMOL/L — SIGNIFICANT CHANGE UP (ref 3.5–5.3)
POTASSIUM SERPL-MCNC: 4.6 MMOL/L — SIGNIFICANT CHANGE UP (ref 3.5–5.3)
POTASSIUM SERPL-SCNC: 4.4 MMOL/L — SIGNIFICANT CHANGE UP (ref 3.5–5.3)
POTASSIUM SERPL-SCNC: 4.6 MMOL/L — SIGNIFICANT CHANGE UP (ref 3.5–5.3)
RBC # BLD: 3.04 M/UL — LOW (ref 3.8–5.2)
RBC # FLD: 12 % — SIGNIFICANT CHANGE UP (ref 10.3–14.5)
SODIUM SERPL-SCNC: 135 MMOL/L — SIGNIFICANT CHANGE UP (ref 135–145)
SODIUM SERPL-SCNC: 136 MMOL/L — SIGNIFICANT CHANGE UP (ref 135–145)
WBC # BLD: 7.6 K/UL — SIGNIFICANT CHANGE UP (ref 3.8–10.5)
WBC # FLD AUTO: 7.6 K/UL — SIGNIFICANT CHANGE UP (ref 3.8–10.5)

## 2018-05-04 RX ADMIN — LIDOCAINE 1 PATCH: 4 CREAM TOPICAL at 23:38

## 2018-05-04 RX ADMIN — Medication 50 MILLIGRAM(S): at 17:29

## 2018-05-04 RX ADMIN — ENOXAPARIN SODIUM 40 MILLIGRAM(S): 100 INJECTION SUBCUTANEOUS at 11:38

## 2018-05-04 RX ADMIN — Medication 1 PATCH: at 11:38

## 2018-05-04 RX ADMIN — GABAPENTIN 300 MILLIGRAM(S): 400 CAPSULE ORAL at 17:29

## 2018-05-04 RX ADMIN — Medication 50 MILLIGRAM(S): at 05:33

## 2018-05-04 RX ADMIN — Medication 50 MILLIGRAM(S): at 13:23

## 2018-05-04 RX ADMIN — Medication 100 MILLIGRAM(S): at 13:23

## 2018-05-04 RX ADMIN — Medication 100 MILLIGRAM(S): at 22:22

## 2018-05-04 RX ADMIN — POLYETHYLENE GLYCOL 3350 17 GRAM(S): 17 POWDER, FOR SOLUTION ORAL at 11:38

## 2018-05-04 RX ADMIN — GABAPENTIN 300 MILLIGRAM(S): 400 CAPSULE ORAL at 05:33

## 2018-05-04 RX ADMIN — SENNA PLUS 2 TABLET(S): 8.6 TABLET ORAL at 22:22

## 2018-05-04 RX ADMIN — Medication 81 MILLIGRAM(S): at 11:37

## 2018-05-04 RX ADMIN — LIDOCAINE 1 PATCH: 4 CREAM TOPICAL at 11:38

## 2018-05-04 RX ADMIN — Medication 100 MILLIGRAM(S): at 05:33

## 2018-05-04 RX ADMIN — SIMVASTATIN 10 MILLIGRAM(S): 20 TABLET, FILM COATED ORAL at 22:22

## 2018-05-04 NOTE — PROGRESS NOTE ADULT - ASSESSMENT
86 year-old female with PMH of HTN, CAD (stent in 2004), current smoker, HLD who presented with back pain, abdominal pain. She had recently been discharged from [facility] on 4/9/18 after a mechanical fall, found to have chronic mild compression fracture of L5 that had been stable since April 2015, small disc bulges in the lumbar spine, mild spinal canal stenosis at L1-L2 and L4-L5.     This time, she was sent to the hospital by visiting nurse for nonresolving back pain. She was admitted to medicine for further management. MRI lumbar spine showed new finding of acute T12 compression fracture.        1. Back pain.  Plan: - MRI spine has acute T12 compression fracture  - continue lidocaine patch, tramadol, gabapentin,  - PT recommended outpatient PT  - no surgical intervention as per orthopedics  -pending guardinship.      2: Failure to thrive in adult.  Plan: - no infectious etiology noted  - continue diet  - evaluated by psychiatry and deemed not to have capacity to make medical decisions for herself  - pending social work follow up.     3:HTN (hypertension).  Plan: - continue metoprolol, hydralazine.     4.CAD (coronary artery disease).  Plan: - continue aspirin, statin.     5: Hyperlipidemia.  Plan: - continue simvastatin.     6 .Smoking greater than 25 pack years. Plan: - nicotine patch.    7.Need for prophylactic measure.  Plan: IMPROVE VTE Individual Risk Assessment          RISK                                                          Points  [  ] Previous VTE                                                3  [  ] Thrombophilia                                             2  [  ] Lower limb paralysis                                   2        (unable to hold up >15 seconds)    [  ] Current Cancer                                             2         (within 6 months)  [ x ] Immobilization > 24 hrs                              1  [  ] ICU/CCU stay > 24 hours                             1  [x  ] Age > 60                                                         1    IMPROVE VTE Score: 2, lovenox for dvt ppx.     Dc planning - ct chest 3 mo; Colonoscopy in  future - to f/u as outpatient

## 2018-05-04 NOTE — PROGRESS NOTE ADULT - SUBJECTIVE AND OBJECTIVE BOX
INTERVAL HPI/OVERNIGHT EVENTS:  Subjective -  Pt seen and examined at bedside. no new complaints  [1] Overnight Acute events ? Y/N  RRT - Y/N      VITAL SIGNS:  Vital Signs Last 24 Hrs  T(C): 36.3 (04 May 2018 05:06), Max: 36.7 (03 May 2018 20:32)  T(F): 97.3 (04 May 2018 05:06), Max: 98 (03 May 2018 20:32)  HR: 91 (04 May 2018 05:06) (70 - 91)  BP: 151/67 (04 May 2018 05:06) (123/49 - 151/67)  BP(mean): --  RR: 17 (04 May 2018 05:06) (16 - 17)  SpO2: 98% (04 May 2018 05:06) (96% - 98%)    PHYSICAL EXAM:    Constitutional: WD WN  Eyes: PERRLA, EOMI  Neck: supple  Respiratory: CTA b/l clear  Cardiovascular: S1 S2 +nt , No M/R/G  Gastrointestinal: NT ND, BS+nt in all 4Qs      MEDICATIONS  (STANDING):  aspirin  chewable 81 milliGRAM(s) Oral daily  docusate sodium 100 milliGRAM(s) Oral three times a day  enoxaparin Injectable 40 milliGRAM(s) SubCutaneous daily  gabapentin 300 milliGRAM(s) Oral every 12 hours  hydrALAZINE 50 milliGRAM(s) Oral every 8 hours  lidocaine   Patch 1 Patch Transdermal daily  metoprolol tartrate 50 milliGRAM(s) Oral two times a day  nicotine -  14 mG/24Hr(s) Patch 1 patch Transdermal daily  polyethylene glycol 3350 17 Gram(s) Oral daily  senna 2 Tablet(s) Oral at bedtime  simvastatin 10 milliGRAM(s) Oral at bedtime  sodium chloride 0.9%. 1000 milliLiter(s) (75 mL/Hr) IV Continuous <Continuous>    MEDICATIONS  (PRN):  cyclobenzaprine 5 milliGRAM(s) Oral three times a day PRN Muscle Spasm  magnesium hydroxide Suspension 30 milliLiter(s) Oral daily PRN Constipation      Intolerances        LABS:                              10.0   7.6   )-----------( 321      ( 04 May 2018 08:14 )             31.2       05-04    136  |  102  |  19<H>  ----------------------------<  95  4.4   |  28  |  0.82    Ca    8.9      04 May 2018 10:40  Phos  4.5     05-04  Mg     2.5     05-04                        Lactate Trend            CAPILLARY BLOOD GLUCOSE            Culture Results:   No growth (04-14 @ 00:49)    NOT following labs

## 2018-05-04 NOTE — CHART NOTE - NSCHARTNOTEFT_GEN_A_CORE
Reassessment   87yFemalePatient is a 87y old  Female who presents with a chief complaint of back pain (18 Apr 2018 16:21)      Factors impacting intake: [ ] none [ ] nausea  [ ] vomiting [ ] diarrhea [ ] constipation  [ ]chewing problems [ ] swallowing issues  [ ] other:     Diet Presciption: Diet, DASH/TLC:   Sodium & Cholesterol Restricted (04-16-18 @ 12:24)    Intake: Visited pt. at lunch meal, eating well, intake 50% & tolerating, no GI distress, stated no other nutrition concerns or questions, awaiting placement, Discussed with RN     Daily: dosing wt. 125 Lbs - 05/04  % Weight Change    Pertinent Medications: MEDICATIONS  (STANDING):  aspirin  chewable 81 milliGRAM(s) Oral daily  docusate sodium 100 milliGRAM(s) Oral three times a day  enoxaparin Injectable 40 milliGRAM(s) SubCutaneous daily  gabapentin 300 milliGRAM(s) Oral every 12 hours  hydrALAZINE 50 milliGRAM(s) Oral every 8 hours  lidocaine   Patch 1 Patch Transdermal daily  metoprolol tartrate 50 milliGRAM(s) Oral two times a day  nicotine -  14 mG/24Hr(s) Patch 1 patch Transdermal daily  polyethylene glycol 3350 17 Gram(s) Oral daily  senna 2 Tablet(s) Oral at bedtime  simvastatin 10 milliGRAM(s) Oral at bedtime  sodium chloride 0.9%. 1000 milliLiter(s) (75 mL/Hr) IV Continuous <Continuous>    MEDICATIONS  (PRN):  cyclobenzaprine 5 milliGRAM(s) Oral three times a day PRN Muscle Spasm  magnesium hydroxide Suspension 30 milliLiter(s) Oral daily PRN Constipation    Pertinent Labs: 05-04 Na136 mmol/L Glu 95 mg/dL K+ 4.4 mmol/L Cr  0.82 mg/dL BUN 19 mg/dL<H> 05-04 Phos 4.5 mg/dL 04-14 [id number] 5.3 % 04-14 Chol 111 mg/dL LDL 37 mg/dL HDL 51 mg/dL Trig 113 mg/dL     CAPILLARY BLOOD GLUCOSE        Skin: Intact    Estimated Needs:   [X ] no change since previous assessment  [ ] recalculated:     Previous Nutrition Diagnosis:   [X ] Inadequate Energy Intake [ ]Inadequate Oral Intake [ ] Excessive Energy Intake   [ ] Underweight [ ] Increased Nutrient Needs [ ] Overweight/Obesity   [ ] Altered GI Function [ ] Unintended Weight Loss [ ] Food & Nutrition Related Knowledge Deficit [ ] Malnutrition     Nutrition Diagnosis is [X ] ongoing  [ ] resolved [ ] not applicable     New Nutrition Diagnosis: [ ] not applicable       Interventions: Nursing to continue feeding assistance and encouragement.  Recommend  [ ] Change Diet To:  [ X] Nutrition Supplement: 1 can Ensure Enlive daily as needed  [ ] Nutrition Support  [ ] Other:     Monitoring and Evaluation:   [X ] PO intake [ x ] Tolerance to diet prescription [ x ] weights [ x ] labs[ x ] follow up per protocol  [ ] other:

## 2018-05-04 NOTE — PROGRESS NOTE ADULT - ASSESSMENT
-low back pain 2/2 acute t 12 fx  -s mm nodule rt ul  -transverse colon thickening  -hx; cad/stenting,htn,hld,smoker  -dementia-lacks capacity  -azotemia-improving    plan; mr lumbar spine 3 mo -per ortho          ct chest  3 mo          colonoscopy in  future          phys tx          analgesia           awaiting placement//guardianship          encourage fluids          no acute care          alc          cea

## 2018-05-04 NOTE — PROGRESS NOTE ADULT - SUBJECTIVE AND OBJECTIVE BOX
Patient is a 87y old  Female who presents with a chief complaint of back pain (18 Apr 2018 16:21)      INTERVAL HPI/OVERNIGHT EVENTS:  waiting for discharge, denies back pain    VITAL SIGNS:  T(F): 97.3 (05-04-18 @ 05:06)  HR: 91 (05-04-18 @ 05:06)  BP: 151/67 (05-04-18 @ 05:06)  RR: 17 (05-04-18 @ 05:06)  SpO2: 98% (05-04-18 @ 05:06)  Wt(kg): --  I&O's Detail          REVIEW OF SYSTEMS:    CONSTITUTIONAL:  No fevers, chills, sweats    HEENT:  Eyes:  No diplopia or blurred vision. ENT:  No earache, sore throat or runny nose.    CARDIOVASCULAR:  No pressure, squeezing, tightness, or heaviness about the chest; no palpitations.    RESPIRATORY:  no sob    GASTROINTESTINAL:  No abdominal pain, nausea, vomiting or diarrhea.    GENITOURINARY:  No dysuria, frequency or urgency.    NEUROLOGIC:  No paresthesias, fasciculations, seizures or weakness.    PSYCHIATRIC:  No disorder of thought or mood.      PHYSICAL EXAM:    Constitutional:no complaints  ENMT:atnc  Neck:supple  Respiratory:clear  Cardiovascular:rr  Gastrointestinal:soft  Extremities:no edema  Vascular:intact  Neurological:non focal  Musculoskeletal:no edema, normal rom    MEDICATIONS  (STANDING):  aspirin  chewable 81 milliGRAM(s) Oral daily  docusate sodium 100 milliGRAM(s) Oral three times a day  enoxaparin Injectable 40 milliGRAM(s) SubCutaneous daily  gabapentin 300 milliGRAM(s) Oral every 12 hours  hydrALAZINE 50 milliGRAM(s) Oral every 8 hours  lidocaine   Patch 1 Patch Transdermal daily  metoprolol tartrate 50 milliGRAM(s) Oral two times a day  nicotine -  14 mG/24Hr(s) Patch 1 patch Transdermal daily  polyethylene glycol 3350 17 Gram(s) Oral daily  senna 2 Tablet(s) Oral at bedtime  simvastatin 10 milliGRAM(s) Oral at bedtime  sodium chloride 0.9%. 1000 milliLiter(s) (75 mL/Hr) IV Continuous <Continuous>    MEDICATIONS  (PRN):  cyclobenzaprine 5 milliGRAM(s) Oral three times a day PRN Muscle Spasm  magnesium hydroxide Suspension 30 milliLiter(s) Oral daily PRN Constipation

## 2018-05-05 RX ADMIN — Medication 1 PATCH: at 12:27

## 2018-05-05 RX ADMIN — ENOXAPARIN SODIUM 40 MILLIGRAM(S): 100 INJECTION SUBCUTANEOUS at 11:42

## 2018-05-05 RX ADMIN — Medication 1 PATCH: at 11:42

## 2018-05-05 RX ADMIN — GABAPENTIN 300 MILLIGRAM(S): 400 CAPSULE ORAL at 05:44

## 2018-05-05 RX ADMIN — SENNA PLUS 2 TABLET(S): 8.6 TABLET ORAL at 22:15

## 2018-05-05 RX ADMIN — LIDOCAINE 1 PATCH: 4 CREAM TOPICAL at 11:42

## 2018-05-05 RX ADMIN — Medication 100 MILLIGRAM(S): at 22:15

## 2018-05-05 RX ADMIN — POLYETHYLENE GLYCOL 3350 17 GRAM(S): 17 POWDER, FOR SOLUTION ORAL at 11:42

## 2018-05-05 RX ADMIN — GABAPENTIN 300 MILLIGRAM(S): 400 CAPSULE ORAL at 17:23

## 2018-05-05 RX ADMIN — Medication 50 MILLIGRAM(S): at 17:24

## 2018-05-05 RX ADMIN — Medication 50 MILLIGRAM(S): at 05:44

## 2018-05-05 RX ADMIN — Medication 100 MILLIGRAM(S): at 05:44

## 2018-05-05 RX ADMIN — Medication 100 MILLIGRAM(S): at 13:18

## 2018-05-05 RX ADMIN — SIMVASTATIN 10 MILLIGRAM(S): 20 TABLET, FILM COATED ORAL at 22:15

## 2018-05-05 RX ADMIN — Medication 50 MILLIGRAM(S): at 22:16

## 2018-05-05 RX ADMIN — Medication 81 MILLIGRAM(S): at 11:42

## 2018-05-05 NOTE — PROGRESS NOTE ADULT - ASSESSMENT
86 year-old female with PMH of HTN, CAD (stent in 2004), current smoker, HLD who presented with back pain, abdominal pain. She had recently been discharged from [facility] on 4/9/18 after a mechanical fall, found to have chronic mild compression fracture of L5 that had been stable since April 2015, small disc bulges in the lumbar spine, mild spinal canal stenosis at L1-L2 and L4-L5.     This time, she was sent to the hospital by visiting nurse for nonresolving back pain. She was admitted to medicine for further management. MRI lumbar spine showed new finding of acute T12 compression fracture.        1. Back pain.  Plan: - MRI spine has acute T12 compression fracture  - continue lidocaine patch, tramadol, gabapentin,  - PT recommended outpatient PT  - no surgical intervention as per orthopedics  -pending guardianship      2: Failure to thrive in adult.  Plan: - no infectious etiology noted  - continue diet - with assisted feed   - evaluated by psychiatry and deemed not to have capacity to make medical decisions for herself  - pending social work follow up.   - awaiting guardianship for d/c planning    3:HTN (hypertension).  Plan: - continue metoprolol, hydralazine.     4.CAD (coronary artery disease).  Plan: - continue aspirin, statin.     5: Hyperlipidemia.  Plan: - continue simvastatin.     6 .Smoking greater than 25 pack years. Plan: - nicotine patch.    7.Need for prophylactic measure.  Plan: IMPROVE VTE Individual Risk Assessment          RISK                                                          Points  [  ] Previous VTE                                                3  [  ] Thrombophilia                                             2  [  ] Lower limb paralysis                                   2        (unable to hold up >15 seconds)    [  ] Current Cancer                                             2         (within 6 months)  [ x ] Immobilization > 24 hrs                              1  [  ] ICU/CCU stay > 24 hours                             1  [x  ] Age > 60                                                         1    IMPROVE VTE Score: 2, lovenox for dvt ppx.     Dc planning - ct chest 3 mo; Colonoscopy in  future - to f/u as outpatient

## 2018-05-05 NOTE — PROGRESS NOTE ADULT - SUBJECTIVE AND OBJECTIVE BOX
ZHOUMTIWONA STEELE  MR# [id number]  87yFemale        Patient is a 87y old  Female who presents with a chief complaint of back pain (18 Apr 2018 16:21)      INTERVAL HPI/OVERNIGHT EVENTS:  Patient seen and examined at bedside. No notiations of chest pain, palpitation, SOB, nausea, vomiting, abdominal pain.    ALLERGIES  No Known Allergies      MEDICATIONS  aspirin  chewable 81 milliGRAM(s) Oral daily  cyclobenzaprine 5 milliGRAM(s) Oral three times a day PRN Muscle Spasm  docusate sodium 100 milliGRAM(s) Oral three times a day  enoxaparin Injectable 40 milliGRAM(s) SubCutaneous daily  gabapentin 300 milliGRAM(s) Oral every 12 hours  hydrALAZINE 50 milliGRAM(s) Oral every 8 hours  lidocaine   Patch 1 Patch Transdermal daily  magnesium hydroxide Suspension 30 milliLiter(s) Oral daily PRN Constipation  metoprolol tartrate 50 milliGRAM(s) Oral two times a day  nicotine -  14 mG/24Hr(s) Patch 1 patch Transdermal daily  polyethylene glycol 3350 17 Gram(s) Oral daily  senna 2 Tablet(s) Oral at bedtime  simvastatin 10 milliGRAM(s) Oral at bedtime  sodium chloride 0.9%. 1000 milliLiter(s) IV Continuous <Continuous>              REVIEW OF SYSTEMS:  CONSTITUTIONAL: No fever, weight loss, or fatigue  EYES: No eye pain, visual disturbances, or discharge  ENT:  No difficulty hearing, tinnitus, vertigo; No sinus or throat pain  NECK: No pain or stiffness  RESPIRATORY: No cough, wheezing, chills or hemoptysis; No Shortness of Breath  CARDIOVASCULAR: No chest pain, palpitations, passing out, dizziness, or leg swelling  GASTROINTESTINAL: No abdominal or epigastric pain. No nausea, vomiting, or hematemesis; No diarrhea or constipation. No melena or hematochezia.  GENITOURINARY: No dysuria, frequency, hematuria, or incontinence  NEUROLOGICAL: No headaches, memory loss, loss of strength, numbness, or tremors  SKIN: No itching, burning, rashes, or lesions   LYMPH Nodes: No enlarged glands  ENDOCRINE: No heat or cold intolerance; No hair loss  MUSCULOSKELETAL: No joint pain or swelling; No muscle, back, or extremity pain  PSYCHIATRIC: No depression, anxiety, mood swings, or difficulty sleeping  HEME/LYMPH: No easy bruising, or bleeding gums  ALLERGY AND IMMUNOLOGIC: No hives or eczema	    [ ] All others negative	  [ ] Unable to obtain      T(C): 36.5 (05-05-18 @ 21:27), Max: 37.1 (05-05-18 @ 13:19)  T(F): 97.7 (05-05-18 @ 21:27), Max: 98.7 (05-05-18 @ 13:19)  HR: 64 (05-05-18 @ 21:27) (63 - 80)  BP: 122/61 (05-05-18 @ 21:27) (111/48 - 146/68)  RR: 16 (05-05-18 @ 21:27) (16 - 16)  SpO2: 96% (05-05-18 @ 21:27) (96% - 97%)  Wt(kg): --    I&O's Summary        PHYSICAL EXAM:  A X O x  HEAD:  Atraumatic, Normocephalic  EYES: EOMI, PERRLA, conjunctiva and sclera clear  NECK: Supple, No JVD, Normal thyroid  Resp: CTAB, No crackles, wheezing,   CVS: Regular rate and rhythm; No discernable murmurs, rubs, or gallops  ABD: Soft, Nontender, Nondistended; Bowel sounds present  EXTREMITIES:  2+ Peripheral Pulses, No edema  LYMPH: No dicernable lymphadenopathy noted  GENERAL: NAD, well-groomed, well-developed      LABS:                        10.0   7.6   )-----------( 321      ( 04 May 2018 08:14 )             31.2     05-04    136  |  102  |  19<H>  ----------------------------<  95  4.4   |  28  |  0.82    Ca    8.9      04 May 2018 10:40  Phos  4.5     05-04  Mg     2.5     05-04          CAPILLARY BLOOD GLUCOSE          Troponins:  ProBNP:  Lipid Profile:   HgA1c:  TSH:           RADIOLOGY & ADDITIONAL TESTS:    Imaging Personally Reviewed:  [ ] YES  [ ] NO      Consultant(s) Notes Reviewed:  [x ] YES  [ ] NO    Care Discussed with Consultants/Other Providers [ x] YES  [ ] NO          PAST MEDICAL & SURGICAL HISTORY:  Smoking greater than 25 pack years  Stenosis of left carotid artery  Vitamin D deficiency  Heart attack: 2004  Hyperlipidemia  HTN (hypertension)  CAD (coronary artery disease)  Stented coronary artery: x 1 - 2004  S/P hernia repair: inguinal , right - February 2106  H/O heart artery stent: 2004  History of tonsillectomy  History of appendectomy  H/O abdominal hysterectomy        Failure to thrive in adult  No h/o HF  Unknown h/o HF  No pertinent family history in first degree relatives  Handoff  MEWS Score  Smoking greater than 25 pack years  Stenosis of left carotid artery  Vitamin D deficiency  Heart attack  Hyperlipidemia  HTN (hypertension)  CAD (coronary artery disease)  Stented coronary artery  MI, old  Compression fracture of T12 vertebra  Failure to thrive in adult  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  HTN (hypertension)  Hyperlipidemia  Vitamin D deficiency  Failure to thrive in adult

## 2018-05-06 RX ADMIN — Medication 50 MILLIGRAM(S): at 17:20

## 2018-05-06 RX ADMIN — Medication 81 MILLIGRAM(S): at 11:16

## 2018-05-06 RX ADMIN — Medication 1 PATCH: at 11:20

## 2018-05-06 RX ADMIN — SENNA PLUS 2 TABLET(S): 8.6 TABLET ORAL at 22:22

## 2018-05-06 RX ADMIN — ENOXAPARIN SODIUM 40 MILLIGRAM(S): 100 INJECTION SUBCUTANEOUS at 11:14

## 2018-05-06 RX ADMIN — GABAPENTIN 300 MILLIGRAM(S): 400 CAPSULE ORAL at 17:20

## 2018-05-06 RX ADMIN — GABAPENTIN 300 MILLIGRAM(S): 400 CAPSULE ORAL at 05:43

## 2018-05-06 RX ADMIN — LIDOCAINE 1 PATCH: 4 CREAM TOPICAL at 11:14

## 2018-05-06 RX ADMIN — SIMVASTATIN 10 MILLIGRAM(S): 20 TABLET, FILM COATED ORAL at 22:22

## 2018-05-06 RX ADMIN — POLYETHYLENE GLYCOL 3350 17 GRAM(S): 17 POWDER, FOR SOLUTION ORAL at 11:15

## 2018-05-06 RX ADMIN — Medication 50 MILLIGRAM(S): at 05:43

## 2018-05-06 RX ADMIN — Medication 50 MILLIGRAM(S): at 22:22

## 2018-05-06 RX ADMIN — Medication 100 MILLIGRAM(S): at 13:55

## 2018-05-06 RX ADMIN — CYCLOBENZAPRINE HYDROCHLORIDE 5 MILLIGRAM(S): 10 TABLET, FILM COATED ORAL at 11:15

## 2018-05-06 RX ADMIN — LIDOCAINE 1 PATCH: 4 CREAM TOPICAL at 00:22

## 2018-05-06 RX ADMIN — Medication 100 MILLIGRAM(S): at 22:22

## 2018-05-06 RX ADMIN — Medication 100 MILLIGRAM(S): at 05:43

## 2018-05-06 RX ADMIN — Medication 1 PATCH: at 11:15

## 2018-05-06 RX ADMIN — Medication 50 MILLIGRAM(S): at 13:55

## 2018-05-06 NOTE — PROGRESS NOTE ADULT - ASSESSMENT
86 year-old female with PMH of HTN, CAD (stent in 2004), current smoker, HLD who presented with back pain, abdominal pain. She had recently been discharged from [facility] on 4/9/18 after a mechanical fall, found to have chronic mild compression fracture of L5 that had been stable since April 2015, small disc bulges in the lumbar spine, mild spinal canal stenosis at L1-L2 and L4-L5.     This time, she was sent to the hospital by visiting nurse for nonresolving back pain. She was admitted to medicine for further management. MRI lumbar spine showed new finding of acute T12 compression fracture.        1. Back pain.  Plan: - MRI spine has acute T12 compression fracture  - continue lidocaine patch, tramadol, gabapentin,  - PT recommended outpatient PT  - no surgical intervention as per orthopedics  -pending guardianship (sw/cw involved)     2: Failure to thrive in adult.  Plan: - no infectious etiology noted  - continue diet - with assisted feed   - evaluated by psychiatry and deemed not to have capacity to make medical decisions for herself  - pending social work follow up.   - awaiting guardianship for d/c planning    3:HTN (hypertension).  Plan: - continue metoprolol, hydralazine.     4.CAD (coronary artery disease).  Plan: - continue aspirin, statin.     5: Hyperlipidemia.  Plan: - continue simvastatin.     6 .Smoking greater than 25 pack years. Plan: - nicotine patch.    7.Need for prophylactic measure.  Plan: IMPROVE VTE Individual Risk Assessment          RISK                                                          Points  [  ] Previous VTE                                                3  [  ] Thrombophilia                                             2  [  ] Lower limb paralysis                                   2        (unable to hold up >15 seconds)    [  ] Current Cancer                                             2         (within 6 months)  [ x ] Immobilization > 24 hrs                              1  [  ] ICU/CCU stay > 24 hours                             1  [x  ] Age > 60                                                         1    IMPROVE VTE Score: 2, lovenox for dvt ppx.     Dc planning - ct chest 3 mo; Colonoscopy in  future - to f/u as outpatient

## 2018-05-06 NOTE — PROGRESS NOTE ADULT - SUBJECTIVE AND OBJECTIVE BOX
IRMTRANATHAN STEELE  MR# [id number]  87yFemale        Patient is a 87y old  Female who presents with a chief complaint of back pain (18 Apr 2018 16:21)      INTERVAL HPI/OVERNIGHT EVENTS:  Patient seen and examined at bedside. No complaints of new.    ALLERGIES  No Known Allergies      MEDICATIONS  aspirin  chewable 81 milliGRAM(s) Oral daily  cyclobenzaprine 5 milliGRAM(s) Oral three times a day PRN Muscle Spasm  docusate sodium 100 milliGRAM(s) Oral three times a day  enoxaparin Injectable 40 milliGRAM(s) SubCutaneous daily  gabapentin 300 milliGRAM(s) Oral every 12 hours  hydrALAZINE 50 milliGRAM(s) Oral every 8 hours  lidocaine   Patch 1 Patch Transdermal daily  magnesium hydroxide Suspension 30 milliLiter(s) Oral daily PRN Constipation  metoprolol tartrate 50 milliGRAM(s) Oral two times a day  nicotine -  14 mG/24Hr(s) Patch 1 patch Transdermal daily  polyethylene glycol 3350 17 Gram(s) Oral daily  senna 2 Tablet(s) Oral at bedtime  simvastatin 10 milliGRAM(s) Oral at bedtime  sodium chloride 0.9%. 1000 milliLiter(s) IV Continuous <Continuous>              REVIEW OF SYSTEMS:  CONSTITUTIONAL: No fever, weight loss, or fatigue  EYES: No eye pain, visual disturbances, or discharge  ENT:  No difficulty hearing, tinnitus, vertigo; No sinus or throat pain  NECK: No pain or stiffness  RESPIRATORY: No cough, wheezing, chills or hemoptysis; No Shortness of Breath  CARDIOVASCULAR: No chest pain, palpitations, passing out, dizziness, or leg swelling  GASTROINTESTINAL: No abdominal or epigastric pain. No nausea, vomiting, or hematemesis; No diarrhea or constipation. No melena or hematochezia.  GENITOURINARY: No dysuria, frequency, hematuria, or incontinence  NEUROLOGICAL: No headaches, memory loss, loss of strength, numbness, or tremors  SKIN: No itching, burning, rashes, or lesions   LYMPH Nodes: No enlarged glands  ENDOCRINE: No heat or cold intolerance; No hair loss  MUSCULOSKELETAL: No joint pain or swelling; No muscle, back, or extremity pain  PSYCHIATRIC: No depression, anxiety, mood swings, or difficulty sleeping  HEME/LYMPH: No easy bruising, or bleeding gums  ALLERGY AND IMMUNOLOGIC: No hives or eczema	    [ ] All others negative	  [ ] Unable to obtain      T(C): 37.3 (05-06-18 @ 14:34), Max: 37.3 (05-06-18 @ 14:34)  T(F): 99.1 (05-06-18 @ 14:34), Max: 99.1 (05-06-18 @ 14:34)  HR: 59 (05-06-18 @ 14:34) (59 - 69)  BP: 109/48 (05-06-18 @ 14:34) (109/48 - 123/65)  RR: 18 (05-06-18 @ 14:34) (16 - 18)  SpO2: 98% (05-06-18 @ 14:34) (96% - 98%)  Wt(kg): --    I&O's Summary        PHYSICAL EXAM:  A X O x  HEAD:  Atraumatic, Normocephalic  EYES: EOMI, PERRLA, conjunctiva and sclera clear  NECK: Supple, No JVD, Normal thyroid  Resp: CTAB, No crackles, wheezing,   CVS: Regular rate and rhythm; No discernable murmurs, rubs, or gallops  ABD: Soft, Nontender, Nondistended; Bowel sounds present  EXTREMITIES:  2+ Peripheral Pulses, No edema  LYMPH: No dicernable lymphadenopathy noted  GENERAL: NAD, well-groomed, well-developed      LABS:              CAPILLARY BLOOD GLUCOSE          Troponins:  ProBNP:  Lipid Profile:   HgA1c:  TSH:           RADIOLOGY & ADDITIONAL TESTS:    Imaging Personally Reviewed:  [ ] YES  [ ] NO      Consultant(s) Notes Reviewed:  [x ] YES  [ ] NO    Care Discussed with Consultants/Other Providers [ x] YES  [ ] NO          PAST MEDICAL & SURGICAL HISTORY:  Smoking greater than 25 pack years  Stenosis of left carotid artery  Vitamin D deficiency  Heart attack: 2004  Hyperlipidemia  HTN (hypertension)  CAD (coronary artery disease)  Stented coronary artery: x 1 - 2004  S/P hernia repair: inguinal , right - February 2106  H/O heart artery stent: 2004  History of tonsillectomy  History of appendectomy  H/O abdominal hysterectomy        Failure to thrive in adult  No h/o HF  Unknown h/o HF  No pertinent family history in first degree relatives  Handoff  MEWS Score  Smoking greater than 25 pack years  Stenosis of left carotid artery  Vitamin D deficiency  Heart attack  Hyperlipidemia  HTN (hypertension)  CAD (coronary artery disease)  Stented coronary artery  MI, old  Compression fracture of T12 vertebra  Failure to thrive in adult  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  HTN (hypertension)  Hyperlipidemia  Vitamin D deficiency  Failure to thrive in adult

## 2018-05-07 RX ADMIN — GABAPENTIN 300 MILLIGRAM(S): 400 CAPSULE ORAL at 17:29

## 2018-05-07 RX ADMIN — SENNA PLUS 2 TABLET(S): 8.6 TABLET ORAL at 22:41

## 2018-05-07 RX ADMIN — ENOXAPARIN SODIUM 40 MILLIGRAM(S): 100 INJECTION SUBCUTANEOUS at 11:47

## 2018-05-07 RX ADMIN — GABAPENTIN 300 MILLIGRAM(S): 400 CAPSULE ORAL at 05:15

## 2018-05-07 RX ADMIN — Medication 81 MILLIGRAM(S): at 11:46

## 2018-05-07 RX ADMIN — POLYETHYLENE GLYCOL 3350 17 GRAM(S): 17 POWDER, FOR SOLUTION ORAL at 11:47

## 2018-05-07 RX ADMIN — SIMVASTATIN 10 MILLIGRAM(S): 20 TABLET, FILM COATED ORAL at 22:41

## 2018-05-07 RX ADMIN — Medication 50 MILLIGRAM(S): at 05:15

## 2018-05-07 RX ADMIN — Medication 50 MILLIGRAM(S): at 22:41

## 2018-05-07 RX ADMIN — Medication 1 PATCH: at 11:47

## 2018-05-07 RX ADMIN — Medication 1 PATCH: at 11:46

## 2018-05-07 RX ADMIN — Medication 100 MILLIGRAM(S): at 22:42

## 2018-05-07 RX ADMIN — Medication 100 MILLIGRAM(S): at 05:15

## 2018-05-07 RX ADMIN — LIDOCAINE 1 PATCH: 4 CREAM TOPICAL at 23:25

## 2018-05-07 RX ADMIN — LIDOCAINE 1 PATCH: 4 CREAM TOPICAL at 11:46

## 2018-05-07 RX ADMIN — Medication 50 MILLIGRAM(S): at 13:17

## 2018-05-07 RX ADMIN — Medication 50 MILLIGRAM(S): at 17:29

## 2018-05-07 RX ADMIN — LIDOCAINE 1 PATCH: 4 CREAM TOPICAL at 00:16

## 2018-05-07 RX ADMIN — Medication 100 MILLIGRAM(S): at 13:17

## 2018-05-07 NOTE — PROGRESS NOTE ADULT - ASSESSMENT
-low back pain 2/2 acute t 12 fx  -s mm nodule rt ul  -transverse colon thickening  -hx; cad/stenting,htn,hld,smoker  -dementia-lacks capacity  -azotemia-improved    plan; mr lumbar spine 3 mo -per ortho          ct chest  3 mo          colonoscopy in  future          phys tx          analgesia           awaiting placement//guardianship          encourage fluids          no acute care          alc

## 2018-05-07 NOTE — PROGRESS NOTE ADULT - SUBJECTIVE AND OBJECTIVE BOX
Patient is a 87y old  Female who presents with a chief complaint of back pain (18 Apr 2018 16:21)      INTERVAL HPI/OVERNIGHT EVENTS:  feels well    VITAL SIGNS:  T(F): 98 (05-07-18 @ 04:54)  HR: 64 (05-07-18 @ 04:54)  BP: 149/52 (05-07-18 @ 04:54)  RR: 17 (05-07-18 @ 04:54)  SpO2: 99% (05-07-18 @ 04:54)  Wt(kg): --  I&O's Detail          REVIEW OF SYSTEMS:    CONSTITUTIONAL:  No fevers, chills, sweats    HEENT:  Eyes:  No diplopia or blurred vision. ENT:  No earache, sore throat or runny nose.    CARDIOVASCULAR:  No pressure, squeezing, tightness, or heaviness about the chest; no palpitations.    RESPIRATORY:  no sob, cough    GASTROINTESTINAL:  No abdominal pain, nausea, vomiting or diarrhea.    GENITOURINARY:  No dysuria, frequency or urgency.    NEUROLOGIC:  No paresthesias, fasciculations, seizures or weakness.    PSYCHIATRIC:  No disorder of thought or mood.      PHYSICAL EXAM:    Constitutional:no complaints  ENMT:atnc  Neck:supple  Respiratory:clear  Cardiovascular:rr  Gastrointestinal:soft  Extremities:no edema  Vascular:intact  Neurological:non focal  Musculoskeletal:no edema, normal rom    MEDICATIONS  (STANDING):  aspirin  chewable 81 milliGRAM(s) Oral daily  docusate sodium 100 milliGRAM(s) Oral three times a day  enoxaparin Injectable 40 milliGRAM(s) SubCutaneous daily  gabapentin 300 milliGRAM(s) Oral every 12 hours  hydrALAZINE 50 milliGRAM(s) Oral every 8 hours  lidocaine   Patch 1 Patch Transdermal daily  metoprolol tartrate 50 milliGRAM(s) Oral two times a day  nicotine -  14 mG/24Hr(s) Patch 1 patch Transdermal daily  polyethylene glycol 3350 17 Gram(s) Oral daily  senna 2 Tablet(s) Oral at bedtime  simvastatin 10 milliGRAM(s) Oral at bedtime  sodium chloride 0.9%. 1000 milliLiter(s) (75 mL/Hr) IV Continuous <Continuous>    MEDICATIONS  (PRN):  cyclobenzaprine 5 milliGRAM(s) Oral three times a day PRN Muscle Spasm  magnesium hydroxide Suspension 30 milliLiter(s) Oral daily PRN Constipation      Allergies    No Known Allergies            LABS:    cea 1.8

## 2018-05-07 NOTE — PROGRESS NOTE ADULT - SUBJECTIVE AND OBJECTIVE BOX
INTERVAL HPI/OVERNIGHT EVENTS:    Subjective -  Pt seen and examined at bedside. no new complaints  [1] Overnight Acute events ? Y/N  RRT - Y/N      VITAL SIGNS:  Vital Signs Last 24 Hrs  T(C): 36.7 (07 May 2018 04:54), Max: 37.3 (06 May 2018 14:34)  T(F): 98 (07 May 2018 04:54), Max: 99.1 (06 May 2018 14:34)  HR: 64 (07 May 2018 04:54) (59 - 73)  BP: 149/52 (07 May 2018 04:54) (109/48 - 149/52)  BP(mean): --  RR: 17 (07 May 2018 04:54) (16 - 18)  SpO2: 99% (07 May 2018 04:54) (98% - 100%)    PHYSICAL EXAM:    Constitutional: WD WN  Eyes: PERRLA, EOMI  Neck: supple  Respiratory: CTA b/l clear  Cardiovascular: S1 S2 +nt , No M/R/G  Gastrointestinal: NT ND, BS+nt in all 4Qs      MEDICATIONS  (STANDING):  aspirin  chewable 81 milliGRAM(s) Oral daily  docusate sodium 100 milliGRAM(s) Oral three times a day  enoxaparin Injectable 40 milliGRAM(s) SubCutaneous daily  gabapentin 300 milliGRAM(s) Oral every 12 hours  hydrALAZINE 50 milliGRAM(s) Oral every 8 hours  lidocaine   Patch 1 Patch Transdermal daily  metoprolol tartrate 50 milliGRAM(s) Oral two times a day  nicotine -  14 mG/24Hr(s) Patch 1 patch Transdermal daily  polyethylene glycol 3350 17 Gram(s) Oral daily  senna 2 Tablet(s) Oral at bedtime  simvastatin 10 milliGRAM(s) Oral at bedtime  sodium chloride 0.9%. 1000 milliLiter(s) (75 mL/Hr) IV Continuous <Continuous>    MEDICATIONS  (PRN):  cyclobenzaprine 5 milliGRAM(s) Oral three times a day PRN Muscle Spasm  magnesium hydroxide Suspension 30 milliLiter(s) Oral daily PRN Constipation      Intolerances        LABS:                     not following labs today                              Lactate Trend            CAPILLARY BLOOD GLUCOSE            Culture Results:   No growth (04-14 @ 00:49)

## 2018-05-07 NOTE — PROGRESS NOTE ADULT - ASSESSMENT
86 year-old female with PMH of HTN, CAD (stent in 2004), current smoker, HLD who presented with back pain, abdominal pain. She had recently been discharged from [facility] on 4/9/18 after a mechanical fall, found to have chronic mild compression fracture of L5 that had been stable since April 2015, small disc bulges in the lumbar spine, mild spinal canal stenosis at L1-L2 and L4-L5.    This time, she was sent to the hospital by visiting nurse for nonresolving back pain. She was admitted to medicine for further management. MRI lumbar spine showed new finding of acute T12 compression fracture.        1. Back pain.  Plan: - MRI spine has acute T12 compression fracture  - continue lidocaine patch, tramadol, gabapentin,  - PT recommended outpatient PT  - no surgical intervention as per orthopedics  -pending guardinship.      2: Failure to thrive in adult.  Plan: - no infectious etiology noted  - continue diet  - evaluated by psychiatry and deemed not to have capacity to make medical decisions for herself  - pending social work follow up.     3:HTN (hypertension).  Plan: - continue metoprolol, hydralazine.     4.CAD (coronary artery disease).  Plan: - continue aspirin, statin.     5: Hyperlipidemia.  Plan: - continue simvastatin.     6 .Smoking greater than 25 pack years. Plan: - nicotine patch.    7.Need for prophylactic measure.  Plan: IMPROVE VTE Individual Risk Assessment          RISK                                                          Points  [  ] Previous VTE                                                3  [  ] Thrombophilia                                             2  [  ] Lower limb paralysis                                   2        (unable to hold up >15 seconds)    [  ] Current Cancer                                             2         (within 6 months)  [ x ] Immobilization > 24 hrs                              1  [  ] ICU/CCU stay > 24 hours                             1  [x  ] Age > 60                                                         1    IMPROVE VTE Score: 2, lovenox for dvt ppx.     Dc planning - ct chest 3 mo; Colonoscopy in  future - to f/u as outpatient

## 2018-05-08 VITALS — HEART RATE: 78 BPM | SYSTOLIC BLOOD PRESSURE: 112 MMHG | DIASTOLIC BLOOD PRESSURE: 72 MMHG

## 2018-05-08 LAB
ANION GAP SERPL CALC-SCNC: 5 MMOL/L — SIGNIFICANT CHANGE UP (ref 5–17)
BASOPHILS # BLD AUTO: 0.1 K/UL — SIGNIFICANT CHANGE UP (ref 0–0.2)
BASOPHILS NFR BLD AUTO: 1.3 % — SIGNIFICANT CHANGE UP (ref 0–2)
BUN SERPL-MCNC: 28 MG/DL — HIGH (ref 7–18)
CALCIUM SERPL-MCNC: 9 MG/DL — SIGNIFICANT CHANGE UP (ref 8.4–10.5)
CHLORIDE SERPL-SCNC: 102 MMOL/L — SIGNIFICANT CHANGE UP (ref 96–108)
CO2 SERPL-SCNC: 29 MMOL/L — SIGNIFICANT CHANGE UP (ref 22–31)
CREAT SERPL-MCNC: 0.92 MG/DL — SIGNIFICANT CHANGE UP (ref 0.5–1.3)
EOSINOPHIL # BLD AUTO: 0.4 K/UL — SIGNIFICANT CHANGE UP (ref 0–0.5)
EOSINOPHIL NFR BLD AUTO: 5.7 % — SIGNIFICANT CHANGE UP (ref 0–6)
GLUCOSE SERPL-MCNC: 79 MG/DL — SIGNIFICANT CHANGE UP (ref 70–99)
HCT VFR BLD CALC: 32 % — LOW (ref 34.5–45)
HGB BLD-MCNC: 10.5 G/DL — LOW (ref 11.5–15.5)
LYMPHOCYTES # BLD AUTO: 1.9 K/UL — SIGNIFICANT CHANGE UP (ref 1–3.3)
LYMPHOCYTES # BLD AUTO: 26.1 % — SIGNIFICANT CHANGE UP (ref 13–44)
MCHC RBC-ENTMCNC: 32.9 GM/DL — SIGNIFICANT CHANGE UP (ref 32–36)
MCHC RBC-ENTMCNC: 33.4 PG — SIGNIFICANT CHANGE UP (ref 27–34)
MCV RBC AUTO: 101.5 FL — HIGH (ref 80–100)
MONOCYTES # BLD AUTO: 0.7 K/UL — SIGNIFICANT CHANGE UP (ref 0–0.9)
MONOCYTES NFR BLD AUTO: 10 % — SIGNIFICANT CHANGE UP (ref 2–14)
NEUTROPHILS # BLD AUTO: 4.1 K/UL — SIGNIFICANT CHANGE UP (ref 1.8–7.4)
NEUTROPHILS NFR BLD AUTO: 56.9 % — SIGNIFICANT CHANGE UP (ref 43–77)
PLATELET # BLD AUTO: 325 K/UL — SIGNIFICANT CHANGE UP (ref 150–400)
POTASSIUM SERPL-MCNC: 4.3 MMOL/L — SIGNIFICANT CHANGE UP (ref 3.5–5.3)
POTASSIUM SERPL-SCNC: 4.3 MMOL/L — SIGNIFICANT CHANGE UP (ref 3.5–5.3)
RBC # BLD: 3.15 M/UL — LOW (ref 3.8–5.2)
RBC # FLD: 11.8 % — SIGNIFICANT CHANGE UP (ref 10.3–14.5)
SODIUM SERPL-SCNC: 136 MMOL/L — SIGNIFICANT CHANGE UP (ref 135–145)
WBC # BLD: 7.1 K/UL — SIGNIFICANT CHANGE UP (ref 3.8–10.5)
WBC # FLD AUTO: 7.1 K/UL — SIGNIFICANT CHANGE UP (ref 3.8–10.5)

## 2018-05-08 PROCEDURE — 83735 ASSAY OF MAGNESIUM: CPT

## 2018-05-08 PROCEDURE — 84145 PROCALCITONIN (PCT): CPT

## 2018-05-08 PROCEDURE — 80048 BASIC METABOLIC PNL TOTAL CA: CPT

## 2018-05-08 PROCEDURE — 97530 THERAPEUTIC ACTIVITIES: CPT

## 2018-05-08 PROCEDURE — 71045 X-RAY EXAM CHEST 1 VIEW: CPT

## 2018-05-08 PROCEDURE — 82378 CARCINOEMBRYONIC ANTIGEN: CPT

## 2018-05-08 PROCEDURE — 84100 ASSAY OF PHOSPHORUS: CPT

## 2018-05-08 PROCEDURE — 80061 LIPID PANEL: CPT

## 2018-05-08 PROCEDURE — 83605 ASSAY OF LACTIC ACID: CPT

## 2018-05-08 PROCEDURE — 74177 CT ABD & PELVIS W/CONTRAST: CPT

## 2018-05-08 PROCEDURE — 82306 VITAMIN D 25 HYDROXY: CPT

## 2018-05-08 PROCEDURE — 97110 THERAPEUTIC EXERCISES: CPT

## 2018-05-08 PROCEDURE — 99285 EMERGENCY DEPT VISIT HI MDM: CPT | Mod: 25

## 2018-05-08 PROCEDURE — 82803 BLOOD GASES ANY COMBINATION: CPT

## 2018-05-08 PROCEDURE — 82607 VITAMIN B-12: CPT

## 2018-05-08 PROCEDURE — 80053 COMPREHEN METABOLIC PANEL: CPT

## 2018-05-08 PROCEDURE — 97162 PT EVAL MOD COMPLEX 30 MIN: CPT

## 2018-05-08 PROCEDURE — 82746 ASSAY OF FOLIC ACID SERUM: CPT

## 2018-05-08 PROCEDURE — 83690 ASSAY OF LIPASE: CPT

## 2018-05-08 PROCEDURE — 83036 HEMOGLOBIN GLYCOSYLATED A1C: CPT

## 2018-05-08 PROCEDURE — 87086 URINE CULTURE/COLONY COUNT: CPT

## 2018-05-08 PROCEDURE — 85610 PROTHROMBIN TIME: CPT

## 2018-05-08 PROCEDURE — 85027 COMPLETE CBC AUTOMATED: CPT

## 2018-05-08 PROCEDURE — 72148 MRI LUMBAR SPINE W/O DYE: CPT

## 2018-05-08 PROCEDURE — 97116 GAIT TRAINING THERAPY: CPT

## 2018-05-08 PROCEDURE — 84443 ASSAY THYROID STIM HORMONE: CPT

## 2018-05-08 PROCEDURE — 81003 URINALYSIS AUTO W/O SCOPE: CPT

## 2018-05-08 RX ADMIN — Medication 50 MILLIGRAM(S): at 06:36

## 2018-05-08 RX ADMIN — GABAPENTIN 300 MILLIGRAM(S): 400 CAPSULE ORAL at 06:36

## 2018-05-08 RX ADMIN — Medication 100 MILLIGRAM(S): at 06:36

## 2018-05-08 RX ADMIN — Medication 81 MILLIGRAM(S): at 12:12

## 2018-05-08 RX ADMIN — Medication 100 MILLIGRAM(S): at 13:36

## 2018-05-08 RX ADMIN — GABAPENTIN 300 MILLIGRAM(S): 400 CAPSULE ORAL at 17:33

## 2018-05-08 RX ADMIN — ENOXAPARIN SODIUM 40 MILLIGRAM(S): 100 INJECTION SUBCUTANEOUS at 12:12

## 2018-05-08 RX ADMIN — LIDOCAINE 1 PATCH: 4 CREAM TOPICAL at 12:12

## 2018-05-08 RX ADMIN — Medication 50 MILLIGRAM(S): at 13:36

## 2018-05-08 RX ADMIN — Medication 50 MILLIGRAM(S): at 17:33

## 2018-05-08 RX ADMIN — Medication 1 PATCH: at 12:11

## 2018-05-08 RX ADMIN — POLYETHYLENE GLYCOL 3350 17 GRAM(S): 17 POWDER, FOR SOLUTION ORAL at 12:12

## 2018-05-08 RX ADMIN — Medication 1 PATCH: at 12:12

## 2018-05-08 NOTE — PROGRESS NOTE ADULT - PROVIDER SPECIALTY LIST ADULT
Internal Medicine
Orthopedics
Pain Medicine
Internal Medicine

## 2018-05-08 NOTE — PROGRESS NOTE ADULT - ASSESSMENT
-low back pain 2/2 acute t 12 fx  -s mm nodule rt ul  -transverse colon thickening  -hx; cad/stenting,htn,hld,smoker  -dementia-lacks capacity      plan; mr lumbar spine 3 mo -per ortho          ct chest  3 mo          colonoscopy in  future          phys tx          analgesia           awaiting placement//guardianship          encourage fluids          no acute care          alc

## 2018-05-08 NOTE — PROGRESS NOTE ADULT - SUBJECTIVE AND OBJECTIVE BOX
Patient is a 87y old  Female who presents with a chief complaint of back pain (18 Apr 2018 16:21)      INTERVAL HPI/OVERNIGHT EVENTS:  feels well, no pain    VITAL SIGNS:  T(F): 97.8 (05-08-18 @ 05:43)  HR: 62 (05-08-18 @ 05:43)  BP: 153/53 (05-08-18 @ 05:43)  RR: 16 (05-08-18 @ 05:43)  SpO2: 100% (05-08-18 @ 05:43)  Wt(kg): --  I&O's Detail          REVIEW OF SYSTEMS:    CONSTITUTIONAL:  No fevers, chills, sweats    HEENT:  Eyes:  No diplopia or blurred vision. ENT:  No earache, sore throat or runny nose.    CARDIOVASCULAR:  No pressure, squeezing, tightness, or heaviness about the chest; no palpitations.    RESPIRATORY:  no sob    GASTROINTESTINAL:  No abdominal pain, nausea, vomiting or diarrhea.    GENITOURINARY:  No dysuria, frequency or urgency.    NEUROLOGIC:  No paresthesias, fasciculations, seizures or weakness.    PSYCHIATRIC:  No disorder of thought or mood.      PHYSICAL EXAM:    Constitutional:no complaints  ENMT:atnc  Neck:supple  Respiratory:clear  Cardiovascular:rr  Gastrointestinal:soft  Extremities:no edema  Vascular:intact  Neurological:non focal  Musculoskeletal:no edema, normal rom    MEDICATIONS  (STANDING):  aspirin  chewable 81 milliGRAM(s) Oral daily  docusate sodium 100 milliGRAM(s) Oral three times a day  enoxaparin Injectable 40 milliGRAM(s) SubCutaneous daily  gabapentin 300 milliGRAM(s) Oral every 12 hours  hydrALAZINE 50 milliGRAM(s) Oral every 8 hours  lidocaine   Patch 1 Patch Transdermal daily  metoprolol tartrate 50 milliGRAM(s) Oral two times a day  nicotine -  14 mG/24Hr(s) Patch 1 patch Transdermal daily  polyethylene glycol 3350 17 Gram(s) Oral daily  senna 2 Tablet(s) Oral at bedtime  simvastatin 10 milliGRAM(s) Oral at bedtime    MEDICATIONS  (PRN):  cyclobenzaprine 5 milliGRAM(s) Oral three times a day PRN Muscle Spasm      Allergies    No Known Allergies

## 2018-05-08 NOTE — PROGRESS NOTE ADULT - ASSESSMENT
86 year-old female with PMH of HTN, CAD (stent in 2004), current smoker, HLD who presented with back pain, abdominal pain. She had recently been discharged from [facility] on 4/9/18 after a mechanical fall, found to have chronic mild compression fracture of L5 that had been stable since April 2015, small disc bulges in the lumbar spine, mild spinal canal stenosis at L1-L2 and L4-L5.    This time, she was sent to the hospital by visiting nurse for nonresolving back pain. She was admitted to medicine for further management. MRI lumbar spine showed new finding of acute T12 compression fracture.        1. Back pain.  Plan: - MRI spine has acute T12 compression fracture  - continue lidocaine patch, tramadol, gabapentin,  - PT recommended outpatient PT  - no surgical intervention as per orthopedics  -pending guardinship.      2: Failure to thrive in adult.  Plan: - no infectious etiology noted  - continue diet  - evaluated by psychiatry and deemed not to have capacity to make medical decisions for herself  - pending social work follow up.     3:HTN (hypertension).  Plan: - continue metoprolol, hydralazine.     4.CAD (coronary artery disease).  Plan: - continue aspirin, statin.     5: Hyperlipidemia.  Plan: - continue simvastatin.     6 .Smoking greater than 25 pack years. Plan: - nicotine patch.    7.Need for prophylactic measure.  Plan: IMPROVE VTE Individual Risk Assessment          RISK                                                          Points  [  ] Previous VTE                                                3  [  ] Thrombophilia                                             2  [  ] Lower limb paralysis                                   2        (unable to hold up >15 seconds)    [  ] Current Cancer                                             2         (within 6 months)  [ x ] Immobilization > 24 hrs                              1  [  ] ICU/CCU stay > 24 hours                             1  [x  ] Age > 60                                                         1    IMPROVE VTE Score: 2, lovenox for dvt ppx.     Dc planning - ct chest 3 mo; Colonoscopy in  future - to f/u as outpatient

## 2018-05-08 NOTE — PROGRESS NOTE ADULT - SUBJECTIVE AND OBJECTIVE BOX
INTERVAL HPI/OVERNIGHT EVENTS:    Subjective -  Pt seen and examined at bedside. no new complaints  [1] Overnight Acute events ? Y/N  RRT - Y/N      VITAL SIGNS:  Vital Signs Last 24 Hrs  T(C): 36.6 (08 May 2018 05:43), Max: 36.9 (07 May 2018 20:50)  T(F): 97.8 (08 May 2018 05:43), Max: 98.5 (07 May 2018 20:50)  HR: 62 (08 May 2018 05:43) (62 - 68)  BP: 153/53 (08 May 2018 05:43) (130/63 - 153/53)  BP(mean): --  RR: 16 (08 May 2018 05:43) (16 - 17)  SpO2: 100% (08 May 2018 05:43) (98% - 100%)      PHYSICAL EXAM:    Constitutional: WD WN  Eyes: PERRLA, EOMI  Neck: supple  Respiratory: CTA b/l clear  Cardiovascular: S1 S2 +nt , No M/R/G  Gastrointestinal: NT ND, BS+nt in all 4Qs      MEDICATIONS  (STANDING):  aspirin  chewable 81 milliGRAM(s) Oral daily  docusate sodium 100 milliGRAM(s) Oral three times a day  enoxaparin Injectable 40 milliGRAM(s) SubCutaneous daily  gabapentin 300 milliGRAM(s) Oral every 12 hours  hydrALAZINE 50 milliGRAM(s) Oral every 8 hours  lidocaine   Patch 1 Patch Transdermal daily  metoprolol tartrate 50 milliGRAM(s) Oral two times a day  nicotine -  14 mG/24Hr(s) Patch 1 patch Transdermal daily  polyethylene glycol 3350 17 Gram(s) Oral daily  senna 2 Tablet(s) Oral at bedtime  simvastatin 10 milliGRAM(s) Oral at bedtime    MEDICATIONS  (PRN):  cyclobenzaprine 5 milliGRAM(s) Oral three times a day PRN Muscle Spasm    Intolerances                          10.5   7.1   )-----------( 325      ( 08 May 2018 08:35 )             32.0       05-08    136  |  102  |  28<H>  ----------------------------<  79  4.3   |  29  |  0.92    Ca    9.0      08 May 2018 08:35                        Lactate Trend            CAPILLARY BLOOD GLUCOSE            Culture Results:   No growth (04-14 @ 00:49)        LABS:                  not following labs today

## 2019-01-01 ENCOUNTER — INPATIENT (INPATIENT)
Facility: HOSPITAL | Age: 84
LOS: 7 days | DRG: 178 | End: 2019-12-03
Attending: FAMILY MEDICINE | Admitting: FAMILY MEDICINE
Payer: OTHER MISCELLANEOUS

## 2019-01-01 ENCOUNTER — TRANSCRIPTION ENCOUNTER (OUTPATIENT)
Age: 84
End: 2019-01-01

## 2019-01-01 ENCOUNTER — INPATIENT (INPATIENT)
Facility: HOSPITAL | Age: 84
LOS: 10 days | Discharge: HOSPICE | DRG: 181 | End: 2019-11-08
Attending: GENERAL PRACTICE | Admitting: GENERAL PRACTICE
Payer: MEDICARE

## 2019-01-01 ENCOUNTER — RESULT REVIEW (OUTPATIENT)
Age: 84
End: 2019-01-01

## 2019-01-01 ENCOUNTER — INPATIENT (INPATIENT)
Facility: HOSPITAL | Age: 84
LOS: 10 days | Discharge: HOME | DRG: 181 | End: 2019-07-05
Attending: GENERAL PRACTICE | Admitting: GENERAL PRACTICE
Payer: MEDICARE

## 2019-01-01 VITALS
HEART RATE: 118 BPM | DIASTOLIC BLOOD PRESSURE: 66 MMHG | HEIGHT: 59 IN | WEIGHT: 100.09 LBS | OXYGEN SATURATION: 96 % | RESPIRATION RATE: 18 BRPM | SYSTOLIC BLOOD PRESSURE: 126 MMHG | TEMPERATURE: 100 F

## 2019-01-01 VITALS — OXYGEN SATURATION: 98 %

## 2019-01-01 VITALS
SYSTOLIC BLOOD PRESSURE: 134 MMHG | RESPIRATION RATE: 18 BRPM | TEMPERATURE: 98 F | HEART RATE: 71 BPM | DIASTOLIC BLOOD PRESSURE: 71 MMHG | OXYGEN SATURATION: 98 %

## 2019-01-01 VITALS
RESPIRATION RATE: 20 BRPM | HEART RATE: 68 BPM | DIASTOLIC BLOOD PRESSURE: 78 MMHG | SYSTOLIC BLOOD PRESSURE: 145 MMHG | OXYGEN SATURATION: 97 % | WEIGHT: 89.95 LBS

## 2019-01-01 VITALS
HEART RATE: 66 BPM | DIASTOLIC BLOOD PRESSURE: 79 MMHG | WEIGHT: 128.97 LBS | RESPIRATION RATE: 16 BRPM | HEIGHT: 62 IN | SYSTOLIC BLOOD PRESSURE: 178 MMHG | TEMPERATURE: 98 F | OXYGEN SATURATION: 97 %

## 2019-01-01 VITALS
SYSTOLIC BLOOD PRESSURE: 127 MMHG | TEMPERATURE: 98 F | DIASTOLIC BLOOD PRESSURE: 49 MMHG | OXYGEN SATURATION: 98 % | HEART RATE: 100 BPM | RESPIRATION RATE: 17 BRPM

## 2019-01-01 DIAGNOSIS — D75.9 DISEASE OF BLOOD AND BLOOD-FORMING ORGANS, UNSPECIFIED: ICD-10-CM

## 2019-01-01 DIAGNOSIS — Z90.710 ACQUIRED ABSENCE OF BOTH CERVIX AND UTERUS: Chronic | ICD-10-CM

## 2019-01-01 DIAGNOSIS — D72.829 ELEVATED WHITE BLOOD CELL COUNT, UNSPECIFIED: ICD-10-CM

## 2019-01-01 DIAGNOSIS — Z90.49 ACQUIRED ABSENCE OF OTHER SPECIFIED PARTS OF DIGESTIVE TRACT: Chronic | ICD-10-CM

## 2019-01-01 DIAGNOSIS — J69.0 PNEUMONITIS DUE TO INHALATION OF FOOD AND VOMIT: ICD-10-CM

## 2019-01-01 DIAGNOSIS — J44.9 CHRONIC OBSTRUCTIVE PULMONARY DISEASE, UNSPECIFIED: ICD-10-CM

## 2019-01-01 DIAGNOSIS — R07.89 OTHER CHEST PAIN: ICD-10-CM

## 2019-01-01 DIAGNOSIS — Z90.89 ACQUIRED ABSENCE OF OTHER ORGANS: Chronic | ICD-10-CM

## 2019-01-01 DIAGNOSIS — Z98.89 OTHER SPECIFIED POSTPROCEDURAL STATES: Chronic | ICD-10-CM

## 2019-01-01 DIAGNOSIS — F03.90 UNSPECIFIED DEMENTIA WITHOUT BEHAVIORAL DISTURBANCE: ICD-10-CM

## 2019-01-01 DIAGNOSIS — E86.0 DEHYDRATION: ICD-10-CM

## 2019-01-01 DIAGNOSIS — J45.909 UNSPECIFIED ASTHMA, UNCOMPLICATED: ICD-10-CM

## 2019-01-01 DIAGNOSIS — I10 ESSENTIAL (PRIMARY) HYPERTENSION: ICD-10-CM

## 2019-01-01 DIAGNOSIS — G30.1 ALZHEIMER'S DISEASE WITH LATE ONSET: ICD-10-CM

## 2019-01-01 DIAGNOSIS — K59.01 SLOW TRANSIT CONSTIPATION: ICD-10-CM

## 2019-01-01 DIAGNOSIS — R64 CACHEXIA: ICD-10-CM

## 2019-01-01 DIAGNOSIS — Z95.5 PRESENCE OF CORONARY ANGIOPLASTY IMPLANT AND GRAFT: Chronic | ICD-10-CM

## 2019-01-01 DIAGNOSIS — Z51.5 ENCOUNTER FOR PALLIATIVE CARE: ICD-10-CM

## 2019-01-01 DIAGNOSIS — C34.90 MALIGNANT NEOPLASM OF UNSPECIFIED PART OF UNSPECIFIED BRONCHUS OR LUNG: ICD-10-CM

## 2019-01-01 DIAGNOSIS — J40 BRONCHITIS, NOT SPECIFIED AS ACUTE OR CHRONIC: ICD-10-CM

## 2019-01-01 DIAGNOSIS — R91.8 OTHER NONSPECIFIC ABNORMAL FINDING OF LUNG FIELD: ICD-10-CM

## 2019-01-01 DIAGNOSIS — R06.03 ACUTE RESPIRATORY DISTRESS: ICD-10-CM

## 2019-01-01 DIAGNOSIS — Z29.9 ENCOUNTER FOR PROPHYLACTIC MEASURES, UNSPECIFIED: ICD-10-CM

## 2019-01-01 DIAGNOSIS — C34.91 MALIGNANT NEOPLASM OF UNSPECIFIED PART OF RIGHT BRONCHUS OR LUNG: ICD-10-CM

## 2019-01-01 DIAGNOSIS — R07.9 CHEST PAIN, UNSPECIFIED: ICD-10-CM

## 2019-01-01 DIAGNOSIS — R06.00 DYSPNEA, UNSPECIFIED: ICD-10-CM

## 2019-01-01 DIAGNOSIS — R45.1 RESTLESSNESS AND AGITATION: ICD-10-CM

## 2019-01-01 DIAGNOSIS — R06.02 SHORTNESS OF BREATH: ICD-10-CM

## 2019-01-01 LAB
24R-OH-CALCIDIOL SERPL-MCNC: 25 NG/ML — LOW (ref 30–80)
ABO RH CONFIRMATION: SIGNIFICANT CHANGE UP
ALBUMIN SERPL ELPH-MCNC: 2.1 G/DL — LOW (ref 3.5–5)
ALBUMIN SERPL ELPH-MCNC: 2.8 G/DL — LOW (ref 3.3–5)
ALBUMIN SERPL ELPH-MCNC: 2.8 G/DL — LOW (ref 3.5–5)
ALBUMIN SERPL ELPH-MCNC: 3.5 G/DL — SIGNIFICANT CHANGE UP (ref 3.3–5)
ALP SERPL-CCNC: 57 U/L — SIGNIFICANT CHANGE UP (ref 40–120)
ALP SERPL-CCNC: 69 U/L — SIGNIFICANT CHANGE UP (ref 40–120)
ALP SERPL-CCNC: 73 U/L — SIGNIFICANT CHANGE UP (ref 40–120)
ALP SERPL-CCNC: 76 U/L — SIGNIFICANT CHANGE UP (ref 40–120)
ALT FLD-CCNC: 22 U/L DA — SIGNIFICANT CHANGE UP (ref 10–60)
ALT FLD-CCNC: 30 U/L DA — SIGNIFICANT CHANGE UP (ref 10–60)
ALT FLD-CCNC: 7 U/L — LOW (ref 10–45)
ALT FLD-CCNC: 8 U/L — LOW (ref 10–45)
ANION GAP SERPL CALC-SCNC: 10 MMOL/L — SIGNIFICANT CHANGE UP (ref 5–17)
ANION GAP SERPL CALC-SCNC: 11 MMOL/L — SIGNIFICANT CHANGE UP (ref 5–17)
ANION GAP SERPL CALC-SCNC: 12 MMOL/L — SIGNIFICANT CHANGE UP (ref 5–17)
ANION GAP SERPL CALC-SCNC: 13 MMOL/L — SIGNIFICANT CHANGE UP (ref 5–17)
ANION GAP SERPL CALC-SCNC: 4 MMOL/L — LOW (ref 5–17)
ANION GAP SERPL CALC-SCNC: 5 MMOL/L — SIGNIFICANT CHANGE UP (ref 5–17)
ANION GAP SERPL CALC-SCNC: 5 MMOL/L — SIGNIFICANT CHANGE UP (ref 5–17)
ANION GAP SERPL CALC-SCNC: 6 MMOL/L — SIGNIFICANT CHANGE UP (ref 5–17)
ANION GAP SERPL CALC-SCNC: 6 MMOL/L — SIGNIFICANT CHANGE UP (ref 5–17)
ANION GAP SERPL CALC-SCNC: 8 MMOL/L — SIGNIFICANT CHANGE UP (ref 5–17)
ANION GAP SERPL CALC-SCNC: 8 MMOL/L — SIGNIFICANT CHANGE UP (ref 5–17)
ANION GAP SERPL CALC-SCNC: 9 MMOL/L — SIGNIFICANT CHANGE UP (ref 5–17)
APPEARANCE UR: CLEAR — SIGNIFICANT CHANGE UP
APTT BLD: 28.6 SEC — SIGNIFICANT CHANGE UP (ref 27.5–36.3)
APTT BLD: 29.4 SEC — SIGNIFICANT CHANGE UP (ref 27.5–36.3)
APTT BLD: 29.8 SEC — SIGNIFICANT CHANGE UP (ref 27.5–36.3)
AST SERPL-CCNC: 10 U/L — SIGNIFICANT CHANGE UP (ref 10–40)
AST SERPL-CCNC: 14 U/L — SIGNIFICANT CHANGE UP (ref 10–40)
AST SERPL-CCNC: 28 U/L — SIGNIFICANT CHANGE UP (ref 10–40)
AST SERPL-CCNC: 47 U/L — HIGH (ref 10–40)
BASE EXCESS BLDV CALC-SCNC: 4 MMOL/L — HIGH (ref -2–2)
BASOPHILS # BLD AUTO: 0 K/UL — SIGNIFICANT CHANGE UP (ref 0–0.2)
BASOPHILS # BLD AUTO: 0.06 K/UL — SIGNIFICANT CHANGE UP (ref 0–0.2)
BASOPHILS # BLD AUTO: SIGNIFICANT CHANGE UP K/UL (ref 0–0.2)
BASOPHILS NFR BLD AUTO: 0 % — SIGNIFICANT CHANGE UP (ref 0–2)
BASOPHILS NFR BLD AUTO: 0.2 % — SIGNIFICANT CHANGE UP (ref 0–2)
BASOPHILS NFR BLD AUTO: 0.6 % — SIGNIFICANT CHANGE UP (ref 0–2)
BASOPHILS NFR BLD AUTO: SIGNIFICANT CHANGE UP % (ref 0–2)
BCA 255 TISS QL IMSTN: 28.3 U/ML — HIGH
BILIRUB SERPL-MCNC: 0.2 MG/DL — SIGNIFICANT CHANGE UP (ref 0.2–1.2)
BILIRUB SERPL-MCNC: 0.3 MG/DL — SIGNIFICANT CHANGE UP (ref 0.2–1.2)
BILIRUB SERPL-MCNC: 0.3 MG/DL — SIGNIFICANT CHANGE UP (ref 0.2–1.2)
BILIRUB SERPL-MCNC: 0.5 MG/DL — SIGNIFICANT CHANGE UP (ref 0.2–1.2)
BILIRUB UR-MCNC: NEGATIVE — SIGNIFICANT CHANGE UP
BLD GP AB SCN SERPL QL: SIGNIFICANT CHANGE UP
BUN SERPL-MCNC: 12 MG/DL — SIGNIFICANT CHANGE UP (ref 7–23)
BUN SERPL-MCNC: 12 MG/DL — SIGNIFICANT CHANGE UP (ref 7–23)
BUN SERPL-MCNC: 15 MG/DL — SIGNIFICANT CHANGE UP (ref 7–23)
BUN SERPL-MCNC: 18 MG/DL — SIGNIFICANT CHANGE UP (ref 7–18)
BUN SERPL-MCNC: 19 MG/DL — HIGH (ref 7–18)
BUN SERPL-MCNC: 19 MG/DL — SIGNIFICANT CHANGE UP (ref 7–23)
BUN SERPL-MCNC: 21 MG/DL — HIGH (ref 7–18)
BUN SERPL-MCNC: 22 MG/DL — SIGNIFICANT CHANGE UP (ref 7–23)
BUN SERPL-MCNC: 22 MG/DL — SIGNIFICANT CHANGE UP (ref 7–23)
BUN SERPL-MCNC: 23 MG/DL — HIGH (ref 7–18)
BUN SERPL-MCNC: 24 MG/DL — HIGH (ref 7–18)
BUN SERPL-MCNC: 24 MG/DL — HIGH (ref 7–18)
BUN SERPL-MCNC: 26 MG/DL — HIGH (ref 7–18)
BUN SERPL-MCNC: 28 MG/DL — HIGH (ref 7–23)
BUN SERPL-MCNC: 33 MG/DL — HIGH (ref 7–18)
BUN SERPL-MCNC: 33 MG/DL — HIGH (ref 7–23)
BUN SERPL-MCNC: 36 MG/DL — HIGH (ref 7–18)
BUN SERPL-MCNC: 37 MG/DL — HIGH (ref 7–18)
CA-I SERPL-SCNC: 1.17 MMOL/L — SIGNIFICANT CHANGE UP (ref 1.12–1.3)
CALCIUM SERPL-MCNC: 8.1 MG/DL — LOW (ref 8.4–10.5)
CALCIUM SERPL-MCNC: 8.3 MG/DL — LOW (ref 8.4–10.5)
CALCIUM SERPL-MCNC: 8.3 MG/DL — LOW (ref 8.4–10.5)
CALCIUM SERPL-MCNC: 8.4 MG/DL — SIGNIFICANT CHANGE UP (ref 8.4–10.5)
CALCIUM SERPL-MCNC: 8.5 MG/DL — SIGNIFICANT CHANGE UP (ref 8.4–10.5)
CALCIUM SERPL-MCNC: 8.5 MG/DL — SIGNIFICANT CHANGE UP (ref 8.4–10.5)
CALCIUM SERPL-MCNC: 8.6 MG/DL — SIGNIFICANT CHANGE UP (ref 8.4–10.5)
CALCIUM SERPL-MCNC: 8.7 MG/DL — SIGNIFICANT CHANGE UP (ref 8.4–10.5)
CALCIUM SERPL-MCNC: 8.8 MG/DL — SIGNIFICANT CHANGE UP (ref 8.4–10.5)
CALCIUM SERPL-MCNC: 9.3 MG/DL — SIGNIFICANT CHANGE UP (ref 8.4–10.5)
CALCIUM SERPL-MCNC: 9.4 MG/DL — SIGNIFICANT CHANGE UP (ref 8.4–10.5)
CALCIUM SERPL-MCNC: 9.5 MG/DL — SIGNIFICANT CHANGE UP (ref 8.4–10.5)
CANCER AG125 SERPL-ACNC: 27 U/ML — SIGNIFICANT CHANGE UP
CANCER AG19-9 SERPL-ACNC: 21 U/ML — SIGNIFICANT CHANGE UP
CANCER AG27-29 SERPL-ACNC: 33.6 U/ML — SIGNIFICANT CHANGE UP (ref 0–38.6)
CEA SERPL-MCNC: 0.9 NG/ML — SIGNIFICANT CHANGE UP (ref 0–3.8)
CHLORIDE BLDV-SCNC: 100 MMOL/L — SIGNIFICANT CHANGE UP (ref 96–108)
CHLORIDE SERPL-SCNC: 101 MMOL/L — SIGNIFICANT CHANGE UP (ref 96–108)
CHLORIDE SERPL-SCNC: 101 MMOL/L — SIGNIFICANT CHANGE UP (ref 96–108)
CHLORIDE SERPL-SCNC: 102 MMOL/L — SIGNIFICANT CHANGE UP (ref 96–108)
CHLORIDE SERPL-SCNC: 103 MMOL/L — SIGNIFICANT CHANGE UP (ref 96–108)
CHLORIDE SERPL-SCNC: 103 MMOL/L — SIGNIFICANT CHANGE UP (ref 96–108)
CHLORIDE SERPL-SCNC: 104 MMOL/L — SIGNIFICANT CHANGE UP (ref 96–108)
CHLORIDE SERPL-SCNC: 106 MMOL/L — SIGNIFICANT CHANGE UP (ref 96–108)
CHLORIDE SERPL-SCNC: 110 MMOL/L — HIGH (ref 96–108)
CHLORIDE SERPL-SCNC: 114 MMOL/L — HIGH (ref 96–108)
CHLORIDE SERPL-SCNC: 118 MMOL/L — HIGH (ref 96–108)
CHLORIDE SERPL-SCNC: 95 MMOL/L — LOW (ref 96–108)
CHLORIDE SERPL-SCNC: 95 MMOL/L — LOW (ref 96–108)
CHLORIDE SERPL-SCNC: 97 MMOL/L — SIGNIFICANT CHANGE UP (ref 96–108)
CHLORIDE SERPL-SCNC: 97 MMOL/L — SIGNIFICANT CHANGE UP (ref 96–108)
CHLORIDE SERPL-SCNC: 98 MMOL/L — SIGNIFICANT CHANGE UP (ref 96–108)
CHLORIDE SERPL-SCNC: 99 MMOL/L — SIGNIFICANT CHANGE UP (ref 96–108)
CHOLEST SERPL-MCNC: 140 MG/DL — SIGNIFICANT CHANGE UP (ref 10–199)
CHOLEST SERPL-MCNC: 153 MG/DL — SIGNIFICANT CHANGE UP (ref 10–199)
CK SERPL-CCNC: 26 U/L — SIGNIFICANT CHANGE UP (ref 21–215)
CO2 BLDV-SCNC: 30 MMOL/L — SIGNIFICANT CHANGE UP (ref 22–30)
CO2 SERPL-SCNC: 23 MMOL/L — SIGNIFICANT CHANGE UP (ref 22–31)
CO2 SERPL-SCNC: 24 MMOL/L — SIGNIFICANT CHANGE UP (ref 22–31)
CO2 SERPL-SCNC: 24 MMOL/L — SIGNIFICANT CHANGE UP (ref 22–31)
CO2 SERPL-SCNC: 25 MMOL/L — SIGNIFICANT CHANGE UP (ref 22–31)
CO2 SERPL-SCNC: 27 MMOL/L — SIGNIFICANT CHANGE UP (ref 22–31)
CO2 SERPL-SCNC: 28 MMOL/L — SIGNIFICANT CHANGE UP (ref 22–31)
CO2 SERPL-SCNC: 29 MMOL/L — SIGNIFICANT CHANGE UP (ref 22–31)
CO2 SERPL-SCNC: 29 MMOL/L — SIGNIFICANT CHANGE UP (ref 22–31)
CO2 SERPL-SCNC: 30 MMOL/L — SIGNIFICANT CHANGE UP (ref 22–31)
COLOR SPEC: YELLOW — SIGNIFICANT CHANGE UP
CREAT SERPL-MCNC: 0.76 MG/DL — SIGNIFICANT CHANGE UP (ref 0.5–1.3)
CREAT SERPL-MCNC: 0.78 MG/DL — SIGNIFICANT CHANGE UP (ref 0.5–1.3)
CREAT SERPL-MCNC: 0.81 MG/DL — SIGNIFICANT CHANGE UP (ref 0.5–1.3)
CREAT SERPL-MCNC: 0.81 MG/DL — SIGNIFICANT CHANGE UP (ref 0.5–1.3)
CREAT SERPL-MCNC: 0.84 MG/DL — SIGNIFICANT CHANGE UP (ref 0.5–1.3)
CREAT SERPL-MCNC: 0.85 MG/DL — SIGNIFICANT CHANGE UP (ref 0.5–1.3)
CREAT SERPL-MCNC: 0.85 MG/DL — SIGNIFICANT CHANGE UP (ref 0.5–1.3)
CREAT SERPL-MCNC: 0.86 MG/DL — SIGNIFICANT CHANGE UP (ref 0.5–1.3)
CREAT SERPL-MCNC: 0.88 MG/DL — SIGNIFICANT CHANGE UP (ref 0.5–1.3)
CREAT SERPL-MCNC: 0.88 MG/DL — SIGNIFICANT CHANGE UP (ref 0.5–1.3)
CREAT SERPL-MCNC: 0.89 MG/DL — SIGNIFICANT CHANGE UP (ref 0.5–1.3)
CREAT SERPL-MCNC: 0.92 MG/DL — SIGNIFICANT CHANGE UP (ref 0.5–1.3)
CREAT SERPL-MCNC: 0.99 MG/DL — SIGNIFICANT CHANGE UP (ref 0.5–1.3)
CREAT SERPL-MCNC: 1.01 MG/DL — SIGNIFICANT CHANGE UP (ref 0.5–1.3)
CREAT SERPL-MCNC: 1.05 MG/DL — SIGNIFICANT CHANGE UP (ref 0.5–1.3)
CREAT SERPL-MCNC: 1.07 MG/DL — SIGNIFICANT CHANGE UP (ref 0.5–1.3)
CREAT SERPL-MCNC: 1.13 MG/DL — SIGNIFICANT CHANGE UP (ref 0.5–1.3)
CREAT SERPL-MCNC: 1.22 MG/DL — SIGNIFICANT CHANGE UP (ref 0.5–1.3)
CREAT SERPL-MCNC: 1.23 MG/DL — SIGNIFICANT CHANGE UP (ref 0.5–1.3)
CREAT SERPL-MCNC: 1.28 MG/DL — SIGNIFICANT CHANGE UP (ref 0.5–1.3)
CULTURE RESULTS: NO GROWTH — SIGNIFICANT CHANGE UP
CULTURE RESULTS: SIGNIFICANT CHANGE UP
CULTURE RESULTS: SIGNIFICANT CHANGE UP
DIFF PNL FLD: NEGATIVE — SIGNIFICANT CHANGE UP
EOSINOPHIL # BLD AUTO: 0 K/UL — SIGNIFICANT CHANGE UP (ref 0–0.5)
EOSINOPHIL # BLD AUTO: 0.03 K/UL — SIGNIFICANT CHANGE UP (ref 0–0.5)
EOSINOPHIL # BLD AUTO: 0.04 K/UL — SIGNIFICANT CHANGE UP (ref 0–0.5)
EOSINOPHIL # BLD AUTO: 0.1 K/UL — SIGNIFICANT CHANGE UP (ref 0–0.5)
EOSINOPHIL # BLD AUTO: 0.19 K/UL — SIGNIFICANT CHANGE UP (ref 0–0.5)
EOSINOPHIL # BLD AUTO: SIGNIFICANT CHANGE UP K/UL (ref 0–0.5)
EOSINOPHIL NFR BLD AUTO: 0 % — SIGNIFICANT CHANGE UP (ref 0–6)
EOSINOPHIL NFR BLD AUTO: 0.9 % — SIGNIFICANT CHANGE UP (ref 0–6)
EOSINOPHIL NFR BLD AUTO: 1 % — SIGNIFICANT CHANGE UP (ref 0–6)
EOSINOPHIL NFR BLD AUTO: 1 % — SIGNIFICANT CHANGE UP (ref 0–6)
EOSINOPHIL NFR BLD AUTO: 2.1 % — SIGNIFICANT CHANGE UP (ref 0–6)
EOSINOPHIL NFR BLD AUTO: SIGNIFICANT CHANGE UP % (ref 0–6)
FERRITIN SERPL-MCNC: 198 NG/ML — HIGH (ref 15–150)
FERRITIN SERPL-MCNC: 546 NG/ML — HIGH (ref 15–150)
FOLATE SERPL-MCNC: 13.1 NG/ML — SIGNIFICANT CHANGE UP
FOLATE SERPL-MCNC: 6.2 NG/ML — SIGNIFICANT CHANGE UP
GAS PNL BLDV: 132 MMOL/L — LOW (ref 135–145)
GAS PNL BLDV: SIGNIFICANT CHANGE UP
GAS PNL BLDV: SIGNIFICANT CHANGE UP
GLUCOSE BLDC GLUCOMTR-MCNC: 85 MG/DL — SIGNIFICANT CHANGE UP (ref 70–99)
GLUCOSE BLDV-MCNC: 101 MG/DL — HIGH (ref 70–99)
GLUCOSE SERPL-MCNC: 100 MG/DL — HIGH (ref 70–99)
GLUCOSE SERPL-MCNC: 103 MG/DL — HIGH (ref 70–99)
GLUCOSE SERPL-MCNC: 106 MG/DL — HIGH (ref 70–99)
GLUCOSE SERPL-MCNC: 107 MG/DL — HIGH (ref 70–99)
GLUCOSE SERPL-MCNC: 126 MG/DL — HIGH (ref 70–99)
GLUCOSE SERPL-MCNC: 134 MG/DL — HIGH (ref 70–99)
GLUCOSE SERPL-MCNC: 138 MG/DL — HIGH (ref 70–99)
GLUCOSE SERPL-MCNC: 178 MG/DL — HIGH (ref 70–99)
GLUCOSE SERPL-MCNC: 75 MG/DL — SIGNIFICANT CHANGE UP (ref 70–99)
GLUCOSE SERPL-MCNC: 75 MG/DL — SIGNIFICANT CHANGE UP (ref 70–99)
GLUCOSE SERPL-MCNC: 76 MG/DL — SIGNIFICANT CHANGE UP (ref 70–99)
GLUCOSE SERPL-MCNC: 76 MG/DL — SIGNIFICANT CHANGE UP (ref 70–99)
GLUCOSE SERPL-MCNC: 77 MG/DL — SIGNIFICANT CHANGE UP (ref 70–99)
GLUCOSE SERPL-MCNC: 79 MG/DL — SIGNIFICANT CHANGE UP (ref 70–99)
GLUCOSE SERPL-MCNC: 85 MG/DL — SIGNIFICANT CHANGE UP (ref 70–99)
GLUCOSE SERPL-MCNC: 85 MG/DL — SIGNIFICANT CHANGE UP (ref 70–99)
GLUCOSE SERPL-MCNC: 87 MG/DL — SIGNIFICANT CHANGE UP (ref 70–99)
GLUCOSE SERPL-MCNC: 87 MG/DL — SIGNIFICANT CHANGE UP (ref 70–99)
GLUCOSE SERPL-MCNC: 88 MG/DL — SIGNIFICANT CHANGE UP (ref 70–99)
GLUCOSE SERPL-MCNC: 88 MG/DL — SIGNIFICANT CHANGE UP (ref 70–99)
GLUCOSE UR QL: NEGATIVE — SIGNIFICANT CHANGE UP
HAPTOGLOB SERPL-MCNC: 188 MG/DL — SIGNIFICANT CHANGE UP (ref 34–200)
HBA1C BLD-MCNC: 5.3 % — SIGNIFICANT CHANGE UP (ref 4–5.6)
HCO3 BLDV-SCNC: 29 MMOL/L — SIGNIFICANT CHANGE UP (ref 21–29)
HCT VFR BLD CALC: 22.7 % — LOW (ref 34.5–45)
HCT VFR BLD CALC: 23.3 % — LOW (ref 34.5–45)
HCT VFR BLD CALC: 24.2 % — LOW (ref 34.5–45)
HCT VFR BLD CALC: 24.8 % — LOW (ref 34.5–45)
HCT VFR BLD CALC: 24.9 % — LOW (ref 34.5–45)
HCT VFR BLD CALC: 25 % — LOW (ref 34.5–45)
HCT VFR BLD CALC: 25 % — LOW (ref 34.5–45)
HCT VFR BLD CALC: 25.2 % — LOW (ref 34.5–45)
HCT VFR BLD CALC: 26 % — LOW (ref 34.5–45)
HCT VFR BLD CALC: 26.9 % — LOW (ref 34.5–45)
HCT VFR BLD CALC: 27.1 % — LOW (ref 34.5–45)
HCT VFR BLD CALC: 27.3 % — LOW (ref 34.5–45)
HCT VFR BLD CALC: 27.4 % — LOW (ref 34.5–45)
HCT VFR BLD CALC: 27.8 % — LOW (ref 34.5–45)
HCT VFR BLD CALC: 28.2 % — LOW (ref 34.5–45)
HCT VFR BLD CALC: 28.6 % — LOW (ref 34.5–45)
HCT VFR BLD CALC: 29.1 % — LOW (ref 34.5–45)
HCT VFR BLD CALC: 30 % — LOW (ref 34.5–45)
HCT VFR BLD CALC: 30 % — LOW (ref 34.5–45)
HCT VFR BLD CALC: 30.4 % — LOW (ref 34.5–45)
HCT VFR BLD CALC: 30.5 % — LOW (ref 34.5–45)
HCT VFR BLD CALC: 30.8 % — LOW (ref 34.5–45)
HCT VFR BLD CALC: 34.3 % — LOW (ref 34.5–45)
HCT VFR BLD CALC: 35 % — SIGNIFICANT CHANGE UP (ref 34.5–45)
HCT VFR BLDA CALC: 30 % — LOW (ref 39–50)
HDLC SERPL-MCNC: 51 MG/DL — SIGNIFICANT CHANGE UP
HDLC SERPL-MCNC: 82 MG/DL — SIGNIFICANT CHANGE UP
HGB BLD CALC-MCNC: 9.9 G/DL — LOW (ref 11.5–15.5)
HGB BLD-MCNC: 10.2 G/DL — LOW (ref 11.5–15.5)
HGB BLD-MCNC: 10.2 G/DL — LOW (ref 11.5–15.5)
HGB BLD-MCNC: 10.4 G/DL — LOW (ref 11.5–15.5)
HGB BLD-MCNC: 7.3 G/DL — LOW (ref 11.5–15.5)
HGB BLD-MCNC: 7.5 G/DL — LOW (ref 11.5–15.5)
HGB BLD-MCNC: 7.7 G/DL — LOW (ref 11.5–15.5)
HGB BLD-MCNC: 7.8 G/DL — LOW (ref 11.5–15.5)
HGB BLD-MCNC: 7.9 G/DL — LOW (ref 11.5–15.5)
HGB BLD-MCNC: 7.9 G/DL — LOW (ref 11.5–15.5)
HGB BLD-MCNC: 8 G/DL — LOW (ref 11.5–15.5)
HGB BLD-MCNC: 8.2 G/DL — LOW (ref 11.5–15.5)
HGB BLD-MCNC: 8.6 G/DL — LOW (ref 11.5–15.5)
HGB BLD-MCNC: 8.9 G/DL — LOW (ref 11.5–15.5)
HGB BLD-MCNC: 9 G/DL — LOW (ref 11.5–15.5)
HGB BLD-MCNC: 9 G/DL — LOW (ref 11.5–15.5)
HGB BLD-MCNC: 9.1 G/DL — LOW (ref 11.5–15.5)
HGB BLD-MCNC: 9.2 G/DL — LOW (ref 11.5–15.5)
HGB BLD-MCNC: 9.3 G/DL — LOW (ref 11.5–15.5)
HGB BLD-MCNC: 9.4 G/DL — LOW (ref 11.5–15.5)
HGB BLD-MCNC: 9.7 G/DL — LOW (ref 11.5–15.5)
IMM GRANULOCYTES NFR BLD AUTO: 0.3 % — SIGNIFICANT CHANGE UP (ref 0–1.5)
IMM GRANULOCYTES NFR BLD AUTO: SIGNIFICANT CHANGE UP % (ref 0–1.5)
INR BLD: 0.99 RATIO — SIGNIFICANT CHANGE UP (ref 0.88–1.16)
INR BLD: 1 RATIO — SIGNIFICANT CHANGE UP (ref 0.88–1.16)
INR BLD: 1.04 RATIO — SIGNIFICANT CHANGE UP (ref 0.88–1.16)
INR BLD: 1.17 RATIO — HIGH (ref 0.88–1.16)
IRON SATN MFR SERPL: 12 % — LOW (ref 14–50)
IRON SATN MFR SERPL: 138 UG/DL — SIGNIFICANT CHANGE UP (ref 40–150)
IRON SATN MFR SERPL: 27 UG/DL — LOW (ref 30–160)
IRON SATN MFR SERPL: 80 % — HIGH (ref 15–50)
KETONES UR-MCNC: NEGATIVE — SIGNIFICANT CHANGE UP
LACTATE BLDV-MCNC: 1.2 MMOL/L — SIGNIFICANT CHANGE UP (ref 0.7–2)
LACTATE SERPL-SCNC: 0.7 MMOL/L — SIGNIFICANT CHANGE UP (ref 0.7–2)
LACTATE SERPL-SCNC: 0.9 MMOL/L — SIGNIFICANT CHANGE UP (ref 0.7–2)
LDH SERPL L TO P-CCNC: 173 U/L — SIGNIFICANT CHANGE UP (ref 50–242)
LEUKOCYTE ESTERASE UR-ACNC: ABNORMAL
LIPID PNL WITH DIRECT LDL SERPL: 49 MG/DL — SIGNIFICANT CHANGE UP
LIPID PNL WITH DIRECT LDL SERPL: 90 MG/DL — SIGNIFICANT CHANGE UP
LYMPHOCYTES # BLD AUTO: 0.12 K/UL — LOW (ref 1–3.3)
LYMPHOCYTES # BLD AUTO: 0.21 K/UL — LOW (ref 1–3.3)
LYMPHOCYTES # BLD AUTO: 0.79 K/UL — LOW (ref 1–3.3)
LYMPHOCYTES # BLD AUTO: 2 K/UL — SIGNIFICANT CHANGE UP (ref 1–3.3)
LYMPHOCYTES # BLD AUTO: 2.15 K/UL — SIGNIFICANT CHANGE UP (ref 1–3.3)
LYMPHOCYTES # BLD AUTO: 21.5 % — SIGNIFICANT CHANGE UP (ref 13–44)
LYMPHOCYTES # BLD AUTO: 23.2 % — SIGNIFICANT CHANGE UP (ref 13–44)
LYMPHOCYTES # BLD AUTO: 3 % — LOW (ref 13–44)
LYMPHOCYTES # BLD AUTO: 4 % — LOW (ref 13–44)
LYMPHOCYTES # BLD AUTO: 7 % — LOW (ref 13–44)
LYMPHOCYTES # BLD AUTO: SIGNIFICANT CHANGE UP % (ref 13–44)
LYMPHOCYTES # BLD AUTO: SIGNIFICANT CHANGE UP K/UL (ref 1–3.3)
MAGNESIUM SERPL-MCNC: 1.9 MG/DL — SIGNIFICANT CHANGE UP (ref 1.6–2.6)
MAGNESIUM SERPL-MCNC: 2 MG/DL — SIGNIFICANT CHANGE UP (ref 1.6–2.6)
MCHC RBC-ENTMCNC: 29 PG — SIGNIFICANT CHANGE UP (ref 27–34)
MCHC RBC-ENTMCNC: 29.1 GM/DL — LOW (ref 32–36)
MCHC RBC-ENTMCNC: 29.3 PG — SIGNIFICANT CHANGE UP (ref 27–34)
MCHC RBC-ENTMCNC: 29.3 PG — SIGNIFICANT CHANGE UP (ref 27–34)
MCHC RBC-ENTMCNC: 29.4 PG — SIGNIFICANT CHANGE UP (ref 27–34)
MCHC RBC-ENTMCNC: 29.5 PG — SIGNIFICANT CHANGE UP (ref 27–34)
MCHC RBC-ENTMCNC: 29.7 PG — SIGNIFICANT CHANGE UP (ref 27–34)
MCHC RBC-ENTMCNC: 29.8 PG — SIGNIFICANT CHANGE UP (ref 27–34)
MCHC RBC-ENTMCNC: 29.8 PG — SIGNIFICANT CHANGE UP (ref 27–34)
MCHC RBC-ENTMCNC: 29.9 PG — SIGNIFICANT CHANGE UP (ref 27–34)
MCHC RBC-ENTMCNC: 29.9 PG — SIGNIFICANT CHANGE UP (ref 27–34)
MCHC RBC-ENTMCNC: 30 PG — SIGNIFICANT CHANGE UP (ref 27–34)
MCHC RBC-ENTMCNC: 30.1 PG — SIGNIFICANT CHANGE UP (ref 27–34)
MCHC RBC-ENTMCNC: 30.3 GM/DL — LOW (ref 32–36)
MCHC RBC-ENTMCNC: 30.3 PG — SIGNIFICANT CHANGE UP (ref 27–34)
MCHC RBC-ENTMCNC: 30.5 GM/DL — LOW (ref 32–36)
MCHC RBC-ENTMCNC: 30.5 PG — SIGNIFICANT CHANGE UP (ref 27–34)
MCHC RBC-ENTMCNC: 30.6 PG — SIGNIFICANT CHANGE UP (ref 27–34)
MCHC RBC-ENTMCNC: 30.6 PG — SIGNIFICANT CHANGE UP (ref 27–34)
MCHC RBC-ENTMCNC: 30.7 GM/DL — LOW (ref 32–36)
MCHC RBC-ENTMCNC: 30.7 PG — SIGNIFICANT CHANGE UP (ref 27–34)
MCHC RBC-ENTMCNC: 30.9 GM/DL — LOW (ref 32–36)
MCHC RBC-ENTMCNC: 30.9 GM/DL — LOW (ref 32–36)
MCHC RBC-ENTMCNC: 31 GM/DL — LOW (ref 32–36)
MCHC RBC-ENTMCNC: 31.1 PG — SIGNIFICANT CHANGE UP (ref 27–34)
MCHC RBC-ENTMCNC: 31.2 PG — SIGNIFICANT CHANGE UP (ref 27–34)
MCHC RBC-ENTMCNC: 31.3 GM/DL — LOW (ref 32–36)
MCHC RBC-ENTMCNC: 31.3 PG — SIGNIFICANT CHANGE UP (ref 27–34)
MCHC RBC-ENTMCNC: 31.4 GM/DL — LOW (ref 32–36)
MCHC RBC-ENTMCNC: 31.5 GM/DL — LOW (ref 32–36)
MCHC RBC-ENTMCNC: 31.6 GM/DL — LOW (ref 32–36)
MCHC RBC-ENTMCNC: 31.7 GM/DL — LOW (ref 32–36)
MCHC RBC-ENTMCNC: 31.8 GM/DL — LOW (ref 32–36)
MCHC RBC-ENTMCNC: 31.9 GM/DL — LOW (ref 32–36)
MCHC RBC-ENTMCNC: 32 GM/DL — SIGNIFICANT CHANGE UP (ref 32–36)
MCHC RBC-ENTMCNC: 32 GM/DL — SIGNIFICANT CHANGE UP (ref 32–36)
MCHC RBC-ENTMCNC: 32.2 GM/DL — SIGNIFICANT CHANGE UP (ref 32–36)
MCHC RBC-ENTMCNC: 32.2 GM/DL — SIGNIFICANT CHANGE UP (ref 32–36)
MCHC RBC-ENTMCNC: 32.7 PG — SIGNIFICANT CHANGE UP (ref 27–34)
MCHC RBC-ENTMCNC: 32.9 GM/DL — SIGNIFICANT CHANGE UP (ref 32–36)
MCHC RBC-ENTMCNC: 33 GM/DL — SIGNIFICANT CHANGE UP (ref 32–36)
MCV RBC AUTO: 102 FL — HIGH (ref 80–100)
MCV RBC AUTO: 90.9 FL — SIGNIFICANT CHANGE UP (ref 80–100)
MCV RBC AUTO: 91 FL — SIGNIFICANT CHANGE UP (ref 80–100)
MCV RBC AUTO: 92 FL — SIGNIFICANT CHANGE UP (ref 80–100)
MCV RBC AUTO: 92.3 FL — SIGNIFICANT CHANGE UP (ref 80–100)
MCV RBC AUTO: 92.6 FL — SIGNIFICANT CHANGE UP (ref 80–100)
MCV RBC AUTO: 93.5 FL — SIGNIFICANT CHANGE UP (ref 80–100)
MCV RBC AUTO: 93.6 FL — SIGNIFICANT CHANGE UP (ref 80–100)
MCV RBC AUTO: 93.8 FL — SIGNIFICANT CHANGE UP (ref 80–100)
MCV RBC AUTO: 94 FL — SIGNIFICANT CHANGE UP (ref 80–100)
MCV RBC AUTO: 94.5 FL — SIGNIFICANT CHANGE UP (ref 80–100)
MCV RBC AUTO: 95.1 FL — SIGNIFICANT CHANGE UP (ref 80–100)
MCV RBC AUTO: 95.9 FL — SIGNIFICANT CHANGE UP (ref 80–100)
MCV RBC AUTO: 96.8 FL — SIGNIFICANT CHANGE UP (ref 80–100)
MCV RBC AUTO: 96.8 FL — SIGNIFICANT CHANGE UP (ref 80–100)
MCV RBC AUTO: 97.5 FL — SIGNIFICANT CHANGE UP (ref 80–100)
MCV RBC AUTO: 97.6 FL — SIGNIFICANT CHANGE UP (ref 80–100)
MCV RBC AUTO: 97.7 FL — SIGNIFICANT CHANGE UP (ref 80–100)
MCV RBC AUTO: 98.3 FL — SIGNIFICANT CHANGE UP (ref 80–100)
MCV RBC AUTO: 98.6 FL — SIGNIFICANT CHANGE UP (ref 80–100)
MCV RBC AUTO: 98.7 FL — SIGNIFICANT CHANGE UP (ref 80–100)
MCV RBC AUTO: 98.7 FL — SIGNIFICANT CHANGE UP (ref 80–100)
MCV RBC AUTO: 98.9 FL — SIGNIFICANT CHANGE UP (ref 80–100)
MCV RBC AUTO: 99.2 FL — SIGNIFICANT CHANGE UP (ref 80–100)
MONOCYTES # BLD AUTO: 0.12 K/UL — SIGNIFICANT CHANGE UP (ref 0–0.9)
MONOCYTES # BLD AUTO: 0.12 K/UL — SIGNIFICANT CHANGE UP (ref 0–0.9)
MONOCYTES # BLD AUTO: 1.46 K/UL — HIGH (ref 0–0.9)
MONOCYTES # BLD AUTO: 1.5 K/UL — HIGH (ref 0–0.9)
MONOCYTES # BLD AUTO: 1.97 K/UL — HIGH (ref 0–0.9)
MONOCYTES # BLD AUTO: SIGNIFICANT CHANGE UP K/UL (ref 0–0.9)
MONOCYTES NFR BLD AUTO: 10 % — SIGNIFICANT CHANGE UP (ref 2–14)
MONOCYTES NFR BLD AUTO: 15.8 % — HIGH (ref 2–14)
MONOCYTES NFR BLD AUTO: 16.6 % — HIGH (ref 2–14)
MONOCYTES NFR BLD AUTO: 3 % — SIGNIFICANT CHANGE UP (ref 2–14)
MONOCYTES NFR BLD AUTO: 4 % — SIGNIFICANT CHANGE UP (ref 2–14)
MONOCYTES NFR BLD AUTO: SIGNIFICANT CHANGE UP % (ref 2–14)
NEUTROPHILS # BLD AUTO: 16.52 K/UL — HIGH (ref 1.8–7.4)
NEUTROPHILS # BLD AUTO: 2.58 K/UL — SIGNIFICANT CHANGE UP (ref 1.8–7.4)
NEUTROPHILS # BLD AUTO: 3.81 K/UL — SIGNIFICANT CHANGE UP (ref 1.8–7.4)
NEUTROPHILS # BLD AUTO: 5.36 K/UL — SIGNIFICANT CHANGE UP (ref 1.8–7.4)
NEUTROPHILS # BLD AUTO: 5.6 K/UL — SIGNIFICANT CHANGE UP (ref 1.8–7.4)
NEUTROPHILS # BLD AUTO: SIGNIFICANT CHANGE UP K/UL (ref 1.8–7.4)
NEUTROPHILS NFR BLD AUTO: 58 % — SIGNIFICANT CHANGE UP (ref 43–77)
NEUTROPHILS NFR BLD AUTO: 60.7 % — SIGNIFICANT CHANGE UP (ref 43–77)
NEUTROPHILS NFR BLD AUTO: 84 % — HIGH (ref 43–77)
NEUTROPHILS NFR BLD AUTO: 88 % — HIGH (ref 43–77)
NEUTROPHILS NFR BLD AUTO: 93 % — HIGH (ref 43–77)
NEUTROPHILS NFR BLD AUTO: SIGNIFICANT CHANGE UP % (ref 43–77)
NITRITE UR-MCNC: NEGATIVE — SIGNIFICANT CHANGE UP
NRBC # BLD: 0 /100 WBCS — SIGNIFICANT CHANGE UP (ref 0–0)
NRBC # BLD: 1 /100 WBCS — HIGH (ref 0–0)
NRBC # BLD: 1 /100 WBCS — HIGH (ref 0–0)
NRBC # BLD: 13 /100 WBCS — HIGH (ref 0–0)
NRBC # BLD: 6 /100 WBCS — HIGH (ref 0–0)
NT-PROBNP SERPL-SCNC: 353 PG/ML — SIGNIFICANT CHANGE UP (ref 0–450)
NT-PROBNP SERPL-SCNC: 715 PG/ML — HIGH (ref 0–300)
PCO2 BLDV: 48 MMHG — SIGNIFICANT CHANGE UP (ref 35–50)
PH BLDV: 7.4 — SIGNIFICANT CHANGE UP (ref 7.35–7.45)
PH UR: 5 — SIGNIFICANT CHANGE UP (ref 5–8)
PHOSPHATE SERPL-MCNC: 3 MG/DL — SIGNIFICANT CHANGE UP (ref 2.5–4.5)
PHOSPHATE SERPL-MCNC: 3.3 MG/DL — SIGNIFICANT CHANGE UP (ref 2.5–4.5)
PLATELET # BLD AUTO: 10 K/UL — CRITICAL LOW (ref 150–400)
PLATELET # BLD AUTO: 133 K/UL — LOW (ref 150–400)
PLATELET # BLD AUTO: 19 K/UL — CRITICAL LOW (ref 150–400)
PLATELET # BLD AUTO: 210 K/UL — SIGNIFICANT CHANGE UP (ref 150–400)
PLATELET # BLD AUTO: 323 K/UL — SIGNIFICANT CHANGE UP (ref 150–400)
PLATELET # BLD AUTO: 333 K/UL — SIGNIFICANT CHANGE UP (ref 150–400)
PLATELET # BLD AUTO: 338 K/UL — SIGNIFICANT CHANGE UP (ref 150–400)
PLATELET # BLD AUTO: 342 K/UL — SIGNIFICANT CHANGE UP (ref 150–400)
PLATELET # BLD AUTO: 347 K/UL — SIGNIFICANT CHANGE UP (ref 150–400)
PLATELET # BLD AUTO: 348 K/UL — SIGNIFICANT CHANGE UP (ref 150–400)
PLATELET # BLD AUTO: 35 K/UL — LOW (ref 150–400)
PLATELET # BLD AUTO: 353 K/UL — SIGNIFICANT CHANGE UP (ref 150–400)
PLATELET # BLD AUTO: 356 K/UL — SIGNIFICANT CHANGE UP (ref 150–400)
PLATELET # BLD AUTO: 358 K/UL — SIGNIFICANT CHANGE UP (ref 150–400)
PLATELET # BLD AUTO: 361 K/UL — SIGNIFICANT CHANGE UP (ref 150–400)
PLATELET # BLD AUTO: 369 K/UL — SIGNIFICANT CHANGE UP (ref 150–400)
PLATELET # BLD AUTO: 435 K/UL — HIGH (ref 150–400)
PLATELET # BLD AUTO: 438 K/UL — HIGH (ref 150–400)
PLATELET # BLD AUTO: 441 K/UL — HIGH (ref 150–400)
PLATELET # BLD AUTO: 51 K/UL — LOW (ref 150–400)
PLATELET # BLD AUTO: 58 K/UL — LOW (ref 150–400)
PLATELET # BLD AUTO: 71 K/UL — LOW (ref 150–400)
PLATELET # BLD AUTO: 76 K/UL — LOW (ref 150–400)
PLATELET # BLD AUTO: 84 K/UL — LOW (ref 150–400)
PO2 BLDV: 23 MMHG — LOW (ref 25–45)
POTASSIUM BLDV-SCNC: 4.3 MMOL/L — SIGNIFICANT CHANGE UP (ref 3.5–5.3)
POTASSIUM SERPL-MCNC: 3.2 MMOL/L — LOW (ref 3.5–5.3)
POTASSIUM SERPL-MCNC: 3.3 MMOL/L — LOW (ref 3.5–5.3)
POTASSIUM SERPL-MCNC: 3.4 MMOL/L — LOW (ref 3.5–5.3)
POTASSIUM SERPL-MCNC: 3.7 MMOL/L — SIGNIFICANT CHANGE UP (ref 3.5–5.3)
POTASSIUM SERPL-MCNC: 3.8 MMOL/L — SIGNIFICANT CHANGE UP (ref 3.5–5.3)
POTASSIUM SERPL-MCNC: 3.8 MMOL/L — SIGNIFICANT CHANGE UP (ref 3.5–5.3)
POTASSIUM SERPL-MCNC: 3.9 MMOL/L — SIGNIFICANT CHANGE UP (ref 3.5–5.3)
POTASSIUM SERPL-MCNC: 3.9 MMOL/L — SIGNIFICANT CHANGE UP (ref 3.5–5.3)
POTASSIUM SERPL-MCNC: 4 MMOL/L — SIGNIFICANT CHANGE UP (ref 3.5–5.3)
POTASSIUM SERPL-MCNC: 4.1 MMOL/L — SIGNIFICANT CHANGE UP (ref 3.5–5.3)
POTASSIUM SERPL-MCNC: 4.2 MMOL/L — SIGNIFICANT CHANGE UP (ref 3.5–5.3)
POTASSIUM SERPL-MCNC: 4.4 MMOL/L — SIGNIFICANT CHANGE UP (ref 3.5–5.3)
POTASSIUM SERPL-MCNC: 4.6 MMOL/L — SIGNIFICANT CHANGE UP (ref 3.5–5.3)
POTASSIUM SERPL-SCNC: 3.2 MMOL/L — LOW (ref 3.5–5.3)
POTASSIUM SERPL-SCNC: 3.3 MMOL/L — LOW (ref 3.5–5.3)
POTASSIUM SERPL-SCNC: 3.4 MMOL/L — LOW (ref 3.5–5.3)
POTASSIUM SERPL-SCNC: 3.7 MMOL/L — SIGNIFICANT CHANGE UP (ref 3.5–5.3)
POTASSIUM SERPL-SCNC: 3.8 MMOL/L — SIGNIFICANT CHANGE UP (ref 3.5–5.3)
POTASSIUM SERPL-SCNC: 3.8 MMOL/L — SIGNIFICANT CHANGE UP (ref 3.5–5.3)
POTASSIUM SERPL-SCNC: 3.9 MMOL/L — SIGNIFICANT CHANGE UP (ref 3.5–5.3)
POTASSIUM SERPL-SCNC: 3.9 MMOL/L — SIGNIFICANT CHANGE UP (ref 3.5–5.3)
POTASSIUM SERPL-SCNC: 4 MMOL/L — SIGNIFICANT CHANGE UP (ref 3.5–5.3)
POTASSIUM SERPL-SCNC: 4.1 MMOL/L — SIGNIFICANT CHANGE UP (ref 3.5–5.3)
POTASSIUM SERPL-SCNC: 4.2 MMOL/L — SIGNIFICANT CHANGE UP (ref 3.5–5.3)
POTASSIUM SERPL-SCNC: 4.4 MMOL/L — SIGNIFICANT CHANGE UP (ref 3.5–5.3)
POTASSIUM SERPL-SCNC: 4.6 MMOL/L — SIGNIFICANT CHANGE UP (ref 3.5–5.3)
PREALB SERPL-MCNC: 6 MG/DL — LOW (ref 20–40)
PROCALCITONIN SERPL-MCNC: 0.54 NG/ML — HIGH (ref 0.02–0.1)
PROT SERPL-MCNC: 6 G/DL — SIGNIFICANT CHANGE UP (ref 6–8.3)
PROT SERPL-MCNC: 6.3 G/DL — SIGNIFICANT CHANGE UP (ref 6–8.3)
PROT SERPL-MCNC: 7.3 G/DL — SIGNIFICANT CHANGE UP (ref 6–8.3)
PROT SERPL-MCNC: 7.3 G/DL — SIGNIFICANT CHANGE UP (ref 6–8.3)
PROT UR-MCNC: 30 MG/DL
PROTHROM AB SERPL-ACNC: 11.1 SEC — SIGNIFICANT CHANGE UP (ref 10–12.9)
PROTHROM AB SERPL-ACNC: 11.2 SEC — SIGNIFICANT CHANGE UP (ref 10–13.1)
PROTHROM AB SERPL-ACNC: 12 SEC — SIGNIFICANT CHANGE UP (ref 10–12.9)
PROTHROM AB SERPL-ACNC: 13.1 SEC — HIGH (ref 10–12.9)
RAPID RVP RESULT: SIGNIFICANT CHANGE UP
RBC # BLD: 2.46 M/UL — LOW (ref 3.8–5.2)
RBC # BLD: 2.56 M/UL — LOW (ref 3.8–5.2)
RBC # BLD: 2.56 M/UL — LOW (ref 3.8–5.2)
RBC # BLD: 2.63 M/UL — LOW (ref 3.8–5.2)
RBC # BLD: 2.65 M/UL — LOW (ref 3.8–5.2)
RBC # BLD: 2.66 M/UL — LOW (ref 3.8–5.2)
RBC # BLD: 2.72 M/UL — LOW (ref 3.8–5.2)
RBC # BLD: 2.72 M/UL — LOW (ref 3.8–5.2)
RBC # BLD: 2.74 M/UL — LOW (ref 3.8–5.2)
RBC # BLD: 2.78 M/UL — LOW (ref 3.8–5.2)
RBC # BLD: 2.8 M/UL — LOW (ref 3.8–5.2)
RBC # BLD: 2.85 M/UL — LOW (ref 3.8–5.2)
RBC # BLD: 2.86 M/UL — LOW (ref 3.8–5.2)
RBC # BLD: 2.87 M/UL — LOW (ref 3.8–5.2)
RBC # BLD: 2.9 M/UL — LOW (ref 3.8–5.2)
RBC # BLD: 2.93 M/UL — LOW (ref 3.8–5.2)
RBC # BLD: 2.94 M/UL — LOW (ref 3.8–5.2)
RBC # BLD: 2.95 M/UL — LOW (ref 3.8–5.2)
RBC # BLD: 3.04 M/UL — LOW (ref 3.8–5.2)
RBC # BLD: 3.04 M/UL — LOW (ref 3.8–5.2)
RBC # BLD: 3.11 M/UL — LOW (ref 3.8–5.2)
RBC # BLD: 3.14 M/UL — LOW (ref 3.8–5.2)
RBC # BLD: 3.25 M/UL — LOW (ref 3.8–5.2)
RBC # BLD: 3.43 M/UL — LOW (ref 3.8–5.2)
RBC # BLD: 3.49 M/UL — LOW (ref 3.8–5.2)
RBC # FLD: 13.2 % — SIGNIFICANT CHANGE UP (ref 10.3–14.5)
RBC # FLD: 14.7 % — HIGH (ref 10.3–14.5)
RBC # FLD: 14.7 % — HIGH (ref 10.3–14.5)
RBC # FLD: 14.9 % — HIGH (ref 10.3–14.5)
RBC # FLD: 15.1 % — HIGH (ref 10.3–14.5)
RBC # FLD: 15.4 % — HIGH (ref 10.3–14.5)
RBC # FLD: 16.5 % — HIGH (ref 10.3–14.5)
RBC # FLD: 16.6 % — HIGH (ref 10.3–14.5)
RBC # FLD: 16.7 % — HIGH (ref 10.3–14.5)
RBC # FLD: 16.8 % — HIGH (ref 10.3–14.5)
RBC # FLD: 17 % — HIGH (ref 10.3–14.5)
RBC # FLD: 17 % — HIGH (ref 10.3–14.5)
RBC # FLD: 17.2 % — HIGH (ref 10.3–14.5)
RBC # FLD: 20.3 % — HIGH (ref 10.3–14.5)
RBC # FLD: 20.6 % — HIGH (ref 10.3–14.5)
RBC # FLD: 21 % — HIGH (ref 10.3–14.5)
RBC # FLD: 21.9 % — HIGH (ref 10.3–14.5)
RBC # FLD: 23.5 % — HIGH (ref 10.3–14.5)
RETICS #: 54.1 K/UL — SIGNIFICANT CHANGE UP (ref 25–125)
RETICS/RBC NFR: 1.9 % — SIGNIFICANT CHANGE UP (ref 0.5–2.5)
SAO2 % BLDV: 32 % — LOW (ref 67–88)
SODIUM SERPL-SCNC: 132 MMOL/L — LOW (ref 135–145)
SODIUM SERPL-SCNC: 133 MMOL/L — LOW (ref 135–145)
SODIUM SERPL-SCNC: 134 MMOL/L — LOW (ref 135–145)
SODIUM SERPL-SCNC: 135 MMOL/L — SIGNIFICANT CHANGE UP (ref 135–145)
SODIUM SERPL-SCNC: 136 MMOL/L — SIGNIFICANT CHANGE UP (ref 135–145)
SODIUM SERPL-SCNC: 137 MMOL/L — SIGNIFICANT CHANGE UP (ref 135–145)
SODIUM SERPL-SCNC: 137 MMOL/L — SIGNIFICANT CHANGE UP (ref 135–145)
SODIUM SERPL-SCNC: 138 MMOL/L — SIGNIFICANT CHANGE UP (ref 135–145)
SODIUM SERPL-SCNC: 138 MMOL/L — SIGNIFICANT CHANGE UP (ref 135–145)
SODIUM SERPL-SCNC: 139 MMOL/L — SIGNIFICANT CHANGE UP (ref 135–145)
SODIUM SERPL-SCNC: 139 MMOL/L — SIGNIFICANT CHANGE UP (ref 135–145)
SODIUM SERPL-SCNC: 144 MMOL/L — SIGNIFICANT CHANGE UP (ref 135–145)
SODIUM SERPL-SCNC: 145 MMOL/L — SIGNIFICANT CHANGE UP (ref 135–145)
SODIUM SERPL-SCNC: 149 MMOL/L — HIGH (ref 135–145)
SP GR SPEC: 1.02 — SIGNIFICANT CHANGE UP (ref 1.01–1.02)
SPECIMEN SOURCE: SIGNIFICANT CHANGE UP
TIBC SERPL-MCNC: 172 UG/DL — LOW (ref 250–450)
TIBC SERPL-MCNC: 220 UG/DL — SIGNIFICANT CHANGE UP (ref 220–430)
TOTAL CHOLESTEROL/HDL RATIO MEASUREMENT: 1.7 RATIO — LOW (ref 3.3–7.1)
TOTAL CHOLESTEROL/HDL RATIO MEASUREMENT: 3 RATIO — LOW (ref 3.3–7.1)
TRIGL SERPL-MCNC: 45 MG/DL — SIGNIFICANT CHANGE UP (ref 10–149)
TRIGL SERPL-MCNC: 60 MG/DL — SIGNIFICANT CHANGE UP (ref 10–149)
TROPONIN I SERPL-MCNC: <0.015 NG/ML — SIGNIFICANT CHANGE UP (ref 0–0.04)
TROPONIN I SERPL-MCNC: <0.015 NG/ML — SIGNIFICANT CHANGE UP (ref 0–0.04)
TSH SERPL-MCNC: 0.85 UU/ML — SIGNIFICANT CHANGE UP (ref 0.34–4.82)
UIBC SERPL-MCNC: 193 UG/DL — SIGNIFICANT CHANGE UP (ref 110–370)
UIBC SERPL-MCNC: 34 UG/DL — LOW (ref 110–370)
UROBILINOGEN FLD QL: NEGATIVE — SIGNIFICANT CHANGE UP
VIT B12 SERPL-MCNC: 481 PG/ML — SIGNIFICANT CHANGE UP (ref 232–1245)
VIT B12 SERPL-MCNC: 740 PG/ML — SIGNIFICANT CHANGE UP (ref 232–1245)
VIT B12 SERPL-MCNC: 783 PG/ML — SIGNIFICANT CHANGE UP (ref 232–1245)
WBC # BLD: 1.9 K/UL — LOW (ref 3.8–10.5)
WBC # BLD: 12.45 K/UL — HIGH (ref 3.8–10.5)
WBC # BLD: 12.56 K/UL — HIGH (ref 3.8–10.5)
WBC # BLD: 12.71 K/UL — HIGH (ref 3.8–10.5)
WBC # BLD: 13.67 K/UL — HIGH (ref 3.8–10.5)
WBC # BLD: 13.67 K/UL — HIGH (ref 3.8–10.5)
WBC # BLD: 13.86 K/UL — HIGH (ref 3.8–10.5)
WBC # BLD: 18.69 K/UL — HIGH (ref 3.8–10.5)
WBC # BLD: 19.47 K/UL — HIGH (ref 3.8–10.5)
WBC # BLD: 19.67 K/UL — HIGH (ref 3.8–10.5)
WBC # BLD: 2.5 K/UL — LOW (ref 3.8–10.5)
WBC # BLD: 2.69 K/UL — LOW (ref 3.8–10.5)
WBC # BLD: 2.93 K/UL — LOW (ref 3.8–10.5)
WBC # BLD: 21.92 K/UL — HIGH (ref 3.8–10.5)
WBC # BLD: 23.2 K/UL — HIGH (ref 3.8–10.5)
WBC # BLD: 4.14 K/UL — SIGNIFICANT CHANGE UP (ref 3.8–10.5)
WBC # BLD: 4.41 K/UL — SIGNIFICANT CHANGE UP (ref 3.8–10.5)
WBC # BLD: 4.82 K/UL — SIGNIFICANT CHANGE UP (ref 3.8–10.5)
WBC # BLD: 7.72 K/UL — SIGNIFICANT CHANGE UP (ref 3.8–10.5)
WBC # BLD: 9.14 K/UL — SIGNIFICANT CHANGE UP (ref 3.8–10.5)
WBC # BLD: 9.2 K/UL — SIGNIFICANT CHANGE UP (ref 3.8–10.5)
WBC # BLD: 9.25 K/UL — SIGNIFICANT CHANGE UP (ref 3.8–10.5)
WBC # BLD: 9.71 K/UL — SIGNIFICANT CHANGE UP (ref 3.8–10.5)
WBC # BLD: 9.78 K/UL — SIGNIFICANT CHANGE UP (ref 3.8–10.5)
WBC # FLD AUTO: 1.9 K/UL — LOW (ref 3.8–10.5)
WBC # FLD AUTO: 12.45 K/UL — HIGH (ref 3.8–10.5)
WBC # FLD AUTO: 12.56 K/UL — HIGH (ref 3.8–10.5)
WBC # FLD AUTO: 12.71 K/UL — HIGH (ref 3.8–10.5)
WBC # FLD AUTO: 13.67 K/UL — HIGH (ref 3.8–10.5)
WBC # FLD AUTO: 13.67 K/UL — HIGH (ref 3.8–10.5)
WBC # FLD AUTO: 13.86 K/UL — HIGH (ref 3.8–10.5)
WBC # FLD AUTO: 18.69 K/UL — HIGH (ref 3.8–10.5)
WBC # FLD AUTO: 19.47 K/UL — HIGH (ref 3.8–10.5)
WBC # FLD AUTO: 19.67 K/UL — HIGH (ref 3.8–10.5)
WBC # FLD AUTO: 2.5 K/UL — LOW (ref 3.8–10.5)
WBC # FLD AUTO: 2.69 K/UL — LOW (ref 3.8–10.5)
WBC # FLD AUTO: 2.93 K/UL — LOW (ref 3.8–10.5)
WBC # FLD AUTO: 21.92 K/UL — HIGH (ref 3.8–10.5)
WBC # FLD AUTO: 23.2 K/UL — HIGH (ref 3.8–10.5)
WBC # FLD AUTO: 4.14 K/UL — SIGNIFICANT CHANGE UP (ref 3.8–10.5)
WBC # FLD AUTO: 4.41 K/UL — SIGNIFICANT CHANGE UP (ref 3.8–10.5)
WBC # FLD AUTO: 4.82 K/UL — SIGNIFICANT CHANGE UP (ref 3.8–10.5)
WBC # FLD AUTO: 7.72 K/UL — SIGNIFICANT CHANGE UP (ref 3.8–10.5)
WBC # FLD AUTO: 9.14 K/UL — SIGNIFICANT CHANGE UP (ref 3.8–10.5)
WBC # FLD AUTO: 9.2 K/UL — SIGNIFICANT CHANGE UP (ref 3.8–10.5)
WBC # FLD AUTO: 9.25 K/UL — SIGNIFICANT CHANGE UP (ref 3.8–10.5)
WBC # FLD AUTO: 9.71 K/UL — SIGNIFICANT CHANGE UP (ref 3.8–10.5)
WBC # FLD AUTO: 9.78 K/UL — SIGNIFICANT CHANGE UP (ref 3.8–10.5)

## 2019-01-01 PROCEDURE — 78999 UNLISTED MISC PX DX NUC MED: CPT

## 2019-01-01 PROCEDURE — 82607 VITAMIN B-12: CPT

## 2019-01-01 PROCEDURE — 78320: CPT | Mod: 26

## 2019-01-01 PROCEDURE — 84295 ASSAY OF SERUM SODIUM: CPT

## 2019-01-01 PROCEDURE — 71045 X-RAY EXAM CHEST 1 VIEW: CPT

## 2019-01-01 PROCEDURE — 88341 IMHCHEM/IMCYTCHM EA ADD ANTB: CPT

## 2019-01-01 PROCEDURE — 87581 M.PNEUMON DNA AMP PROBE: CPT

## 2019-01-01 PROCEDURE — 88360 TUMOR IMMUNOHISTOCHEM/MANUAL: CPT

## 2019-01-01 PROCEDURE — 86300 IMMUNOASSAY TUMOR CA 15-3: CPT

## 2019-01-01 PROCEDURE — 81001 URINALYSIS AUTO W/SCOPE: CPT

## 2019-01-01 PROCEDURE — 85730 THROMBOPLASTIN TIME PARTIAL: CPT

## 2019-01-01 PROCEDURE — 32405: CPT

## 2019-01-01 PROCEDURE — 88172 CYTP DX EVAL FNA 1ST EA SITE: CPT

## 2019-01-01 PROCEDURE — 36415 COLL VENOUS BLD VENIPUNCTURE: CPT

## 2019-01-01 PROCEDURE — 87633 RESP VIRUS 12-25 TARGETS: CPT

## 2019-01-01 PROCEDURE — 93005 ELECTROCARDIOGRAM TRACING: CPT

## 2019-01-01 PROCEDURE — 74177 CT ABD & PELVIS W/CONTRAST: CPT | Mod: 26

## 2019-01-01 PROCEDURE — 86304 IMMUNOASSAY TUMOR CA 125: CPT

## 2019-01-01 PROCEDURE — 99233 SBSQ HOSP IP/OBS HIGH 50: CPT

## 2019-01-01 PROCEDURE — 82803 BLOOD GASES ANY COMBINATION: CPT

## 2019-01-01 PROCEDURE — 85379 FIBRIN DEGRADATION QUANT: CPT

## 2019-01-01 PROCEDURE — 84132 ASSAY OF SERUM POTASSIUM: CPT

## 2019-01-01 PROCEDURE — 83010 ASSAY OF HAPTOGLOBIN QUANT: CPT

## 2019-01-01 PROCEDURE — 82746 ASSAY OF FOLIC ACID SERUM: CPT

## 2019-01-01 PROCEDURE — 82947 ASSAY GLUCOSE BLOOD QUANT: CPT

## 2019-01-01 PROCEDURE — 94640 AIRWAY INHALATION TREATMENT: CPT

## 2019-01-01 PROCEDURE — 99285 EMERGENCY DEPT VISIT HI MDM: CPT

## 2019-01-01 PROCEDURE — 83615 LACTATE (LD) (LDH) ENZYME: CPT

## 2019-01-01 PROCEDURE — 87086 URINE CULTURE/COLONY COUNT: CPT

## 2019-01-01 PROCEDURE — 80053 COMPREHEN METABOLIC PANEL: CPT

## 2019-01-01 PROCEDURE — 82728 ASSAY OF FERRITIN: CPT

## 2019-01-01 PROCEDURE — 83605 ASSAY OF LACTIC ACID: CPT

## 2019-01-01 PROCEDURE — 88305 TISSUE EXAM BY PATHOLOGIST: CPT | Mod: 26

## 2019-01-01 PROCEDURE — 85610 PROTHROMBIN TIME: CPT

## 2019-01-01 PROCEDURE — 88173 CYTOPATH EVAL FNA REPORT: CPT | Mod: 26

## 2019-01-01 PROCEDURE — 80061 LIPID PANEL: CPT

## 2019-01-01 PROCEDURE — 80048 BASIC METABOLIC PNL TOTAL CA: CPT

## 2019-01-01 PROCEDURE — 88173 CYTOPATH EVAL FNA REPORT: CPT

## 2019-01-01 PROCEDURE — 87040 BLOOD CULTURE FOR BACTERIA: CPT

## 2019-01-01 PROCEDURE — 71275 CT ANGIOGRAPHY CHEST: CPT

## 2019-01-01 PROCEDURE — 70450 CT HEAD/BRAIN W/O DYE: CPT

## 2019-01-01 PROCEDURE — 82962 GLUCOSE BLOOD TEST: CPT

## 2019-01-01 PROCEDURE — 74177 CT ABD & PELVIS W/CONTRAST: CPT

## 2019-01-01 PROCEDURE — 99233 SBSQ HOSP IP/OBS HIGH 50: CPT | Mod: GV

## 2019-01-01 PROCEDURE — 85027 COMPLETE CBC AUTOMATED: CPT

## 2019-01-01 PROCEDURE — A9561: CPT

## 2019-01-01 PROCEDURE — 71045 X-RAY EXAM CHEST 1 VIEW: CPT | Mod: 26

## 2019-01-01 PROCEDURE — 77012 CT SCAN FOR NEEDLE BIOPSY: CPT

## 2019-01-01 PROCEDURE — 82435 ASSAY OF BLOOD CHLORIDE: CPT

## 2019-01-01 PROCEDURE — 99239 HOSP IP/OBS DSCHRG MGMT >30: CPT

## 2019-01-01 PROCEDURE — 70450 CT HEAD/BRAIN W/O DYE: CPT | Mod: 26

## 2019-01-01 PROCEDURE — 93010 ELECTROCARDIOGRAM REPORT: CPT

## 2019-01-01 PROCEDURE — 82330 ASSAY OF CALCIUM: CPT

## 2019-01-01 PROCEDURE — 82378 CARCINOEMBRYONIC ANTIGEN: CPT

## 2019-01-01 PROCEDURE — 83735 ASSAY OF MAGNESIUM: CPT

## 2019-01-01 PROCEDURE — 88360 TUMOR IMMUNOHISTOCHEM/MANUAL: CPT | Mod: 26

## 2019-01-01 PROCEDURE — 71275 CT ANGIOGRAPHY CHEST: CPT | Mod: 26

## 2019-01-01 PROCEDURE — 84100 ASSAY OF PHOSPHORUS: CPT

## 2019-01-01 PROCEDURE — 71250 CT THORAX DX C-: CPT

## 2019-01-01 PROCEDURE — 88342 IMHCHEM/IMCYTCHM 1ST ANTB: CPT | Mod: 26,59

## 2019-01-01 PROCEDURE — 84134 ASSAY OF PREALBUMIN: CPT

## 2019-01-01 PROCEDURE — 83540 ASSAY OF IRON: CPT

## 2019-01-01 PROCEDURE — 78803 RP LOCLZJ TUM SPECT 1 AREA: CPT

## 2019-01-01 PROCEDURE — 83550 IRON BINDING TEST: CPT

## 2019-01-01 PROCEDURE — 93308 TTE F-UP OR LMTD: CPT

## 2019-01-01 PROCEDURE — 87798 DETECT AGENT NOS DNA AMP: CPT

## 2019-01-01 PROCEDURE — 78306 BONE IMAGING WHOLE BODY: CPT | Mod: 26

## 2019-01-01 PROCEDURE — 99285 EMERGENCY DEPT VISIT HI MDM: CPT | Mod: 25

## 2019-01-01 PROCEDURE — 83880 ASSAY OF NATRIURETIC PEPTIDE: CPT

## 2019-01-01 PROCEDURE — 87486 CHLMYD PNEUM DNA AMP PROBE: CPT

## 2019-01-01 PROCEDURE — 77012 CT SCAN FOR NEEDLE BIOPSY: CPT | Mod: 26

## 2019-01-01 PROCEDURE — 88341 IMHCHEM/IMCYTCHM EA ADD ANTB: CPT | Mod: 26,59

## 2019-01-01 PROCEDURE — 71250 CT THORAX DX C-: CPT | Mod: 26

## 2019-01-01 PROCEDURE — 99223 1ST HOSP IP/OBS HIGH 75: CPT | Mod: GV

## 2019-01-01 PROCEDURE — 99223 1ST HOSP IP/OBS HIGH 75: CPT

## 2019-01-01 PROCEDURE — 85045 AUTOMATED RETICULOCYTE COUNT: CPT

## 2019-01-01 PROCEDURE — 78999 UNLISTED MISC PX DX NUC MED: CPT | Mod: 26

## 2019-01-01 PROCEDURE — 86301 IMMUNOASSAY TUMOR CA 19-9: CPT

## 2019-01-01 PROCEDURE — 99284 EMERGENCY DEPT VISIT MOD MDM: CPT | Mod: 25

## 2019-01-01 PROCEDURE — 78306 BONE IMAGING WHOLE BODY: CPT

## 2019-01-01 PROCEDURE — 88342 IMHCHEM/IMCYTCHM 1ST ANTB: CPT

## 2019-01-01 PROCEDURE — 93308 TTE F-UP OR LMTD: CPT | Mod: 26

## 2019-01-01 PROCEDURE — 97161 PT EVAL LOW COMPLEX 20 MIN: CPT

## 2019-01-01 PROCEDURE — 88305 TISSUE EXAM BY PATHOLOGIST: CPT

## 2019-01-01 PROCEDURE — 85014 HEMATOCRIT: CPT

## 2019-01-01 RX ORDER — HYDRALAZINE HCL 50 MG
1 TABLET ORAL
Qty: 90 | Refills: 0
Start: 2019-01-01 | End: 2019-12-07

## 2019-01-01 RX ORDER — CEFTRIAXONE 500 MG/1
INJECTION, POWDER, FOR SOLUTION INTRAMUSCULAR; INTRAVENOUS
Refills: 0 | Status: DISCONTINUED | OUTPATIENT
Start: 2019-01-01 | End: 2019-01-01

## 2019-01-01 RX ORDER — HYDROCORTISONE 1 %
1 OINTMENT (GRAM) TOPICAL
Qty: 3 | Refills: 0
Start: 2019-01-01 | End: 2019-12-07

## 2019-01-01 RX ORDER — MORPHINE SULFATE 50 MG/1
1 CAPSULE, EXTENDED RELEASE ORAL EVERY 4 HOURS
Refills: 0 | Status: DISCONTINUED | OUTPATIENT
Start: 2019-01-01 | End: 2019-01-01

## 2019-01-01 RX ORDER — CEFTRIAXONE 500 MG/1
1000 INJECTION, POWDER, FOR SOLUTION INTRAMUSCULAR; INTRAVENOUS ONCE
Refills: 0 | Status: COMPLETED | OUTPATIENT
Start: 2019-01-01 | End: 2019-01-01

## 2019-01-01 RX ORDER — ENOXAPARIN SODIUM 100 MG/ML
40 INJECTION SUBCUTANEOUS DAILY
Refills: 0 | Status: DISCONTINUED | OUTPATIENT
Start: 2019-01-01 | End: 2019-01-01

## 2019-01-01 RX ORDER — SIMVASTATIN 20 MG/1
1 TABLET, FILM COATED ORAL
Qty: 30 | Refills: 0
Start: 2019-01-01 | End: 2019-12-07

## 2019-01-01 RX ORDER — CHOLECALCIFEROL (VITAMIN D3) 125 MCG
1000 CAPSULE ORAL DAILY
Refills: 0 | Status: DISCONTINUED | OUTPATIENT
Start: 2019-01-01 | End: 2019-01-01

## 2019-01-01 RX ORDER — METOPROLOL TARTRATE 50 MG
50 TABLET ORAL
Refills: 0 | Status: DISCONTINUED | OUTPATIENT
Start: 2019-01-01 | End: 2019-01-01

## 2019-01-01 RX ORDER — ACETAMINOPHEN 500 MG
2 TABLET ORAL
Qty: 240 | Refills: 0
Start: 2019-01-01 | End: 2019-12-07

## 2019-01-01 RX ORDER — IPRATROPIUM/ALBUTEROL SULFATE 18-103MCG
3 AEROSOL WITH ADAPTER (GRAM) INHALATION
Qty: 30 | Refills: 0
Start: 2019-01-01 | End: 2019-12-07

## 2019-01-01 RX ORDER — SERTRALINE 25 MG/1
25 TABLET, FILM COATED ORAL DAILY
Refills: 0 | Status: DISCONTINUED | OUTPATIENT
Start: 2019-01-01 | End: 2019-01-01

## 2019-01-01 RX ORDER — ROBINUL 0.2 MG/ML
0.4 INJECTION INTRAMUSCULAR; INTRAVENOUS EVERY 4 HOURS
Refills: 0 | Status: DISCONTINUED | OUTPATIENT
Start: 2019-01-01 | End: 2019-01-01

## 2019-01-01 RX ORDER — CEFTRIAXONE 500 MG/1
1000 INJECTION, POWDER, FOR SOLUTION INTRAMUSCULAR; INTRAVENOUS EVERY 24 HOURS
Refills: 0 | Status: DISCONTINUED | OUTPATIENT
Start: 2019-01-01 | End: 2019-01-01

## 2019-01-01 RX ORDER — SIMVASTATIN 20 MG/1
1 TABLET, FILM COATED ORAL
Qty: 0 | Refills: 0 | DISCHARGE

## 2019-01-01 RX ORDER — SERTRALINE 25 MG/1
1 TABLET, FILM COATED ORAL
Qty: 0 | Refills: 0 | DISCHARGE

## 2019-01-01 RX ORDER — CHOLECALCIFEROL (VITAMIN D3) 125 MCG
1 CAPSULE ORAL
Qty: 30 | Refills: 0

## 2019-01-01 RX ORDER — GUAIFENESIN/DEXTROMETHORPHAN 600MG-30MG
10 TABLET, EXTENDED RELEASE 12 HR ORAL
Qty: 300 | Refills: 0
Start: 2019-01-01 | End: 2019-12-07

## 2019-01-01 RX ORDER — DONEPEZIL HYDROCHLORIDE 10 MG/1
1 TABLET, FILM COATED ORAL
Qty: 0 | Refills: 0 | DISCHARGE
Start: 2019-01-01

## 2019-01-01 RX ORDER — AMLODIPINE BESYLATE 2.5 MG/1
1 TABLET ORAL
Qty: 0 | Refills: 0 | DISCHARGE

## 2019-01-01 RX ORDER — DONEPEZIL HYDROCHLORIDE 10 MG/1
1 TABLET, FILM COATED ORAL
Qty: 30 | Refills: 0
Start: 2019-01-01 | End: 2019-12-07

## 2019-01-01 RX ORDER — ACETAMINOPHEN 500 MG
650 TABLET ORAL EVERY 6 HOURS
Refills: 0 | Status: DISCONTINUED | OUTPATIENT
Start: 2019-01-01 | End: 2019-01-01

## 2019-01-01 RX ORDER — DONEPEZIL HYDROCHLORIDE 10 MG/1
1 TABLET, FILM COATED ORAL
Qty: 0 | Refills: 0 | DISCHARGE

## 2019-01-01 RX ORDER — ASPIRIN/CALCIUM CARB/MAGNESIUM 324 MG
81 TABLET ORAL DAILY
Refills: 0 | Status: DISCONTINUED | OUTPATIENT
Start: 2019-01-01 | End: 2019-01-01

## 2019-01-01 RX ORDER — AMLODIPINE BESYLATE 2.5 MG/1
5 TABLET ORAL DAILY
Refills: 0 | Status: DISCONTINUED | OUTPATIENT
Start: 2019-01-01 | End: 2019-01-01

## 2019-01-01 RX ORDER — TIOTROPIUM BROMIDE 18 UG/1
1 CAPSULE ORAL; RESPIRATORY (INHALATION) DAILY
Refills: 0 | Status: DISCONTINUED | OUTPATIENT
Start: 2019-01-01 | End: 2019-01-01

## 2019-01-01 RX ORDER — HYDROCORTISONE 1 %
1 OINTMENT (GRAM) TOPICAL
Refills: 0 | Status: DISCONTINUED | OUTPATIENT
Start: 2019-01-01 | End: 2019-01-01

## 2019-01-01 RX ORDER — SENNA PLUS 8.6 MG/1
2 TABLET ORAL AT BEDTIME
Refills: 0 | Status: DISCONTINUED | OUTPATIENT
Start: 2019-01-01 | End: 2019-01-01

## 2019-01-01 RX ORDER — SODIUM CHLORIDE 9 MG/ML
1000 INJECTION INTRAMUSCULAR; INTRAVENOUS; SUBCUTANEOUS
Refills: 0 | Status: DISCONTINUED | OUTPATIENT
Start: 2019-01-01 | End: 2019-01-01

## 2019-01-01 RX ORDER — HYDRALAZINE HCL 50 MG
1 TABLET ORAL
Qty: 0 | Refills: 0 | DISCHARGE
Start: 2019-01-01

## 2019-01-01 RX ORDER — HYDRALAZINE HCL 50 MG
1 TABLET ORAL
Qty: 90 | Refills: 0

## 2019-01-01 RX ORDER — ONDANSETRON 8 MG/1
4 TABLET, FILM COATED ORAL ONCE
Refills: 0 | Status: COMPLETED | OUTPATIENT
Start: 2019-01-01 | End: 2019-01-01

## 2019-01-01 RX ORDER — FLUTICASONE PROPIONATE 220 MCG
2 AEROSOL WITH ADAPTER (GRAM) INHALATION
Qty: 1 | Refills: 0
Start: 2019-01-01 | End: 2019-12-07

## 2019-01-01 RX ORDER — MORPHINE SULFATE 50 MG/1
0.5 CAPSULE, EXTENDED RELEASE ORAL EVERY 4 HOURS
Refills: 0 | Status: DISCONTINUED | OUTPATIENT
Start: 2019-01-01 | End: 2019-01-01

## 2019-01-01 RX ORDER — SIMVASTATIN 20 MG/1
10 TABLET, FILM COATED ORAL AT BEDTIME
Refills: 0 | Status: DISCONTINUED | OUTPATIENT
Start: 2019-01-01 | End: 2019-01-01

## 2019-01-01 RX ORDER — CEFTRIAXONE 500 MG/1
1000 INJECTION, POWDER, FOR SOLUTION INTRAMUSCULAR; INTRAVENOUS ONCE
Refills: 0 | Status: DISCONTINUED | OUTPATIENT
Start: 2019-01-01 | End: 2019-01-01

## 2019-01-01 RX ORDER — DONEPEZIL HYDROCHLORIDE 10 MG/1
5 TABLET, FILM COATED ORAL AT BEDTIME
Refills: 0 | Status: DISCONTINUED | OUTPATIENT
Start: 2019-01-01 | End: 2019-01-01

## 2019-01-01 RX ORDER — IPRATROPIUM/ALBUTEROL SULFATE 18-103MCG
3 AEROSOL WITH ADAPTER (GRAM) INHALATION EVERY 6 HOURS
Refills: 0 | Status: DISCONTINUED | OUTPATIENT
Start: 2019-01-01 | End: 2019-01-01

## 2019-01-01 RX ORDER — GUAIFENESIN/DEXTROMETHORPHAN 600MG-30MG
10 TABLET, EXTENDED RELEASE 12 HR ORAL
Refills: 0 | Status: DISCONTINUED | OUTPATIENT
Start: 2019-01-01 | End: 2019-01-01

## 2019-01-01 RX ORDER — SODIUM CHLORIDE 9 MG/ML
1000 INJECTION INTRAMUSCULAR; INTRAVENOUS; SUBCUTANEOUS ONCE
Refills: 0 | Status: COMPLETED | OUTPATIENT
Start: 2019-01-01 | End: 2019-01-01

## 2019-01-01 RX ORDER — BUDESONIDE AND FORMOTEROL FUMARATE DIHYDRATE 160; 4.5 UG/1; UG/1
2 AEROSOL RESPIRATORY (INHALATION)
Qty: 2 | Refills: 0
Start: 2019-01-01 | End: 2019-12-07

## 2019-01-01 RX ORDER — AMLODIPINE BESYLATE 2.5 MG/1
1 TABLET ORAL
Qty: 0 | Refills: 0 | DISCHARGE
Start: 2019-01-01

## 2019-01-01 RX ORDER — HYDRALAZINE HCL 50 MG
50 TABLET ORAL EVERY 8 HOURS
Refills: 0 | Status: DISCONTINUED | OUTPATIENT
Start: 2019-01-01 | End: 2019-01-01

## 2019-01-01 RX ORDER — LIDOCAINE 4 G/100G
1 CREAM TOPICAL DAILY
Refills: 0 | Status: DISCONTINUED | OUTPATIENT
Start: 2019-01-01 | End: 2019-01-01

## 2019-01-01 RX ORDER — SENNA PLUS 8.6 MG/1
2 TABLET ORAL
Qty: 0 | Refills: 0 | DISCHARGE

## 2019-01-01 RX ORDER — BUDESONIDE AND FORMOTEROL FUMARATE DIHYDRATE 160; 4.5 UG/1; UG/1
2 AEROSOL RESPIRATORY (INHALATION)
Refills: 0 | Status: DISCONTINUED | OUTPATIENT
Start: 2019-01-01 | End: 2019-01-01

## 2019-01-01 RX ORDER — PANTOPRAZOLE SODIUM 20 MG/1
1 TABLET, DELAYED RELEASE ORAL
Qty: 0 | Refills: 0 | DISCHARGE
Start: 2019-01-01

## 2019-01-01 RX ORDER — SODIUM CHLORIDE 9 MG/ML
1 INJECTION INTRAMUSCULAR; INTRAVENOUS; SUBCUTANEOUS
Qty: 0 | Refills: 0 | DISCHARGE

## 2019-01-01 RX ORDER — PANTOPRAZOLE SODIUM 20 MG/1
1 TABLET, DELAYED RELEASE ORAL
Qty: 30 | Refills: 0
Start: 2019-01-01 | End: 2019-12-07

## 2019-01-01 RX ORDER — ALBUTEROL 90 UG/1
2 AEROSOL, METERED ORAL EVERY 6 HOURS
Refills: 0 | Status: DISCONTINUED | OUTPATIENT
Start: 2019-01-01 | End: 2019-01-01

## 2019-01-01 RX ORDER — BUDESONIDE AND FORMOTEROL FUMARATE DIHYDRATE 160; 4.5 UG/1; UG/1
1 AEROSOL RESPIRATORY (INHALATION)
Qty: 1 | Refills: 0
Start: 2019-01-01 | End: 2019-01-01

## 2019-01-01 RX ORDER — BUDESONIDE AND FORMOTEROL FUMARATE DIHYDRATE 160; 4.5 UG/1; UG/1
2 AEROSOL RESPIRATORY (INHALATION)
Qty: 0 | Refills: 0 | DISCHARGE

## 2019-01-01 RX ORDER — FUROSEMIDE 40 MG
20 TABLET ORAL ONCE
Refills: 0 | Status: COMPLETED | OUTPATIENT
Start: 2019-01-01 | End: 2019-01-01

## 2019-01-01 RX ORDER — HYDRALAZINE HCL 50 MG
1 TABLET ORAL
Qty: 0 | Refills: 0 | DISCHARGE

## 2019-01-01 RX ORDER — MORPHINE SULFATE 50 MG/1
2.5 CAPSULE, EXTENDED RELEASE ORAL
Refills: 0 | Status: DISCONTINUED | OUTPATIENT
Start: 2019-01-01 | End: 2019-01-01

## 2019-01-01 RX ORDER — SIMVASTATIN 20 MG/1
1 TABLET, FILM COATED ORAL
Qty: 0 | Refills: 0 | DISCHARGE
Start: 2019-01-01

## 2019-01-01 RX ORDER — ALBUTEROL 90 UG/1
2 AEROSOL, METERED ORAL
Qty: 0 | Refills: 0 | DISCHARGE

## 2019-01-01 RX ORDER — GUAIFENESIN/DEXTROMETHORPHAN 600MG-30MG
10 TABLET, EXTENDED RELEASE 12 HR ORAL
Qty: 0 | Refills: 0 | DISCHARGE
Start: 2019-01-01

## 2019-01-01 RX ORDER — TRAMADOL HYDROCHLORIDE 50 MG/1
25 TABLET ORAL EVERY 8 HOURS
Refills: 0 | Status: DISCONTINUED | OUTPATIENT
Start: 2019-01-01 | End: 2019-01-01

## 2019-01-01 RX ORDER — ALBUTEROL 90 UG/1
2 AEROSOL, METERED ORAL
Qty: 1 | Refills: 0
Start: 2019-01-01 | End: 2019-01-01

## 2019-01-01 RX ORDER — HYDROCORTISONE 1 %
1 OINTMENT (GRAM) TOPICAL
Qty: 0 | Refills: 0 | DISCHARGE
Start: 2019-01-01

## 2019-01-01 RX ORDER — MORPHINE SULFATE 50 MG/1
1 CAPSULE, EXTENDED RELEASE ORAL
Refills: 0 | Status: DISCONTINUED | OUTPATIENT
Start: 2019-01-01 | End: 2019-01-01

## 2019-01-01 RX ORDER — MINERAL OIL
133 OIL (ML) MISCELLANEOUS ONCE
Refills: 0 | Status: COMPLETED | OUTPATIENT
Start: 2019-01-01 | End: 2019-01-01

## 2019-01-01 RX ORDER — FILGRASTIM 480MCG/1.6
300 VIAL (ML) INJECTION DAILY
Refills: 0 | Status: COMPLETED | OUTPATIENT
Start: 2019-01-01 | End: 2019-01-01

## 2019-01-01 RX ORDER — GUAIFENESIN/DEXTROMETHORPHAN 600MG-30MG
10 TABLET, EXTENDED RELEASE 12 HR ORAL EVERY 6 HOURS
Refills: 0 | Status: DISCONTINUED | OUTPATIENT
Start: 2019-01-01 | End: 2019-01-01

## 2019-01-01 RX ORDER — AMLODIPINE BESYLATE 2.5 MG/1
1 TABLET ORAL
Qty: 30 | Refills: 0
Start: 2019-01-01 | End: 2019-12-07

## 2019-01-01 RX ORDER — EZETIMIBE 10 MG/1
1 TABLET ORAL
Qty: 0 | Refills: 0 | DISCHARGE

## 2019-01-01 RX ORDER — ASPIRIN/CALCIUM CARB/MAGNESIUM 324 MG
1 TABLET ORAL
Qty: 0 | Refills: 0 | DISCHARGE

## 2019-01-01 RX ORDER — BUDESONIDE, MICRONIZED 100 %
0.5 POWDER (GRAM) MISCELLANEOUS EVERY 12 HOURS
Refills: 0 | Status: DISCONTINUED | OUTPATIENT
Start: 2019-01-01 | End: 2019-01-01

## 2019-01-01 RX ORDER — ACETAMINOPHEN 500 MG
650 TABLET ORAL ONCE
Refills: 0 | Status: COMPLETED | OUTPATIENT
Start: 2019-01-01 | End: 2019-01-01

## 2019-01-01 RX ORDER — BUDESONIDE AND FORMOTEROL FUMARATE DIHYDRATE 160; 4.5 UG/1; UG/1
2 AEROSOL RESPIRATORY (INHALATION)
Qty: 1 | Refills: 0
Start: 2019-01-01 | End: 2019-01-01

## 2019-01-01 RX ORDER — PIPERACILLIN AND TAZOBACTAM 4; .5 G/20ML; G/20ML
3.38 INJECTION, POWDER, LYOPHILIZED, FOR SOLUTION INTRAVENOUS EVERY 8 HOURS
Refills: 0 | Status: DISCONTINUED | OUTPATIENT
Start: 2019-01-01 | End: 2019-01-01

## 2019-01-01 RX ORDER — PANTOPRAZOLE SODIUM 20 MG/1
40 TABLET, DELAYED RELEASE ORAL
Refills: 0 | Status: DISCONTINUED | OUTPATIENT
Start: 2019-01-01 | End: 2019-01-01

## 2019-01-01 RX ORDER — ACETAMINOPHEN 500 MG
2 TABLET ORAL
Qty: 0 | Refills: 0 | DISCHARGE
Start: 2019-01-01

## 2019-01-01 RX ORDER — ALBUTEROL 90 UG/1
2 AEROSOL, METERED ORAL
Qty: 0 | Refills: 0 | DISCHARGE
Start: 2019-01-01

## 2019-01-01 RX ORDER — METOPROLOL TARTRATE 50 MG
1 TABLET ORAL
Qty: 0 | Refills: 0 | DISCHARGE
Start: 2019-01-01

## 2019-01-01 RX ORDER — PIPERACILLIN AND TAZOBACTAM 4; .5 G/20ML; G/20ML
3.38 INJECTION, POWDER, LYOPHILIZED, FOR SOLUTION INTRAVENOUS ONCE
Refills: 0 | Status: DISCONTINUED | OUTPATIENT
Start: 2019-01-01 | End: 2019-01-01

## 2019-01-01 RX ORDER — AMLODIPINE BESYLATE 2.5 MG/1
1 TABLET ORAL
Qty: 30 | Refills: 0
Start: 2019-01-01 | End: 2019-01-01

## 2019-01-01 RX ORDER — POTASSIUM CHLORIDE 20 MEQ
40 PACKET (EA) ORAL ONCE
Refills: 0 | Status: COMPLETED | OUTPATIENT
Start: 2019-01-01 | End: 2019-01-01

## 2019-01-01 RX ORDER — METOPROLOL TARTRATE 50 MG
1 TABLET ORAL
Qty: 60 | Refills: 0

## 2019-01-01 RX ORDER — IPRATROPIUM/ALBUTEROL SULFATE 18-103MCG
3 AEROSOL WITH ADAPTER (GRAM) INHALATION
Qty: 0 | Refills: 0 | DISCHARGE
Start: 2019-01-01

## 2019-01-01 RX ADMIN — ENOXAPARIN SODIUM 40 MILLIGRAM(S): 100 INJECTION SUBCUTANEOUS at 12:41

## 2019-01-01 RX ADMIN — MORPHINE SULFATE 1 MILLIGRAM(S): 50 CAPSULE, EXTENDED RELEASE ORAL at 12:51

## 2019-01-01 RX ADMIN — Medication 200 MILLIGRAM(S): at 05:22

## 2019-01-01 RX ADMIN — SERTRALINE 25 MILLIGRAM(S): 25 TABLET, FILM COATED ORAL at 11:12

## 2019-01-01 RX ADMIN — MORPHINE SULFATE 1 MILLIGRAM(S): 50 CAPSULE, EXTENDED RELEASE ORAL at 06:27

## 2019-01-01 RX ADMIN — BUDESONIDE AND FORMOTEROL FUMARATE DIHYDRATE 2 PUFF(S): 160; 4.5 AEROSOL RESPIRATORY (INHALATION) at 21:23

## 2019-01-01 RX ADMIN — SERTRALINE 25 MILLIGRAM(S): 25 TABLET, FILM COATED ORAL at 13:20

## 2019-01-01 RX ADMIN — BUDESONIDE AND FORMOTEROL FUMARATE DIHYDRATE 2 PUFF(S): 160; 4.5 AEROSOL RESPIRATORY (INHALATION) at 12:32

## 2019-01-01 RX ADMIN — MORPHINE SULFATE 1 MILLIGRAM(S): 50 CAPSULE, EXTENDED RELEASE ORAL at 05:48

## 2019-01-01 RX ADMIN — Medication 50 MILLIGRAM(S): at 07:01

## 2019-01-01 RX ADMIN — Medication 10 MILLILITER(S): at 05:36

## 2019-01-01 RX ADMIN — Medication 3 MILLILITER(S): at 03:34

## 2019-01-01 RX ADMIN — MORPHINE SULFATE 1 MILLIGRAM(S): 50 CAPSULE, EXTENDED RELEASE ORAL at 23:42

## 2019-01-01 RX ADMIN — Medication 3 MILLILITER(S): at 18:36

## 2019-01-01 RX ADMIN — Medication 10 MILLILITER(S): at 18:34

## 2019-01-01 RX ADMIN — MORPHINE SULFATE 1 MILLIGRAM(S): 50 CAPSULE, EXTENDED RELEASE ORAL at 06:42

## 2019-01-01 RX ADMIN — Medication 50 MILLIGRAM(S): at 16:49

## 2019-01-01 RX ADMIN — BUDESONIDE AND FORMOTEROL FUMARATE DIHYDRATE 2 PUFF(S): 160; 4.5 AEROSOL RESPIRATORY (INHALATION) at 22:18

## 2019-01-01 RX ADMIN — Medication 0.5 MILLIGRAM(S): at 06:01

## 2019-01-01 RX ADMIN — DONEPEZIL HYDROCHLORIDE 5 MILLIGRAM(S): 10 TABLET, FILM COATED ORAL at 21:46

## 2019-01-01 RX ADMIN — Medication 10 MILLILITER(S): at 06:04

## 2019-01-01 RX ADMIN — Medication 3 MILLILITER(S): at 20:24

## 2019-01-01 RX ADMIN — Medication 50 MILLIGRAM(S): at 13:27

## 2019-01-01 RX ADMIN — Medication 10 MILLILITER(S): at 23:09

## 2019-01-01 RX ADMIN — DONEPEZIL HYDROCHLORIDE 5 MILLIGRAM(S): 10 TABLET, FILM COATED ORAL at 21:41

## 2019-01-01 RX ADMIN — LIDOCAINE 1 PATCH: 4 CREAM TOPICAL at 11:24

## 2019-01-01 RX ADMIN — Medication 200 MILLIGRAM(S): at 21:28

## 2019-01-01 RX ADMIN — Medication 200 MILLIGRAM(S): at 22:05

## 2019-01-01 RX ADMIN — Medication 10 MILLILITER(S): at 06:16

## 2019-01-01 RX ADMIN — LIDOCAINE 1 PATCH: 4 CREAM TOPICAL at 00:10

## 2019-01-01 RX ADMIN — Medication 50 MILLIGRAM(S): at 21:27

## 2019-01-01 RX ADMIN — Medication 50 MILLIGRAM(S): at 14:26

## 2019-01-01 RX ADMIN — AMLODIPINE BESYLATE 5 MILLIGRAM(S): 2.5 TABLET ORAL at 05:55

## 2019-01-01 RX ADMIN — Medication 3 MILLILITER(S): at 17:11

## 2019-01-01 RX ADMIN — Medication 20 MILLIGRAM(S): at 17:56

## 2019-01-01 RX ADMIN — Medication 50 MILLIGRAM(S): at 13:51

## 2019-01-01 RX ADMIN — Medication 1 TABLET(S): at 11:07

## 2019-01-01 RX ADMIN — Medication 200 MILLIGRAM(S): at 05:43

## 2019-01-01 RX ADMIN — SODIUM CHLORIDE 60 MILLILITER(S): 9 INJECTION INTRAMUSCULAR; INTRAVENOUS; SUBCUTANEOUS at 21:17

## 2019-01-01 RX ADMIN — Medication 50 MILLIGRAM(S): at 05:57

## 2019-01-01 RX ADMIN — SENNA PLUS 2 TABLET(S): 8.6 TABLET ORAL at 21:27

## 2019-01-01 RX ADMIN — BUDESONIDE AND FORMOTEROL FUMARATE DIHYDRATE 2 PUFF(S): 160; 4.5 AEROSOL RESPIRATORY (INHALATION) at 12:00

## 2019-01-01 RX ADMIN — Medication 50 MILLIGRAM(S): at 14:48

## 2019-01-01 RX ADMIN — SENNA PLUS 2 TABLET(S): 8.6 TABLET ORAL at 22:41

## 2019-01-01 RX ADMIN — Medication 10 MILLILITER(S): at 17:37

## 2019-01-01 RX ADMIN — SERTRALINE 25 MILLIGRAM(S): 25 TABLET, FILM COATED ORAL at 12:41

## 2019-01-01 RX ADMIN — DONEPEZIL HYDROCHLORIDE 5 MILLIGRAM(S): 10 TABLET, FILM COATED ORAL at 22:19

## 2019-01-01 RX ADMIN — Medication 10 MILLILITER(S): at 17:51

## 2019-01-01 RX ADMIN — Medication 10 MILLILITER(S): at 17:11

## 2019-01-01 RX ADMIN — ENOXAPARIN SODIUM 40 MILLIGRAM(S): 100 INJECTION SUBCUTANEOUS at 11:07

## 2019-01-01 RX ADMIN — Medication 50 MILLIGRAM(S): at 21:46

## 2019-01-01 RX ADMIN — Medication 1 APPLICATION(S): at 17:25

## 2019-01-01 RX ADMIN — Medication 1 APPLICATION(S): at 06:02

## 2019-01-01 RX ADMIN — LIDOCAINE 1 PATCH: 4 CREAM TOPICAL at 00:11

## 2019-01-01 RX ADMIN — SENNA PLUS 2 TABLET(S): 8.6 TABLET ORAL at 21:47

## 2019-01-01 RX ADMIN — Medication 40 MILLIEQUIVALENT(S): at 21:25

## 2019-01-01 RX ADMIN — AMLODIPINE BESYLATE 5 MILLIGRAM(S): 2.5 TABLET ORAL at 05:57

## 2019-01-01 RX ADMIN — BUDESONIDE AND FORMOTEROL FUMARATE DIHYDRATE 2 PUFF(S): 160; 4.5 AEROSOL RESPIRATORY (INHALATION) at 21:00

## 2019-01-01 RX ADMIN — BUDESONIDE AND FORMOTEROL FUMARATE DIHYDRATE 2 PUFF(S): 160; 4.5 AEROSOL RESPIRATORY (INHALATION) at 22:25

## 2019-01-01 RX ADMIN — Medication 3 MILLILITER(S): at 05:57

## 2019-01-01 RX ADMIN — Medication 10 MILLILITER(S): at 11:59

## 2019-01-01 RX ADMIN — Medication 1 APPLICATION(S): at 17:24

## 2019-01-01 RX ADMIN — Medication 200 MILLIGRAM(S): at 13:47

## 2019-01-01 RX ADMIN — PANTOPRAZOLE SODIUM 40 MILLIGRAM(S): 20 TABLET, DELAYED RELEASE ORAL at 06:11

## 2019-01-01 RX ADMIN — Medication 1000 UNIT(S): at 13:30

## 2019-01-01 RX ADMIN — Medication 3 MILLILITER(S): at 08:27

## 2019-01-01 RX ADMIN — Medication 50 MILLIGRAM(S): at 17:11

## 2019-01-01 RX ADMIN — Medication 300 MICROGRAM(S): at 11:10

## 2019-01-01 RX ADMIN — SIMVASTATIN 10 MILLIGRAM(S): 20 TABLET, FILM COATED ORAL at 21:25

## 2019-01-01 RX ADMIN — BUDESONIDE AND FORMOTEROL FUMARATE DIHYDRATE 2 PUFF(S): 160; 4.5 AEROSOL RESPIRATORY (INHALATION) at 12:17

## 2019-01-01 RX ADMIN — Medication 0.5 MILLIGRAM(S): at 04:37

## 2019-01-01 RX ADMIN — Medication 10 MILLILITER(S): at 21:24

## 2019-01-01 RX ADMIN — BUDESONIDE AND FORMOTEROL FUMARATE DIHYDRATE 2 PUFF(S): 160; 4.5 AEROSOL RESPIRATORY (INHALATION) at 13:06

## 2019-01-01 RX ADMIN — DONEPEZIL HYDROCHLORIDE 5 MILLIGRAM(S): 10 TABLET, FILM COATED ORAL at 21:25

## 2019-01-01 RX ADMIN — MORPHINE SULFATE 1 MILLIGRAM(S): 50 CAPSULE, EXTENDED RELEASE ORAL at 18:01

## 2019-01-01 RX ADMIN — BUDESONIDE AND FORMOTEROL FUMARATE DIHYDRATE 2 PUFF(S): 160; 4.5 AEROSOL RESPIRATORY (INHALATION) at 21:28

## 2019-01-01 RX ADMIN — Medication 3 MILLILITER(S): at 15:08

## 2019-01-01 RX ADMIN — SIMVASTATIN 10 MILLIGRAM(S): 20 TABLET, FILM COATED ORAL at 22:49

## 2019-01-01 RX ADMIN — MORPHINE SULFATE 1 MILLIGRAM(S): 50 CAPSULE, EXTENDED RELEASE ORAL at 20:28

## 2019-01-01 RX ADMIN — Medication 50 MILLIGRAM(S): at 17:27

## 2019-01-01 RX ADMIN — Medication 50 MILLIGRAM(S): at 13:06

## 2019-01-01 RX ADMIN — Medication 10 MILLILITER(S): at 06:56

## 2019-01-01 RX ADMIN — Medication 1 APPLICATION(S): at 17:37

## 2019-01-01 RX ADMIN — Medication 50 MILLIGRAM(S): at 21:47

## 2019-01-01 RX ADMIN — Medication 200 MILLIGRAM(S): at 12:29

## 2019-01-01 RX ADMIN — Medication 200 MILLIGRAM(S): at 06:01

## 2019-01-01 RX ADMIN — Medication 10 MILLILITER(S): at 11:12

## 2019-01-01 RX ADMIN — Medication 50 MILLIGRAM(S): at 21:00

## 2019-01-01 RX ADMIN — Medication 1 MILLIGRAM(S): at 06:59

## 2019-01-01 RX ADMIN — Medication 3 MILLILITER(S): at 05:36

## 2019-01-01 RX ADMIN — SODIUM CHLORIDE 60 MILLILITER(S): 9 INJECTION INTRAMUSCULAR; INTRAVENOUS; SUBCUTANEOUS at 23:49

## 2019-01-01 RX ADMIN — Medication 3 MILLILITER(S): at 03:27

## 2019-01-01 RX ADMIN — Medication 50 MILLIGRAM(S): at 15:05

## 2019-01-01 RX ADMIN — Medication 10 MILLILITER(S): at 12:41

## 2019-01-01 RX ADMIN — MORPHINE SULFATE 1 MILLIGRAM(S): 50 CAPSULE, EXTENDED RELEASE ORAL at 06:54

## 2019-01-01 RX ADMIN — Medication 10 MILLILITER(S): at 17:32

## 2019-01-01 RX ADMIN — Medication 0.5 MILLIGRAM(S): at 19:00

## 2019-01-01 RX ADMIN — Medication 50 MILLIGRAM(S): at 22:08

## 2019-01-01 RX ADMIN — Medication 0.5 MILLIGRAM(S): at 22:07

## 2019-01-01 RX ADMIN — LIDOCAINE 1 PATCH: 4 CREAM TOPICAL at 12:36

## 2019-01-01 RX ADMIN — LIDOCAINE 1 PATCH: 4 CREAM TOPICAL at 20:42

## 2019-01-01 RX ADMIN — Medication 10 MILLILITER(S): at 05:15

## 2019-01-01 RX ADMIN — Medication 10 MILLILITER(S): at 13:51

## 2019-01-01 RX ADMIN — SERTRALINE 25 MILLIGRAM(S): 25 TABLET, FILM COATED ORAL at 11:17

## 2019-01-01 RX ADMIN — Medication 10 MILLILITER(S): at 11:07

## 2019-01-01 RX ADMIN — Medication 1 TABLET(S): at 19:00

## 2019-01-01 RX ADMIN — Medication 3 MILLILITER(S): at 12:29

## 2019-01-01 RX ADMIN — SENNA PLUS 2 TABLET(S): 8.6 TABLET ORAL at 22:05

## 2019-01-01 RX ADMIN — SERTRALINE 25 MILLIGRAM(S): 25 TABLET, FILM COATED ORAL at 11:08

## 2019-01-01 RX ADMIN — LIDOCAINE 1 PATCH: 4 CREAM TOPICAL at 19:34

## 2019-01-01 RX ADMIN — SIMVASTATIN 10 MILLIGRAM(S): 20 TABLET, FILM COATED ORAL at 21:17

## 2019-01-01 RX ADMIN — Medication 50 MILLIGRAM(S): at 13:20

## 2019-01-01 RX ADMIN — CEFTRIAXONE 100 MILLIGRAM(S): 500 INJECTION, POWDER, FOR SOLUTION INTRAMUSCULAR; INTRAVENOUS at 12:51

## 2019-01-01 RX ADMIN — Medication 200 MILLIGRAM(S): at 05:09

## 2019-01-01 RX ADMIN — Medication 3 MILLILITER(S): at 18:18

## 2019-01-01 RX ADMIN — SERTRALINE 25 MILLIGRAM(S): 25 TABLET, FILM COATED ORAL at 12:08

## 2019-01-01 RX ADMIN — SERTRALINE 25 MILLIGRAM(S): 25 TABLET, FILM COATED ORAL at 11:51

## 2019-01-01 RX ADMIN — Medication 50 MILLIGRAM(S): at 05:17

## 2019-01-01 RX ADMIN — SIMVASTATIN 10 MILLIGRAM(S): 20 TABLET, FILM COATED ORAL at 22:18

## 2019-01-01 RX ADMIN — Medication 0.5 MILLIGRAM(S): at 05:42

## 2019-01-01 RX ADMIN — Medication 0.5 MILLIGRAM(S): at 06:46

## 2019-01-01 RX ADMIN — Medication 10 MILLILITER(S): at 12:34

## 2019-01-01 RX ADMIN — MORPHINE SULFATE 1 MILLIGRAM(S): 50 CAPSULE, EXTENDED RELEASE ORAL at 01:20

## 2019-01-01 RX ADMIN — Medication 10 MILLILITER(S): at 06:08

## 2019-01-01 RX ADMIN — Medication 3 MILLILITER(S): at 13:43

## 2019-01-01 RX ADMIN — MORPHINE SULFATE 1 MILLIGRAM(S): 50 CAPSULE, EXTENDED RELEASE ORAL at 22:31

## 2019-01-01 RX ADMIN — SENNA PLUS 2 TABLET(S): 8.6 TABLET ORAL at 21:00

## 2019-01-01 RX ADMIN — PANTOPRAZOLE SODIUM 40 MILLIGRAM(S): 20 TABLET, DELAYED RELEASE ORAL at 06:12

## 2019-01-01 RX ADMIN — Medication 1000 UNIT(S): at 13:48

## 2019-01-01 RX ADMIN — MORPHINE SULFATE 1 MILLIGRAM(S): 50 CAPSULE, EXTENDED RELEASE ORAL at 07:24

## 2019-01-01 RX ADMIN — Medication 0.5 MILLIGRAM(S): at 05:36

## 2019-01-01 RX ADMIN — Medication 50 MILLIGRAM(S): at 21:24

## 2019-01-01 RX ADMIN — MORPHINE SULFATE 1 MILLIGRAM(S): 50 CAPSULE, EXTENDED RELEASE ORAL at 08:39

## 2019-01-01 RX ADMIN — Medication 0.5 MILLIGRAM(S): at 05:25

## 2019-01-01 RX ADMIN — Medication 0.5 MILLIGRAM(S): at 05:58

## 2019-01-01 RX ADMIN — Medication 200 MILLIGRAM(S): at 05:57

## 2019-01-01 RX ADMIN — ENOXAPARIN SODIUM 40 MILLIGRAM(S): 100 INJECTION SUBCUTANEOUS at 12:29

## 2019-01-01 RX ADMIN — SENNA PLUS 2 TABLET(S): 8.6 TABLET ORAL at 21:34

## 2019-01-01 RX ADMIN — Medication 10 MILLILITER(S): at 06:13

## 2019-01-01 RX ADMIN — Medication 20 MILLIGRAM(S): at 05:45

## 2019-01-01 RX ADMIN — Medication 50 MILLIGRAM(S): at 06:55

## 2019-01-01 RX ADMIN — LIDOCAINE 1 PATCH: 4 CREAM TOPICAL at 19:20

## 2019-01-01 RX ADMIN — Medication 50 MILLIGRAM(S): at 13:42

## 2019-01-01 RX ADMIN — Medication 3 MILLILITER(S): at 11:49

## 2019-01-01 RX ADMIN — Medication 50 MILLIGRAM(S): at 05:22

## 2019-01-01 RX ADMIN — SERTRALINE 25 MILLIGRAM(S): 25 TABLET, FILM COATED ORAL at 22:49

## 2019-01-01 RX ADMIN — ENOXAPARIN SODIUM 40 MILLIGRAM(S): 100 INJECTION SUBCUTANEOUS at 11:11

## 2019-01-01 RX ADMIN — Medication 200 MILLIGRAM(S): at 12:31

## 2019-01-01 RX ADMIN — Medication 50 MILLIGRAM(S): at 22:19

## 2019-01-01 RX ADMIN — Medication 10 MILLILITER(S): at 17:23

## 2019-01-01 RX ADMIN — SIMVASTATIN 10 MILLIGRAM(S): 20 TABLET, FILM COATED ORAL at 21:18

## 2019-01-01 RX ADMIN — CEFTRIAXONE 100 MILLIGRAM(S): 500 INJECTION, POWDER, FOR SOLUTION INTRAMUSCULAR; INTRAVENOUS at 15:09

## 2019-01-01 RX ADMIN — Medication 3 MILLILITER(S): at 23:32

## 2019-01-01 RX ADMIN — Medication 1 APPLICATION(S): at 17:18

## 2019-01-01 RX ADMIN — SIMVASTATIN 10 MILLIGRAM(S): 20 TABLET, FILM COATED ORAL at 22:05

## 2019-01-01 RX ADMIN — Medication 1 TABLET(S): at 13:30

## 2019-01-01 RX ADMIN — MORPHINE SULFATE 1 MILLIGRAM(S): 50 CAPSULE, EXTENDED RELEASE ORAL at 10:10

## 2019-01-01 RX ADMIN — SERTRALINE 25 MILLIGRAM(S): 25 TABLET, FILM COATED ORAL at 12:36

## 2019-01-01 RX ADMIN — Medication 50 MILLIGRAM(S): at 05:25

## 2019-01-01 RX ADMIN — LIDOCAINE 1 PATCH: 4 CREAM TOPICAL at 12:03

## 2019-01-01 RX ADMIN — Medication 1000 UNIT(S): at 11:50

## 2019-01-01 RX ADMIN — LIDOCAINE 1 PATCH: 4 CREAM TOPICAL at 12:29

## 2019-01-01 RX ADMIN — SERTRALINE 25 MILLIGRAM(S): 25 TABLET, FILM COATED ORAL at 12:30

## 2019-01-01 RX ADMIN — ROBINUL 0.4 MILLIGRAM(S): 0.2 INJECTION INTRAMUSCULAR; INTRAVENOUS at 14:03

## 2019-01-01 RX ADMIN — Medication 50 MILLIGRAM(S): at 14:50

## 2019-01-01 RX ADMIN — Medication 1000 UNIT(S): at 11:12

## 2019-01-01 RX ADMIN — Medication 1 TABLET(S): at 11:12

## 2019-01-01 RX ADMIN — Medication 10 MILLILITER(S): at 12:30

## 2019-01-01 RX ADMIN — Medication 20 MILLIGRAM(S): at 06:55

## 2019-01-01 RX ADMIN — BUDESONIDE AND FORMOTEROL FUMARATE DIHYDRATE 2 PUFF(S): 160; 4.5 AEROSOL RESPIRATORY (INHALATION) at 12:29

## 2019-01-01 RX ADMIN — Medication 1 TABLET(S): at 11:51

## 2019-01-01 RX ADMIN — Medication 0.5 MILLIGRAM(S): at 05:21

## 2019-01-01 RX ADMIN — Medication 1 APPLICATION(S): at 06:06

## 2019-01-01 RX ADMIN — LIDOCAINE 1 PATCH: 4 CREAM TOPICAL at 11:11

## 2019-01-01 RX ADMIN — Medication 3 MILLILITER(S): at 06:04

## 2019-01-01 RX ADMIN — Medication 1 APPLICATION(S): at 17:57

## 2019-01-01 RX ADMIN — Medication 0.5 MILLIGRAM(S): at 05:09

## 2019-01-01 RX ADMIN — Medication 133 MILLILITER(S): at 17:56

## 2019-01-01 RX ADMIN — SERTRALINE 25 MILLIGRAM(S): 25 TABLET, FILM COATED ORAL at 12:33

## 2019-01-01 RX ADMIN — Medication 1 APPLICATION(S): at 06:56

## 2019-01-01 RX ADMIN — MORPHINE SULFATE 1 MILLIGRAM(S): 50 CAPSULE, EXTENDED RELEASE ORAL at 11:07

## 2019-01-01 RX ADMIN — Medication 3 MILLILITER(S): at 21:07

## 2019-01-01 RX ADMIN — Medication 50 MILLIGRAM(S): at 21:41

## 2019-01-01 RX ADMIN — MORPHINE SULFATE 1 MILLIGRAM(S): 50 CAPSULE, EXTENDED RELEASE ORAL at 22:21

## 2019-01-01 RX ADMIN — PANTOPRAZOLE SODIUM 40 MILLIGRAM(S): 20 TABLET, DELAYED RELEASE ORAL at 05:44

## 2019-01-01 RX ADMIN — Medication 10 MILLILITER(S): at 17:17

## 2019-01-01 RX ADMIN — Medication 200 MILLIGRAM(S): at 14:55

## 2019-01-01 RX ADMIN — Medication 1 APPLICATION(S): at 17:23

## 2019-01-01 RX ADMIN — SIMVASTATIN 10 MILLIGRAM(S): 20 TABLET, FILM COATED ORAL at 21:23

## 2019-01-01 RX ADMIN — Medication 3 MILLILITER(S): at 08:32

## 2019-01-01 RX ADMIN — BUDESONIDE AND FORMOTEROL FUMARATE DIHYDRATE 2 PUFF(S): 160; 4.5 AEROSOL RESPIRATORY (INHALATION) at 11:25

## 2019-01-01 RX ADMIN — LIDOCAINE 1 PATCH: 4 CREAM TOPICAL at 11:28

## 2019-01-01 RX ADMIN — Medication 10 MILLILITER(S): at 23:32

## 2019-01-01 RX ADMIN — SIMVASTATIN 10 MILLIGRAM(S): 20 TABLET, FILM COATED ORAL at 21:27

## 2019-01-01 RX ADMIN — LIDOCAINE 1 PATCH: 4 CREAM TOPICAL at 00:05

## 2019-01-01 RX ADMIN — Medication 200 MILLIGRAM(S): at 14:26

## 2019-01-01 RX ADMIN — MORPHINE SULFATE 1 MILLIGRAM(S): 50 CAPSULE, EXTENDED RELEASE ORAL at 05:45

## 2019-01-01 RX ADMIN — Medication 3 MILLILITER(S): at 05:44

## 2019-01-01 RX ADMIN — ROBINUL 0.4 MILLIGRAM(S): 0.2 INJECTION INTRAMUSCULAR; INTRAVENOUS at 04:47

## 2019-01-01 RX ADMIN — Medication 3 MILLILITER(S): at 09:06

## 2019-01-01 RX ADMIN — Medication 1 APPLICATION(S): at 06:25

## 2019-01-01 RX ADMIN — Medication 10 MILLILITER(S): at 00:00

## 2019-01-01 RX ADMIN — Medication 1000 UNIT(S): at 12:29

## 2019-01-01 RX ADMIN — SENNA PLUS 2 TABLET(S): 8.6 TABLET ORAL at 21:28

## 2019-01-01 RX ADMIN — MORPHINE SULFATE 1 MILLIGRAM(S): 50 CAPSULE, EXTENDED RELEASE ORAL at 17:41

## 2019-01-01 RX ADMIN — SENNA PLUS 2 TABLET(S): 8.6 TABLET ORAL at 21:16

## 2019-01-01 RX ADMIN — SIMVASTATIN 10 MILLIGRAM(S): 20 TABLET, FILM COATED ORAL at 21:34

## 2019-01-01 RX ADMIN — Medication 50 MILLIGRAM(S): at 18:36

## 2019-01-01 RX ADMIN — Medication 10 MILLILITER(S): at 05:22

## 2019-01-01 RX ADMIN — SIMVASTATIN 10 MILLIGRAM(S): 20 TABLET, FILM COATED ORAL at 21:00

## 2019-01-01 RX ADMIN — Medication 50 MILLIGRAM(S): at 05:43

## 2019-01-01 RX ADMIN — Medication 20 MILLIGRAM(S): at 17:31

## 2019-01-01 RX ADMIN — Medication 10 MILLILITER(S): at 17:27

## 2019-01-01 RX ADMIN — Medication 0.5 MILLIGRAM(S): at 14:01

## 2019-01-01 RX ADMIN — Medication 50 MILLIGRAM(S): at 13:45

## 2019-01-01 RX ADMIN — Medication 10 MILLIGRAM(S): at 01:54

## 2019-01-01 RX ADMIN — Medication 10 MILLILITER(S): at 13:08

## 2019-01-01 RX ADMIN — ENOXAPARIN SODIUM 40 MILLIGRAM(S): 100 INJECTION SUBCUTANEOUS at 11:59

## 2019-01-01 RX ADMIN — Medication 0.5 MILLIGRAM(S): at 04:47

## 2019-01-01 RX ADMIN — Medication 3 MILLILITER(S): at 05:09

## 2019-01-01 RX ADMIN — Medication 3 MILLILITER(S): at 08:54

## 2019-01-01 RX ADMIN — LIDOCAINE 1 PATCH: 4 CREAM TOPICAL at 11:07

## 2019-01-01 RX ADMIN — Medication 1 APPLICATION(S): at 18:19

## 2019-01-01 RX ADMIN — Medication 50 MILLIGRAM(S): at 19:00

## 2019-01-01 RX ADMIN — Medication 50 MILLIGRAM(S): at 23:49

## 2019-01-01 RX ADMIN — MORPHINE SULFATE 1 MILLIGRAM(S): 50 CAPSULE, EXTENDED RELEASE ORAL at 06:03

## 2019-01-01 RX ADMIN — Medication 3 MILLILITER(S): at 13:30

## 2019-01-01 RX ADMIN — SODIUM CHLORIDE 60 MILLILITER(S): 9 INJECTION INTRAMUSCULAR; INTRAVENOUS; SUBCUTANEOUS at 18:25

## 2019-01-01 RX ADMIN — Medication 200 MILLIGRAM(S): at 05:36

## 2019-01-01 RX ADMIN — LIDOCAINE 1 PATCH: 4 CREAM TOPICAL at 18:21

## 2019-01-01 RX ADMIN — LIDOCAINE 1 PATCH: 4 CREAM TOPICAL at 00:21

## 2019-01-01 RX ADMIN — Medication 10 MILLILITER(S): at 17:16

## 2019-01-01 RX ADMIN — Medication 50 MILLIGRAM(S): at 05:44

## 2019-01-01 RX ADMIN — SERTRALINE 25 MILLIGRAM(S): 25 TABLET, FILM COATED ORAL at 12:00

## 2019-01-01 RX ADMIN — Medication 3 MILLILITER(S): at 23:09

## 2019-01-01 RX ADMIN — Medication 3 MILLILITER(S): at 17:28

## 2019-01-01 RX ADMIN — LIDOCAINE 1 PATCH: 4 CREAM TOPICAL at 19:00

## 2019-01-01 RX ADMIN — AMLODIPINE BESYLATE 5 MILLIGRAM(S): 2.5 TABLET ORAL at 06:13

## 2019-01-01 RX ADMIN — Medication 200 MILLIGRAM(S): at 05:26

## 2019-01-01 RX ADMIN — TRAMADOL HYDROCHLORIDE 25 MILLIGRAM(S): 50 TABLET ORAL at 22:40

## 2019-01-01 RX ADMIN — BUDESONIDE AND FORMOTEROL FUMARATE DIHYDRATE 2 PUFF(S): 160; 4.5 AEROSOL RESPIRATORY (INHALATION) at 22:42

## 2019-01-01 RX ADMIN — DONEPEZIL HYDROCHLORIDE 5 MILLIGRAM(S): 10 TABLET, FILM COATED ORAL at 21:27

## 2019-01-01 RX ADMIN — Medication 50 MILLIGRAM(S): at 22:17

## 2019-01-01 RX ADMIN — Medication 20 MILLIGRAM(S): at 06:14

## 2019-01-01 RX ADMIN — Medication 10 MILLILITER(S): at 19:00

## 2019-01-01 RX ADMIN — SENNA PLUS 2 TABLET(S): 8.6 TABLET ORAL at 21:23

## 2019-01-01 RX ADMIN — SENNA PLUS 2 TABLET(S): 8.6 TABLET ORAL at 22:49

## 2019-01-01 RX ADMIN — Medication 1 APPLICATION(S): at 17:31

## 2019-01-01 RX ADMIN — Medication 20 MILLIGRAM(S): at 06:38

## 2019-01-01 RX ADMIN — PANTOPRAZOLE SODIUM 40 MILLIGRAM(S): 20 TABLET, DELAYED RELEASE ORAL at 06:14

## 2019-01-01 RX ADMIN — Medication 3 MILLILITER(S): at 12:30

## 2019-01-01 RX ADMIN — SERTRALINE 25 MILLIGRAM(S): 25 TABLET, FILM COATED ORAL at 11:39

## 2019-01-01 RX ADMIN — SIMVASTATIN 10 MILLIGRAM(S): 20 TABLET, FILM COATED ORAL at 21:28

## 2019-01-01 RX ADMIN — DONEPEZIL HYDROCHLORIDE 5 MILLIGRAM(S): 10 TABLET, FILM COATED ORAL at 22:18

## 2019-01-01 RX ADMIN — DONEPEZIL HYDROCHLORIDE 5 MILLIGRAM(S): 10 TABLET, FILM COATED ORAL at 21:23

## 2019-01-01 RX ADMIN — ENOXAPARIN SODIUM 40 MILLIGRAM(S): 100 INJECTION SUBCUTANEOUS at 11:24

## 2019-01-01 RX ADMIN — MORPHINE SULFATE 1 MILLIGRAM(S): 50 CAPSULE, EXTENDED RELEASE ORAL at 12:58

## 2019-01-01 RX ADMIN — DONEPEZIL HYDROCHLORIDE 5 MILLIGRAM(S): 10 TABLET, FILM COATED ORAL at 22:08

## 2019-01-01 RX ADMIN — Medication 200 MILLIGRAM(S): at 05:42

## 2019-01-01 RX ADMIN — Medication 50 MILLIGRAM(S): at 22:05

## 2019-01-01 RX ADMIN — AMLODIPINE BESYLATE 5 MILLIGRAM(S): 2.5 TABLET ORAL at 05:45

## 2019-01-01 RX ADMIN — Medication 50 MILLIGRAM(S): at 21:28

## 2019-01-01 RX ADMIN — Medication 10 MILLILITER(S): at 05:57

## 2019-01-01 RX ADMIN — LIDOCAINE 1 PATCH: 4 CREAM TOPICAL at 13:08

## 2019-01-01 RX ADMIN — Medication 650 MILLIGRAM(S): at 05:34

## 2019-01-01 RX ADMIN — Medication 1 APPLICATION(S): at 06:15

## 2019-01-01 RX ADMIN — Medication 50 MILLIGRAM(S): at 22:41

## 2019-01-01 RX ADMIN — DONEPEZIL HYDROCHLORIDE 5 MILLIGRAM(S): 10 TABLET, FILM COATED ORAL at 21:19

## 2019-01-01 RX ADMIN — Medication 81 MILLIGRAM(S): at 11:40

## 2019-01-01 RX ADMIN — Medication 10 MILLILITER(S): at 00:22

## 2019-01-01 RX ADMIN — Medication 50 MILLIGRAM(S): at 13:47

## 2019-01-01 RX ADMIN — BUDESONIDE AND FORMOTEROL FUMARATE DIHYDRATE 2 PUFF(S): 160; 4.5 AEROSOL RESPIRATORY (INHALATION) at 22:50

## 2019-01-01 RX ADMIN — AMLODIPINE BESYLATE 5 MILLIGRAM(S): 2.5 TABLET ORAL at 06:11

## 2019-01-01 RX ADMIN — SODIUM CHLORIDE 60 MILLILITER(S): 9 INJECTION INTRAMUSCULAR; INTRAVENOUS; SUBCUTANEOUS at 21:46

## 2019-01-01 RX ADMIN — Medication 200 MILLIGRAM(S): at 13:30

## 2019-01-01 RX ADMIN — SENNA PLUS 2 TABLET(S): 8.6 TABLET ORAL at 22:18

## 2019-01-01 RX ADMIN — Medication 1000 UNIT(S): at 11:38

## 2019-01-01 RX ADMIN — DONEPEZIL HYDROCHLORIDE 5 MILLIGRAM(S): 10 TABLET, FILM COATED ORAL at 22:37

## 2019-01-01 RX ADMIN — Medication 0.5 MILLIGRAM(S): at 20:55

## 2019-01-01 RX ADMIN — LIDOCAINE 1 PATCH: 4 CREAM TOPICAL at 13:53

## 2019-01-01 RX ADMIN — Medication 3 MILLILITER(S): at 20:12

## 2019-01-01 RX ADMIN — Medication 0.5 MILLIGRAM(S): at 08:38

## 2019-01-01 RX ADMIN — Medication 200 MILLIGRAM(S): at 15:05

## 2019-01-01 RX ADMIN — Medication 50 MILLIGRAM(S): at 06:11

## 2019-01-01 RX ADMIN — Medication 50 MILLIGRAM(S): at 22:50

## 2019-01-01 RX ADMIN — Medication 3 MILLILITER(S): at 18:01

## 2019-01-01 RX ADMIN — Medication 3 MILLILITER(S): at 00:00

## 2019-01-01 RX ADMIN — MORPHINE SULFATE 1 MILLIGRAM(S): 50 CAPSULE, EXTENDED RELEASE ORAL at 01:29

## 2019-01-01 RX ADMIN — MORPHINE SULFATE 1 MILLIGRAM(S): 50 CAPSULE, EXTENDED RELEASE ORAL at 06:56

## 2019-01-01 RX ADMIN — LIDOCAINE 1 PATCH: 4 CREAM TOPICAL at 20:11

## 2019-01-01 RX ADMIN — Medication 0.5 MILLIGRAM(S): at 05:44

## 2019-01-01 RX ADMIN — Medication 1000 UNIT(S): at 19:00

## 2019-01-01 RX ADMIN — Medication 10 MILLILITER(S): at 18:29

## 2019-01-01 RX ADMIN — LIDOCAINE 1 PATCH: 4 CREAM TOPICAL at 06:24

## 2019-01-01 RX ADMIN — Medication 200 MILLIGRAM(S): at 21:18

## 2019-01-01 RX ADMIN — Medication 200 MILLIGRAM(S): at 16:49

## 2019-01-01 RX ADMIN — MORPHINE SULFATE 1 MILLIGRAM(S): 50 CAPSULE, EXTENDED RELEASE ORAL at 18:12

## 2019-01-01 RX ADMIN — Medication 10 MILLILITER(S): at 17:25

## 2019-01-01 RX ADMIN — MORPHINE SULFATE 1 MILLIGRAM(S): 50 CAPSULE, EXTENDED RELEASE ORAL at 23:19

## 2019-01-01 RX ADMIN — DONEPEZIL HYDROCHLORIDE 5 MILLIGRAM(S): 10 TABLET, FILM COATED ORAL at 21:17

## 2019-01-01 RX ADMIN — Medication 1 APPLICATION(S): at 18:05

## 2019-01-01 RX ADMIN — Medication 3 MILLILITER(S): at 15:15

## 2019-01-01 RX ADMIN — Medication 10 MILLILITER(S): at 18:36

## 2019-01-01 RX ADMIN — ENOXAPARIN SODIUM 40 MILLIGRAM(S): 100 INJECTION SUBCUTANEOUS at 11:17

## 2019-01-01 RX ADMIN — AMLODIPINE BESYLATE 5 MILLIGRAM(S): 2.5 TABLET ORAL at 06:14

## 2019-01-01 RX ADMIN — PANTOPRAZOLE SODIUM 40 MILLIGRAM(S): 20 TABLET, DELAYED RELEASE ORAL at 05:15

## 2019-01-01 RX ADMIN — Medication 3 MILLILITER(S): at 11:16

## 2019-01-01 RX ADMIN — ENOXAPARIN SODIUM 40 MILLIGRAM(S): 100 INJECTION SUBCUTANEOUS at 12:36

## 2019-01-01 RX ADMIN — Medication 200 MILLIGRAM(S): at 21:16

## 2019-01-01 RX ADMIN — Medication 1 APPLICATION(S): at 18:50

## 2019-01-01 RX ADMIN — Medication 0.5 MILLIGRAM(S): at 21:59

## 2019-01-01 RX ADMIN — Medication 1 APPLICATION(S): at 06:39

## 2019-01-01 RX ADMIN — Medication 0.5 MILLIGRAM(S): at 18:09

## 2019-01-01 RX ADMIN — SODIUM CHLORIDE 60 MILLILITER(S): 9 INJECTION INTRAMUSCULAR; INTRAVENOUS; SUBCUTANEOUS at 21:19

## 2019-01-01 RX ADMIN — Medication 50 MILLIGRAM(S): at 13:30

## 2019-01-01 RX ADMIN — ROBINUL 0.4 MILLIGRAM(S): 0.2 INJECTION INTRAMUSCULAR; INTRAVENOUS at 05:48

## 2019-01-01 RX ADMIN — Medication 20 MILLIGRAM(S): at 18:18

## 2019-01-01 RX ADMIN — ENOXAPARIN SODIUM 40 MILLIGRAM(S): 100 INJECTION SUBCUTANEOUS at 13:53

## 2019-01-01 RX ADMIN — PANTOPRAZOLE SODIUM 40 MILLIGRAM(S): 20 TABLET, DELAYED RELEASE ORAL at 06:08

## 2019-01-01 RX ADMIN — DONEPEZIL HYDROCHLORIDE 5 MILLIGRAM(S): 10 TABLET, FILM COATED ORAL at 21:34

## 2019-01-01 RX ADMIN — LIDOCAINE 1 PATCH: 4 CREAM TOPICAL at 11:18

## 2019-01-01 RX ADMIN — SODIUM CHLORIDE 65 MILLILITER(S): 9 INJECTION INTRAMUSCULAR; INTRAVENOUS; SUBCUTANEOUS at 05:36

## 2019-01-01 RX ADMIN — LIDOCAINE 1 PATCH: 4 CREAM TOPICAL at 22:20

## 2019-01-01 RX ADMIN — Medication 0.5 MILLIGRAM(S): at 17:11

## 2019-01-01 RX ADMIN — DONEPEZIL HYDROCHLORIDE 5 MILLIGRAM(S): 10 TABLET, FILM COATED ORAL at 21:28

## 2019-01-01 RX ADMIN — MORPHINE SULFATE 1 MILLIGRAM(S): 50 CAPSULE, EXTENDED RELEASE ORAL at 12:05

## 2019-01-01 RX ADMIN — Medication 3 MILLILITER(S): at 20:44

## 2019-01-01 RX ADMIN — BUDESONIDE AND FORMOTEROL FUMARATE DIHYDRATE 2 PUFF(S): 160; 4.5 AEROSOL RESPIRATORY (INHALATION) at 11:07

## 2019-01-01 RX ADMIN — Medication 3 MILLILITER(S): at 05:42

## 2019-01-01 RX ADMIN — SENNA PLUS 2 TABLET(S): 8.6 TABLET ORAL at 21:18

## 2019-01-01 RX ADMIN — Medication 1 TABLET(S): at 11:38

## 2019-01-01 RX ADMIN — MORPHINE SULFATE 1 MILLIGRAM(S): 50 CAPSULE, EXTENDED RELEASE ORAL at 15:59

## 2019-01-01 RX ADMIN — Medication 300 MICROGRAM(S): at 14:10

## 2019-01-01 RX ADMIN — Medication 50 MILLIGRAM(S): at 17:05

## 2019-01-01 RX ADMIN — Medication 10 MILLILITER(S): at 21:26

## 2019-01-01 RX ADMIN — LIDOCAINE 1 PATCH: 4 CREAM TOPICAL at 17:30

## 2019-01-01 RX ADMIN — Medication 1 APPLICATION(S): at 06:08

## 2019-01-01 RX ADMIN — Medication 10 MILLILITER(S): at 18:18

## 2019-01-01 RX ADMIN — Medication 0.5 MILLIGRAM(S): at 22:28

## 2019-01-01 RX ADMIN — Medication 50 MILLIGRAM(S): at 18:18

## 2019-01-01 RX ADMIN — Medication 3 MILLILITER(S): at 21:57

## 2019-01-01 RX ADMIN — Medication 3 MILLILITER(S): at 23:37

## 2019-01-01 RX ADMIN — Medication 1 APPLICATION(S): at 07:01

## 2019-01-01 RX ADMIN — Medication 20 MILLIGRAM(S): at 05:55

## 2019-01-01 RX ADMIN — Medication 1000 UNIT(S): at 11:06

## 2019-01-01 RX ADMIN — MORPHINE SULFATE 1 MILLIGRAM(S): 50 CAPSULE, EXTENDED RELEASE ORAL at 00:42

## 2019-01-01 RX ADMIN — Medication 200 MILLIGRAM(S): at 13:42

## 2019-01-01 RX ADMIN — Medication 3 MILLILITER(S): at 13:48

## 2019-01-01 RX ADMIN — LIDOCAINE 1 PATCH: 4 CREAM TOPICAL at 18:51

## 2019-01-01 RX ADMIN — Medication 3 MILLILITER(S): at 18:34

## 2019-01-01 RX ADMIN — Medication 50 MILLIGRAM(S): at 18:34

## 2019-01-01 RX ADMIN — MORPHINE SULFATE 1 MILLIGRAM(S): 50 CAPSULE, EXTENDED RELEASE ORAL at 09:00

## 2019-01-01 RX ADMIN — Medication 10 MILLILITER(S): at 13:42

## 2019-01-01 RX ADMIN — Medication 1 APPLICATION(S): at 05:46

## 2019-01-01 RX ADMIN — SERTRALINE 25 MILLIGRAM(S): 25 TABLET, FILM COATED ORAL at 13:11

## 2019-01-01 RX ADMIN — Medication 10 MILLILITER(S): at 12:39

## 2019-01-01 RX ADMIN — MORPHINE SULFATE 1 MILLIGRAM(S): 50 CAPSULE, EXTENDED RELEASE ORAL at 09:52

## 2019-01-01 RX ADMIN — Medication 1 APPLICATION(S): at 17:49

## 2019-01-01 RX ADMIN — DONEPEZIL HYDROCHLORIDE 5 MILLIGRAM(S): 10 TABLET, FILM COATED ORAL at 22:41

## 2019-01-01 RX ADMIN — Medication 200 MILLIGRAM(S): at 21:23

## 2019-01-01 RX ADMIN — SERTRALINE 25 MILLIGRAM(S): 25 TABLET, FILM COATED ORAL at 11:24

## 2019-01-01 RX ADMIN — Medication 3 MILLILITER(S): at 11:06

## 2019-01-01 RX ADMIN — MORPHINE SULFATE 1 MILLIGRAM(S): 50 CAPSULE, EXTENDED RELEASE ORAL at 12:20

## 2019-01-01 RX ADMIN — Medication 1 APPLICATION(S): at 06:26

## 2019-01-01 RX ADMIN — Medication 1 APPLICATION(S): at 17:16

## 2019-01-01 RX ADMIN — Medication 1 APPLICATION(S): at 06:13

## 2019-01-01 RX ADMIN — SIMVASTATIN 10 MILLIGRAM(S): 20 TABLET, FILM COATED ORAL at 21:47

## 2019-01-01 RX ADMIN — LIDOCAINE 1 PATCH: 4 CREAM TOPICAL at 12:41

## 2019-01-01 RX ADMIN — Medication 0.5 MILLIGRAM(S): at 14:02

## 2019-01-01 RX ADMIN — Medication 10 MILLILITER(S): at 05:42

## 2019-01-01 RX ADMIN — LIDOCAINE 1 PATCH: 4 CREAM TOPICAL at 23:29

## 2019-01-01 RX ADMIN — BUDESONIDE AND FORMOTEROL FUMARATE DIHYDRATE 2 PUFF(S): 160; 4.5 AEROSOL RESPIRATORY (INHALATION) at 11:11

## 2019-01-01 RX ADMIN — Medication 20 MILLIGRAM(S): at 06:08

## 2019-01-01 RX ADMIN — SENNA PLUS 2 TABLET(S): 8.6 TABLET ORAL at 22:24

## 2019-01-01 RX ADMIN — MORPHINE SULFATE 1 MILLIGRAM(S): 50 CAPSULE, EXTENDED RELEASE ORAL at 17:28

## 2019-01-01 RX ADMIN — MORPHINE SULFATE 1 MILLIGRAM(S): 50 CAPSULE, EXTENDED RELEASE ORAL at 21:58

## 2019-01-01 RX ADMIN — Medication 3 MILLILITER(S): at 03:43

## 2019-01-01 RX ADMIN — TRAMADOL HYDROCHLORIDE 25 MILLIGRAM(S): 50 TABLET ORAL at 00:04

## 2019-01-01 RX ADMIN — Medication 3 MILLILITER(S): at 16:04

## 2019-01-01 RX ADMIN — Medication 10 MILLILITER(S): at 12:29

## 2019-01-01 RX ADMIN — SERTRALINE 25 MILLIGRAM(S): 25 TABLET, FILM COATED ORAL at 13:42

## 2019-01-01 RX ADMIN — Medication 1 APPLICATION(S): at 17:26

## 2019-01-01 RX ADMIN — DONEPEZIL HYDROCHLORIDE 5 MILLIGRAM(S): 10 TABLET, FILM COATED ORAL at 21:16

## 2019-01-01 RX ADMIN — Medication 20 MILLIGRAM(S): at 06:04

## 2019-01-01 RX ADMIN — SODIUM CHLORIDE 3000 MILLILITER(S): 9 INJECTION INTRAMUSCULAR; INTRAVENOUS; SUBCUTANEOUS at 01:43

## 2019-01-01 RX ADMIN — Medication 0.5 MILLIGRAM(S): at 18:02

## 2019-01-01 RX ADMIN — AMLODIPINE BESYLATE 5 MILLIGRAM(S): 2.5 TABLET ORAL at 05:22

## 2019-01-01 RX ADMIN — Medication 50 MILLIGRAM(S): at 05:09

## 2019-01-01 RX ADMIN — Medication 20 MILLIGRAM(S): at 11:17

## 2019-01-01 RX ADMIN — Medication 200 MILLIGRAM(S): at 21:17

## 2019-01-01 RX ADMIN — SIMVASTATIN 10 MILLIGRAM(S): 20 TABLET, FILM COATED ORAL at 22:41

## 2019-01-01 RX ADMIN — BUDESONIDE AND FORMOTEROL FUMARATE DIHYDRATE 2 PUFF(S): 160; 4.5 AEROSOL RESPIRATORY (INHALATION) at 21:41

## 2019-01-01 RX ADMIN — Medication 50 MILLIGRAM(S): at 13:17

## 2019-01-01 RX ADMIN — Medication 3 MILLILITER(S): at 20:48

## 2019-01-01 RX ADMIN — Medication 50 MILLIGRAM(S): at 06:38

## 2019-01-01 RX ADMIN — SODIUM CHLORIDE 60 MILLILITER(S): 9 INJECTION INTRAMUSCULAR; INTRAVENOUS; SUBCUTANEOUS at 05:26

## 2019-01-01 RX ADMIN — AMLODIPINE BESYLATE 5 MILLIGRAM(S): 2.5 TABLET ORAL at 07:00

## 2019-01-01 RX ADMIN — Medication 50 MILLIGRAM(S): at 06:13

## 2019-01-01 RX ADMIN — SENNA PLUS 2 TABLET(S): 8.6 TABLET ORAL at 21:46

## 2019-01-01 RX ADMIN — Medication 10 MILLILITER(S): at 00:44

## 2019-01-01 RX ADMIN — Medication 10 MILLILITER(S): at 18:02

## 2019-01-01 RX ADMIN — SERTRALINE 25 MILLIGRAM(S): 25 TABLET, FILM COATED ORAL at 13:30

## 2019-01-01 RX ADMIN — Medication 10 MILLILITER(S): at 05:49

## 2019-01-01 RX ADMIN — Medication 10 MILLILITER(S): at 11:48

## 2019-01-01 RX ADMIN — SERTRALINE 25 MILLIGRAM(S): 25 TABLET, FILM COATED ORAL at 12:29

## 2019-01-01 RX ADMIN — ENOXAPARIN SODIUM 40 MILLIGRAM(S): 100 INJECTION SUBCUTANEOUS at 19:00

## 2019-01-01 RX ADMIN — Medication 50 MILLIGRAM(S): at 06:01

## 2019-01-01 RX ADMIN — Medication 3 MILLILITER(S): at 05:25

## 2019-01-01 RX ADMIN — BUDESONIDE AND FORMOTEROL FUMARATE DIHYDRATE 2 PUFF(S): 160; 4.5 AEROSOL RESPIRATORY (INHALATION) at 21:46

## 2019-01-01 RX ADMIN — Medication 3 MILLILITER(S): at 09:04

## 2019-01-01 RX ADMIN — Medication 0.5 MILLIGRAM(S): at 17:25

## 2019-01-01 RX ADMIN — DONEPEZIL HYDROCHLORIDE 5 MILLIGRAM(S): 10 TABLET, FILM COATED ORAL at 22:49

## 2019-01-01 RX ADMIN — Medication 10 MILLILITER(S): at 11:18

## 2019-01-01 RX ADMIN — Medication 50 MILLIGRAM(S): at 05:55

## 2019-01-01 RX ADMIN — MORPHINE SULFATE 1 MILLIGRAM(S): 50 CAPSULE, EXTENDED RELEASE ORAL at 17:26

## 2019-01-01 RX ADMIN — LIDOCAINE 1 PATCH: 4 CREAM TOPICAL at 00:00

## 2019-01-01 RX ADMIN — SIMVASTATIN 10 MILLIGRAM(S): 20 TABLET, FILM COATED ORAL at 21:46

## 2019-01-01 RX ADMIN — Medication 0.5 MILLIGRAM(S): at 18:01

## 2019-01-01 RX ADMIN — Medication 10 MILLILITER(S): at 13:48

## 2019-01-01 RX ADMIN — Medication 50 MILLIGRAM(S): at 21:18

## 2019-01-01 RX ADMIN — ROBINUL 0.4 MILLIGRAM(S): 0.2 INJECTION INTRAMUSCULAR; INTRAVENOUS at 20:54

## 2019-01-01 RX ADMIN — MORPHINE SULFATE 1 MILLIGRAM(S): 50 CAPSULE, EXTENDED RELEASE ORAL at 00:00

## 2019-01-01 RX ADMIN — Medication 20 MILLIGRAM(S): at 06:11

## 2019-01-01 RX ADMIN — Medication 50 MILLIGRAM(S): at 05:36

## 2019-01-01 RX ADMIN — Medication 1 APPLICATION(S): at 05:15

## 2019-01-01 RX ADMIN — Medication 3 MILLILITER(S): at 21:22

## 2019-01-01 RX ADMIN — MORPHINE SULFATE 1 MILLIGRAM(S): 50 CAPSULE, EXTENDED RELEASE ORAL at 05:32

## 2019-01-01 RX ADMIN — PANTOPRAZOLE SODIUM 40 MILLIGRAM(S): 20 TABLET, DELAYED RELEASE ORAL at 06:38

## 2019-01-01 RX ADMIN — Medication 10 MILLIGRAM(S): at 02:37

## 2019-01-01 RX ADMIN — Medication 10 MILLILITER(S): at 23:37

## 2019-01-01 RX ADMIN — Medication 1 TABLET(S): at 13:48

## 2019-01-01 RX ADMIN — BUDESONIDE AND FORMOTEROL FUMARATE DIHYDRATE 2 PUFF(S): 160; 4.5 AEROSOL RESPIRATORY (INHALATION) at 22:09

## 2019-01-01 RX ADMIN — Medication 50 MILLIGRAM(S): at 05:37

## 2019-01-01 RX ADMIN — SERTRALINE 25 MILLIGRAM(S): 25 TABLET, FILM COATED ORAL at 12:18

## 2019-01-01 RX ADMIN — Medication 0.5 MILLIGRAM(S): at 05:57

## 2019-01-01 RX ADMIN — PANTOPRAZOLE SODIUM 40 MILLIGRAM(S): 20 TABLET, DELAYED RELEASE ORAL at 05:57

## 2019-01-01 RX ADMIN — SERTRALINE 25 MILLIGRAM(S): 25 TABLET, FILM COATED ORAL at 12:31

## 2019-01-01 RX ADMIN — Medication 0.5 MILLIGRAM(S): at 18:43

## 2019-01-01 RX ADMIN — Medication 1 APPLICATION(S): at 05:49

## 2019-01-01 RX ADMIN — SENNA PLUS 2 TABLET(S): 8.6 TABLET ORAL at 21:41

## 2019-01-01 RX ADMIN — LIDOCAINE 1 PATCH: 4 CREAM TOPICAL at 01:30

## 2019-01-01 RX ADMIN — Medication 20 MILLIGRAM(S): at 06:13

## 2019-01-01 RX ADMIN — Medication 10 MILLILITER(S): at 05:09

## 2019-01-01 RX ADMIN — DONEPEZIL HYDROCHLORIDE 5 MILLIGRAM(S): 10 TABLET, FILM COATED ORAL at 22:24

## 2019-01-01 RX ADMIN — Medication 3 MILLILITER(S): at 19:00

## 2019-01-01 RX ADMIN — Medication 3 MILLILITER(S): at 09:25

## 2019-01-01 RX ADMIN — Medication 10 MILLILITER(S): at 11:16

## 2019-01-01 RX ADMIN — LIDOCAINE 1 PATCH: 4 CREAM TOPICAL at 02:07

## 2019-01-01 RX ADMIN — SIMVASTATIN 10 MILLIGRAM(S): 20 TABLET, FILM COATED ORAL at 21:26

## 2019-01-01 RX ADMIN — Medication 3 MILLILITER(S): at 01:17

## 2019-01-01 RX ADMIN — Medication 1 TABLET(S): at 12:30

## 2019-01-01 RX ADMIN — Medication 81 MILLIGRAM(S): at 11:39

## 2019-01-01 RX ADMIN — Medication 3 MILLILITER(S): at 05:22

## 2019-01-01 RX ADMIN — MORPHINE SULFATE 1 MILLIGRAM(S): 50 CAPSULE, EXTENDED RELEASE ORAL at 01:54

## 2019-01-01 RX ADMIN — Medication 20 MILLIGRAM(S): at 23:37

## 2019-01-01 RX ADMIN — LIDOCAINE 1 PATCH: 4 CREAM TOPICAL at 12:33

## 2019-01-01 RX ADMIN — AMLODIPINE BESYLATE 5 MILLIGRAM(S): 2.5 TABLET ORAL at 05:44

## 2019-01-01 RX ADMIN — Medication 10 MILLILITER(S): at 01:08

## 2019-01-01 RX ADMIN — ENOXAPARIN SODIUM 40 MILLIGRAM(S): 100 INJECTION SUBCUTANEOUS at 11:39

## 2019-01-01 RX ADMIN — Medication 200 MILLIGRAM(S): at 21:34

## 2019-01-01 RX ADMIN — MORPHINE SULFATE 1 MILLIGRAM(S): 50 CAPSULE, EXTENDED RELEASE ORAL at 02:42

## 2019-01-01 RX ADMIN — TRAMADOL HYDROCHLORIDE 25 MILLIGRAM(S): 50 TABLET ORAL at 00:59

## 2019-01-01 RX ADMIN — Medication 50 MILLIGRAM(S): at 21:16

## 2019-01-01 RX ADMIN — MORPHINE SULFATE 1 MILLIGRAM(S): 50 CAPSULE, EXTENDED RELEASE ORAL at 21:00

## 2019-01-01 RX ADMIN — Medication 650 MILLIGRAM(S): at 06:37

## 2019-01-01 RX ADMIN — MORPHINE SULFATE 1 MILLIGRAM(S): 50 CAPSULE, EXTENDED RELEASE ORAL at 11:22

## 2019-01-01 RX ADMIN — Medication 1 APPLICATION(S): at 05:34

## 2019-01-01 RX ADMIN — Medication 0.5 MILLIGRAM(S): at 18:47

## 2019-01-01 RX ADMIN — ENOXAPARIN SODIUM 40 MILLIGRAM(S): 100 INJECTION SUBCUTANEOUS at 11:12

## 2019-01-01 RX ADMIN — Medication 50 MILLIGRAM(S): at 21:23

## 2019-01-01 RX ADMIN — Medication 50 MILLIGRAM(S): at 18:20

## 2019-01-01 RX ADMIN — SIMVASTATIN 10 MILLIGRAM(S): 20 TABLET, FILM COATED ORAL at 21:41

## 2019-01-01 RX ADMIN — PANTOPRAZOLE SODIUM 40 MILLIGRAM(S): 20 TABLET, DELAYED RELEASE ORAL at 07:00

## 2019-01-01 RX ADMIN — BUDESONIDE AND FORMOTEROL FUMARATE DIHYDRATE 2 PUFF(S): 160; 4.5 AEROSOL RESPIRATORY (INHALATION) at 10:41

## 2019-01-01 RX ADMIN — Medication 10 MILLILITER(S): at 06:25

## 2019-01-01 RX ADMIN — Medication 200 MILLIGRAM(S): at 21:24

## 2019-01-01 RX ADMIN — SERTRALINE 25 MILLIGRAM(S): 25 TABLET, FILM COATED ORAL at 22:37

## 2019-01-01 RX ADMIN — Medication 50 MILLIGRAM(S): at 12:29

## 2019-01-01 RX ADMIN — Medication 3 MILLILITER(S): at 15:14

## 2019-01-01 RX ADMIN — DONEPEZIL HYDROCHLORIDE 5 MILLIGRAM(S): 10 TABLET, FILM COATED ORAL at 21:00

## 2019-01-01 RX ADMIN — Medication 50 MILLIGRAM(S): at 21:17

## 2019-01-01 RX ADMIN — AMLODIPINE BESYLATE 5 MILLIGRAM(S): 2.5 TABLET ORAL at 06:55

## 2019-01-01 RX ADMIN — Medication 1 TABLET(S): at 11:40

## 2019-01-01 RX ADMIN — LIDOCAINE 1 PATCH: 4 CREAM TOPICAL at 11:59

## 2019-01-01 RX ADMIN — Medication 50 MILLIGRAM(S): at 14:07

## 2019-01-01 RX ADMIN — AMLODIPINE BESYLATE 5 MILLIGRAM(S): 2.5 TABLET ORAL at 05:36

## 2019-01-01 RX ADMIN — Medication 50 MILLIGRAM(S): at 18:01

## 2019-01-01 RX ADMIN — Medication 1000 UNIT(S): at 13:42

## 2019-01-01 RX ADMIN — Medication 0.5 MILLIGRAM(S): at 10:26

## 2019-01-01 RX ADMIN — AMLODIPINE BESYLATE 5 MILLIGRAM(S): 2.5 TABLET ORAL at 05:09

## 2019-01-01 RX ADMIN — LIDOCAINE 1 PATCH: 4 CREAM TOPICAL at 23:09

## 2019-01-01 RX ADMIN — MORPHINE SULFATE 1 MILLIGRAM(S): 50 CAPSULE, EXTENDED RELEASE ORAL at 02:53

## 2019-01-01 RX ADMIN — Medication 10 MILLILITER(S): at 11:26

## 2019-01-01 RX ADMIN — LIDOCAINE 1 PATCH: 4 CREAM TOPICAL at 19:10

## 2019-01-01 RX ADMIN — SERTRALINE 25 MILLIGRAM(S): 25 TABLET, FILM COATED ORAL at 19:00

## 2019-01-01 RX ADMIN — Medication 10 MILLILITER(S): at 06:10

## 2019-01-01 RX ADMIN — SIMVASTATIN 10 MILLIGRAM(S): 20 TABLET, FILM COATED ORAL at 22:19

## 2019-01-01 RX ADMIN — Medication 10 MILLILITER(S): at 00:09

## 2019-01-01 RX ADMIN — Medication 10 MILLILITER(S): at 06:38

## 2019-01-01 RX ADMIN — SIMVASTATIN 10 MILLIGRAM(S): 20 TABLET, FILM COATED ORAL at 22:24

## 2019-01-01 RX ADMIN — AMLODIPINE BESYLATE 5 MILLIGRAM(S): 2.5 TABLET ORAL at 06:38

## 2019-01-01 RX ADMIN — SODIUM CHLORIDE 60 MILLILITER(S): 9 INJECTION INTRAMUSCULAR; INTRAVENOUS; SUBCUTANEOUS at 21:34

## 2019-01-01 RX ADMIN — SENNA PLUS 2 TABLET(S): 8.6 TABLET ORAL at 21:17

## 2019-01-01 RX ADMIN — Medication 1 APPLICATION(S): at 17:17

## 2019-01-01 RX ADMIN — Medication 200 MILLIGRAM(S): at 19:00

## 2019-01-01 RX ADMIN — SERTRALINE 25 MILLIGRAM(S): 25 TABLET, FILM COATED ORAL at 13:53

## 2019-01-01 RX ADMIN — Medication 1 APPLICATION(S): at 18:21

## 2019-01-01 RX ADMIN — ENOXAPARIN SODIUM 40 MILLIGRAM(S): 100 INJECTION SUBCUTANEOUS at 12:32

## 2019-01-01 RX ADMIN — Medication 20 MILLIGRAM(S): at 11:49

## 2019-01-01 RX ADMIN — Medication 3 MILLILITER(S): at 13:45

## 2019-01-01 RX ADMIN — Medication 200 MILLIGRAM(S): at 21:27

## 2019-01-01 RX ADMIN — PANTOPRAZOLE SODIUM 40 MILLIGRAM(S): 20 TABLET, DELAYED RELEASE ORAL at 06:55

## 2019-01-01 RX ADMIN — Medication 20 MILLIGRAM(S): at 07:01

## 2019-01-01 RX ADMIN — Medication 0.5 MILLIGRAM(S): at 18:34

## 2019-01-01 RX ADMIN — Medication 50 MILLIGRAM(S): at 21:34

## 2019-01-01 RX ADMIN — TRAMADOL HYDROCHLORIDE 25 MILLIGRAM(S): 50 TABLET ORAL at 23:41

## 2019-01-01 RX ADMIN — Medication 1 APPLICATION(S): at 06:11

## 2019-01-01 RX ADMIN — BUDESONIDE AND FORMOTEROL FUMARATE DIHYDRATE 2 PUFF(S): 160; 4.5 AEROSOL RESPIRATORY (INHALATION) at 11:17

## 2019-01-01 RX ADMIN — Medication 0.5 MILLIGRAM(S): at 17:28

## 2019-01-01 RX ADMIN — Medication 0.5 MILLIGRAM(S): at 20:13

## 2019-01-01 RX ADMIN — MORPHINE SULFATE 1 MILLIGRAM(S): 50 CAPSULE, EXTENDED RELEASE ORAL at 07:00

## 2019-01-01 RX ADMIN — Medication 10 MILLILITER(S): at 07:00

## 2019-01-01 RX ADMIN — Medication 50 MILLIGRAM(S): at 06:14

## 2019-01-01 RX ADMIN — PANTOPRAZOLE SODIUM 40 MILLIGRAM(S): 20 TABLET, DELAYED RELEASE ORAL at 06:25

## 2019-01-01 RX ADMIN — MORPHINE SULFATE 1 MILLIGRAM(S): 50 CAPSULE, EXTENDED RELEASE ORAL at 21:52

## 2019-01-01 RX ADMIN — Medication 0.5 MILLIGRAM(S): at 17:23

## 2019-01-01 RX ADMIN — Medication 10 MILLILITER(S): at 23:08

## 2019-01-01 RX ADMIN — Medication 1 TABLET(S): at 13:42

## 2019-01-01 RX ADMIN — MORPHINE SULFATE 1 MILLIGRAM(S): 50 CAPSULE, EXTENDED RELEASE ORAL at 23:34

## 2019-01-01 RX ADMIN — ONDANSETRON 4 MILLIGRAM(S): 8 TABLET, FILM COATED ORAL at 10:00

## 2019-01-01 RX ADMIN — Medication 3 MILLILITER(S): at 00:50

## 2019-01-01 RX ADMIN — Medication 1 APPLICATION(S): at 06:20

## 2019-01-01 RX ADMIN — Medication 10 MILLILITER(S): at 05:25

## 2019-01-01 RX ADMIN — Medication 50 MILLIGRAM(S): at 06:03

## 2019-01-01 RX ADMIN — SERTRALINE 25 MILLIGRAM(S): 25 TABLET, FILM COATED ORAL at 11:09

## 2019-01-01 RX ADMIN — SERTRALINE 25 MILLIGRAM(S): 25 TABLET, FILM COATED ORAL at 15:09

## 2019-01-01 RX ADMIN — SIMVASTATIN 10 MILLIGRAM(S): 20 TABLET, FILM COATED ORAL at 22:08

## 2019-01-01 RX ADMIN — ENOXAPARIN SODIUM 40 MILLIGRAM(S): 100 INJECTION SUBCUTANEOUS at 12:18

## 2019-01-01 RX ADMIN — Medication 10 MILLILITER(S): at 18:05

## 2019-01-01 RX ADMIN — ENOXAPARIN SODIUM 40 MILLIGRAM(S): 100 INJECTION SUBCUTANEOUS at 13:07

## 2019-01-01 RX ADMIN — SENNA PLUS 2 TABLET(S): 8.6 TABLET ORAL at 00:49

## 2019-01-01 RX ADMIN — SERTRALINE 25 MILLIGRAM(S): 25 TABLET, FILM COATED ORAL at 12:01

## 2019-01-01 RX ADMIN — Medication 0.5 MILLIGRAM(S): at 08:17

## 2019-01-01 RX ADMIN — Medication 0.5 MILLIGRAM(S): at 18:18

## 2019-01-01 RX ADMIN — Medication 1000 UNIT(S): at 11:39

## 2019-01-01 RX ADMIN — Medication 50 MILLIGRAM(S): at 18:02

## 2019-01-01 RX ADMIN — DONEPEZIL HYDROCHLORIDE 5 MILLIGRAM(S): 10 TABLET, FILM COATED ORAL at 22:05

## 2019-02-28 NOTE — CONSULT NOTE ADULT - NEGATIVE NEUROLOGICAL SYMPTOMS
Returned phone call to patient and instructed him to stop taking the oral decadron before chemo as patient does receive IV decadron the day of chemo  no focal seizures/no paresthesias/no generalized seizures

## 2019-04-04 NOTE — BEHAVIORAL HEALTH ASSESSMENT NOTE - ORIENTED TO PERSON
Patient tolerating oral liquids without difficulty. No apparent s&s of distress noted at this time, no complaints voiced at this time. Discharge instructions reviewed with patient/family/friend with good verbal feedback received. Patient ready for discharge    
VSS, all questions answered. Denies recent fever or illness. Pt states ready for procedure.  
Yes

## 2019-04-17 NOTE — PROGRESS NOTE BEHAVIORAL HEALTH - NSBHFUPREASONCONS_PSY_A_CORE
I called and notified the pt of these results/recommendations. The pt verbalized understanding, but declined the Prolia at this time. dementia/capacity

## 2019-05-09 NOTE — H&P ADULT - NSHPROSALLOTHERNEGRD_GEN_ALL_CORE
Subjective: The patient is awake and alert. No problems overnight. Denies chest pain, angina, and dyspnea. Denies abdominal pain. Tolerating diet. No nausea or vomiting. Awaiting MRI results    Objective:    /70   Pulse 70   Temp 96.9 °F (36.1 °C) (Temporal)   Resp 16   Ht 5' 6\" (1.676 m)   Wt 183 lb 5 oz (83.2 kg)   SpO2 95%   BMI 29.59 kg/m²     Heart:  RRR, no murmurs, gallops, or rubs.   Lungs:  CTA bilaterally, no wheeze, rales or rhonchi  Abd: bowel sounds present, nontender, nondistended, no masses  Extrem:  No clubbing, cyanosis, or edema    CBC with Differential:    Lab Results   Component Value Date    WBC 8.1 05/06/2019    RBC 4.41 05/06/2019    HGB 12.9 05/06/2019    HCT 40.1 05/06/2019     05/08/2019    MCV 90.9 05/06/2019    MCH 29.3 05/06/2019    MCHC 32.2 05/06/2019    RDW 13.2 05/06/2019    LYMPHOPCT 10.5 05/06/2019    MONOPCT 7.9 05/06/2019    BASOPCT 0.5 05/06/2019    MONOSABS 0.63 05/06/2019    LYMPHSABS 0.84 05/06/2019    EOSABS 0.12 05/06/2019    BASOSABS 0.04 05/06/2019     CMP:    Lab Results   Component Value Date     05/06/2019    K 4.0 05/06/2019     05/06/2019    CO2 29 05/06/2019    BUN 11 05/06/2019    CREATININE 0.9 05/06/2019    CREATININE 0.8 05/06/2019    GFRAA >60 05/06/2019    LABGLOM >60 05/06/2019    GLUCOSE 110 05/06/2019    PROT 6.0 08/11/2018    LABALBU 3.0 08/11/2018    CALCIUM 9.4 05/06/2019    BILITOT 0.2 08/11/2018    ALKPHOS 80 08/11/2018    AST 12 08/11/2018    ALT 8 08/11/2018        Assessment:    Patient Active Problem List   Diagnosis    Multiple sclerosis ([facility]    Acute cystitis    Bilateral foot-drop    Infection due to urethral catheter ([address]    Urinary tract infection due to extended-spectrum beta lactamase (ESBL) producing Escherichia coli    Acute encephalopathy    Heart failure (HCC)    Myocardial infarction, anteroseptal (HCC)       Plan:  Possible D/C if ok with Cardiology  Awaiting MRI results        Nehal Denson All other review of systems negative, except as noted in HPI

## 2019-06-24 NOTE — H&P ADULT - PROBLEM SELECTOR PLAN 6
IMPROVE VTE Individual Risk Assessment          RISK                                                          Points  [  ] Previous VTE                                                3  [  ] Thrombophilia                                             2  [  ] Lower limb paralysis                                   2        (unable to hold up >15 seconds)    [  ] Current Cancer                                             2         (within 6 months)  [ x ] Immobilization > 24 hrs                              1  [  ] ICU/CCU stay > 24 hours                             1  [x  ] Age > 60                                                         1    IMPROVE VTE Score: 2, lovenox for dvt ppx lovenox

## 2019-06-24 NOTE — ED ADULT NURSE NOTE - NSIMPLEMENTINTERV_GEN_ALL_ED
Implemented All Fall with Harm Risk Interventions:  [place] to call system. Call bell, personal items and telephone within reach. Instruct patient to call for assistance. Room bathroom lighting operational. Non-slip footwear when patient is off stretcher. Physically safe environment: no spills, clutter or unnecessary equipment. Stretcher in lowest position, wheels locked, appropriate side rails in place. Provide visual cue, wrist band, yellow gown, etc. Monitor gait and stability. Monitor for mental status changes and reorient to person, place, and time. Review medications for side effects contributing to fall risk. Reinforce activity limits and safety measures with patient and family. Provide visual clues: red socks.

## 2019-06-24 NOTE — CONSULT NOTE ADULT - SUBJECTIVE AND OBJECTIVE BOX
CHIEF COMPLAINT:Patient is a 88y old  Female who presents with a chief complaint of sob.    HPI:  88 year-old female with history of HTN, CAD (stent in 2004), HLD presents to the Emergency Department for exertional hypoxia.  Patient moved from [place] (residing there for the past year) and moved home a week ago.  Had a momentary episode of AMS and was taken to [facility] and diagnosed with dementia.  Patient followed up with PCP today and noted to be hypoxic to 82% with exertion.  Patient declines any resp. distress.  + non-productive vough.  No fevers, chills, nausea, vomiting, chest pain, shortness of breath, abdominal pain, falls/trauma.      PAST MEDICAL & SURGICAL HISTORY:  Smoking greater than 25 pack years  Stenosis of left carotid artery  Vitamin D deficiency  Heart attack: 2004  Hyperlipidemia  HTN (hypertension)  CAD (coronary artery disease)  Stented coronary artery: x 1 - 2004  S/P hernia repair: inguinal , right - February 2106  H/O heart artery stent: 2004  History of tonsillectomy  History of appendectomy  H/O abdominal hysterectomy      MEDICATIONS  (STANDING):    MEDICATIONS  (PRN):      FAMILY HISTORY:  No pertinent family history in first degree relatives      SOCIAL HISTORY:    [ ] Non-smoker  [ ] Smoker  [ ] Alcohol    Allergies    No Known Allergies    Intolerances    	    REVIEW OF SYSTEMS:  CONSTITUTIONAL: No fever, weight loss, or fatigue  EYES: No eye pain, visual disturbances, or discharge  ENT:  No difficulty hearing, tinnitus, vertigo; No sinus or throat pain  NECK: No pain or stiffness  RESPIRATORY: No cough, wheezing, chills or hemoptysis; + Shortness of Breath  CARDIOVASCULAR: No chest pain, + palpitations, no passing out, dizziness, or leg swelling  GASTROINTESTINAL: No abdominal or epigastric pain. No nausea, vomiting, or hematemesis; No diarrhea or constipation. No melena or hematochezia.  GENITOURINARY: No dysuria, frequency, hematuria, or incontinence  NEUROLOGICAL: No headaches, memory loss, loss of strength, numbness, or tremors  SKIN: No itching, burning, rashes, or lesions   LYMPH Nodes: No enlarged glands  ENDOCRINE: No heat or cold intolerance; No hair loss  MUSCULOSKELETAL: No joint pain or swelling; No muscle, back, or extremity pain  PSYCHIATRIC: No depression, anxiety, mood swings, or difficulty sleeping  HEME/LYMPH: No easy bruising, or bleeding gums  ALLERGY AND IMMUNOLOGIC: No hives or eczema	    [ ] All others negative	  [ ] Unable to obtain    PHYSICAL EXAM:  T(C): 36.7 (06-24-19 @ 19:18), Max: 36.8 (06-24-19 @ 14:36)  HR: 62 (06-24-19 @ 19:18) (62 - 66)  BP: 184/65 (06-24-19 @ 19:18) (178/79 - 184/65)  RR: 18 (06-24-19 @ 19:18) (16 - 18)  SpO2: 98% (06-24-19 @ 19:18) (97% - 98%)  Wt(kg): --  I&O's Summary      Appearance: Normal	  HEENT:   Normal oral mucosa, PERRL, EOMI	  Lymphatic: No lymphadenopathy  Cardiovascular: Normal S1 S2, No JVD, + murmurs, No edema  Respiratory: rhonchi	  Psychiatry: A & O x 3, Mood & affect appropriate  Gastrointestinal:  Soft, Non-tender, + BS	  Skin: No rashes, No ecchymoses, No cyanosis	  Neurologic: Non-focal  Extremities: Normal range of motion, No clubbing, cyanosis or edema  Vascular: Peripheral pulses palpable 2+ bilaterally    TELEMETRY: 	    ECG:  	  RADIOLOGY:  OTHER: 	  	  LABS:	 	    CARDIAC MARKERS:                              10.2   9.2   )-----------( 369      ( 24 Jun 2019 16:44 )             30.8     06-24    132<L>  |  95<L>  |  15  ----------------------------<  106<H>  4.6   |  24  |  0.84    Ca    9.5      24 Jun 2019 16:44    TPro  7.3  /  Alb  3.5  /  TBili  0.3  /  DBili  x   /  AST  14  /  ALT  8<L>  /  AlkPhos  69  06-24    proBNP: Serum Pro-Brain Natriuretic Peptide: 715 pg/mL (06-24 @ 16:44)    Lipid Profile:   HgA1c:   TSH:   PT/INR - ( 24 Jun 2019 16:44 )   PT: 12.0 sec;   INR: 1.04 ratio         PTT - ( 24 Jun 2019 16:44 )  PTT:29.8 sec    PREVIOUS DIAGNOSTIC TESTING:    < from: CT Chest No Cont (04.04.18 @ 09:44) >  Emphysema with new right upper lobe 6 mm nodule which is indeterminate.   Three-month follow-up CTneeded for complete evaluation.    < from: 12 Lead ECG (04.03.18 @ 01:25) >  Diagnosis Line *** Poor data quality, interpretation may be adversely affected  Normal sinus rhythm  Nonspecific ST and T wave abnormality  Abnormal ECG    < from: Xray Chest 1 View AP/PA (06.24.19 @ 16:33) >  INTERPRETATION:  Ill defined right middle and lower lobe consolidation   likely pneumonia and/or atelectasis. Left mid lung opacities favor   chronic scarring via correlation with CT chest of 4/4/2018.    < from: Transthoracic Echocardiogram (04.27.15 @ 06:47) >  1. Normal Left Ventricular Systolic Function,  (EF = 55 to  60%)  2. Grade II diastolic dysfunction  3. RV systolic pressure is mildly increased.=35mmhg    < end of copied text >

## 2019-06-24 NOTE — H&P ADULT - NSHPREVIEWOFSYSTEMS_GEN_ALL_CORE
GEN: no fever, no chills, no pain  RESP: no SOB, no cough, no sputum  CVS: no chest pain  GI: no abdominal pain, no nausea  : no dysuria, no frequency, no hematuria  NEURO: no headache, no dizziness  PSYCH: no depression, not anxious  Derm : no itching

## 2019-06-24 NOTE — H&P ADULT - NSICDXPASTMEDICALHX_GEN_ALL_CORE_FT
PAST MEDICAL HISTORY:  CAD (coronary artery disease)     Heart attack 2004    HTN (hypertension)     Hyperlipidemia     Smoking greater than 25 pack years     Stenosis of left carotid artery     Stented coronary artery x 1 - 2004    Vitamin D deficiency

## 2019-06-24 NOTE — ED PROVIDER NOTE - CLINICAL SUMMARY MEDICAL DECISION MAKING FREE TEXT BOX
88 year-old female with history of HTN, CAD (stent in 2004), HLD presents to the Emergency Department for exertional hypoxia; patient in no resp. distress.  Plan for CXR to rule-out PNA.  Labs, and ambulate spO2 and reassess. 88 year-old female with history of HTN, CAD (stent in 2004), HLD presents to the Emergency Department for exertional hypoxia; patient in no resp. distress.  Plan for CXR to rule-out PNA.  Labs, and ambulate spO2 and reassess.  Attending Denice Duron: 89 y/o female h/o HTN, HLD presenting to the ED with concern for hypoxia at pcp. in the ED pt well appearing without respiratory distress. cxr shows possible pna, considier metastatic process. pt with dry cough and afebrile. age adjusted d dimer normal making PE less likely. no evidence of B lines on pocus making interstitial edema as cause less likely. will obtain ct chest to further evaluate. and re-eval

## 2019-06-24 NOTE — H&P ADULT - PROBLEM SELECTOR PLAN 1
SOB / DOMINGUEZ and hypoxemia  lung mass on CT  check CTA r/o PE, as elevated D Dimer   pt placed on O2 via NC   monitor  pt hemodynamically stable, so will hold off of full AC

## 2019-06-24 NOTE — ED ADULT NURSE REASSESSMENT NOTE - NS ED NURSE REASSESS COMMENT FT1
Report received from Alen BOND Report received from Alen BOND.  Pt is resting comfortably.  Given sandwich and drink.  Repositioned in bed. VSS.  Safety and comfort maintained.  Pending bed upstairs.

## 2019-06-24 NOTE — CONSULT NOTE ADULT - ASSESSMENT
pt with sig cardiac history with increasing sob and chest pain.  cta of chest r/o mass/PE  ecg awaiting   continue cardiac meds  tele  dvt prophylaxis  echo

## 2019-06-24 NOTE — H&P ADULT - PROBLEM SELECTOR PLAN 3
resuming lopressor and hydralazine  monitor BP resuming lopressor +  hydralazine  monitor BP as elevated  cardio appreciated

## 2019-06-24 NOTE — ED PROVIDER NOTE - PROGRESS NOTE DETAILS
Attending Denice Duron: ct chest shows likely malignancy. will admit for further evaluation and monitoring of oxygen saturation

## 2019-06-24 NOTE — ED PROVIDER NOTE - ATTENDING CONTRIBUTION TO CARE
Attending MD Denice Duron:  I personally have seen and examined this patient.  Resident note reviewed and agree on plan of care and except where noted.  See HPI, PE, and MDM for details.

## 2019-06-24 NOTE — H&P ADULT - HISTORY OF PRESENT ILLNESS
Pt is an 88 year-old woman with PMH of HTN, CAD (stent in 2004), HLD presents to the Emergency Department for exertional hypoxia.    Patient moved from [place] (residing there for the past year) and moved home a week ago.  Had a momentary episode of AMS and was taken to [facility] and diagnosed with dementia.    Patient followed up with PCP today and noted to be hypoxic to 82% with exertion.    Patient declines any resp. distress.  + non-productive cough.    No fevers, chills, nausea, vomiting, chest pain, shortness of breath, abdominal pain, falls/trauma Pt is an 88 year-old woman with PMH of HTN, CAD (stent in 2004), HLD presents to the Emergency Department for exertional hypoxia.    Patient moved from NH (residing there for the past year) and moved home a week ago.  Had a momentary episode of AMS and was taken to [facility] and diagnosed with dementia.    Patient followed up with PCP today and noted to be hypoxic to 82% with exertion.    Patient declines any resp. distress.  + non-productive cough.    No fevers, chills, nausea, vomiting, chest pain, shortness of breath, abdominal pain, falls/trauma    pt unable to give much history due to forgetfulness

## 2019-06-24 NOTE — H&P ADULT - NSHPSOCIALHISTORY_GEN_ALL_CORE
past daily smoker > 25 years   neg for etoh past daily smoker > 25 years , last smoke a year ago  neg for etoh

## 2019-06-24 NOTE — ED PROVIDER NOTE - PHYSICAL EXAMINATION
*Gen: NAD, AAO*3  *HEENT: NC/AT, MMM, airway patent, trachea midline  *CV: RRR, S1/S2 present, + murmur; b/l LE 2+ pain  *Resp: no respiratory distress, LCTAB, no wheezing  *Abd: non-distended, soft N/Tx4, no guarding or rigidity  *Neuro: no focal neuro deficits, moving all limbs appropriately  *Extremities: no gross deformity  *Skin: no rashes, no wounds   ~ Fabricio Clemens M.D.

## 2019-06-24 NOTE — ED ADULT NURSE NOTE - OBJECTIVE STATEMENT
89 y/o F pt w/ pmh of HTN, CAD with stent, HLD, present to sent by PMD for dyspnea on exertion, pt was a follow up visit s/p leaving nursing [facility], pt was found to have cough, SOB with exertion and b/l lower extremity swelling, on exam pt A&Ox3, lungs b/l CTA, breathing spontaneous, unlabored without distress, sating 98% on room air, non productive cough, b/l 1 plus non pitting edema, denies chest pain, dizziness, fever, chills, dysuria, hematuria, seen and eval by MD, heplock placed, labs drawn and sent

## 2019-06-24 NOTE — ED PROVIDER NOTE - OBJECTIVE STATEMENT
88 year-old female with history of HTN, CAD (stent in 2004), HLD presents to the Emergency Department for exertional hypoxia to 82% at PCP office.  Patient moved from NH (residing there for the past year)     PCP -   TBA with Dr. Daren Li 88 year-old female with history of HTN, CAD (stent in 2004), HLD presents to the Emergency Department for exertional hypoxia.  Patient moved from NH (residing there for the past year) and moved home a week ago.  Had a momentary episode of AMS and was taken to [facility] and diagnosed with dementia.  Patient followed up with PCP today and noted to be hypoxic to 82% with exertion.  Patient declines any resp. distress.  + non-productive vough.  No fevers, chills, nausea, vomiting, chest pain, shortness of breath, abdominal pain, falls/trauma.    PCP - Jen MCALLISTERA with Dr. Daren Li

## 2019-06-24 NOTE — H&P ADULT - NSICDXPASTSURGICALHX_GEN_ALL_CORE_FT
PAST SURGICAL HISTORY:  H/O abdominal hysterectomy     H/O heart artery stent 2004    History of appendectomy     History of tonsillectomy     S/P hernia repair inguinal , right - February 2106

## 2019-06-26 NOTE — CONSULT NOTE ADULT - SUBJECTIVE AND OBJECTIVE BOX
[facility] - [facility] - CONSULT NOTE    HPI:    PMHX:  HTN (hypertension)  HLD (hyperlipidemia)  CAD (coronary artery disease)  Myocardial infarction  Stenosis of left carotid artery  Vitamin D deficiency    PSHX:  hernia repair  heart artery stent  tonsillectomy  appendectomy  abdominal hysterectomy  No significant past surgical history      FAMILY HISTORY:  No pertinent family history in first degree relatives      SOCIAL HISTORY:  former smoker > 25 years discontinued last year    Pulmonary Medications:       Antimicrobials:      Cardiology:  hydrALAZINE 50 milliGRAM(s) Oral every 8 hours  metoprolol tartrate 50 milliGRAM(s) Oral two times a day      Other:  aspirin  chewable 81 milliGRAM(s) Oral daily  cholecalciferol 1000 Unit(s) Oral daily  donepezil 5 milliGRAM(s) Oral at bedtime  enoxaparin Injectable 40 milliGRAM(s) SubCutaneous daily  multivitamin 1 Tablet(s) Oral daily  senna 2 Tablet(s) Oral at bedtime  sertraline 25 milliGRAM(s) Oral daily  simvastatin 10 milliGRAM(s) Oral at bedtime  sodium chloride 0.9%. 1000 milliLiter(s) IV Continuous <Continuous>      Prn:  MEDICATIONS  (PRN):      Allergies    No Known Allergies    Intolerances        HOME MEDICATIONS: see  H & P    REVIEW OF SYSTEMS:  Constitutional: As per HPI  HEENT: Within normal limits  CV: As per HPI  Resp: As per HPI  GI: Within normal limits   : Within normal limits  Musculoskeletal: Within normal limits  Skin: Within normal limits  Neurological: Within normal limits  Psychiatric: Within normal limits  Endocrine: Within normal limits  Hematologic/Lymphatic: Within normal limits  Allergic/Immunologic: Within normal limits    [x] All other systems negative    OBJECTIVE:    I&O's Detail    25 Jun 2019 07:01  -  26 Jun 2019 07:00  --------------------------------------------------------  IN:    Oral Fluid: 660 mL  Total IN: 660 mL    OUT:    Voided: 1550 mL  Total OUT: 1550 mL    Total NET: -890 mL        Daily     Daily       PHYSICAL EXAM:  ICU Vital Signs Last 24 Hrs  T(C): 36.6 (26 Jun 2019 07:31), Max: 36.9 (26 Jun 2019 00:06)  T(F): 97.8 (26 Jun 2019 07:31), Max: 98.5 (26 Jun 2019 00:06)  HR: 54 (26 Jun 2019 07:31) (54 - 73)  BP: 136/58 (26 Jun 2019 07:31) (136/58 - 158/71)  BP(mean): --  ABP: --  ABP(mean): --  RR: 18 (26 Jun 2019 07:31) (18 - 18)  SpO2: 100% (26 Jun 2019 07:31) (100% - 100%)    General: Awake. Alert. Cooperative. No distress. Appears stated age 	  HEENT:   Atraumatic. Normocephalic. Anicteric. Normal oral mucosa. PERRL. EOMI.  Neck: Supple. Trachea midline. Thyroid without enlargement/tenderness/nodules. No carotid bruit. No JVD.	  Cardiovascular: Regular rate and rhythm. S1 S2 normal. No murmurs, rubs or gallops.  Respiratory: Respirations unlabored. Clear to auscultation and percussion bilaterally. No curvature.  Abdomen: Soft. Non-tender. Non-distended. No organomegaly. No masses. Normal bowel sounds.  Extremities: Warm to touch. No clubbing or cyanosis. No pedal edema.  Pulses: 2+ peripheral pulses all extremities.	  Skin: Normal skin color. No rashes or lesions. No ecchymoses. No cyanosis. Warm to touch.  Lymph Nodes: Cervical, supraclavicular and axillary nodes normal  Neurological: Motor and sensory examination equal and normal. A and O x 3  Psychiatry: Appropriate mood and affect.      LABS:                          8.2    9.25  )-----------( 338      ( 25 Jun 2019 08:53 )             26.9     CBC    WBC  9.25 <==, 9.2 <==    Hemoglobin  8.2 <<==, 10.2 <<==    Hematocrit  26.9 <==, 30.8 <==    Platelets  338 <==, 369 <==      136  |  98  |  12  ----------------------------<  88    06-26  4.1   |  28  |  0.85    LYTES    sodium  136 <==, 136 <==, 132 <==    potassium   4.1 <==, 4.0 <==, 4.6 <==    chloride  98 <==, 99 <==, 95 <==    carbon dioxide  28 <==, 24 <==, 24 <==    =============================================================================================  RENAL FUNCTION:    Creatinine:   0.85  <<==, 0.81  <<==, 0.84  <<==    BUN:   12 <==, 12 <==, 15 <==    ============================================================================================    calcium   8.7 <==, 8.6 <==, 9.5 <==    phos   3.3 <==    mag   1.9 <==    ============================================================================================  LFTs    AST:   10 <== , 14 <==     ALT:  7  <== , 8  <==     AP:  57  <=, 69  <=    Bili:  0.2  <=, 0.3  <=    PT/INR - ( 24 Jun 2019 16:44 )   PT: 12.0 sec;   INR: 1.04 ratio       PTT - ( 24 Jun 2019 16:44 )  PTT:29.8 sec    Venous Blood Gas:  06-24 @ 16:44  7.40/48/23/29/32  VBG Lactate: 1.2    Serum Pro-Brain Natriuretic Peptide: 715 pg/mL (06-24 @ 16:44)    D-Dimer Assay, Quantitative: 356 ng/mL DDU (06-24 @ 16:44)    MICROBIOLOGY:           RADIOLOGY:  [x ] Chest radiographs reviewed and interpreted by me    EXAM:  XR CHEST AP OR PA 1V                          PROCEDURE DATE:  06/24/2019      INTERPRETATION:  CLINICAL INFORMATION: Cough.    EXAM: Frontal radiograph of the chest.    COMPARISON: Chest radiograph from 4/13/2018    FINDINGS:    Thecardiac silhouette is normal in size. There is a questionable trace   left pleural effusion. There are no focal consolidations or pleural   effusions. There is probable mild pulmonary vascular congestion.   Calcifications overlie the bilateral breasts.      IMPRESSION:    Questionable trace left pleural effusion and probable mild pulmonary   vascular congestion. Please correlate with the CT scan performed the same   day as this exam.    GLORIA DENNY M.D., RADIOLOGY RESIDENT  This document has been electronically signed.  INOCENCIA CONTI M.D., ATTENDING RADIOLOGIST  This document has been electronically signed. Jun 25 2019  9:42AM      ---------------------------------------------------------------------------------------------------------------  EXAM:  CT CHEST                          PROCEDURE DATE:  06/24/2019      INTERPRETATION:  Clinical information: Evaluate for pneumonia. Exam is   compared to previous study of 4/4/2018.    CT scan of the chest was obtained without administration of intravenous   contrast.    Once again, asymmetric nodularity containing calcification is noted   within the right breast. Appearance is unchanged when compared to   previous exam.    Multiple lymph nodes are present in the anterior mediastinum, pretracheal   space, AP window, right hilum and the subcarinal region. One of the   largest lymph node is present in the subcarinal region and measures   approximately 3.5 x 2.1 cm.     Heart is normal in size. Calcification of the aortic valve and the   coronary arteries is noted. No pericardial effusion is noted.     No central endobronchial lesions are noted. Two lobulated nodules are   present in the right lower lobe. The larger one measures approximately   2.8 x 2.3 cm and is causing mild compression/narrowing of the posterior   segment of the right lower lobe bronchus. The second right lower lobe   nodule measures 2 x 1.8 cm. A 0.7 cm solid nodule in the apical segment   of the right upper lobe has increased in size when compared to previous   exam. Patient is status post wedge resection in the left lower lobe. An   associated nodular opacity is once again noted and is unchanged when   compared to previous exam. Bilateral apical thickening/scarring is noted.   No pleural effusions are noted.    Small hiatal hernia is noted.    Below the diaphragm, visualized portions of the abdomen demonstrate small   low-attenuation lesion in the left kidney which is too small to be   adequately characterized on this exam. Thickening of bothadrenal glands   is noted.     Degenerative changes of the spine as well as loss of height of one of the   lower vertebral bodies is noted.    Impression: 2.8 cm nodule in the right lower lobe is noted. Primary   consideration is lung carcinoma.    Additional nodule in the right lower lobe most likely represents   metastasis.    Mediastinal/right hilar adenopathy.    Small nodule in the apical segment of the right upper lobe has increased   in size when compared to previous exam.    JOHN LEWIS M.D., ATTENDING RADIOLOGIST  This document has been electronically signed. Jun 25 2019  9:27AM  ---------------------------------------------------------------------------------------------------------------    EXAM:  CT ABDOMEN AND PELVIS IC                          EXAM:  CT ANGIO CHEST (W)AW IC                          PROCEDURE DATE:  06/25/2019      INTERPRETATION:  CLINICAL INFORMATION: Shortness of breath and hypoxia.   Lung mass. Question PE. Metastatic workup     COMPARISON: CT chest 6/24/2019 and CT abdomen and pelvis 4/13/2018.    PROCEDURE:   CT Angiography of the Chest was performed followed by portal venous phase   imaging of the Abdomen and Pelvis.  IV Contrast: 90 ml of Omnipaque 350 was injected intravenously. 10 ml   were discarded.  Oral contrast: None.  Sagittal and coronal reformats were performed as well as 3D (MIP)   reconstructions.    FINDINGS:    CHEST:     LUNGS AND LARGE AIRWAYS: Collapsed of the central airways, likely due to   expiratory phase. Status post left lower lobe wedge resection. Emphysema.   Unchanged biapical scarring. Redemonstrated right lower lobe lobulated   nodules, unchanged. The larger one measures 2.7 x 2.5 cm (series 3, image   93) and the smaller one measures 2.0 x 1.9 cm (series 3, image 103).   Right basilar passive atelectasis.  PLEURA: Trace left and small right pleural effusion.  VESSELS: No pulmonary embolism. There is narrowing of the right lower   lobe posterior segmental pulmonary artery secondary to extrinsic   compression from a surrounding right lower lobe pulmonary nodule as   described above. The aorta and pulmonary artery are normal in size.   Aortic atherosclerotic calcifications.  HEART: Heart size is normal. No pericardial effusion. Aortic valve and   coronary artery calcifications.  MEDIASTINUM AND LBUE: Unchanged mediastinal and right hilar   lymphadenopathy. For reference a subcarinal lymph node measures 3.6 x 2.0   cm (series 3, image 66).  CHEST WALL AND LOWER NECK: Unchanged asymmetric right breast nodularity   with coarse calcifications.    ABDOMEN AND PELVIS:    LIVER: Left hepatic hypodensity in the falciform ligament, likely focal   fat. Tiny subcentimeter right hepatic dome hypodensity, too small to   characterize.  BILE DUCTS: Normal caliber.  GALLBLADDER: Cholelithiasis  SPLEEN: Not visualized.  PANCREAS: Within normal limits.  ADRENALS: Mild thickening of bilateral adrenals.  KIDNEYS/URETERS: Bilateral renal cysts. Additional bilateral   subcentimeter hypodensities, too small to characterize. The kidneys   enhance symmetrically. No hydronephrosis.    BLADDER: Within normal limits.  REPRODUCTIVE ORGANS: Hysterectomy. No adnexal masses.    BOWEL: No bowel obstruction. Sigmoid diverticulosis without   diverticulitis.  Appendix is not visualized.  PERITONEUM: No ascites.  VESSELS:  Atherosclerosis.  RETROPERITONEUM: No lymphadenopathy.    ABDOMINAL WALL: Small foci of subcutaneous air in the ventral left lower   abdominal wall subcutaneous tissue, likely related to subcutaneous   injections.  BONES: Redemonstrated compression deformity of the T12 vertebral body,   which is progressed as compared to prior. Degenerative changes of the   spine.    IMPRESSION:     No pulmonary embolism.    Narrowing of the right lower lobe posterior segmental pulmonary artery   secondary to extrinsic compression from a surrounding right lower lobe   pulmonary nodule.    Compression deformity of the T12 vertebral body which is increased as   compared to prior CT 4/13/2018.    JERRY WALKER M.D., RADIOLGY RESIDENT  This document has been electronically signed.  JOHN LEWIS M.D., ATTENDING RADIOLOGIST  This document has been electronically signed. Jun 25 2019  4:40PM  --------------------------------------------------------------------------------------------------------------- [facility] - [facility] - CONSULT NOTE    HPI: 88 year old gentlewoman, former heavy smoker, with no known history of intrinsic lung disease. She has a history of "dementia", HTN, HLD, CAD s/p MI/PCI (LVEF in 2015 was normal) and multiple falls. The patient was recently brought to [facility] with an alteration in mental status and diagnosed with dementia. She was seen in follow-up by her PCP who found the patient to be hypoxic on room air with exertion -> 82%> She has a productive cough associated with chest congestion and wheeze. She has no fevers, chills or sweats. No anterior chest pain/pressure or palpitations. Chest radiographs are abnormal. Asked to evaluate.    PMHX:  Dementia  HTN (hypertension)  HLD (hyperlipidemia)  CAD (coronary artery disease)  Myocardial infarction  Stenosis of left carotid artery  Vitamin D deficiency    PSHX:  Hernia repair  Heart artery stent  Tonsillectomy  Appendectomy  Abdominal hysterectomy      FAMILY HISTORY:  No pertinent family history in first degree relatives      SOCIAL HISTORY:  former smoker > 25 years discontinued last year    Pulmonary Medications:       Antimicrobials:      Cardiology:  hydrALAZINE 50 milliGRAM(s) Oral every 8 hours  metoprolol tartrate 50 milliGRAM(s) Oral two times a day      Other:  aspirin  chewable 81 milliGRAM(s) Oral daily  cholecalciferol 1000 Unit(s) Oral daily  donepezil 5 milliGRAM(s) Oral at bedtime  enoxaparin Injectable 40 milliGRAM(s) SubCutaneous daily  multivitamin 1 Tablet(s) Oral daily  senna 2 Tablet(s) Oral at bedtime  sertraline 25 milliGRAM(s) Oral daily  simvastatin 10 milliGRAM(s) Oral at bedtime  sodium chloride 0.9%. 1000 milliLiter(s) IV Continuous <Continuous>      Prn:  MEDICATIONS  (PRN):      Allergies    No Known Allergies    HOME MEDICATIONS: see  H & P    REVIEW OF SYSTEMS:  Constitutional: As per HPI  HEENT: Within normal limits  CV: As per HPI  Resp: As per HPI  GI: Within normal limits   : Within normal limits  Musculoskeletal: Within normal limits  Skin: Within normal limits  Neurological: confusion  Psychiatric: Within normal limits  Endocrine: Within normal limits  Hematologic/Lymphatic: Within normal limits  Allergic/Immunologic: Within normal limits    [x] All other systems negative    OBJECTIVE:    I&O's Detail    25 Jun 2019 07:01  -  26 Jun 2019 07:00  --------------------------------------------------------  IN:    Oral Fluid: 660 mL  Total IN: 660 mL    OUT:    Voided: 1550 mL  Total OUT: 1550 mL    Total NET: -890 mL    PHYSICAL EXAM:  ICU Vital Signs Last 24 Hrs  T(C): 36.6 (26 Jun 2019 07:31), Max: 36.9 (26 Jun 2019 00:06)  T(F): 97.8 (26 Jun 2019 07:31), Max: 98.5 (26 Jun 2019 00:06)  HR: 54 (26 Jun 2019 07:31) (54 - 73)  BP: 136/58 (26 Jun 2019 07:31) (136/58 - 158/71)  BP(mean): --  ABP: --  ABP(mean): --  RR: 18 (26 Jun 2019 07:31) (18 - 18)  SpO2: 100% (26 Jun 2019 07:31) (100% - 100%) on 2lpm nasal canula    General: Awake. Alert. Cooperative. No distress. Appears stated age. Confused. 	  HEENT: Atraumatic. Normocephalic. Anicteric. Normal oral mucosa. PERRL. EOMI.  Neck: Supple. Trachea midline. Thyroid without enlargement/tenderness/nodules. No carotid bruit. No JVD.	  Cardiovascular: Regular rate and rhythm. S1 S2 normal. No murmurs, rubs or gallops.  Respiratory: Respirations unlabored. Diffuse rhonchi selwyn wheeze. No curvature.  Abdomen: Soft. Non-tender. Non-distended. No organomegaly. No masses. Normal bowel sounds.  Extremities: Warm to touch. No clubbing or cyanosis. No pedal edema.  Pulses: 2+ peripheral pulses all extremities.	  Skin: Right breast with mild thickening of the skin with fullness to palpaion  Lymph Nodes: Cervical, supraclavicular and axillary nodes normal  Neurological: Motor and sensory examination equal and normal. A and O x 1  Psychiatry: Appropriate mood and affect.      LABS:                          8.2    9.25  )-----------( 338      ( 25 Jun 2019 08:53 )             26.9     CBC    WBC  9.25 <==, 9.2 <==    Hemoglobin  8.2 <<==, 10.2 <<==    Hematocrit  26.9 <==, 30.8 <==    Platelets  338 <==, 369 <==      136  |  98  |  12  ----------------------------<  88    06-26  4.1   |  28  |  0.85    LYTES    sodium  136 <==, 136 <==, 132 <==    potassium   4.1 <==, 4.0 <==, 4.6 <==    chloride  98 <==, 99 <==, 95 <==    carbon dioxide  28 <==, 24 <==, 24 <==    =============================================================================================  RENAL FUNCTION:    Creatinine:   0.85  <<==, 0.81  <<==, 0.84  <<==    BUN:   12 <==, 12 <==, 15 <==    ============================================================================================    calcium   8.7 <==, 8.6 <==, 9.5 <==    phos   3.3 <==    mag   1.9 <==    ============================================================================================  LFTs    AST:   10 <== , 14 <==     ALT:  7  <== , 8  <==     AP:  57  <=, 69  <=    Bili:  0.2  <=, 0.3  <=    PT/INR - ( 24 Jun 2019 16:44 )   PT: 12.0 sec;   INR: 1.04 ratio       PTT - ( 24 Jun 2019 16:44 )  PTT:29.8 sec    Venous Blood Gas:  06-24 @ 16:44  7.40/48/23/29/32  VBG Lactate: 1.2    Serum Pro-Brain Natriuretic Peptide: 715 pg/mL (06-24 @ 16:44)    D-Dimer Assay, Quantitative: 356 ng/mL DDU (06-24 @ 16:44)    MICROBIOLOGY:         RADIOLOGY:  [x ] Chest radiographs reviewed and interpreted by me    EXAM:  XR CHEST AP OR PA 1V                          PROCEDURE DATE:  06/24/2019      INTERPRETATION:  CLINICAL INFORMATION: Cough.    EXAM: Frontal radiograph of the chest.    COMPARISON: Chest radiograph from 4/13/2018    FINDINGS:    Thecardiac silhouette is normal in size. There is a questionable trace   left pleural effusion. There are no focal consolidations or pleural   effusions. There is probable mild pulmonary vascular congestion.   Calcifications overlie the bilateral breasts.      IMPRESSION:    Questionable trace left pleural effusion and probable mild pulmonary   vascular congestion. Please correlate with the CT scan performed the same   day as this exam.    GLORIA DENNY M.D., RADIOLOGY RESIDENT  This document has been electronically signed.  INOCENCIA CONTI M.D., ATTENDING RADIOLOGIST  This document has been electronically signed. Jun 25 2019  9:42AM      ---------------------------------------------------------------------------------------------------------------  EXAM:  CT CHEST                          PROCEDURE DATE:  06/24/2019      INTERPRETATION:  Clinical information: Evaluate for pneumonia. Exam is   compared to previous study of 4/4/2018.    CT scan of the chest was obtained without administration of intravenous   contrast.    Once again, asymmetric nodularity containing calcification is noted   within the right breast. Appearance is unchanged when compared to   previous exam.    Multiple lymph nodes are present in the anterior mediastinum, pretracheal   space, AP window, right hilum and the subcarinal region. One of the   largest lymph node is present in the subcarinal region and measures   approximately 3.5 x 2.1 cm.     Heart is normal in size. Calcification of the aortic valve and the   coronary arteries is noted. No pericardial effusion is noted.     No central endobronchial lesions are noted. Two lobulated nodules are   present in the right lower lobe. The larger one measures approximately   2.8 x 2.3 cm and is causing mild compression/narrowing of the posterior   segment of the right lower lobe bronchus. The second right lower lobe   nodule measures 2 x 1.8 cm. A 0.7 cm solid nodule in the apical segment   of the right upper lobe has increased in size when compared to previous   exam. Patient is status post wedge resection in the left lower lobe. An   associated nodular opacity is once again noted and is unchanged when   compared to previous exam. Bilateral apical thickening/scarring is noted.   No pleural effusions are noted.    Small hiatal hernia is noted.    Below the diaphragm, visualized portions of the abdomen demonstrate small   low-attenuation lesion in the left kidney which is too small to be   adequately characterized on this exam. Thickening of bothadrenal glands   is noted.     Degenerative changes of the spine as well as loss of height of one of the   lower vertebral bodies is noted.    Impression: 2.8 cm nodule in the right lower lobe is noted. Primary   consideration is lung carcinoma.    Additional nodule in the right lower lobe most likely represents   metastasis.    Mediastinal/right hilar adenopathy.    Small nodule in the apical segment of the right upper lobe has increased   in size when compared to previous exam.    JOHN LEWIS M.D., ATTENDING RADIOLOGIST  This document has been electronically signed. Jun 25 2019  9:27AM  ---------------------------------------------------------------------------------------------------------------    EXAM:  CT ABDOMEN AND PELVIS IC                          EXAM:  CT ANGIO CHEST (W)AW IC                          PROCEDURE DATE:  06/25/2019      INTERPRETATION:  CLINICAL INFORMATION: Shortness of breath and hypoxia.   Lung mass. Question PE. Metastatic workup     COMPARISON: CT chest 6/24/2019 and CT abdomen and pelvis 4/13/2018.    PROCEDURE:   CT Angiography of the Chest was performed followed by portal venous phase   imaging of the Abdomen and Pelvis.  IV Contrast: 90 ml of Omnipaque 350 was injected intravenously. 10 ml   were discarded.  Oral contrast: None.  Sagittal and coronal reformats were performed as well as 3D (MIP)   reconstructions.    FINDINGS:    CHEST:     LUNGS AND LARGE AIRWAYS: Collapsed of the central airways, likely due to   expiratory phase. Status post left lower lobe wedge resection. Emphysema.   Unchanged biapical scarring. Redemonstrated right lower lobe lobulated   nodules, unchanged. The larger one measures 2.7 x 2.5 cm (series 3, image   93) and the smaller one measures 2.0 x 1.9 cm (series 3, image 103).   Right basilar passive atelectasis.  PLEURA: Trace left and small right pleural effusion.  VESSELS: No pulmonary embolism. There is narrowing of the right lower   lobe posterior segmental pulmonary artery secondary to extrinsic   compression from a surrounding right lower lobe pulmonary nodule as   described above. The aorta and pulmonary artery are normal in size.   Aortic atherosclerotic calcifications.  HEART: Heart size is normal. No pericardial effusion. Aortic valve and   coronary artery calcifications.  MEDIASTINUM AND BLUE: Unchanged mediastinal and right hilar   lymphadenopathy. For reference a subcarinal lymph node measures 3.6 x 2.0   cm (series 3, image 66).  CHEST WALL AND LOWER NECK: Unchanged asymmetric right breast nodularity   with coarse calcifications.    ABDOMEN AND PELVIS:    LIVER: Left hepatic hypodensity in the falciform ligament, likely focal   fat. Tiny subcentimeter right hepatic dome hypodensity, too small to   characterize.  BILE DUCTS: Normal caliber.  GALLBLADDER: Cholelithiasis  SPLEEN: Not visualized.  PANCREAS: Within normal limits.  ADRENALS: Mild thickening of bilateral adrenals.  KIDNEYS/URETERS: Bilateral renal cysts. Additional bilateral   subcentimeter hypodensities, too small to characterize. The kidneys   enhance symmetrically. No hydronephrosis.    BLADDER: Within normal limits.  REPRODUCTIVE ORGANS: Hysterectomy. No adnexal masses.    BOWEL: No bowel obstruction. Sigmoid diverticulosis without   diverticulitis.  Appendix is not visualized.  PERITONEUM: No ascites.  VESSELS:  Atherosclerosis.  RETROPERITONEUM: No lymphadenopathy.    ABDOMINAL WALL: Small foci of subcutaneous air in the ventral left lower   abdominal wall subcutaneous tissue, likely related to subcutaneous   injections.  BONES: Redemonstrated compression deformity of the T12 vertebral body,   which is progressed as compared to prior. Degenerative changes of the   spine.    IMPRESSION:     No pulmonary embolism.    Narrowing of the right lower lobe posterior segmental pulmonary artery   secondary to extrinsic compression from a surrounding right lower lobe   pulmonary nodule.    Compression deformity of the T12 vertebral body which is increased as   compared to prior CT 4/13/2018.    JERRY WALKER M.D., RADIOLGY RESIDENT  This document has been electronically signed.  JOHN LEWIS M.D., ATTENDING RADIOLOGIST  This document has been electronically signed. Jun 25 2019  4:40PM  --------------------------------------------------------------------------------------------------------------- [facility] - CONSULT NOTE    HPI: 88 year old gentlewoman, former heavy smoker, with no known history of intrinsic lung disease. She has a history of "dementia", HTN, HLD, CAD s/p MI/PCI (LVEF in 2015 was normal) and multiple falls. The patient was recently brought to [facility] with an alteration in mental status and diagnosed with dementia. She was seen in follow-up by her PCP who found the patient to be hypoxic on room air with exertion -> 82%. She has a productive cough associated with chest congestion and wheeze. She has no fevers, chills or sweats. Her appetite is good and she has not had weight loss. No anterior chest pain/pressure or palpitations. Chest radiographs are abnormal. Asked to evaluate.    PMHX:  Dementia  HTN (hypertension)  HLD (hyperlipidemia)  CAD (coronary artery disease)  Myocardial infarction  Stenosis of left carotid artery  Vitamin D deficiency    PSHX:  Hernia repair  Heart artery stent  Tonsillectomy  Appendectomy  Abdominal hysterectomy      FAMILY HISTORY:  No pertinent family history in first degree relatives      SOCIAL HISTORY:  former smoker > 25 years discontinued last year    Pulmonary Medications:       Antimicrobials:      Cardiology:  hydrALAZINE 50 milliGRAM(s) Oral every 8 hours  metoprolol tartrate 50 milliGRAM(s) Oral two times a day      Other:  aspirin  chewable 81 milliGRAM(s) Oral daily  cholecalciferol 1000 Unit(s) Oral daily  donepezil 5 milliGRAM(s) Oral at bedtime  enoxaparin Injectable 40 milliGRAM(s) SubCutaneous daily  multivitamin 1 Tablet(s) Oral daily  senna 2 Tablet(s) Oral at bedtime  sertraline 25 milliGRAM(s) Oral daily  simvastatin 10 milliGRAM(s) Oral at bedtime  sodium chloride 0.9%. 1000 milliLiter(s) IV Continuous <Continuous>      Prn:  MEDICATIONS  (PRN):      Allergies    No Known Allergies    HOME MEDICATIONS: see  H & P    REVIEW OF SYSTEMS:  Constitutional: As per HPI  HEENT: Within normal limits  CV: As per HPI  Resp: As per HPI  GI: Within normal limits   : Within normal limits  Musculoskeletal: Within normal limits  Skin: Within normal limits  Neurological: confusion  Psychiatric: Within normal limits  Endocrine: Within normal limits  Hematologic/Lymphatic: Within normal limits  Allergic/Immunologic: Within normal limits    [x] All other systems negative    OBJECTIVE:    I&O's Detail    25 Jun 2019 07:01  -  26 Jun 2019 07:00  --------------------------------------------------------  IN:    Oral Fluid: 660 mL  Total IN: 660 mL    OUT:    Voided: 1550 mL  Total OUT: 1550 mL    Total NET: -890 mL    PHYSICAL EXAM:  ICU Vital Signs Last 24 Hrs  T(C): 36.6 (26 Jun 2019 07:31), Max: 36.9 (26 Jun 2019 00:06)  T(F): 97.8 (26 Jun 2019 07:31), Max: 98.5 (26 Jun 2019 00:06)  HR: 54 (26 Jun 2019 07:31) (54 - 73)  BP: 136/58 (26 Jun 2019 07:31) (136/58 - 158/71)  BP(mean): --  ABP: --  ABP(mean): --  RR: 18 (26 Jun 2019 07:31) (18 - 18)  SpO2: 100% (26 Jun 2019 07:31) (100% - 100%) on 2lpm nasal canula    General: Awake. Alert. Cooperative. No distress. Appears stated age. Confused. 	  HEENT: Atraumatic. Normocephalic. Anicteric. Normal oral mucosa. PERRL. EOMI.  Neck: Supple. Trachea midline. Thyroid without enlargement/tenderness/nodules. No carotid bruit. No JVD.	  Cardiovascular: Regular rate and rhythm. S1 S2 normal. No murmurs, rubs or gallops.  Respiratory: Respirations unlabored. Diffuse rhonchi selwyn wheeze. No curvature.  Abdomen: Soft. Non-tender. Non-distended. No organomegaly. No masses. Normal bowel sounds.  Extremities: Warm to touch. No clubbing or cyanosis. No pedal edema.  Pulses: 2+ peripheral pulses all extremities.	  Skin: Right breast with mild thickening of the skin with fullness to palpaion  Lymph Nodes: Cervical, supraclavicular and axillary nodes normal  Neurological: Motor and sensory examination equal and normal. A and O x 1  Psychiatry: Appropriate mood and affect.      LABS:                          8.2    9.25  )-----------( 338      ( 25 Jun 2019 08:53 )             26.9     CBC    WBC  9.25 <==, 9.2 <==    Hemoglobin  8.2 <<==, 10.2 <<==    Hematocrit  26.9 <==, 30.8 <==    Platelets  338 <==, 369 <==      136  |  98  |  12  ----------------------------<  88 06-26  4.1   |  28  |  0.85    LYTES    sodium  136 <==, 136 <==, 132 <==    potassium   4.1 <==, 4.0 <==, 4.6 <==    chloride  98 <==, 99 <==, 95 <==    carbon dioxide  28 <==, 24 <==, 24 <==    =============================================================================================  RENAL FUNCTION:    Creatinine:   0.85  <<==, 0.81  <<==, 0.84  <<==    BUN:   12 <==, 12 <==, 15 <==    ============================================================================================    calcium   8.7 <==, 8.6 <==, 9.5 <==    phos   3.3 <==    mag   1.9 <==    ============================================================================================  LFTs    AST:   10 <== , 14 <==     ALT:  7  <== , 8  <==     AP:  57  <=, 69  <=    Bili:  0.2  <=, 0.3  <=    PT/INR - ( 24 Jun 2019 16:44 )   PT: 12.0 sec;   INR: 1.04 ratio       PTT - ( 24 Jun 2019 16:44 )  PTT:29.8 sec    Venous Blood Gas:  06-24 @ 16:44  7.40/48/23/29/32  VBG Lactate: 1.2    Serum Pro-Brain Natriuretic Peptide: 715 pg/mL (06-24 @ 16:44)    D-Dimer Assay, Quantitative: 356 ng/mL DDU (06-24 @ 16:44)    MICROBIOLOGY:             RADIOLOGY:  [x ] Chest radiographs reviewed and interpreted by me    EXAM:  XR CHEST AP OR PA 1V                          PROCEDURE DATE:  06/24/2019      INTERPRETATION:  CLINICAL INFORMATION: Cough.    EXAM: Frontal radiograph of the chest.    COMPARISON: Chest radiograph from 4/13/2018    FINDINGS:    Thecardiac silhouette is normal in size. There is a questionable trace   left pleural effusion. There are no focal consolidations or pleural   effusions. There is probable mild pulmonary vascular congestion.   Calcifications overlie the bilateral breasts.      IMPRESSION:    Questionable trace left pleural effusion and probable mild pulmonary   vascular congestion. Please correlate with the CT scan performed the same   day as this exam.    GLORIA DENNY M.D., RADIOLOGY RESIDENT  This document has been electronically signed.  INOCENCIA CONTI M.D., ATTENDING RADIOLOGIST  This document has been electronically signed. Jun 25 2019  9:42AM      ---------------------------------------------------------------------------------------------------------------  EXAM:  CT CHEST                          PROCEDURE DATE:  06/24/2019      INTERPRETATION:  Clinical information: Evaluate for pneumonia. Exam is   compared to previous study of 4/4/2018.    CT scan of the chest was obtained without administration of intravenous   contrast.    Once again, asymmetric nodularity containing calcification is noted   within the right breast. Appearance is unchanged when compared to   previous exam.    Multiple lymph nodes are present in the anterior mediastinum, pretracheal   space, AP window, right hilum and the subcarinal region. One of the   largest lymph node is present in the subcarinal region and measures   approximately 3.5 x 2.1 cm.     Heart is normal in size. Calcification of the aortic valve and the   coronary arteries is noted. No pericardial effusion is noted.     No central endobronchial lesions are noted. Two lobulated nodules are   present in the right lower lobe. The larger one measures approximately   2.8 x 2.3 cm and is causing mild compression/narrowing of the posterior   segment of the right lower lobe bronchus. The second right lower lobe   nodule measures 2 x 1.8 cm. A 0.7 cm solid nodule in the apical segment   of the right upper lobe has increased in size when compared to previous   exam. Patient is status post wedge resection in the left lower lobe. An   associated nodular opacity is once again noted and is unchanged when   compared to previous exam. Bilateral apical thickening/scarring is noted.   No pleural effusions are noted.    Small hiatal hernia is noted.    Below the diaphragm, visualized portions of the abdomen demonstrate small   low-attenuation lesion in the left kidney which is too small to be   adequately characterized on this exam. Thickening of bothadrenal glands   is noted.     Degenerative changes of the spine as well as loss of height of one of the   lower vertebral bodies is noted.    Impression: 2.8 cm nodule in the right lower lobe is noted. Primary   consideration is lung carcinoma.    Additional nodule in the right lower lobe most likely represents   metastasis.    Mediastinal/right hilar adenopathy.    Small nodule in the apical segment of the right upper lobe has increased   in size when compared to previous exam.    JOHN LEWIS M.D., ATTENDING RADIOLOGIST  This document has been electronically signed. Jun 25 2019  9:27AM  ---------------------------------------------------------------------------------------------------------------    EXAM:  CT ABDOMEN AND PELVIS IC                          EXAM:  CT ANGIO CHEST (W)AW IC                          PROCEDURE DATE:  06/25/2019      INTERPRETATION:  CLINICAL INFORMATION: Shortness of breath and hypoxia.   Lung mass. Question PE. Metastatic workup     COMPARISON: CT chest 6/24/2019 and CT abdomen and pelvis 4/13/2018.    PROCEDURE:   CT Angiography of the Chest was performed followed by portal venous phase   imaging of the Abdomen and Pelvis.  IV Contrast: 90 ml of Omnipaque 350 was injected intravenously. 10 ml   were discarded.  Oral contrast: None.  Sagittal and coronal reformats were performed as well as 3D (MIP)   reconstructions.    FINDINGS:    CHEST:     LUNGS AND LARGE AIRWAYS: Collapsed of the central airways, likely due to   expiratory phase. Status post left lower lobe wedge resection. Emphysema.   Unchanged biapical scarring. Redemonstrated right lower lobe lobulated   nodules, unchanged. The larger one measures 2.7 x 2.5 cm (series 3, image   93) and the smaller one measures 2.0 x 1.9 cm (series 3, image 103).   Right basilar passive atelectasis.  PLEURA: Trace left and small right pleural effusion.  VESSELS: No pulmonary embolism. There is narrowing of the right lower   lobe posterior segmental pulmonary artery secondary to extrinsic   compression from a surrounding right lower lobe pulmonary nodule as   described above. The aorta and pulmonary artery are normal in size.   Aortic atherosclerotic calcifications.  HEART: Heart size is normal. No pericardial effusion. Aortic valve and   coronary artery calcifications.  MEDIASTINUM AND BLUE: Unchanged mediastinal and right hilar   lymphadenopathy. For reference a subcarinal lymph node measures 3.6 x 2.0   cm (series 3, image 66).  CHEST WALL AND LOWER NECK: Unchanged asymmetric right breast nodularity   with coarse calcifications.    ABDOMEN AND PELVIS:    LIVER: Left hepatic hypodensity in the falciform ligament, likely focal   fat. Tiny subcentimeter right hepatic dome hypodensity, too small to   characterize.  BILE DUCTS: Normal caliber.  GALLBLADDER: Cholelithiasis  SPLEEN: Not visualized.  PANCREAS: Within normal limits.  ADRENALS: Mild thickening of bilateral adrenals.  KIDNEYS/URETERS: Bilateral renal cysts. Additional bilateral   subcentimeter hypodensities, too small to characterize. The kidneys   enhance symmetrically. No hydronephrosis.    BLADDER: Within normal limits.  REPRODUCTIVE ORGANS: Hysterectomy. No adnexal masses.    BOWEL: No bowel obstruction. Sigmoid diverticulosis without   diverticulitis.  Appendix is not visualized.  PERITONEUM: No ascites.  VESSELS:  Atherosclerosis.  RETROPERITONEUM: No lymphadenopathy.    ABDOMINAL WALL: Small foci of subcutaneous air in the ventral left lower   abdominal wall subcutaneous tissue, likely related to subcutaneous   injections.  BONES: Redemonstrated compression deformity of the T12 vertebral body,   which is progressed as compared to prior. Degenerative changes of the   spine.    IMPRESSION:     No pulmonary embolism.    Narrowing of the right lower lobe posterior segmental pulmonary artery   secondary to extrinsic compression from a surrounding right lower lobe   pulmonary nodule.    Compression deformity of the T12 vertebral body which is increased as   compared to prior CT 4/13/2018.    JERRY WALKER M.D., RADIOLGY RESIDENT  This document has been electronically signed.  JOHN LEWIS M.D., ATTENDING RADIOLOGIST  This document has been electronically signed. Jun 25 2019  4:40PM  ---------------------------------------------------------------------------------------------------------------    EXAM:  CT BRAIN                          PROCEDURE DATE:  06/26/2019      INTERPRETATION:  History: Metastatic breast cancer. Rule out intracranial   metastatic disease..    Description: A noncontrast head CT was performed. 5 mm axial images were   performed from the skull base to the vertex.    Comparison is made to a head CT study from 04/02/2018.    Evaluation for the presence or absence of intracranial metastatic disease   is limited and incomplete without intravenous contrast. Consider   follow-up contrast-enhanced CT or MRI if clinically warranted, and if   there are no contrast or MRI contraindications. No large intracranial   mass is noted within the limitations of this noncontrast study.    There is no evidence for acute infarct, acute hemorrhage, mass effect,   calvarial fracture, or hydrocephalus.    Mild patchy hypodensity without mass effect is noted in the   periventricular white matter which most likely represents chronic   microvascular ischemic changes given the patient's age.    Cerebral volume loss is present with secondary proportional prominence of   the sulci and ventricles.    No lytic or blastic calvarial lesions are noted. The visualized portions   of the paranasal sinuses and mastoid air cells are clear.    IMPRESSION:    1. No acute intracranial pathology is noted. If the patient has new and   persistent symptoms, consider short interval follow-up head CT or brain   MRI follow-up if there are no MRI contraindications.  2. Evaluation for the presence or absence of intracranial metastatic   disease is limited and incomplete without intravenous contrast. Consider   follow-up contrast-enhanced CT or MRI if clinically warranted, and if   there are no contrast or MRI contraindications.    GALDINO DRAKE M.D., ATTENDING RADIOLOGIST  This document has been electronically signed. Jun 26 2019 11:16AM  ---------------------------------------------------------------------------------------------------------------

## 2019-06-26 NOTE — CONSULT NOTE ADULT - CONSULT REASON
lung nodules hypoxemia; COPD exacerbation; lung nodules hypoxemia; COPD exacerbation; lung nodules/masses; intrathoracic adenopathy

## 2019-06-26 NOTE — CONSULT NOTE ADULT - ASSESSMENT
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    likely breast cancer metastatic to the mediastinum, right hilum and lung (2 lobulated nodules in the right lower lobe and a subcentimeter lung nodule in the right upper lobe) - much less likely is synchronous lung cancers - given recent change in mental status, would be concerned about metastatic disease to the brain    HTN/HLD/CAD/MI    PLAN/RECOMMENDATIONS:    oxygen supplementation to keep saturation greater than 92%  RVP  solumedrol 20mg IVP q6h  albuterol/atrovent nebs q6h  pulmicort 0.5mg nebs q12h - rinse mouth after use - give after duoneb  robitussin DM/tessalon perles  cardiac meds ASA/zocor/lopressor/hydralazine - await ECHO  brain CT  GI/DVT prophylaxis  aricept/zoloft    Thank you for the courtesy of this referral. Plan of care discussed with the patient at bedside and Dr. Hankins.    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    right lower lobe lobulated nodules with compression/narrowing of the posterior segment of the right lower lobe bronchus - extensive adenopathy in the anterior mediastinum, pretracheal space, AP window, right hilum and subcarinal region - suspect primary lung cancer - unclear if a central small cell lung cancer has spread outward or a peripheral non-small cell lung cancer has spread to the central nodes - metastatic breast cancer seems unlikely - no evidence of metastatic disease to the brain on head CT given its limitations    HTN/HLD/CAD/MI    PLAN/RECOMMENDATIONS:    oxygen supplementation to keep saturation greater than 92%  RVP  solumedrol 20mg IVP q6h  albuterol/atrovent nebs q6h  pulmicort 0.5mg nebs q12h - rinse mouth after use - give after duoneb  robitussin DM/tessalon perles  cardiac meds ASA/zocor/lopressor/hydralazine - await ECHO  GI/DVT prophylaxis  aricept/zoloft  CT guided lung biopsy arranged with Dr. Chan for Monday July 1st at 3pm    Thank you for the courtesy of this referral. Plan of care discussed with the patient at bedside and Dr. Hankins.    Jonn Shine MD, [facility]  [phone]  Pulmonary Medicine

## 2019-06-27 NOTE — DIETITIAN INITIAL EVALUATION ADULT. - ADD RECOMMEND
1. Continue to encourage po intake and obtain/honor food preferences as able, 2. Monitor PO intake, diet tolerance, weight trends, labs, and skin integrity

## 2019-06-27 NOTE — DIETITIAN INITIAL EVALUATION ADULT. - ENERGY NEEDS
Pt with PMH including HTN, Hyperlipidemia, CAD, dementia admitted with exertional hypoxia-pulmonary following, pt found to have lung masses on CT, plan for biopsy next week.    Ht: 5'1" , Wt: 125.6 lbs, BMI: 21.1 kg/m2, IBW: 125 lbs +/- 10%, %IBW: 100%  1+ vivek leg/ankle edema, Skin intact

## 2019-06-27 NOTE — DIETITIAN INITIAL EVALUATION ADULT. - OTHER INFO
Pt reports eating well PTA consuming 3 meals per day, pt stated she lives by herself per  pt receives assistance from home health aide. Pt previously residing in nursing home facility. Pt stated she does not add salt to foods.    Weight history: pt reports UBW of 125 lbs, noted previous admission weight 119.9 lbs (4/2018). In house pt with dosing weight 128.9 lbs (6/24), recent weight 125.6 lbs (6/26), slight weight loss within 2 days likely related to scale error vs fluids. Pt with no food allergies, no micronutrient supplementation. No N+V, last BM noted 6/25. Pt with new chewing/swallowing difficulty -confirmed by RN.

## 2019-06-27 NOTE — DIETITIAN INITIAL EVALUATION ADULT. - ENERGY INTAKE
Good (>75%)/Per RN pt has been eating well with good appetite, had >50% of breakfast today and currently waiting for lunch

## 2019-06-29 NOTE — PHYSICAL THERAPY INITIAL EVALUATION ADULT - PERTINENT HX OF CURRENT PROBLEM, REHAB EVAL
This 87 y/o female with H/O HTN, Dementia, CAD w Stent, HLD, Appendectomy, Tonsillectomy, Inguinal Hernia Repair admitted with SOB and Lung Mass.

## 2019-06-29 NOTE — PHYSICAL THERAPY INITIAL EVALUATION ADULT - CRITERIA FOR SKILLED THERAPEUTIC INTERVENTIONS
impairments found/risk reduction/prevention/predicted duration of therapy intervention/therapy frequency/anticipated discharge recommendation/functional limitations in following categories/anticipated equipment needs at discharge

## 2019-06-29 NOTE — PHYSICAL THERAPY INITIAL EVALUATION ADULT - ADDITIONAL COMMENTS
Pt lives alone in an apartment with elevator access. Pt amb w/ RW PTA. Pt has a 24/7 HHA as per chart review.

## 2019-06-29 NOTE — PHYSICAL THERAPY INITIAL EVALUATION ADULT - PRECAUTIONS/LIMITATIONS, REHAB EVAL
6/24 Chest X-ray: Questionable trace left pleural effusion and mild pulmonary vascular congestion. Chest CT: 2.8cm nodule in right lower lobe. 6/26 Head CT: No acute intracranial pathology

## 2019-07-03 NOTE — DISCHARGE NOTE PROVIDER - NSDCCAREPROVSEEN_GEN_ALL_CORE_FT
Manasa Hankins Fabricio Duron  Hoorbod Delshadfar  Hoorbod Delshadfar  Hoorbod Delshadfar  [facility]  Arlyn Roseaidglory Li  [facility] Jen Chan  Ordering Physician  Eliana Cerna

## 2019-07-03 NOTE — DISCHARGE NOTE PROVIDER - PROVIDER TOKENS
PROVIDER:[TOKEN:[[id number]]] PROVIDER:[TOKEN:[[id number]]],PROVIDER:[TOKEN:[[id number]]] PROVIDER:[TOKEN:[[id number]]],PROVIDER:[TOKEN:[[id number]]],PROVIDER:[TOKEN:[[id number]]],PROVIDER:[TOKEN:[[id number]]]

## 2019-07-03 NOTE — DISCHARGE NOTE PROVIDER - HOSPITAL COURSE
89 y/o female htn, cad, anemia, hyponatremia presented with sob admitted with copd exa now resolved; f/u with lung masses s/p bx > squamous cell carcinoma. Hematology consulted, out patient work-up with Dr. Durbin. 89 y/o female htn, cad, anemia, hyponatremia presented with sob admitted with copd exa now resolved; f/u with lung masses s/p bx > squamous cell carcinoma. Hematology consulted, out patient work-up with Dr. Durbin.                 Kali of hospital course:    Patient was seen and evaluated and followed by multiple specialists throughout the stay and treated medically with improvement.    Patient was otherwise stable and had no other complications.    Patient was discharged to home in stable condition to follow up with primary doctor as outpatient for follow up and further care.

## 2019-07-03 NOTE — DISCHARGE NOTE PROVIDER - NSDCCPCAREPLAN_GEN_ALL_CORE_FT
PRINCIPAL DISCHARGE DIAGNOSIS  Diagnosis: Shortness of breath  Assessment and Plan of Treatment: likley related COPD exacerbation and lung mass .  Please follow up with the Pulmonologist      SECONDARY DISCHARGE DIAGNOSES  Diagnosis: Lung mass  Assessment and Plan of Treatment: S/p lung bx.- Please follow up with the Oncologist for bx results    Diagnosis: Benign essential HTN  Assessment and Plan of Treatment: Low salt diet  Activity as tolerated.  Take all medication as prescribed.  Follow up with your medical doctor for routine blood pressure monitoring at your next visit.  Notify your doctor if you have any of the following symptoms:   Dizziness, Lightheadedness, Blurry vision, Headache, Chest pain, Shortness of breath      Diagnosis: CAD in native artery  Assessment and Plan of Treatment: Coronary artery disease is a condition where the arteries the supply the heart muscle get clogges with fatty deposits & puts you at risk for a heart attack  Call your doctor if you have any new pain, pressure, or discomfort in the center of your chest, pain, tingling or discomfort in arms, back, neck, jaw, or stomach, shortness of breath, nausea, vomiting, burping or heartburn, sweating, cold and clammy skin, racing or abnormal heartbeat for more than 10 minutes or if they keep coming & going.  Call 911 and do not tr to get to hospital by care  You can help yourself with lefestyle changes (quitting smoking if you smoke), eat lots of fruits & vegetables & low fat dairy products, not a lot of meat & fatty foods, walk or some form of physical activity most days of the week, lose weight if you are overweight  Take your cardiac medication as prescribed to lower cholesterol, to lower blood pressure, aspirin to prevent blood clots, and diabetes control  Make sure to keep appointments with doctor for cardiac follow up care      Diagnosis: COPD with hypoxia  Assessment and Plan of Treatment: Call your Health Care provider upon arrival home to make a follow up appointment within one week.  Take all inhalers as prescribed by your Health Care Provider.  Take steroids as prescribed by your Health Care Provider.  If your cough increases infrequency and severity and/or you have shortness of breath or increased shortness of breath call your Health Care Provider.  If you develop fever, chills, night sweats, malaise, and/or change in mental status call your Health care Provider.  Nutrition is very important.  Eat small frequent meals.  Increase your activity as tolerated.  Do not stay in bed all day PRINCIPAL DISCHARGE DIAGNOSIS  Diagnosis: Shortness of breath  Assessment and Plan of Treatment: likley related COPD exacerbation and lung mass .  Please follow up with the Pulmonologist---call for appointment [phone]      SECONDARY DISCHARGE DIAGNOSES  Diagnosis: Lung mass  Assessment and Plan of Treatment: Follow-up with your Oncologist Dr. Durbin for further monitring and treamtment.    Diagnosis: COPD with hypoxia  Assessment and Plan of Treatment: Call your Health Care provider upon arrival home to make a follow up appointment within one week.  Take all inhalers as prescribed by your Health Care Provider.  Take steroids as prescribed by your Health Care Provider.  If your cough increases infrequency and severity and/or you have shortness of breath or increased shortness of breath call your Health Care Provider.  If you develop fever, chills, night sweats, malaise, and/or change in mental status call your Health care Provider.  Nutrition is very important.  Eat small frequent meals.  Increase your activity as tolerated.  Do not stay in bed all day      Diagnosis: Benign essential HTN  Assessment and Plan of Treatment: Low salt diet  Activity as tolerated.  Take all medication as prescribed.  Follow up with your medical doctor for routine blood pressure monitoring at your next visit.  Notify your doctor if you have any of the following symptoms:   Dizziness, Lightheadedness, Blurry vision, Headache, Chest pain, Shortness of breath      Diagnosis: CAD in native artery  Assessment and Plan of Treatment: Coronary artery disease is a condition where the arteries the supply the heart muscle get clogges with fatty deposits & puts you at risk for a heart attack  Call your doctor if you have any new pain, pressure, or discomfort in the center of your chest, pain, tingling or discomfort in arms, back, neck, jaw, or stomach, shortness of breath, nausea, vomiting, burping or heartburn, sweating, cold and clammy skin, racing or abnormal heartbeat for more than 10 minutes or if they keep coming & going.  Call 911 and do not tr to get to hospital by care  You can help yourself with lefestyle changes (quitting smoking if you smoke), eat lots of fruits & vegetables & low fat dairy products, not a lot of meat & fatty foods, walk or some form of physical activity most days of the week, lose weight if you are overweight  Take your cardiac medication as prescribed to lower cholesterol, to lower blood pressure, aspirin to prevent blood clots, and diabetes control  Make sure to keep appointments with doctor for cardiac follow up care      Diagnosis: Lung mass  Assessment and Plan of Treatment: S/p lung bx.- Please follow up with the Oncologist for bx results

## 2019-07-03 NOTE — DISCHARGE NOTE PROVIDER - CARE PROVIDER_API CALL
Manasa Hankins (DO)  Internal Medicine  [address] [place] [address]  Phone: [phone]  Fax: [phone]  Follow Up Time: Manasa Hankins (DO)  Internal Medicine  [address] [place] [address]  Phone: [phone]  Fax: [phone]  Follow Up Time:     Mitch Li (DO)  Cardiology Medicine  [address] [place] [address], [address] [place] [address]  Phone: [phone]  Fax: [phone]  Follow Up Time: Manasa Hankins (DO)  Internal Medicine  [address] [facility] [address]  [place] [address]  Phone: [phone]  Fax: [phone]  Follow Up Time:     Mitch Li (DO)  Cardiology Medicine  [address] [facility], [address] [place] [address]  Phone: [phone]  Fax: [phone]  Follow Up Time:     Papo Durbin)  Hematology; Internal Medicine; Medical Oncology  [address] [facility], [address]  [place] [address]  Phone: [phone]  Fax: [phone]  Follow Up Time:     Pari Karimi)  Critical Care Medicine; Internal Medicine; Pulmonary Disease  [address] [facility], [address]  [place] [address]  Phone: [phone]  Fax: [phone]  Follow Up Time:

## 2019-07-04 NOTE — CONSULT NOTE ADULT - ASSESSMENT
89 y/o woman with newly diagnosed NSCLC.   Based on advanced age, dementia, will unlikely be a candidate for systemic ctx.  Can consider outpatient, PET Scan, molecular testing.  Can then consider role of targeted therapy vs. supportive care.  If stable, no objection to DC with outpatient follow up with Dr. Durbin. 87 y/o woman with newly diagnosed NSCLC.   Based on advanced age, dementia, will unlikely be a candidate for systemic ctx.  Can consider outpatient, PET Scan, molecular testing.  Can then consider role of targeted therapy vs. supportive care.  If stable, no objection to DC with outpatient follow up with Dr. Durbin.    borderline macrocytic anemia- Will check iron stores, b12, folate, ldh, hapto, retic. h/h is adequate. can monitor for now.

## 2019-07-04 NOTE — CONSULT NOTE ADULT - SUBJECTIVE AND OBJECTIVE BOX
IRENA STEELE  MRN-[id number]    Patient is a 88y old  Female who presents with a chief complaint of SOB (04 Jul 2019 12:43)    HPI:  Pt is an 88 year-old woman with PMH of HTN, Dementia, CAD (stent in 2004), HLD presents to the Emergency Department for exertional hypoxia.    Patient moved from NH (residing there for the past year) and moved home a week ago.  Had a momentary episode of AMS and was taken to [facility] and diagnosed with dementia.    Patient followed up with PCP and noted to be hypoxic to 82% with exertion.    Patient declines any resp. distress.  + non-productive cough.    No fevers, chills, nausea, vomiting, chest pain, shortness of breath, abdominal pain, falls/trauma.  Ct scans showed right sided lung lesions. Bx done shows NSCLC, squamous.    pt unable to give much history due to forgetfulness (24 Jun 2019 20:14)    PAST MEDICAL & SURGICAL HISTORY:  Smoking greater than 25 pack years  Stenosis of left carotid artery  Vitamin D deficiency  Heart attack: 2004  Hyperlipidemia  HTN (hypertension)  CAD (coronary artery disease)  Stented coronary artery: x 1 - 2004  S/P hernia repair: inguinal , right - February 2106  H/O heart artery stent: 2004  History of tonsillectomy  History of appendectomy  H/O abdominal hysterectomy      Current Meds  MEDICATIONS  (STANDING):  ALBUTerol/ipratropium for Nebulization 3 milliLiter(s) Nebulizer every 6 hours  amLODIPine   Tablet 5 milliGRAM(s) Oral daily  benzonatate 200 milliGRAM(s) Oral three times a day  buDESOnide   0.5 milliGRAM(s) Respule 0.5 milliGRAM(s) Inhalation every 12 hours  cholecalciferol 1000 Unit(s) Oral daily  donepezil 5 milliGRAM(s) Oral at bedtime  hydrALAZINE 50 milliGRAM(s) Oral every 8 hours  metoprolol tartrate 50 milliGRAM(s) Oral two times a day  multivitamin 1 Tablet(s) Oral daily  senna 2 Tablet(s) Oral at bedtime  sertraline 25 milliGRAM(s) Oral daily  simvastatin 10 milliGRAM(s) Oral at bedtime    MEDICATIONS  (PRN):  guaiFENesin/dextromethorphan  Syrup 10 milliLiter(s) Oral every 6 hours PRN Cough    Allergies    No Known Allergies    Social History   No tob.  No etoh    FAMILY HISTORY:  No pertinent family history in first degree relatives    REVIEW OF SYSTEMS    General:	Denies fatigue, fevers, chills, sweats, decreased appetite.    Skin/Breast: denies pruritis, rash  	  Ophthalmologic: no change in vision or blurring  	  HEENT	Denies dry mouth, oral sores, dysphagia,  change in hearing.    Respiratory and Thorax:  denies cough, sob, wheeze, hemoptysis  	  Cardiovascular:	no cp , palp, orthopnea    Gastrointestinal:	no n/v/d constipation    Genitourinary:	no dysuria of frequency, no hematuria, no flank pain    Musculoskeletal:	no bone or joint pain. no muscle aches.     Neurological:	no change in sensory or motor function. no headache. no weakness.     Psychiatric:	no depression, no anxiety, insomnia.     Hematology/Lymphatics:	no bleeding or bruising      Vital Signs Last 24 Hrs  T(C): 37.1 (04 Jul 2019 16:22), Max: 37.6 (04 Jul 2019 00:17)  T(F): 98.7 (04 Jul 2019 16:22), Max: 99.7 (04 Jul 2019 00:17)  HR: 75 (04 Jul 2019 16:22) (69 - 85)  BP: 152/75 (04 Jul 2019 16:22) (116/58 - 152/75)  BP(mean): --  RR: 18 (04 Jul 2019 16:22) (18 - 18)  SpO2: 96% (04 Jul 2019 16:22) (95% - 99%)    PHYSICAL EXAM:      Constitutional: NAD    Eyes: PERRLA EOMI, anicteric sclera    Heent :No oral sores, no pharyngeal injection. moist mucosa.    Neck: supple, no jvd, no LAD    Respiratory: CTA b/l     Cardiovascular: s1s2, no m/g/r    Gastrointestinal: soft, nt, nd, + BS    Extremities: no c/c/e    Neurological:A&O x 3 moves all ext.    Skin: no rash on exposed skin    Lymph Nodes: no lymphadenopathy.              Lab  CBC Full  -  ( 04 Jul 2019 10:24 )  WBC Count : 12.71 K/uL  RBC Count : 2.93 M/uL  Hemoglobin : 9.1 g/dL  Hematocrit : 28.6 %  Platelet Count - Automated : 348 K/uL  Mean Cell Volume : 97.6 fl  Mean Cell Hemoglobin : 31.1 pg  Mean Cell Hemoglobin Concentration : 31.8 gm/dL  Auto Neutrophil # : x  Auto Lymphocyte # : x  Auto Monocyte # : x  Auto Eosinophil # : x  Auto Basophil # : x  Auto Neutrophil % : x  Auto Lymphocyte % : x  Auto Monocyte % : x  Auto Eosinophil % : x  Auto Basophil % : x    07-03    137  |  97  |  33<H>  ----------------------------<  85  3.8   |  27  |  0.99    Ca    8.7      03 Jul 2019 07:41          Rad:    Assessment/Plan

## 2019-07-05 NOTE — PROGRESS NOTE ADULT - REASON FOR ADMISSION
SOB

## 2019-07-05 NOTE — PROGRESS NOTE ADULT - ASSESSMENT
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    right lower lobe lobulated nodules with compression/narrowing of the posterior segment of the right lower lobe bronchus - extensive adenopathy in the anterior mediastinum, pretracheal space, AP window, right hilum and subcarinal region - suspect primary lung cancer - unclear if a central small cell lung cancer has spread outward or a peripheral non-small cell lung cancer has spread to the central nodes - metastatic breast cancer seems unlikely - no evidence of metastatic disease to the brain on head CT given its limitations    HTN/HLD/CAD/MI    PLAN/RECOMMENDATIONS:    stable oxygenation on room air  RVP - negative  change IV to oral steroids - prednisone 50mg daily x 5 days  albuterol/atrovent nebs q6h  pulmicort 0.5mg nebs q12h - rinse mouth after use - give after duoneb  robitussin DM/tessalon perles  cardiac meds ASA (on hold)/zocor/lopressor/hydralazine - await ECHO  GI/DVT prophylaxis  aricept/zoloft  CT guided lung biopsy arranged with Dr. Chan for Monday July 1st at 3pm    Will follow with you. Plan of care discussed with the patient at bedside and the dedicated floor NP.    Jonn Shine MD, [facility]  [phone]  Pulmonary Medicine
_________________________________________________________________________________________  ========>>  M E D I C A L   A T T E N D I N G    F O L L O W  U P  N O T E  <<=========  -----------------------------------------------------------------------------------------------------    - Patient seen and examined by me earlier today.   - In summary,  IRENA STEELE is a 88y year old woman who originally presented with SOB  - Patient today overall doing ok, comfortable, eating OK. overall comfortable     ==================>> REVIEW OF SYSTEM <<=================    GEN: no fever, no chills, + c/o pain in Rt side of stomach  RESP: no SOB, + occasional cough, no sputum  CVS: no chest pain, no palpitations, no edema  GI: no abdominal pain, no nausea, no constipation, no diarrhea  : no dysuria, no frequency, no hematuria  Neuro: no headache, no dizziness  Derm : no itching, no rash    ==================>> PHYSICAL EXAM <<=================    GEN: A&O X 2 , NAD , comfortable, forgetful , in chair   HEENT: NCAT, PERRL, MMM, hearing intact  Neck: supple , no JVD  CVS: S1S2 , regular , No M/R/G appreciated  PULM: CTA B/L,  no W/R/R appreciated, + occasional cough   ABD.: soft. non tender, non distended,  bowel sounds present, + tenderness at RLQ of abdomen at site of prior SQ injection with mild ecchymosis   Extrem: intact pulses , no edema   PSYCH : normal mood,  not anxious       ==================>> MEDICATIONS <<====================    ALBUTerol/ipratropium for Nebulization 3 milliLiter(s) Nebulizer every 6 hours  amLODIPine   Tablet 5 milliGRAM(s) Oral daily  benzonatate 200 milliGRAM(s) Oral three times a day  buDESOnide   0.5 milliGRAM(s) Respule 0.5 milliGRAM(s) Inhalation every 12 hours  cholecalciferol 1000 Unit(s) Oral daily  donepezil 5 milliGRAM(s) Oral at bedtime  enoxaparin Injectable 40 milliGRAM(s) SubCutaneous daily  guaiFENesin/dextromethorphan  Syrup 10 milliLiter(s) Oral four times a day  hydrALAZINE 50 milliGRAM(s) Oral every 8 hours  metoprolol tartrate 50 milliGRAM(s) Oral two times a day  multivitamin 1 Tablet(s) Oral daily  senna 2 Tablet(s) Oral at bedtime  sertraline 25 milliGRAM(s) Oral daily  simvastatin 10 milliGRAM(s) Oral at bedtime    ==================>> VITAL SIGNS <<==================    Vital Signs Last 24 Hrs  T(C): 36.1 (07-03-19 @ 07:57)  T(F): 97 (07-03-19 @ 07:57), Max: 98.2 (07-02-19 @ 21:19)  HR: 70 (07-03-19 @ 07:57) (67 - 75)  BP: 136/70 (07-03-19 @ 07:57)  RR: 18 (07-03-19 @ 07:57) (18 - 18)  SpO2: 97% (07-03-19 @ 07:57) (95% - 98%)       ==================>> LAB AND IMAGING <<==================                        8.6    12.45 )-----------( 353      ( 02 Jul 2019 08:32 )             27.3        137  |  97  |  33<H>  ----------------------------<  85  3.8   |  27  |  0.99    Ca    8.7      03 Jul 2019 07:41    WBC count:   12.45 <<== ,  13.67 <<== ,  12.56 <<==   Hemoglobin:   8.6 <<==,  8.9 <<==,  9.0 <<==  platelets:  353 <==, 347 <==, 356 <==, 323 <==    Creatinine:  0.99  <<==, 1.13  <<==, 1.05  <<==, 1.01  <<==  Sodium:   137  <==, 134  <==, 136  <==, 135  <==      ___________________________________________________________________________________  ===============>>  A S S E S S M E N T   A N D   P L A N <<===============  ------------------------------------------------------------------------------------------    · Assessment		  SOB  lung masses  HTN, poorly controlled likely due to steroids   CAD  Dementia  Hyponatremia   anemia     Problem/Plan - 1:  ·  Problem: Shortness of breath / DOMINGUEZ and hypoxemia, overall improved  appreciated pulm mgmt      COPD exacerbation overall improved   continue medical treatment and O2 as needed     short course of steroids finished    lung mass / nodules on CT      narrowing of airways due to nodules  a CT guided Biopsy done, pending results       onc to eval need for further testing / mgmt vs outpatient follow up   suspect breast primary   monitor  supportive care  IS  cough suppressants as needed    Problem/Plan - 2:  ·  Problem: HTN, CAD / HLD  Continue Current medications and monitor.   cardio appreciated.  monitor  BP on steroids     Problem/Plan - 3:  ·  Problem: Hyponatremia  stable    pt seems to be eating well >> encouraged    monitor     Problem/Plan - 4:  Problem: Need for prophylactic measure.   lovenox.  diet    pain in abdomen thought to be due to injection site of Lovenox with small hematoma     pt seen ambulating now with assistance so will hold off AC for now  H/H relatively stable so monitor >> if drops > check CT scan   hot packs for comfort     Dispo plan pending Onc eval   --------------------------------------------  Case discussed with pt, NP, RN  Education given on findings and plan of care  ___________________________  H. CARLOS Hankins.  Pager: [phone]
_________________________________________________________________________________________  ========>>  M E D I C A L   A T T E N D I N G    F O L L O W  U P  N O T E  <<=========  -----------------------------------------------------------------------------------------------------    - Patient seen and examined by me earlier today.   - In summary,  IRENA STEELE is a 88y year old woman who originally presented with SOB  - Patient today overall doing ok, comfortable, eating OK. overall comfortable     ==================>> REVIEW OF SYSTEM <<=================    GEN: no fever, no chills, no pain  RESP: no SOB, no cough, no sputum  CVS: no chest pain, no palpitations, no edema  GI: no abdominal pain, no nausea, no constipation, no diarrhea  : no dysuria, no frequency, no hematuria  Neuro: no headache, no dizziness  Derm : no itching, no rash    ==================>> PHYSICAL EXAM <<=================    GEN: A&O X 2 , NAD , comfortable, forgetful , in bed   HEENT: NCAT, PERRL, MMM, hearing intact  Neck: supple , no JVD  CVS: S1S2 , regular , No M/R/G appreciated  PULM: CTA B/L,  no W/R/R appreciated  ABD.: soft. non tender, non distended,  bowel sounds present  Extrem: intact pulses , no edema   PSYCH : normal mood,  not anxious       ==================>> MEDICATIONS <<====================    ALBUTerol/ipratropium for Nebulization 3 milliLiter(s) Nebulizer every 6 hours  benzonatate 200 milliGRAM(s) Oral three times a day  buDESOnide   0.5 milliGRAM(s) Respule 0.5 milliGRAM(s) Inhalation every 12 hours  cholecalciferol 1000 Unit(s) Oral daily  donepezil 5 milliGRAM(s) Oral at bedtime  enoxaparin Injectable 40 milliGRAM(s) SubCutaneous daily  guaiFENesin/dextromethorphan  Syrup 10 milliLiter(s) Oral four times a day  hydrALAZINE 50 milliGRAM(s) Oral every 8 hours  metoprolol tartrate 50 milliGRAM(s) Oral two times a day  multivitamin 1 Tablet(s) Oral daily  predniSONE   Tablet 50 milliGRAM(s) Oral daily  senna 2 Tablet(s) Oral at bedtime  sertraline 25 milliGRAM(s) Oral daily  simvastatin 10 milliGRAM(s) Oral at bedtime  sodium chloride 0.9%. 1000 milliLiter(s) IV Continuous <Continuous>    ==================>> VITAL SIGNS <<==================    Vital Signs Last 24 Hrs  T(C): 36.7 (06-27-19 @ 15:13)  T(F): 98.1 (06-27-19 @ 15:13), Max: 98.2 (06-27-19 @ 05:50)  HR: 68 (06-27-19 @ 15:13) (68 - 75)  BP: 151/57 (06-27-19 @ 15:13)  RR: 18 (06-27-19 @ 15:13) (18 - 18)  SpO2: 94% (06-27-19 @ 15:13) (94% - 98%)       ==================>> LAB AND IMAGING <<==================                        9.0    9.78  )-----------( 358      ( 26 Jun 2019 10:18 )             29.1        133<L>  |  97  |  22  ----------------------------<  178<H>  3.9   |  25  |  1.07    Ca    8.7      27 Jun 2019 07:24    WBC count:   9.78 <<== ,  9.25 <<== ,  9.2 <<==   Hemoglobin:   9.0 <<==,  8.2 <<==,  10.2 <<==  platelets:  358 <==, 338 <==, 369 <==    Creatinine:  1.07  <<==, 0.85  <<==, 0.81  <<==, 0.84  <<==  Sodium:   133  <==, 136  <==, 136  <==, 132  <==       AST:          10 <== , 14 <==      ALT:        7  <== , 8  <==      AP:        57  <=, 69  <=     Bili:        0.2  <=, 0.3  <=     cancer markers noted : elevated breast markers, negative CEA !!    < from: CT Angio Chest w/ IV Cont (06.25.19 @ 14:13) >  IMPRESSION:   No pulmonary embolism.  Narrowing of the right lower lobe posterior segmental pulmonary artery   secondary to extrinsic compression from a surrounding right lower lobe   pulmonary nodule.  Compression deformity of the T12 vertebral body which is increased as   compared to prior CT 4/13/2018.  < end of copied text >    ___________________________________________________________________________________  ===============>>  A S S E S S M E N T   A N D   P L A N <<===============  ------------------------------------------------------------------------------------------    · Assessment		  SOB  lung masses  elevated D Dimer  HTN  CAD  Dementia  Hyponatremia   anemia     Problem/Plan - 1:  ·  Problem: Shortness of breath / DOMINGUEZ and hypoxemia, overall improved  appreciated pulm mgmt      COPD exacerbation overall improved   continue medical treatment and O2 as above    lung mass / nodules on CT      narrowing of airways due to nodules  a CT guided Biopsy has been scheduled for Monday     will layo [facility] for that..      eventual onc eval  suspect breast primary   monitor  supportive care  IS    Problem/Plan - 2:  ·  Problem: HTN, CAD / HLD  Continue Current medications and monitor.   cardio appreciated.    Problem/Plan - 3:  ·  Problem: Hyponatremia   ? developing SIADH ?    pt seems to be eating well >> encouraged    monitor closely    renal eval if worsened    Problem/Plan - 4:  Problem: Need for prophylactic measure.   lovenox.  diet    --------------------------------------------  Case discussed with pt, RN  Education given on findings and plan of care  ___________________________  HMary Hankins D.O.  Pager: [phone]
_________________________________________________________________________________________  ========>>  M E D I C A L   A T T E N D I N G    F O L L O W  U P  N O T E  <<=========  -----------------------------------------------------------------------------------------------------    - Patient seen and examined by me earlier today.   - In summary,  IRENA STEELE is a 88y year old woman who originally presented with SOB  - Patient today overall doing ok, comfortable, eating OK. overall comfortable     ==================>> REVIEW OF SYSTEM <<=================    GEN: no fever, no chills, no pain  RESP: no SOB, no cough, no sputum  CVS: no chest pain, no palpitations, no edema  GI: no abdominal pain, no nausea, no constipation, no diarrhea  : no dysuria, no frequency, no hematuria  Neuro: no headache, no dizziness  Derm : no itching, no rash    ==================>> PHYSICAL EXAM <<=================    GEN: A&O X 2 , NAD , comfortable, forgetful , in bed   HEENT: NCAT, PERRL, MMM, hearing intact  Neck: supple , no JVD  CVS: S1S2 , regular , No M/R/G appreciated  PULM: CTA B/L,  no W/R/R appreciated, + occasional cough   ABD.: soft. non tender, non distended,  bowel sounds present  Extrem: intact pulses , no edema   PSYCH : normal mood,  not anxious        ==================>> MEDICATIONS <<====================    ALBUTerol/ipratropium for Nebulization 3 milliLiter(s) Nebulizer every 6 hours  benzonatate 200 milliGRAM(s) Oral three times a day  buDESOnide   0.5 milliGRAM(s) Respule 0.5 milliGRAM(s) Inhalation every 12 hours  cholecalciferol 1000 Unit(s) Oral daily  donepezil 5 milliGRAM(s) Oral at bedtime  enoxaparin Injectable 40 milliGRAM(s) SubCutaneous daily  guaiFENesin/dextromethorphan  Syrup 10 milliLiter(s) Oral four times a day  hydrALAZINE 50 milliGRAM(s) Oral every 8 hours  metoprolol tartrate 50 milliGRAM(s) Oral two times a day  multivitamin 1 Tablet(s) Oral daily  predniSONE   Tablet 50 milliGRAM(s) Oral daily  senna 2 Tablet(s) Oral at bedtime  sertraline 25 milliGRAM(s) Oral daily  simvastatin 10 milliGRAM(s) Oral at bedtime  sodium chloride 0.9%. 1000 milliLiter(s) IV Continuous <Continuous>    ==================>> VITAL SIGNS <<==================    Vital Signs Last 24 Hrs  T(C): 37.1 (06-28-19 @ 16:09)  T(F): 98.8 (06-28-19 @ 16:09), Max: 98.8 (06-28-19 @ 00:50)  HR: 78 (06-28-19 @ 16:09) (69 - 78)  BP: 176/72 (06-28-19 @ 16:09)  RR: 18 (06-28-19 @ 16:09) (18 - 18)  SpO2: 93% (06-28-19 @ 16:09) (91% - 93%)       ==================>> LAB AND IMAGING <<==================                        8.0    13.67 )-----------( 323      ( 28 Jun 2019 09:31 )             25.0     135  |  99  |  28<H>  ----------------------------<  88  3.9   |  25  |  1.01    Ca    8.3<L>      28 Jun 2019 06:44      WBC count:   13.67 <<== ,  9.78 <<== ,  9.25 <<== ,  9.2 <<==   Hemoglobin:   8.0 <<==,  9.0 <<==,  8.2 <<==,  10.2 <<==  platelets:  323 <==, 358 <==, 338 <==, 369 <==    Creatinine:  1.01  <<==, 1.07  <<==, 0.85  <<==, 0.81  <<==, 0.84  <<==  Sodium:   135  <==, 133  <==, 136  <==, 136  <==, 132  <==       AST:          10 <== , 14 <==      ALT:        7  <== , 8  <==      AP:        57  <=, 69  <=     Bili:        0.2  <=, 0.3  <=    < from: CT Angio Chest w/ IV Cont (06.25.19 @ 14:13) >  IMPRESSION:   No pulmonary embolism.  Narrowing of the right lower lobe posterior segmental pulmonary artery   secondary to extrinsic compression from a surrounding right lower lobe   pulmonary nodule.  Compression deformity of the T12 vertebral body which is increased as   compared to prior CT 4/13/2018.  < end of copied text >    ___________________________________________________________________________________  ===============>>  A S S E S S M E N T   A N D   P L A N <<===============  ------------------------------------------------------------------------------------------    · Assessment		  SOB  lung masses  elevated D Dimer  HTN, poorly controlled likely due to steroids   CAD  Dementia  Hyponatremia   anemia     Problem/Plan - 1:  ·  Problem: Shortness of breath / DOMINGUEZ and hypoxemia, overall improved  appreciated pulm mgmt      COPD exacerbation overall improved   continue medical treatment and O2 as above    short course of steroids per pulm     lung mass / nodules on CT      narrowing of airways due to nodules  a CT guided Biopsy has been scheduled for Monday     will layo ASA for that..      eventual onc eval  suspect breast primary   monitor  supportive care  IS  cough suppressants as needed    Problem/Plan - 2:  ·  Problem: HTN, CAD / HLD  Continue Current medications and monitor.   cardio appreciated.  monitor  BP on steroids     Problem/Plan - 3:  ·  Problem: Hyponatremia   ? developing SIADH ?    pt seems to be eating well >> encouraged    monitor closely    renal eval if worsened    Problem/Plan - 4:  Problem: Need for prophylactic measure.   lovenox.  diet    --------------------------------------------  Case discussed with pt  Education given on findings and plan of care  ___________________________  H. CARLOS Hankins.  Pager: [phone]
M E D I C A L   A T T E N D I N G    F O L L O W    U P   N O T E                                     ------------------------------------------------------------------------------------------------    patient evaluated by me, case discussed with team, chart, medications, and physical exam reviewed, labs / tests  and vitals reviewed by me, as bellow.   Patient is stable for discharge today. pt doing well, I spoe with oncologis and in detail with pt's guardian and gave all the information from hospital workup and info for oncologist to followup with. all questions answered.   Patient to follow up with  Oncology, Dr Durbin   See discharge document for full note.      ==================>> MEDICATIONS <<====================    amLODIPine   Tablet 5 milliGRAM(s) Oral daily  benzonatate 200 milliGRAM(s) Oral three times a day  buDESOnide  80 MICROgram(s)/formoterol 4.5 MICROgram(s) Inhaler 2 Puff(s) Inhalation two times a day  cholecalciferol 1000 Unit(s) Oral daily  donepezil 5 milliGRAM(s) Oral at bedtime  hydrALAZINE 50 milliGRAM(s) Oral every 8 hours  metoprolol tartrate 50 milliGRAM(s) Oral two times a day  multivitamin 1 Tablet(s) Oral daily  senna 2 Tablet(s) Oral at bedtime  sertraline 25 milliGRAM(s) Oral daily  simvastatin 10 milliGRAM(s) Oral at bedtime    MEDICATIONS  (PRN):  ALBUTerol    90 MICROgram(s) HFA Inhaler 2 Puff(s) Inhalation every 6 hours PRN Shortness of Breath and/or Wheezing  guaiFENesin/dextromethorphan  Syrup 10 milliLiter(s) Oral every 6 hours PRN Cough    ==================>> VITAL SIGNS <<==================    T(C): 36.7 (07-05-19 @ 14:20), Max: 37.1 (07-04-19 @ 23:52)  HR: 71 (07-05-19 @ 14:20) (64 - 83)  BP: 134/71 (07-05-19 @ 14:20) (118/63 - 135/67)  RR: 18 (07-05-19 @ 14:20) (18 - 18)  SpO2: 98% (07-05-19 @ 14:20) (92% - 98%)    I&O's Summary    04 Jul 2019 07:01  -  05 Jul 2019 07:00  --------------------------------------------------------  IN: 540 mL / OUT: 0 mL / NET: 540 mL    05 Jul 2019 07:01  -  05 Jul 2019 17:06  --------------------------------------------------------  IN: 540 mL / OUT: 0 mL / NET: 540 mL       ==================>> LAB AND IMAGING <<==================                        9.1    12.71 )-----------( 348      ( 04 Jul 2019 10:24 )             28.6          Lipid profile:  (06-25-19)     Total: 153     LDL  : 90     HDL  :51     TG   :60     HgA1C:   WBC count:   12.71 <<== ,  13.86 <<== ,  12.45 <<== ,  13.67 <<==   Hemoglobin:   9.1 <<==,  8.6 <<==,  8.6 <<==,  8.9 <<==  platelets:  348 <==, 342 <==, 353 <==, 347 <==, 356 <==    Creatinine:  0.99  <<==, 1.13  <<==, 1.05  <<==  Sodium:   137  <==, 134  <==, 136  <==    Path reviewed as before
_________________________________________________________________________________________  ========>>  M E D I C A L   A T T E N D I N G    F O L L O W  U P  N O T E  <<=========  -----------------------------------------------------------------------------------------------------    - Patient seen and examined by me earlier today.   - In summary,  IRENA STEELE is a 88y year old woman who originally presented with SOB  - Patient today overall doing ok, comfortable, eating OK. overall comfortable     ==================>> REVIEW OF SYSTEM <<=================    GEN: no fever, no chills, no pain  RESP: no SOB, + occasional cough, no sputum  CVS: no chest pain, no palpitations, no edema  GI: no abdominal pain, no nausea, no constipation, no diarrhea  : no dysuria, no frequency, no hematuria  Neuro: no headache, no dizziness  Derm : no itching, no rash    ==================>> PHYSICAL EXAM <<=================    GEN: A&O X 2 , NAD , comfortable, forgetful , in bed   HEENT: NCAT, PERRL, MMM, hearing intact  Neck: supple , no JVD  CVS: S1S2 , regular , No M/R/G appreciated  PULM: CTA B/L,  no W/R/R appreciated, + occasional cough   ABD.: soft. non tender, non distended,  bowel sounds present  Extrem: intact pulses , no edema   PSYCH : normal mood,  not anxious       ==================>> MEDICATIONS <<====================    ALBUTerol/ipratropium for Nebulization 3 milliLiter(s) Nebulizer every 6 hours  amLODIPine   Tablet 5 milliGRAM(s) Oral daily  benzonatate 200 milliGRAM(s) Oral three times a day  buDESOnide   0.5 milliGRAM(s) Respule 0.5 milliGRAM(s) Inhalation every 12 hours  cholecalciferol 1000 Unit(s) Oral daily  donepezil 5 milliGRAM(s) Oral at bedtime  enoxaparin Injectable 40 milliGRAM(s) SubCutaneous daily  guaiFENesin/dextromethorphan  Syrup 10 milliLiter(s) Oral four times a day  hydrALAZINE 50 milliGRAM(s) Oral every 8 hours  metoprolol tartrate 50 milliGRAM(s) Oral two times a day  multivitamin 1 Tablet(s) Oral daily  predniSONE   Tablet 50 milliGRAM(s) Oral daily  senna 2 Tablet(s) Oral at bedtime  sertraline 25 milliGRAM(s) Oral daily  simvastatin 10 milliGRAM(s) Oral at bedtime    ==================>> VITAL SIGNS <<==================    Vital Signs Last 24 Hrs  T(C): 36.7 (06-30-19 @ 08:12)  T(F): 98 (06-30-19 @ 08:12), Max: 98.7 (06-29-19 @ 16:02)  HR: 60 (06-30-19 @ 08:12) (60 - 86)  BP: 179/75 (06-30-19 @ 08:12)  RR: 18 (06-30-19 @ 08:12) (18 - 18)  SpO2: 98% (06-30-19 @ 08:12) (91% - 98%)       ==================>> LAB AND IMAGING <<==================                        9.0    12.56 )-----------( 356      ( 30 Jun 2019 09:17 )             28.2        136  |  99  |  19  ----------------------------<  76  3.7   |  25  |  1.05    Ca    8.7      30 Jun 2019 07:19      WBC count:   12.56 <<== ,  13.67 <<== ,  9.78 <<==   Hemoglobin:   9.0 <<==,  8.0 <<==,  9.0 <<==  platelets:  356 <==, 323 <==, 358 <==, 338 <==, 369 <==    Creatinine:  1.05  <<==, 1.01  <<==, 1.07  <<==, 0.85  <<==, 0.81  <<==, 0.84  <<==  Sodium:   136  <==, 135  <==, 133  <==, 136  <==, 136  <==, 132  <==            < from: CT Angio Chest w/ IV Cont (06.25.19 @ 14:13) >  IMPRESSION:   No pulmonary embolism.  Narrowing of the right lower lobe posterior segmental pulmonary artery   secondary to extrinsic compression from a surrounding right lower lobe   pulmonary nodule.  Compression deformity of the T12 vertebral body which is increased as   compared to prior CT 4/13/2018.  < end of copied text >    ___________________________________________________________________________________  ===============>>  A S S E S S M E N T   A N D   P L A N <<===============  ------------------------------------------------------------------------------------------    · Assessment		  SOB  lung masses  elevated D Dimer  HTN, poorly controlled likely due to steroids   CAD  Dementia  Hyponatremia   anemia     Problem/Plan - 1:  ·  Problem: Shortness of breath / DOMINGUEZ and hypoxemia, overall improved  appreciated pulm mgmt      COPD exacerbation overall improved   continue medical treatment and O2 as above    short course of steroids per pulm >> taper vs DC     lung mass / nodules on CT      narrowing of airways due to nodules  a CT guided Biopsy has been scheduled for Monday >> NPO post MN     will hole ASA for that..      eventual onc eval  suspect breast primary   monitor  supportive care  IS  cough suppressants as needed    Problem/Plan - 2:  ·  Problem: HTN, CAD / HLD  Continue Current medications and monitor.   cardio appreciated.  monitor  BP on steroids     Problem/Plan - 3:  ·  Problem: Hyponatremia   ? developing SIADH ?    pt seems to be eating well >> encouraged    monitor closely    renal eval if worsened    Problem/Plan - 4:  Problem: Need for prophylactic measure.   lovenox.  diet    --------------------------------------------  Case discussed with pt  Education given on findings and plan of care  ___________________________  HMary Hankins D.O.  Pager: [phone]
_________________________________________________________________________________________  ========>>  M E D I C A L   A T T E N D I N G    F O L L O W  U P  N O T E  <<=========  -----------------------------------------------------------------------------------------------------    - Patient seen and examined by me earlier today.   - In summary,  IRENA STEELE is a 88y year old woman who originally presented with SOB  - Patient today overall doing ok, comfortable, eating OK. overall comfortable     ==================>> REVIEW OF SYSTEM <<=================    GEN: no fever, no chills, no pain  RESP: no SOB, + occasional cough, no sputum  CVS: no chest pain, no palpitations, no edema  GI: no abdominal pain, no nausea, no constipation, no diarrhea  : no dysuria, no frequency, no hematuria  Neuro: no headache, no dizziness  Derm : no itching, no rash    ==================>> PHYSICAL EXAM <<=================    GEN: A&O X 2 , NAD , comfortable, forgetful , in bed   HEENT: NCAT, PERRL, MMM, hearing intact  Neck: supple , no JVD  CVS: S1S2 , regular , No M/R/G appreciated  PULM: CTA B/L,  no W/R/R appreciated, + occasional cough   ABD.: soft. non tender, non distended,  bowel sounds present  Extrem: intact pulses , no edema   PSYCH : normal mood,  not anxious       ==================>> MEDICATIONS <<====================    ALBUTerol/ipratropium for Nebulization 3 milliLiter(s) Nebulizer every 6 hours  amLODIPine   Tablet 5 milliGRAM(s) Oral daily  benzonatate 200 milliGRAM(s) Oral three times a day  buDESOnide   0.5 milliGRAM(s) Respule 0.5 milliGRAM(s) Inhalation every 12 hours  cholecalciferol 1000 Unit(s) Oral daily  donepezil 5 milliGRAM(s) Oral at bedtime  enoxaparin Injectable 40 milliGRAM(s) SubCutaneous daily  guaiFENesin/dextromethorphan  Syrup 10 milliLiter(s) Oral four times a day  hydrALAZINE 50 milliGRAM(s) Oral every 8 hours  metoprolol tartrate 50 milliGRAM(s) Oral two times a day  multivitamin 1 Tablet(s) Oral daily  predniSONE   Tablet 50 milliGRAM(s) Oral daily  senna 2 Tablet(s) Oral at bedtime  sertraline 25 milliGRAM(s) Oral daily  simvastatin 10 milliGRAM(s) Oral at bedtime    ==================>> VITAL SIGNS <<==================    Vital Signs Last 24 Hrs  T(C): 37.9 (07-01-19 @ 09:06)  T(F): 100.2 (07-01-19 @ 09:06), Max: 100.2 (07-01-19 @ 09:06)  HR: 62 (07-01-19 @ 09:06) (60 - 69)  BP: 163/72 (07-01-19 @ 09:06)  RR: 18 (07-01-19 @ 09:06) (18 - 18)  SpO2: 89% (07-01-19 @ 09:06) (89% - 94%)       ==================>> LAB AND IMAGING <<==================                        9.0    12.56 )-----------( 356      ( 30 Jun 2019 09:17 )             28.2        136  |  99  |  19  ----------------------------<  76  3.7   |  25  |  1.05    Ca    8.7      30 Jun 2019 07:19      WBC count:   12.56 <<== ,  13.67 <<==   Hemoglobin:   9.0 <<==,  8.0 <<==  platelets:  356 <==, 323 <==, 358 <==    Creatinine:  1.05  <<==, 1.01  <<==, 1.07  <<==, 0.85  <<==  Sodium:   136  <==, 135  <==, 133  <==, 136  <==    ___________________________________________________________________________________  ===============>>  A S S E S S M E N T   A N D   P L A N <<===============  ------------------------------------------------------------------------------------------    · Assessment		  SOB  lung masses  elevated D Dimer  HTN, poorly controlled likely due to steroids   CAD  Dementia  Hyponatremia   anemia     Problem/Plan - 1:  ·  Problem: Shortness of breath / DOMINGUEZ and hypoxemia, overall improved  appreciated pulm mgmt      COPD exacerbation overall improved   continue medical treatment and O2 as needed     short course of steroids per pulm >> taper vs DC     lung mass / nodules on CT      narrowing of airways due to nodules  a CT guided Biopsy scheduled for today       eventual onc eval  suspect breast primary   monitor  supportive care  IS  cough suppressants as needed    Problem/Plan - 2:  ·  Problem: HTN, CAD / HLD  Continue Current medications and monitor.   cardio appreciated.  monitor  BP on steroids     Problem/Plan - 3:  ·  Problem: Hyponatremia   ? developing SIADH ?    pt seems to be eating well >> encouraged    monitor closely    renal eval if worsened    Problem/Plan - 4:  Problem: Need for prophylactic measure.   lovenox.  diet    --------------------------------------------  Case discussed with pt  Education given on findings and plan of care  ___________________________  H. CARLOS Hankins.  Pager: [phone]
_________________________________________________________________________________________  ========>>  M E D I C A L   A T T E N D I N G    F O L L O W  U P  N O T E  <<=========  -----------------------------------------------------------------------------------------------------    - Patient seen and examined by me earlier today.   - In summary,  IRENA STEELE is a 88y year old woman who originally presented with SOB  - Patient today overall doing ok, comfortable, eating OK. overall comfortable     ==================>> REVIEW OF SYSTEM <<=================    GEN: no fever, no chills, no pain  RESP: no SOB, + occasional cough, no sputum  CVS: no chest pain, no palpitations, no edema  GI: no abdominal pain, no nausea, no constipation, no diarrhea  : no dysuria, no frequency, no hematuria  Neuro: no headache, no dizziness  Derm : no itching, no rash    ==================>> PHYSICAL EXAM <<=================    GEN: A&O X 2 , NAD , comfortable, forgetful , in bed   HEENT: NCAT, PERRL, MMM, hearing intact  Neck: supple , no JVD  CVS: S1S2 , regular , No M/R/G appreciated  PULM: CTA B/L,  no W/R/R appreciated, + occasional cough   ABD.: soft. non tender, non distended,  bowel sounds present  Extrem: intact pulses , no edema   PSYCH : normal mood,  not anxious       ==================>> MEDICATIONS <<====================    ALBUTerol/ipratropium for Nebulization 3 milliLiter(s) Nebulizer every 6 hours  amLODIPine   Tablet 5 milliGRAM(s) Oral daily  benzonatate 200 milliGRAM(s) Oral three times a day  buDESOnide   0.5 milliGRAM(s) Respule 0.5 milliGRAM(s) Inhalation every 12 hours  cholecalciferol 1000 Unit(s) Oral daily  donepezil 5 milliGRAM(s) Oral at bedtime  enoxaparin Injectable 40 milliGRAM(s) SubCutaneous daily  guaiFENesin/dextromethorphan  Syrup 10 milliLiter(s) Oral four times a day  hydrALAZINE 50 milliGRAM(s) Oral every 8 hours  metoprolol tartrate 50 milliGRAM(s) Oral two times a day  multivitamin 1 Tablet(s) Oral daily  senna 2 Tablet(s) Oral at bedtime  sertraline 25 milliGRAM(s) Oral daily  simvastatin 10 milliGRAM(s) Oral at bedtime    ==================>> VITAL SIGNS <<==================    Vital Signs Last 24 Hrs  T(C): 37.3 (07-02-19 @ 08:02)  T(F): 99.2 (07-02-19 @ 08:02), Max: 99.2 (07-02-19 @ 08:02)  HR: 65 (07-02-19 @ 08:02) (62 - 86)  BP: 123/67 (07-02-19 @ 08:02)  RR: 18 (07-02-19 @ 08:02) (16 - 18)  SpO2: 96% (07-02-19 @ 08:02) (96% - 99%)       ==================>> LAB AND IMAGING <<==================                        8.6    12.45 )-----------( 353      ( 02 Jul 2019 08:32 )             27.3        134<L>  |  95<L>  |  22  ----------------------------<  87  3.4<L>   |  28  |  1.13    Ca    8.3<L>      02 Jul 2019 05:36      WBC count:   12.45 <<== ,  13.67 <<== ,  12.56 <<== ,  13.67 <<==   Hemoglobin:   8.6 <<==,  8.9 <<==,  9.0 <<==,  8.0 <<==  platelets:  353 <==, 347 <==, 356 <==, 323 <==    Creatinine:  1.13  <<==, 1.05  <<==, 1.01  <<==, 1.07  <<==  Sodium:   134  <==, 136  <==, 135  <==, 133  <==    ___________________________________________________________________________________  ===============>>  A S S E S S M E N T   A N D   P L A N <<===============  ------------------------------------------------------------------------------------------    · Assessment		  SOB  lung masses  elevated D Dimer  HTN, poorly controlled likely due to steroids   CAD  Dementia  Hyponatremia   anemia     Problem/Plan - 1:  ·  Problem: Shortness of breath / DOMINGUEZ and hypoxemia, overall improved  appreciated pulm mgmt      COPD exacerbation overall improved   continue medical treatment and O2 as needed     short course of steroids finished    lung mass / nodules on CT      narrowing of airways due to nodules  a CT guided Biopsy done, pending results       onc to eval need for further testing / mgmt vs outpatient follow up   suspect breast primary   monitor  supportive care  IS  cough suppressants as needed    Problem/Plan - 2:  ·  Problem: HTN, CAD / HLD  Continue Current medications and monitor.   cardio appreciated.  monitor  BP on steroids     Problem/Plan - 3:  ·  Problem: Hyponatremia  stable    pt seems to be eating well >> encouraged    monitor     Problem/Plan - 4:  Problem: Need for prophylactic measure.   lovenox.  diet    --------------------------------------------  Case discussed with pt, NP  Education given on findings and plan of care  ___________________________  HMary Hankins D.O.  Pager: [phone]
_________________________________________________________________________________________  ========>>  M E D I C A L   A T T E N D I N G    F O L L O W  U P  N O T E  <<=========  -----------------------------------------------------------------------------------------------------    - Patient seen and examined by me earlier today.   - In summary,  IRENA STEELE is a 88y year old woman who originally presented with SOB  - Patient today overall doing ok, comfortable, eating OK. overall comfortable     ==================>> REVIEW OF SYSTEM <<=================    GEN: no fever, no chills, no pain  RESP: no SOB, no cough, no sputum  CVS: no chest pain, no palpitations, no edema  GI: no abdominal pain, no nausea, no constipation, no diarrhea  : no dysuria, no frequency, no hematuria  Neuro: no headache, no dizziness  Derm : no itching, no rash    ==================>> PHYSICAL EXAM <<=================    GEN: A&O X 2 , NAD , comfortable, forgetful   HEENT: NCAT, PERRL, MMM, hearing intact  Neck: supple , no JVD  CVS: S1S2 , regular , No M/R/G appreciated  PULM: CTA B/L,  no W/R/R appreciated  ABD.: soft. non tender, non distended,  bowel sounds present  Extrem: intact pulses , no edema   PSYCH : normal mood,  not anxious      ==================>> MEDICATIONS <<====================    MEDICATIONS  (STANDING):  aspirin  chewable 81 milliGRAM(s) Oral daily  cholecalciferol 1000 Unit(s) Oral daily  donepezil 5 milliGRAM(s) Oral at bedtime  enoxaparin Injectable 40 milliGRAM(s) SubCutaneous daily  hydrALAZINE 50 milliGRAM(s) Oral every 8 hours  metoprolol tartrate 50 milliGRAM(s) Oral two times a day  multivitamin 1 Tablet(s) Oral daily  senna 2 Tablet(s) Oral at bedtime  sertraline 25 milliGRAM(s) Oral daily  simvastatin 10 milliGRAM(s) Oral at bedtime  sodium chloride 0.9%. 1000 milliLiter(s) (60 mL/Hr) IV Continuous <Continuous>    ==================>> VITAL SIGNS <<==================    T(C): 36.7 (06-25-19 @ 16:30), Max: 37.1 (06-25-19 @ 05:15)  HR: 58 (06-25-19 @ 16:30) (55 - 68)  BP: 155/65 (06-25-19 @ 16:30) (147/54 - 160/69)  RR: 18 (06-25-19 @ 16:30) (18 - 18)  SpO2: 100% (06-25-19 @ 16:30) (97% - 100%)    I&O's Summary    24 Jun 2019 07:01  -  25 Jun 2019 07:00  --------------------------------------------------------  IN: 0 mL / OUT: 250 mL / NET: -250 mL    25 Jun 2019 07:01  -  25 Jun 2019 19:29  --------------------------------------------------------  IN: 240 mL / OUT: 550 mL / NET: -310 mL     ==================>> LAB AND IMAGING <<==================                        8.2    9.25  )-----------( 338      ( 25 Jun 2019 08:53 )             26.9        Hemoglobin:   8.2 <<==,  10.2 <<==    136  |  99  |  12  ----------------------------<  85  4.0   |  24  |  0.81    Ca    8.6      25 Jun 2019 06:32  Phos  3.3     06-25  Mg     1.9     06-25    TPro  6.0  /  Alb  2.8<L>  /  TBili  0.2  /  DBili  x   /  AST  10  /  ALT  7<L>  /  AlkPhos  57  06-25    PT/INR - ( 24 Jun 2019 16:44 )   PT: 12.0 sec;   INR: 1.04 ratio    PTT - ( 24 Jun 2019 16:44 )  PTT:29.8 sec               Lipid profile:  (06-25-19)     Total: 153     LDL  : 90     HDL  :51     TG   :60     cancer markers noted : elevated breast markers, negative CEA !!    < from: CT Angio Chest w/ IV Cont (06.25.19 @ 14:13) >  IMPRESSION:   No pulmonary embolism.  Narrowing of the right lower lobe posterior segmental pulmonary artery   secondary to extrinsic compression from a surrounding right lower lobe   pulmonary nodule.  Compression deformity of the T12 vertebral body which is increased as   compared to prior CT 4/13/2018.  < end of copied text >    ___________________________________________________________________________________  ===============>>  A S S E S S M E N T   A N D   P L A N <<===============  ------------------------------------------------------------------------------------------    · Assessment		  SOB  lung masses  elevated D Dimer  HTN  CAD  Dementia      Problem/Plan - 1:  ·  Problem: Shortness of breath / DOMINGUEZ and hypoxemia  lung mass / nodules on CT  PE ruled out   narrowing of airways due to nodules  will ask pulm input  likely will need biopsy and onc eval  likely would need formal mammogram   continue O2 via NC   monitor    Problem/Plan - 2:  ·  Problem: HTN   lopressor +  hydralazine  monitor BP as elevated  cardio appreciated.    Problem/Plan - 3:  ·  Problem: CAD / HLD  c/w aspirin.   c/w statin    Problem/Plan - 4:  Problem: Need for prophylactic measure.   lovenox.  diet    --------------------------------------------  Case discussed with pt, NP..   Education given on findings and plan of care  ___________________________  KALEB Hankins D.O.  Pager: [phone]
_________________________________________________________________________________________  ========>>  M E D I C A L   A T T E N D I N G    F O L L O W  U P  N O T E  <<=========  -----------------------------------------------------------------------------------------------------    - Patient seen and examined by me earlier today.   - In summary,  IRENA STEELE is a 88y year old woman who originally presented with SOB  - Patient today overall doing ok, comfortable, eating OK. overall comfortable     ==================>> REVIEW OF SYSTEM <<=================    GEN: no fever, no chills, no pain  RESP: no SOB, no cough, no sputum  CVS: no chest pain, no palpitations, no edema  GI: no abdominal pain, no nausea, no constipation, no diarrhea  : no dysuria, no frequency, no hematuria  Neuro: no headache, no dizziness  Derm : no itching, no rash    ==================>> PHYSICAL EXAM <<=================    GEN: A&O X 2 , NAD , comfortable, forgetful , in bed   HEENT: NCAT, PERRL, MMM, hearing intact  Neck: supple , no JVD  CVS: S1S2 , regular , No M/R/G appreciated  PULM: CTA B/L,  no W/R/R appreciated  ABD.: soft. non tender, non distended,  bowel sounds present  Extrem: intact pulses , no edema   PSYCH : normal mood,  not anxious      ==================>> MEDICATIONS <<====================    ALBUTerol/ipratropium for Nebulization 3 milliLiter(s) Nebulizer every 6 hours  aspirin  chewable 81 milliGRAM(s) Oral daily  benzonatate 200 milliGRAM(s) Oral three times a day  buDESOnide   0.5 milliGRAM(s) Respule 0.5 milliGRAM(s) Inhalation every 12 hours  cholecalciferol 1000 Unit(s) Oral daily  donepezil 5 milliGRAM(s) Oral at bedtime  enoxaparin Injectable 40 milliGRAM(s) SubCutaneous daily  guaiFENesin/dextromethorphan  Syrup 10 milliLiter(s) Oral four times a day  hydrALAZINE 50 milliGRAM(s) Oral every 8 hours  methylPREDNISolone sodium succinate Injectable 20 milliGRAM(s) IV Push every 6 hours  metoprolol tartrate 50 milliGRAM(s) Oral two times a day  multivitamin 1 Tablet(s) Oral daily  senna 2 Tablet(s) Oral at bedtime  sertraline 25 milliGRAM(s) Oral daily  simvastatin 10 milliGRAM(s) Oral at bedtime  sodium chloride 0.9%. 1000 milliLiter(s) IV Continuous <Continuous>    ==================>> VITAL SIGNS <<==================    Vital Signs Last 24 Hrs  T(C): 36.7 (06-26-19 @ 16:04)  T(F): 98 (06-26-19 @ 16:04), Max: 98.5 (06-26-19 @ 00:06)  HR: 65 (06-26-19 @ 16:04) (54 - 73)  BP: 166/61 (06-26-19 @ 16:04)  RR: 18 (06-26-19 @ 16:04) (18 - 18)  SpO2: 99% (06-26-19 @ 16:04) (99% - 100%)       ==================>> LAB AND IMAGING <<==================                        9.0    9.78  )-----------( 358      ( 26 Jun 2019 10:18 )             29.1        136  |  98  |  12  ----------------------------<  88  4.1   |  28  |  0.85    Ca    8.7      26 Jun 2019 07:38  Phos  3.3     06-25  Mg     1.9     06-25    TPro  6.0  /  Alb  2.8<L>  /  TBili  0.2  /  DBili  x   /  AST  10  /  ALT  7<L>  /  AlkPhos  57  06-25    WBC count:   9.78 <<== ,  9.25 <<== ,  9.2 <<==   Hemoglobin:   9.0 <<==,  8.2 <<==,  10.2 <<==  platelets:  358 <==, 338 <==, 369 <==    Creatinine:  0.85  <<==, 0.81  <<==, 0.84  <<==  Sodium:   136  <==, 136  <==, 132  <==       AST:          10 <== , 14 <==      ALT:        7  <== , 8  <==      AP:        57  <=, 69  <=     Bili:        0.2  <=, 0.3  <=     cancer markers noted : elevated breast markers, negative CEA !!    < from: CT Angio Chest w/ IV Cont (06.25.19 @ 14:13) >  IMPRESSION:   No pulmonary embolism.  Narrowing of the right lower lobe posterior segmental pulmonary artery   secondary to extrinsic compression from a surrounding right lower lobe   pulmonary nodule.  Compression deformity of the T12 vertebral body which is increased as   compared to prior CT 4/13/2018.  < end of copied text >    ___________________________________________________________________________________  ===============>>  A S S E S S M E N T   A N D   P L A N <<===============  ------------------------------------------------------------------------------------------    · Assessment		  SOB  lung masses  elevated D Dimer  HTN  CAD  Dementia      Problem/Plan - 1:  ·  Problem: Shortness of breath / DOMINGUEZ and hypoxemia, overall improved  appreciated pulm consult and discussed     reportedly this morning was having a lot of wheezing, diagnosed with acute COPD exacerbation and now on solumedrol  comtinue medical treatmetn and O2 as above    lung mass / nodules on CT      narrowing of airways due to nodules  a CT guided Biopsy has been scheduled for Monday     will layo [facility] for that..      eventual onc eval  suspect breast primary   monitor  supportive care  IS    Problem/Plan - 2:  ·  Problem: HTN   Continue Current medications and monitor.   cardio appreciated.    Problem/Plan - 3:  ·  Problem: CAD / HLD  c/w aspirin.   c/w statin    Problem/Plan - 4:  Problem: Need for prophylactic measure.   lovenox.  diet    --------------------------------------------  Case discussed with pt, Pulm  Education given on findings and plan of care  ___________________________  HMary Hankins D.O.  Pager: [phone]
_________________________________________________________________________________________  ========>>  M E D I C A L   A T T E N D I N G    F O L L O W  U P  N O T E  <<=========  -----------------------------------------------------------------------------------------------------    - Patient seen and examined by me earlier today.   - In summary,  IRENA STEELE is a 88y year old woman who originally presented with SOB  - Patient today overall doing ok, comfortable, eating OK. overall comfortable     ==================>> REVIEW OF SYSTEM <<=================    GEN: no fever, no chills, no pain  RESP: no SOB, no cough, no sputum  CVS: no chest pain, no palpitations, no edema  GI: no abdominal pain, no nausea, no constipation, no diarrhea  : no dysuria, no frequency, no hematuria  Neuro: no headache, no dizziness  Derm : no itching, no rash    ==================>> PHYSICAL EXAM <<=================    GEN: A&O X 2 , NAD , comfortable, forgetful , in bed   HEENT: NCAT, PERRL, MMM, hearing intact  Neck: supple , no JVD  CVS: S1S2 , regular , No M/R/G appreciated  PULM: CTA B/L,  no W/R/R appreciated, + occasional cough   ABD.: soft. non tender, non distended,  bowel sounds present  Extrem: intact pulses , no edema   PSYCH : normal mood,  not anxious       ==================>> MEDICATIONS <<====================    ALBUTerol/ipratropium for Nebulization 3 milliLiter(s) Nebulizer every 6 hours  benzonatate 200 milliGRAM(s) Oral three times a day  buDESOnide   0.5 milliGRAM(s) Respule 0.5 milliGRAM(s) Inhalation every 12 hours  cholecalciferol 1000 Unit(s) Oral daily  donepezil 5 milliGRAM(s) Oral at bedtime  enoxaparin Injectable 40 milliGRAM(s) SubCutaneous daily  guaiFENesin/dextromethorphan  Syrup 10 milliLiter(s) Oral four times a day  hydrALAZINE 50 milliGRAM(s) Oral every 8 hours  metoprolol tartrate 50 milliGRAM(s) Oral two times a day  multivitamin 1 Tablet(s) Oral daily  predniSONE   Tablet 50 milliGRAM(s) Oral daily  senna 2 Tablet(s) Oral at bedtime  sertraline 25 milliGRAM(s) Oral daily  simvastatin 10 milliGRAM(s) Oral at bedtime  sodium chloride 0.9%. 1000 milliLiter(s) IV Continuous <Continuous>    ==================>> VITAL SIGNS <<==================    Vital Signs Last 24 Hrs  T(C): 36.7 (06-29-19 @ 08:20)  T(F): 98.1 (06-29-19 @ 08:20), Max: 98.8 (06-28-19 @ 16:09)  HR: 64 (06-29-19 @ 08:20) (64 - 88)  BP: 150/70 (06-29-19 @ 08:20)  RR: 18 (06-29-19 @ 08:20) (18 - 18)  SpO2: 91% (06-29-19 @ 08:20) (91% - 93%)       ==================>> LAB AND IMAGING <<==================                        8.0    13.67 )-----------( 323      ( 28 Jun 2019 09:31 )             25.0        135  |  99  |  28<H>  ----------------------------<  88  3.9   |  25  |  1.01    Ca    8.3<L>      28 Jun 2019 06:44    WBC count:   13.67 <<== ,  9.78 <<== ,  9.25 <<== ,  9.2 <<==   Hemoglobin:   8.0 <<==,  9.0 <<==,  8.2 <<==,  10.2 <<==  platelets:  323 <==, 358 <==, 338 <==, 369 <==    Creatinine:  1.01  <<==, 1.07  <<==, 0.85  <<==, 0.81  <<==, 0.84  <<==  Sodium:   135  <==, 133  <==, 136  <==, 136  <==, 132  <==       AST:          10 <== , 14 <==      ALT:        7  <== , 8  <==      AP:        57  <=, 69  <=     Bili:        0.2  <=, 0.3  <=    < from: CT Angio Chest w/ IV Cont (06.25.19 @ 14:13) >  IMPRESSION:   No pulmonary embolism.  Narrowing of the right lower lobe posterior segmental pulmonary artery   secondary to extrinsic compression from a surrounding right lower lobe   pulmonary nodule.  Compression deformity of the T12 vertebral body which is increased as   compared to prior CT 4/13/2018.  < end of copied text >    ___________________________________________________________________________________  ===============>>  A S S E S S M E N T   A N D   P L A N <<===============  ------------------------------------------------------------------------------------------    · Assessment		  SOB  lung masses  elevated D Dimer  HTN, poorly controlled likely due to steroids   CAD  Dementia  Hyponatremia   anemia     Problem/Plan - 1:  ·  Problem: Shortness of breath / DOMINGUEZ and hypoxemia, overall improved  appreciated pulm mgmt      COPD exacerbation overall improved   continue medical treatment and O2 as above    short course of steroids per pulm     lung mass / nodules on CT      narrowing of airways due to nodules  a CT guided Biopsy has been scheduled for Monday     will lyao [facility] for that..      eventual onc eval  suspect breast primary   monitor  supportive care  IS  cough suppressants as needed    Problem/Plan - 2:  ·  Problem: HTN, CAD / HLD  Continue Current medications and monitor.   cardio appreciated.  monitor  BP on steroids     Problem/Plan - 3:  ·  Problem: Hyponatremia   ? developing SIADH ?    pt seems to be eating well >> encouraged    monitor closely    renal eval if worsened    Problem/Plan - 4:  Problem: Need for prophylactic measure.   lovenox.  diet    --------------------------------------------  Case discussed with pt  Education given on findings and plan of care  ___________________________  H. CARLOS Hankins.  Pager: [phone]
_________________________________________________________________________________________  ========>>  M E D I C A L   A T T E N D I N G    F O L L O W  U P  N O T E  <<=========  -----------------------------------------------------------------------------------------------------    - Patient seen and examined by me earlier today.   - In summary,  IRENA STEELE is a 88y year old woman who originally presented with SOB  - Patient today overall doing ok, comfortable, eating OK. overall comfortable     ==================>> REVIEW OF SYSTEM <<=================    GEN: no fever, no chills, pain in Rt side of stomach > seems ms resolved   RESP: no SOB, + occasional cough, no sputum  CVS: no chest pain, no palpitations, no edema  GI: no abdominal pain, no nausea, no constipation, no diarrhea  : no dysuria, no frequency, no hematuria  Neuro: no headache, no dizziness  Derm : no itching, no rash    ==================>> PHYSICAL EXAM <<=================    GEN: A&O X 2 , NAD , comfortable, forgetful , in chair   HEENT: NCAT, PERRL, MMM, hearing intact  Neck: supple , no JVD  CVS: S1S2 , regular , No M/R/G appreciated  PULM: CTA B/L,  no W/R/R appreciated, + occasional cough   ABD.: soft. non tender, non distended,  bowel sounds present  Extrem: intact pulses , no edema   PSYCH : normal mood,  not anxious        ==================>> MEDICATIONS <<====================    ALBUTerol/ipratropium for Nebulization 3 milliLiter(s) Nebulizer every 6 hours  amLODIPine   Tablet 5 milliGRAM(s) Oral daily  benzonatate 200 milliGRAM(s) Oral three times a day  buDESOnide   0.5 milliGRAM(s) Respule 0.5 milliGRAM(s) Inhalation every 12 hours  cholecalciferol 1000 Unit(s) Oral daily  donepezil 5 milliGRAM(s) Oral at bedtime  hydrALAZINE 50 milliGRAM(s) Oral every 8 hours  metoprolol tartrate 50 milliGRAM(s) Oral two times a day  multivitamin 1 Tablet(s) Oral daily  senna 2 Tablet(s) Oral at bedtime  sertraline 25 milliGRAM(s) Oral daily  simvastatin 10 milliGRAM(s) Oral at bedtime    MEDICATIONS  (PRN):  guaiFENesin/dextromethorphan  Syrup 10 milliLiter(s) Oral every 6 hours PRN Cough    ==================>> VITAL SIGNS <<==================    Vital Signs Last 24 Hrs  T(C): 37.1 (07-04-19 @ 16:22)  T(F): 98.7 (07-04-19 @ 16:22), Max: 99.7 (07-04-19 @ 00:17)  HR: 75 (07-04-19 @ 16:22) (69 - 85)  BP: 152/75 (07-04-19 @ 16:22)  RR: 18 (07-04-19 @ 16:22) (18 - 18)  SpO2: 96% (07-04-19 @ 16:22) (95% - 99%)       ==================>> LAB AND IMAGING <<==================                        9.1    12.71 )-----------( 348      ( 04 Jul 2019 10:24 )             28.6        137  |  97  |  33<H>  ----------------------------<  85  3.8   |  27  |  0.99    Ca    8.7      03 Jul 2019 07:41    WBC count:   12.71 <<== ,  13.86 <<== ,  12.45 <<== ,  13.67 <<== ,  12.56 <<==   Hemoglobin:   9.1 <<==,  8.6 <<==,  8.6 <<==,  8.9 <<==,  9.0 <<==  platelets:  348 <==, 342 <==, 353 <==, 347 <==, 356 <==    Creatinine:  0.99  <<==, 1.13  <<==, 1.05  <<==  Sodium:   137  <==, 134  <==, 136  <==    Cytopathology - Non Gyn Report (07.01.19 @ 20:52)    Cytopathology - Non Gyn Report:   ACCESSION No:  [id number]  IRENA STEELE H                     2  Cytopathology Report  Final Diagnosis  LUNG, RIGHT, CT GUIDED FNA  POSITIVE FOR MALIGNANT CELLS.  Non-small cell carcinoma, consistent with squamous cell carcinoma  Note: The smears and cell block are composed of disorganized  crowded groups, syncytial sheets and single malignant cells with  nuclear pleomorphism, hyperchromasia, irregular chromatin,  occasional prominent nucleoli, and rare mitosis. Focal areas show  dense, orangephilic cytoplasm.  Immunohistochemical staining shows the tumor expresses CK7, P40  and GATA3.  TTF1 and Mammoglobin are negative.  PDL testing is ordered and will be reported in an addendum.  Dr. Shine and Dr. Chan were informed of the diagnosis via  encrypted email on 07/03/19 11:55.    Screened by: Annelise STEIN(ASCP)  Verified by: Michelle Johnson M.D.  (Electronic Signature)  Reported on: 07/03/19 12:00 EDT, [address] [place] [address]  [id number]  Cytology technical processing performed at [address], [place] [address]  _________________________________________________________________    ___________________________________________________________________________________  ===============>>  A S S E S S M E N T   A N D   P L A N <<===============  ------------------------------------------------------------------------------------------    · Assessment		  SOB  lung masses >> squamous cell carcinoma   HTN  CAD  Dementia  Hyponatremia   anemia     Problem/Plan - 1:  ·  Problem: Shortness of breath / DOMINGUEZ and hypoxemia, overall improved  appreciated pulm mgmt      COPD exacerbation resolved   continue medical treatment and O2 as needed     lung mass / nodules on CT      narrowing of airways due to nodules  a CT guided Biopsy done, results as above     Bone scan ordered, pending         onc consulted, discussed and appreciated      further decision on treatment based on molecular testing... > to be discussed as OP   supportive care  IS  cough suppressants as needed    Problem/Plan - 2:  ·  Problem: HTN, CAD / HLD  Continue Current medications and monitor.   cardio appreciated.  monitor  BP on steroids     Problem/Plan - 3:  ·  Problem: Hyponatremia  stable    pt seems to be eating well >> encouraged    monitor     Problem/Plan - 4:  Problem: Need for prophylactic measure.   lovenox.  diet    Dispo plan back to facility   --------------------------------------------  Case discussed as above   Education given on findings and plan of care, pt made aware of diagnosis >> will try to reach out to the guardian tomorrow   ___________________________  H. CARLOS Hankins.  Pager: [phone]
87 y/o woman with newly diagnosed NSCLC.   Based on advanced age, dementia, will unlikely be a candidate for systemic ctx.  Can consider outpatient, PET Scan, molecular testing.  Can then consider role of targeted therapy vs. supportive care.  If stable, no objection to DC with outpatient follow up with Dr. Durbin.    borderline macrocytic anemia- Will check iron stores, b12, folate, ldh, hapto, retic. h/h is adequate. can monitor for now.
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    right lower lobe lobulated nodules with compression/narrowing of the posterior segment of the right lower lobe bronchus - extensive adenopathy in the anterior mediastinum, pretracheal space, AP window, right hilum and subcarinal region -     HTN/HLD/CAD/MI    PLAN/RECOMMENDATIONS:  CT FNA- as above- squamous cell CA-  Onc input noted  stable oxygenation on room air  no further oral steroids  change pulmicort /albuterol nebs to symbicort 60- 2 puffs twice per day with prn albuterol mdi  robitussin DM- prn  tessalon perles  cardiac meds   GI/DVT prophylaxis  dc ok from pulmonary standpoint with outpt f/u with onc and our office    Pari Karimi MD, [facility]  [phone]  Pulmonary Medicine
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    right lower lobe lobulated nodules with compression/narrowing of the posterior segment of the right lower lobe bronchus - extensive adenopathy in the anterior mediastinum, pretracheal space, AP window, right hilum and subcarinal region - suspect primary lung cancer - unclear if a central small cell lung cancer has spread outward or a peripheral non-small cell lung cancer has spread to the central nodes - metastatic breast cancer seems unlikely - no evidence of metastatic disease to the brain on head CT given its limitations    HTN/HLD/CAD/MI    PLAN/RECOMMENDATIONS:    stable oxygenation on room air  RVP - negative  change IV to oral steroids - prednisone 50mg daily x 5 days and hopefully can dc at that point.    albuterol/atrovent nebs q6h  pulmicort 0.5mg nebs q12h - rinse mouth after use - give after duoneb  robitussin DM/tessalon perles  cardiac meds ASA (on hold)/zocor/lopressor/hydralazine - await ECHO  GI/DVT prophylaxis  aricept/zoloft  CT guided lung biopsy arranged with Dr. Chan for Monday July 1st at 3pm- pt will need to be NPO after midnight Sunday.  We will see intermittently over the next few days as needed, please call for acute issues      Pari Karimi MD, [facility]  [phone]  Pulmonary Medicine
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    right lower lobe lobulated nodules with compression/narrowing of the posterior segment of the right lower lobe bronchus - extensive adenopathy in the anterior mediastinum, pretracheal space, AP window, right hilum and subcarinal region - suspect primary lung cancer - unclear if a central small cell lung cancer has spread outward or a peripheral non-small cell lung cancer has spread to the central nodes - metastatic breast cancer seems unlikely - no evidence of metastatic disease to the brain on head CT given its limitations    HTN/HLD/CAD/MI    PLAN/RECOMMENDATIONS:    stable oxygenation on room air  RVP - negative  no further oral steroids  albuterol/atrovent nebs q6h- will change to prn over next day or so  pulmicort 0.5mg nebs q12h - rinse mouth after use - give after duoneb  robitussin DM/tessalon perles  cardiac meds ASA (on hold)/zocor/lopressor/hydralazine - await ECHO  GI/DVT prophylaxis  aricept/zoloft  CT guided lung biopsy yesterday- follow up results.  ? breast based on overall situation      Pari Karimi MD, [facility]  [phone]  Pulmonary Medicine
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    right lower lobe lobulated nodules with compression/narrowing of the posterior segment of the right lower lobe bronchus - extensive adenopathy in the anterior mediastinum, pretracheal space, AP window, right hilum and subcarinal region - suspect primary lung cancer - unclear if a central small cell lung cancer has spread outward or a peripheral non-small cell lung cancer has spread to the central nodes - metastatic breast cancer seems unlikely - no evidence of metastatic disease to the brain on head CT given its limitations    HTN/HLD/CAD/MI    PLAN/RECOMMENDATIONS:    stable oxygenation on room air  RVP - negative  no further oral steroids  albuterol/atrovent nebs q6h- will continue for now given CT findings  pulmicort 0.5mg nebs q12h - rinse mouth after use - give after duoneb  robitussin DM/tessalon perles  cardiac meds ASA (on hold)/zocor/lopressor/hydralazine - await ECHO  GI/DVT prophylaxis  aricept/zoloft  CT guided lung biopsy - follow up results.  ? breast based on overall situation      Pari Karimi MD, [facility]  [phone]  Pulmonary Medicine
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    right lower lobe lobulated nodules with compression/narrowing of the posterior segment of the right lower lobe bronchus - extensive adenopathy in the anterior mediastinum, pretracheal space, AP window, right hilum and subcarinal region - suspect primary lung cancer - unclear if a central small cell lung cancer has spread outward or a peripheral non-small cell lung cancer has spread to the central nodes - metastatic breast cancer seems unlikely - no evidence of metastatic disease to the brain on head CT given its limitations    HTN/HLD/CAD/MI    PLAN/RECOMMENDATIONS:  CT FNA- as above- squamous cell CA- await all stains.  Onc input would be useful  stable oxygenation on room air  RVP - negative  no further oral steroids  albuterol/atrovent nebs q6h- will continue for now given CT findings of airway compression.  If pt remains stable, can consider change to symbicort with prn nebs  pulmicort 0.5mg nebs q12h - rinse mouth after use - give after duoneb  robitussin DM- change to prn  tessalon perles  cardiac meds   GI/DVT prophylaxis          Pari Karimi MD, [facility] [place]  [phone]  Pulmonary Medicine

## 2019-07-05 NOTE — PROGRESS NOTE ADULT - PROVIDER SPECIALTY LIST ADULT
Cardiology
Heme/Onc
Internal Medicine
Pulmonology
Cardiology
Internal Medicine

## 2019-07-05 NOTE — PROGRESS NOTE ADULT - SUBJECTIVE AND OBJECTIVE BOX
CARDIOLOGY     PROGRESS  NOTE   ________________________________________________    CHIEF COMPLAINT:Patient is a 88y old  Female who presents with a chief complaint of SOB (03 Jul 2019 13:51)  no complain.  	  REVIEW OF SYSTEMS:  CONSTITUTIONAL: No fever, weight loss, or fatigue  EYES: No eye pain, visual disturbances, or discharge  ENT:  No difficulty hearing, tinnitus, vertigo; No sinus or throat pain  NECK: No pain or stiffness  RESPIRATORY: No cough, wheezing, chills or hemoptysis; No Shortness of Breath  CARDIOVASCULAR: No chest pain, palpitations, passing out, dizziness, or leg swelling  GASTROINTESTINAL: No abdominal or epigastric pain. No nausea, vomiting, or hematemesis; No diarrhea or constipation. No melena or hematochezia.  GENITOURINARY: No dysuria, frequency, hematuria, or incontinence  NEUROLOGICAL: No headaches, memory loss, loss of strength, numbness, or tremors  SKIN: No itching, burning, rashes, or lesions   LYMPH Nodes: No enlarged glands  ENDOCRINE: No heat or cold intolerance; No hair loss  MUSCULOSKELETAL: No joint pain or swelling; No muscle, back, or extremity pain  PSYCHIATRIC: No depression, anxiety, mood swings, or difficulty sleeping  HEME/LYMPH: No easy bruising, or bleeding gums  ALLERGY AND IMMUNOLOGIC: No hives or eczema	    [ ] All others negative	  [ ] Unable to obtain    PHYSICAL EXAM:  T(C): 36.7 (07-04-19 @ 08:29), Max: 37.6 (07-04-19 @ 00:17)  HR: 77 (07-04-19 @ 08:29) (67 - 85)  BP: 116/58 (07-04-19 @ 08:29) (116/58 - 149/66)  RR: 18 (07-04-19 @ 08:29) (18 - 18)  SpO2: 95% (07-04-19 @ 08:29) (95% - 99%)  Wt(kg): --  I&O's Summary    03 Jul 2019 07:01  -  04 Jul 2019 07:00  --------------------------------------------------------  IN: 480 mL / OUT: 0 mL / NET: 480 mL        Appearance: Normal	  HEENT:   Normal oral mucosa, PERRL, EOMI	  Lymphatic: No lymphadenopathy  Cardiovascular: Normal S1 S2, No JVD, + murmurs, No edema  Respiratory: Lungs clear to auscultation	  Psychiatry: A & O x 3, Mood & affect appropriate  Gastrointestinal:  Soft, Non-tender, + BS	  Skin: No rashes, No ecchymoses, No cyanosis	  Neurologic: Non-focal  Extremities: Normal range of motion, No clubbing, cyanosis or edema  Vascular: Peripheral pulses palpable 2+ bilaterally    MEDICATIONS  (STANDING):  ALBUTerol/ipratropium for Nebulization 3 milliLiter(s) Nebulizer every 6 hours  amLODIPine   Tablet 5 milliGRAM(s) Oral daily  benzonatate 200 milliGRAM(s) Oral three times a day  buDESOnide   0.5 milliGRAM(s) Respule 0.5 milliGRAM(s) Inhalation every 12 hours  cholecalciferol 1000 Unit(s) Oral daily  donepezil 5 milliGRAM(s) Oral at bedtime  guaiFENesin/dextromethorphan  Syrup 10 milliLiter(s) Oral four times a day  hydrALAZINE 50 milliGRAM(s) Oral every 8 hours  metoprolol tartrate 50 milliGRAM(s) Oral two times a day  multivitamin 1 Tablet(s) Oral daily  senna 2 Tablet(s) Oral at bedtime  sertraline 25 milliGRAM(s) Oral daily  simvastatin 10 milliGRAM(s) Oral at bedtime      TELEMETRY: 	    ECG:  	  RADIOLOGY:  OTHER: 	  	  LABS:	 	    CARDIAC MARKERS:                                8.6    13.86 )-----------( 342      ( 03 Jul 2019 11:39 )             27.1     07-03    137  |  97  |  33<H>  ----------------------------<  85  3.8   |  27  |  0.99    Ca    8.7      03 Jul 2019 07:41      proBNP: Serum Pro-Brain Natriuretic Peptide: 715 pg/mL (06-24 @ 16:44)    Lipid Profile: Cholesterol 153  LDL 90  HDL 51  TG 60    HgA1c:   TSH:   Cytopathology - Non Gyn Report (07.01.19 @ 20:52)    Cytopathology - Non Gyn Report:   ACCESSION No:  [id number]    IRENA STEELE                     2        Cytopathology Report            Final Diagnosis  LUNG, RIGHT, CT GUIDED FNA  POSITIVE FOR MALIGNANT CELLS.  Non-small cell carcinoma, consistent with squamous cell carcinoma  (see note).    Note: The smears and cell block are composed of disorganized  crowded groups, syncytial sheets and single malignant cells with  nuclear pleomorphism, hyperchromasia, irregular chromatin,  occasional prominent nucleoli, and rare mitosis. Focal areas show  dense, orangephilic cytoplasm.    Immunohistochemical staining shows the tumor expresses CK7, P40  and GATA3.  TTF1 and Mammoglobin are negative.    PDL testing is ordered and will be reported in an addendum.    Dr. Shine and Dr. Chan were informed of the diagnosis via  encrypted email on 07/03/19 11:55.    Slide(s) with built in immunohistochemical study control(s) and  negative control associated with this case has/have been verified  by the sign out pathologist.  For slide(s) without built in controls positive control slides  has/have been reviewed and approved by immunohistochemistry lab  These immunohistochemical/ in-situ hybridization tests have been  developed and their performance characteristics determined by  [facility], Department of Pathology,  Division of Immunopathology, [address] [place] [address].  It has not been cleared or approved by the U.S. Food  and Drug Administration.  The FDA has determined that such  clearance or approval is not necessary.  This test is used for  clinical purposes.  The laboratory is certified under the CLIA-88  as qualified to perform high complexity clinical  testing.    Screened by: Annelise STEIN(ASCP)  Verified by: Michelle Johnson M.D.  (Electronic Signature)  Reported on: 07/03/19 12:00 EDT, [address] [place]  [id number]  Cytology technical processing performed at [address] [place] [id number]  _________________________________________________________________                IRENA STEELE                     2        Cytopathology Report            Specimen(s) Submitted  LUNG, RIGHT, CT GUIDED FNA      Statement of Adequacy  Immediate cytologic study for adequacy of specimen using a Diff-  Quik stain was performed. FNA acceptable for further evaluation  by SARITA Bucio(ASCP).      Clinical Information  Multiple lung masses, right lung mass mediastinal adenopathy; 3.5  cm.  Rule out cancer.      Gross Description  Received: Needle rinses  in formalin. 30 ml of reddish-brown  fluid.  6 Smear received (3 air dried and 3 fixed in  alcohol)  Prepared:  1 cell block          Assessment and plan  ---------------------------  awaiting biopsy result  bp is well controlled  continue cardiac meds  increase ambulation  dvt prophylaxis
CARDIOLOGY     PROGRESS  NOTE   ________________________________________________    CHIEF COMPLAINT:Patient is a 88y old  Female who presents with a chief complaint of SOB (01 Jul 2019 10:55)  doin better.  	  REVIEW OF SYSTEMS:  CONSTITUTIONAL: No fever, weight loss, or fatigue  EYES: No eye pain, visual disturbances, or discharge  ENT:  No difficulty hearing, tinnitus, vertigo; No sinus or throat pain  NECK: No pain or stiffness  RESPIRATORY: No cough, wheezing, chills or hemoptysis; No Shortness of Breath  CARDIOVASCULAR: No chest pain, palpitations, passing out, dizziness, or leg swelling  GASTROINTESTINAL: No abdominal or epigastric pain. No nausea, vomiting, or hematemesis; No diarrhea or constipation. No melena or hematochezia.  GENITOURINARY: No dysuria, frequency, hematuria, or incontinence  NEUROLOGICAL: No headaches, memory loss, loss of strength, numbness, or tremors  SKIN: No itching, burning, rashes, or lesions   LYMPH Nodes: No enlarged glands  ENDOCRINE: No heat or cold intolerance; No hair loss  MUSCULOSKELETAL: No joint pain or swelling; No muscle, back, or extremity pain  PSYCHIATRIC: No depression, anxiety, mood swings, or difficulty sleeping  HEME/LYMPH: No easy bruising, or bleeding gums  ALLERGY AND IMMUNOLOGIC: No hives or eczema	    [ ] All others negative	  [ ] Unable to obtain    PHYSICAL EXAM:  T(C): 37.3 (07-02-19 @ 08:02), Max: 37.9 (07-01-19 @ 09:06)  HR: 65 (07-02-19 @ 08:02) (62 - 86)  BP: 123/67 (07-02-19 @ 08:02) (110/64 - 177/65)  RR: 18 (07-02-19 @ 08:02) (16 - 18)  SpO2: 96% (07-02-19 @ 08:02) (89% - 99%)  Wt(kg): --  I&O's Summary    01 Jul 2019 07:01  -  02 Jul 2019 07:00  --------------------------------------------------------  IN: 0 mL / OUT: 0 mL / NET: 0 mL        Appearance: Normal	  HEENT:   Normal oral mucosa, PERRL, EOMI	  Lymphatic: No lymphadenopathy  Cardiovascular: Normal S1 S2, No JVD, + murmurs, No edema  Respiratory: Lungs clear to auscultation	  Psychiatry: A & O x 3, Mood & affect appropriate  Gastrointestinal:  Soft, Non-tender, + BS	  Skin: No rashes, No ecchymoses, No cyanosis	  Neurologic: Non-focal  Extremities: Normal range of motion, No clubbing, cyanosis or edema  Vascular: Peripheral pulses palpable 2+ bilaterally    MEDICATIONS  (STANDING):  ALBUTerol/ipratropium for Nebulization 3 milliLiter(s) Nebulizer every 6 hours  amLODIPine   Tablet 5 milliGRAM(s) Oral daily  benzonatate 200 milliGRAM(s) Oral three times a day  buDESOnide   0.5 milliGRAM(s) Respule 0.5 milliGRAM(s) Inhalation every 12 hours  cholecalciferol 1000 Unit(s) Oral daily  donepezil 5 milliGRAM(s) Oral at bedtime  enoxaparin Injectable 40 milliGRAM(s) SubCutaneous daily  guaiFENesin/dextromethorphan  Syrup 10 milliLiter(s) Oral four times a day  hydrALAZINE 50 milliGRAM(s) Oral every 8 hours  metoprolol tartrate 50 milliGRAM(s) Oral two times a day  multivitamin 1 Tablet(s) Oral daily  senna 2 Tablet(s) Oral at bedtime  sertraline 25 milliGRAM(s) Oral daily  simvastatin 10 milliGRAM(s) Oral at bedtime      TELEMETRY: 	    ECG:  	  RADIOLOGY:  OTHER: 	  	  LABS:	 	    CARDIAC MARKERS:                                8.9    13.67 )-----------( 347      ( 01 Jul 2019 10:48 )             27.8     07-02    134<L>  |  95<L>  |  22  ----------------------------<  87  3.4<L>   |  28  |  1.13    Ca    8.3<L>      02 Jul 2019 05:36      proBNP: Serum Pro-Brain Natriuretic Peptide: 715 pg/mL (06-24 @ 16:44)    Lipid Profile: Cholesterol 153  LDL 90  HDL 51  TG 60    HgA1c:   TSH:   PT/INR - ( 01 Jul 2019 10:21 )   PT: 11.2 sec;   INR: 0.99 ratio               Assessment and plan  ---------------------------  cad/htn  continue all cardiac meds  bp is well controlled  replete k  temp ? sec to malignancy awaiting biopsy result  onc eval  dvt prophylaxis
CARDIOLOGY     PROGRESS  NOTE   ________________________________________________    CHIEF COMPLAINT:Patient is a 88y old  Female who presents with a chief complaint of SOB (03 Jul 2019 08:44)  no complain.  	  REVIEW OF SYSTEMS:  CONSTITUTIONAL: No fever, weight loss, or fatigue  EYES: No eye pain, visual disturbances, or discharge  ENT:  No difficulty hearing, tinnitus, vertigo; No sinus or throat pain  NECK: No pain or stiffness  RESPIRATORY: No cough, wheezing, chills or hemoptysis; No Shortness of Breath  CARDIOVASCULAR: No chest pain, palpitations, passing out, dizziness, or leg swelling  GASTROINTESTINAL: No abdominal or epigastric pain. No nausea, vomiting, or hematemesis; No diarrhea or constipation. No melena or hematochezia.  GENITOURINARY: No dysuria, frequency, hematuria, or incontinence  NEUROLOGICAL: No headaches, memory loss, loss of strength, numbness, or tremors  SKIN: No itching, burning, rashes, or lesions   LYMPH Nodes: No enlarged glands  ENDOCRINE: No heat or cold intolerance; No hair loss  MUSCULOSKELETAL: No joint pain or swelling; No muscle, back, or extremity pain  PSYCHIATRIC: No depression, anxiety, mood swings, or difficulty sleeping  HEME/LYMPH: No easy bruising, or bleeding gums  ALLERGY AND IMMUNOLOGIC: No hives or eczema	    [ ] All others negative	  [ ] Unable to obtain    PHYSICAL EXAM:  T(C): 36.1 (07-03-19 @ 07:57), Max: 36.8 (07-02-19 @ 21:19)  HR: 70 (07-03-19 @ 07:57) (67 - 75)  BP: 136/70 (07-03-19 @ 07:57) (127/74 - 169/65)  RR: 18 (07-03-19 @ 07:57) (18 - 18)  SpO2: 97% (07-03-19 @ 07:57) (95% - 98%)  Wt(kg): --  I&O's Summary    02 Jul 2019 07:01  -  03 Jul 2019 07:00  --------------------------------------------------------  IN: 560 mL / OUT: 0 mL / NET: 560 mL        Appearance: Normal	  HEENT:   Normal oral mucosa, PERRL, EOMI	  Lymphatic: No lymphadenopathy  Cardiovascular: Normal S1 S2, No JVD, + murmurs, No edema  Respiratory: Lungs clear to auscultation	  Psychiatry: A & O x 3, Mood & affect appropriate  Gastrointestinal:  Soft, Non-tender, + BS	  Skin: No rashes, No ecchymoses, No cyanosis	  Neurologic: Non-focal  Extremities: Normal range of motion, No clubbing, cyanosis or edema  Vascular: Peripheral pulses palpable 2+ bilaterally    MEDICATIONS  (STANDING):  ALBUTerol/ipratropium for Nebulization 3 milliLiter(s) Nebulizer every 6 hours  amLODIPine   Tablet 5 milliGRAM(s) Oral daily  benzonatate 200 milliGRAM(s) Oral three times a day  buDESOnide   0.5 milliGRAM(s) Respule 0.5 milliGRAM(s) Inhalation every 12 hours  cholecalciferol 1000 Unit(s) Oral daily  donepezil 5 milliGRAM(s) Oral at bedtime  enoxaparin Injectable 40 milliGRAM(s) SubCutaneous daily  guaiFENesin/dextromethorphan  Syrup 10 milliLiter(s) Oral four times a day  hydrALAZINE 50 milliGRAM(s) Oral every 8 hours  metoprolol tartrate 50 milliGRAM(s) Oral two times a day  multivitamin 1 Tablet(s) Oral daily  senna 2 Tablet(s) Oral at bedtime  sertraline 25 milliGRAM(s) Oral daily  simvastatin 10 milliGRAM(s) Oral at bedtime      TELEMETRY: 	    ECG:  	  RADIOLOGY:  OTHER: 	  	  LABS:	 	    CARDIAC MARKERS:                                8.6    12.45 )-----------( 353      ( 02 Jul 2019 08:32 )             27.3     07-03    137  |  97  |  33<H>  ----------------------------<  85  3.8   |  27  |  0.99    Ca    8.7      03 Jul 2019 07:41      proBNP: Serum Pro-Brain Natriuretic Peptide: 715 pg/mL (06-24 @ 16:44)    Lipid Profile: Cholesterol 153  LDL 90  HDL 51  TG 60    HgA1c:   TSH:   PT/INR - ( 01 Jul 2019 10:21 )   PT: 11.2 sec;   INR: 0.99 ratio               Assessment and plan  ---------------------------  doing better  awaiting biopsy result  hem onc eval  continue meds  CAD continue cardiac meds
CARDIOLOGY     PROGRESS  NOTE   ________________________________________________    CHIEF COMPLAINT:Patient is a 88y old  Female who presents with a chief complaint of SOB (05 Jul 2019 09:42)  no complain.  	  REVIEW OF SYSTEMS:  CONSTITUTIONAL: No fever, weight loss, or fatigue  EYES: No eye pain, visual disturbances, or discharge  ENT:  No difficulty hearing, tinnitus, vertigo; No sinus or throat pain  NECK: No pain or stiffness  RESPIRATORY: No cough, wheezing, chills or hemoptysis; No Shortness of Breath  CARDIOVASCULAR: No chest pain, palpitations, passing out, dizziness, or leg swelling  GASTROINTESTINAL: No abdominal or epigastric pain. No nausea, vomiting, or hematemesis; No diarrhea or constipation. No melena or hematochezia.  GENITOURINARY: No dysuria, frequency, hematuria, or incontinence  NEUROLOGICAL: No headaches, memory loss, loss of strength, numbness, or tremors  SKIN: No itching, burning, rashes, or lesions   LYMPH Nodes: No enlarged glands  ENDOCRINE: No heat or cold intolerance; No hair loss  MUSCULOSKELETAL: No joint pain or swelling; No muscle, back, or extremity pain  PSYCHIATRIC: No depression, anxiety, mood swings, or difficulty sleeping  HEME/LYMPH: No easy bruising, or bleeding gums  ALLERGY AND IMMUNOLOGIC: No hives or eczema	    [ ] All others negative	  [ ] Unable to obtain    PHYSICAL EXAM:  T(C): 36.9 (07-05-19 @ 08:03), Max: 37.1 (07-04-19 @ 16:22)  HR: 64 (07-05-19 @ 08:03) (64 - 83)  BP: 128/69 (07-05-19 @ 08:03) (118/63 - 152/75)  RR: 18 (07-05-19 @ 08:03) (18 - 18)  SpO2: 92% (07-05-19 @ 08:03) (92% - 96%)  Wt(kg): --  I&O's Summary    04 Jul 2019 07:01  -  05 Jul 2019 07:00  --------------------------------------------------------  IN: 540 mL / OUT: 0 mL / NET: 540 mL        Appearance: Normal	  HEENT:   Normal oral mucosa, PERRL, EOMI	  Lymphatic: No lymphadenopathy  Cardiovascular: Normal S1 S2, No JVD, + murmurs, No edema  Respiratory: Lungs clear to auscultation	  Psychiatry: A & O x 3, Mood & affect appropriate  Gastrointestinal:  Soft, Non-tender, + BS	  Skin: No rashes, No ecchymoses, No cyanosis	  Neurologic: Non-focal  Extremities: Normal range of motion, No clubbing, cyanosis or edema  Vascular: Peripheral pulses palpable 2+ bilaterally    MEDICATIONS  (STANDING):  amLODIPine   Tablet 5 milliGRAM(s) Oral daily  benzonatate 200 milliGRAM(s) Oral three times a day  buDESOnide  80 MICROgram(s)/formoterol 4.5 MICROgram(s) Inhaler 2 Puff(s) Inhalation two times a day  cholecalciferol 1000 Unit(s) Oral daily  donepezil 5 milliGRAM(s) Oral at bedtime  hydrALAZINE 50 milliGRAM(s) Oral every 8 hours  metoprolol tartrate 50 milliGRAM(s) Oral two times a day  multivitamin 1 Tablet(s) Oral daily  senna 2 Tablet(s) Oral at bedtime  sertraline 25 milliGRAM(s) Oral daily  simvastatin 10 milliGRAM(s) Oral at bedtime      TELEMETRY: 	    ECG:  	  RADIOLOGY:  OTHER: 	  	  LABS:	 	    CARDIAC MARKERS:                                9.1    12.71 )-----------( 348      ( 04 Jul 2019 10:24 )             28.6           proBNP: Serum Pro-Brain Natriuretic Peptide: 715 pg/mL (06-24 @ 16:44)    Lipid Profile: Cholesterol 153  LDL 90  HDL 51  TG 60    HgA1c:   TSH:   Cytopathology - Non Gyn Report (07.01.19 @ 20:52)    Cytopathology - Non Gyn Report:   ACCESSION No:  [id number]    IRENA STEELE H                     2        Cytopathology Report            Final Diagnosis  LUNG, RIGHT, CT GUIDED FNA  POSITIVE FOR MALIGNANT CELLS.  Non-small cell carcinoma, consistent with squamous cell carcinoma  (see note).    Note: The smears and cell block are composed of disorganized  crowded groups, syncytial sheets and single malignant cells with  nuclear pleomorphism, hyperchromasia, irregular chromatin,  occasional prominent nucleoli, and rare mitosis. Focal areas show  dense, orangephilic cytoplasm.    Immunohistochemical staining shows the tumor expresses CK7, P40  and GATA3.  TTF1 and Mammoglobin are negative.    PDL testing is ordered and will be reported in an addendum.    Dr. Shine and Dr. Chan were informed of the diagnosis via  encrypted email on 07/03/19 11:55.    Slide(s) with built in immunohistochemical study control(s) and  negative control associated with this case has/have been verified  by the sign out pathologist.  For slide(s) without built in controls positive control slides  has/have been reviewed and approved by immunohistochemistry lab  These immunohistochemical/ in-situ hybridization tests have been  developed and their performance characteristics determined by  [facility], Department of Pathology,  Division of Immunopathology, [address] [place] [address].  It has not been cleared or approved by the U.S. Food  and Drug Administration.  The FDA has determined that such  clearance or approval is not necessary.  This test is used for  clinical purposes.  The laboratory is certified under the CLIA-88  as qualified to perform high complexity clinical  testing.    Screened by: Annelise STEIN(ASCP)  Verified by: Michelle Johnson M.D.  (Electronic Signature)  Reported on: 07/03/19 12:00 EDT, [address] [place] [address]  [id number]  Cytology technical processing performed at [address] [place] [id number]  _________________________________________________________________                IRENA STEELE H                     2        Cytopathology Report            Specimen(s) Submitted  LUNG, RIGHT, CT GUIDED FNA      Statement of Adequacy  Immediate cytologic study for adequacy of specimen using a Diff-  Quik stain was performed. FNA acceptable for further evaluation  by SARITA Bucio(ASCP).      Clinical Information  Multiple lung masses, right lung mass mediastinal adenopathy; 3.5  cm.  Rule out cancer.      Gross Description  Received: Needle rinses  in formalin. 30 ml of reddish-brown  fluid.  6 Smear received (3 air dried and 3 fixed in  alcohol)  Prepared:  1 cell block          Assessment and plan  ---------------------------  copd/cad  lung mass s/p biopsy  continue cardiac meds  dvt prophylaxis
CARDIOLOGY     PROGRESS  NOTE   ________________________________________________    CHIEF COMPLAINT:Patient is a 88y old  Female who presents with a chief complaint of SOB (24 Jun 2019 20:14)  no complain.  	  REVIEW OF SYSTEMS:  CONSTITUTIONAL: No fever, weight loss, or fatigue  EYES: No eye pain, visual disturbances, or discharge  ENT:  No difficulty hearing, tinnitus, vertigo; No sinus or throat pain  NECK: No pain or stiffness  RESPIRATORY: No cough, wheezing, chills or hemoptysis; No Shortness of Breath  CARDIOVASCULAR: No chest pain, palpitations, passing out, dizziness, or leg swelling  GASTROINTESTINAL: No abdominal or epigastric pain. No nausea, vomiting, or hematemesis; No diarrhea or constipation. No melena or hematochezia.  GENITOURINARY: No dysuria, frequency, hematuria, or incontinence  NEUROLOGICAL: No headaches, memory loss, loss of strength, numbness, or tremors  SKIN: No itching, burning, rashes, or lesions   LYMPH Nodes: No enlarged glands  ENDOCRINE: No heat or cold intolerance; No hair loss  MUSCULOSKELETAL: No joint pain or swelling; No muscle, back, or extremity pain  PSYCHIATRIC: No depression, anxiety, mood swings, or difficulty sleeping  HEME/LYMPH: No easy bruising, or bleeding gums  ALLERGY AND IMMUNOLOGIC: No hives or eczema	    [ ] All others negative	  [ ] Unable to obtain    PHYSICAL EXAM:  T(C): 37.1 (06-25-19 @ 05:15), Max: 37.1 (06-25-19 @ 05:15)  HR: 61 (06-25-19 @ 05:15) (61 - 68)  BP: 156/67 (06-25-19 @ 05:15) (147/54 - 184/65)  RR: 18 (06-25-19 @ 05:15) (16 - 18)  SpO2: 100% (06-25-19 @ 05:15) (97% - 100%)  Wt(kg): --  I&O's Summary    24 Jun 2019 07:01  -  25 Jun 2019 07:00  --------------------------------------------------------  IN: 0 mL / OUT: 250 mL / NET: -250 mL        Appearance: Normal	  HEENT:   Normal oral mucosa, PERRL, EOMI	  Lymphatic: No lymphadenopathy  Cardiovascular: Normal S1 S2, No JVD, +murmurs, No edema  Respiratory: rhonchi  Psychiatry: A & O x 3, Mood & affect appropriate  Gastrointestinal:  Soft, Non-tender, + BS	  Skin: No rashes, No ecchymoses, No cyanosis	  Neurologic: Non-focal  Extremities: Normal range of motion, No clubbing, cyanosis or edema  Vascular: Peripheral pulses palpable 2+ bilaterally    MEDICATIONS  (STANDING):  aspirin  chewable 81 milliGRAM(s) Oral daily  cholecalciferol 1000 Unit(s) Oral daily  donepezil 5 milliGRAM(s) Oral at bedtime  enoxaparin Injectable 40 milliGRAM(s) SubCutaneous daily  hydrALAZINE 50 milliGRAM(s) Oral every 8 hours  metoprolol tartrate 50 milliGRAM(s) Oral two times a day  multivitamin 1 Tablet(s) Oral daily  senna 2 Tablet(s) Oral at bedtime  sertraline 25 milliGRAM(s) Oral daily  simvastatin 10 milliGRAM(s) Oral at bedtime  sodium chloride 0.9%. 1000 milliLiter(s) (60 mL/Hr) IV Continuous <Continuous>      TELEMETRY: 	    ECG:  	  RADIOLOGY:  OTHER: 	  	  LABS:	 	    CARDIAC MARKERS:                                10.2   9.2   )-----------( 369      ( 24 Jun 2019 16:44 )             30.8     06-25    136  |  99  |  12  ----------------------------<  85  4.0   |  24  |  0.81    Ca    8.6      25 Jun 2019 06:32  Phos  3.3     06-25  Mg     1.9     06-25    TPro  6.0  /  Alb  2.8<L>  /  TBili  0.2  /  DBili  x   /  AST  10  /  ALT  7<L>  /  AlkPhos  57  06-25    proBNP: Serum Pro-Brain Natriuretic Peptide: 715 pg/mL (06-24 @ 16:44)    Lipid Profile:   HgA1c:   TSH:   PT/INR - ( 24 Jun 2019 16:44 )   PT: 12.0 sec;   INR: 1.04 ratio         PTT - ( 24 Jun 2019 16:44 )  PTT:29.8 sec  < from: CT Chest No Cont (06.24.19 @ 18:31) >  INTERPRETATION:  Multiple new solid masses in the right lower lobe, right   hilar mass and large subcarinal lymph node. The right upper lobe nodule   has increased in size from 4mm to 8mm.    Although an underlying focal organizing pneumonia is a possibility,   primary diagnostic consideration is malignancy.    < end of copied text >      Assessment and plan  ---------------------------  sob/cad  pulmonary eval  will adjust cardiac meds  echio  may consider brain ct/mri r/o mets sec to change of mental statuu  dvt prophylaxis
CARDIOLOGY     PROGRESS  NOTE   ________________________________________________    CHIEF COMPLAINT:Patient is a 88y old  Female who presents with a chief complaint of SOB (25 Jun 2019 19:29)  doing better.  	  REVIEW OF SYSTEMS:  CONSTITUTIONAL: No fever, weight loss, or fatigue  EYES: No eye pain, visual disturbances, or discharge  ENT:  No difficulty hearing, tinnitus, vertigo; No sinus or throat pain  NECK: No pain or stiffness  RESPIRATORY: No cough, wheezing, chills or hemoptysis; No Shortness of Breath  CARDIOVASCULAR: No chest pain, palpitations, passing out, dizziness, or leg swelling  GASTROINTESTINAL: No abdominal or epigastric pain. No nausea, vomiting, or hematemesis; No diarrhea or constipation. No melena or hematochezia.  GENITOURINARY: No dysuria, frequency, hematuria, or incontinence  NEUROLOGICAL: No headaches, memory loss, loss of strength, numbness, or tremors  SKIN: No itching, burning, rashes, or lesions   LYMPH Nodes: No enlarged glands  ENDOCRINE: No heat or cold intolerance; No hair loss  MUSCULOSKELETAL: No joint pain or swelling; No muscle, back, or extremity pain  PSYCHIATRIC: No depression, anxiety, mood swings, or difficulty sleeping  HEME/LYMPH: No easy bruising, or bleeding gums  ALLERGY AND IMMUNOLOGIC: No hives or eczema	    [ ] All others negative	  [ ] Unable to obtain    PHYSICAL EXAM:  T(C): 36.6 (06-26-19 @ 07:31), Max: 36.9 (06-26-19 @ 00:06)  HR: 54 (06-26-19 @ 07:31) (54 - 73)  BP: 136/58 (06-26-19 @ 07:31) (136/58 - 158/71)  RR: 18 (06-26-19 @ 07:31) (18 - 18)  SpO2: 100% (06-26-19 @ 07:31) (100% - 100%)  Wt(kg): --  I&O's Summary    25 Jun 2019 07:01  -  26 Jun 2019 07:00  --------------------------------------------------------  IN: 660 mL / OUT: 1550 mL / NET: -890 mL        Appearance: Normal	  HEENT:   Normal oral mucosa, PERRL, EOMI	  Lymphatic: No lymphadenopathy  Cardiovascular: Normal S1 S2, No JVD, + murmurs, No edema  Respiratory:rhonchi  Psychiatry: A & O x 3, Mood & affect appropriate  Gastrointestinal:  Soft, Non-tender, + BS	  Skin: No rashes, No ecchymoses, No cyanosis	  Neurologic: Non-focal  Extremities: Normal range of motion, No clubbing, cyanosis or edema  Vascular: Peripheral pulses palpable 2+ bilaterally    MEDICATIONS  (STANDING):  aspirin  chewable 81 milliGRAM(s) Oral daily  cholecalciferol 1000 Unit(s) Oral daily  donepezil 5 milliGRAM(s) Oral at bedtime  enoxaparin Injectable 40 milliGRAM(s) SubCutaneous daily  hydrALAZINE 50 milliGRAM(s) Oral every 8 hours  metoprolol tartrate 50 milliGRAM(s) Oral two times a day  multivitamin 1 Tablet(s) Oral daily  senna 2 Tablet(s) Oral at bedtime  sertraline 25 milliGRAM(s) Oral daily  simvastatin 10 milliGRAM(s) Oral at bedtime  sodium chloride 0.9%. 1000 milliLiter(s) (60 mL/Hr) IV Continuous <Continuous>      TELEMETRY: 	    ECG:  	  RADIOLOGY:  OTHER: 	  	  LABS:	 	    CARDIAC MARKERS:                                8.2    9.25  )-----------( 338      ( 25 Jun 2019 08:53 )             26.9     06-26    136  |  98  |  12  ----------------------------<  88  4.1   |  28  |  0.85    Ca    8.7      26 Jun 2019 07:38  Phos  3.3     06-25  Mg     1.9     06-25    TPro  6.0  /  Alb  2.8<L>  /  TBili  0.2  /  DBili  x   /  AST  10  /  ALT  7<L>  /  AlkPhos  57  06-25    proBNP: Serum Pro-Brain Natriuretic Peptide: 715 pg/mL (06-24 @ 16:44)    Lipid Profile: Cholesterol 153  LDL 90  HDL 51  TG 60    HgA1c:   TSH:   PT/INR - ( 24 Jun 2019 16:44 )   PT: 12.0 sec;   INR: 1.04 ratio         PTT - ( 24 Jun 2019 16:44 )  PTT:29.8 sec      Assessment and plan  ---------------------------  doing better  continue current meds  o2  awaiting biopsy  mental status change ? mets ct/mri brain  echo  dvyt prophylaxis
CARDIOLOGY     PROGRESS  NOTE   ________________________________________________    CHIEF COMPLAINT:Patient is a 88y old  Female who presents with a chief complaint of SOB (26 Jun 2019 17:55)  no comoplain.  	  REVIEW OF SYSTEMS:  CONSTITUTIONAL: No fever, weight loss, or fatigue  EYES: No eye pain, visual disturbances, or discharge  ENT:  No difficulty hearing, tinnitus, vertigo; No sinus or throat pain  NECK: No pain or stiffness  RESPIRATORY: No cough, wheezing, chills or hemoptysis; No Shortness of Breath  CARDIOVASCULAR: No chest pain, palpitations, passing out, dizziness, or leg swelling  GASTROINTESTINAL: No abdominal or epigastric pain. No nausea, vomiting, or hematemesis; No diarrhea or constipation. No melena or hematochezia.  GENITOURINARY: No dysuria, frequency, hematuria, or incontinence  NEUROLOGICAL: No headaches, memory loss, loss of strength, numbness, or tremors  SKIN: No itching, burning, rashes, or lesions   LYMPH Nodes: No enlarged glands  ENDOCRINE: No heat or cold intolerance; No hair loss  MUSCULOSKELETAL: No joint pain or swelling; No muscle, back, or extremity pain  PSYCHIATRIC: No depression, anxiety, mood swings, or difficulty sleeping  HEME/LYMPH: No easy bruising, or bleeding gums  ALLERGY AND IMMUNOLOGIC: No hives or eczema	    [ ] All others negative	  [ ] Unable to obtain    PHYSICAL EXAM:  T(C): 36.4 (06-27-19 @ 08:05), Max: 36.8 (06-27-19 @ 05:50)  HR: 68 (06-27-19 @ 08:05) (59 - 75)  BP: 152/64 (06-27-19 @ 08:05) (152/64 - 186/72)  RR: 18 (06-27-19 @ 08:05) (18 - 18)  SpO2: 97% (06-27-19 @ 08:05) (95% - 99%)  Wt(kg): --  I&O's Summary    26 Jun 2019 07:01  -  27 Jun 2019 07:00  --------------------------------------------------------  IN: 1200 mL / OUT: 2100 mL / NET: -900 mL        Appearance: Normal	  HEENT:   Normal oral mucosa, PERRL, EOMI	  Lymphatic: No lymphadenopathy  Cardiovascular: Normal S1 S2, No JVD, + murmurs, No edema  Respiratory: Lungs clear to auscultation	  Psychiatry: A & O x 3, Mood & affect appropriate  Gastrointestinal:  Soft, Non-tender, + BS	  Skin: No rashes, No ecchymoses, No cyanosis	  Neurologic: Non-focal  Extremities: Normal range of motion, No clubbing, cyanosis or edema  Vascular: Peripheral pulses palpable 2+ bilaterally    MEDICATIONS  (STANDING):  ALBUTerol/ipratropium for Nebulization 3 milliLiter(s) Nebulizer every 6 hours  benzonatate 200 milliGRAM(s) Oral three times a day  buDESOnide   0.5 milliGRAM(s) Respule 0.5 milliGRAM(s) Inhalation every 12 hours  cholecalciferol 1000 Unit(s) Oral daily  donepezil 5 milliGRAM(s) Oral at bedtime  enoxaparin Injectable 40 milliGRAM(s) SubCutaneous daily  guaiFENesin/dextromethorphan  Syrup 10 milliLiter(s) Oral four times a day  hydrALAZINE 50 milliGRAM(s) Oral every 8 hours  methylPREDNISolone sodium succinate Injectable 20 milliGRAM(s) IV Push every 6 hours  metoprolol tartrate 50 milliGRAM(s) Oral two times a day  multivitamin 1 Tablet(s) Oral daily  senna 2 Tablet(s) Oral at bedtime  sertraline 25 milliGRAM(s) Oral daily  simvastatin 10 milliGRAM(s) Oral at bedtime  sodium chloride 0.9%. 1000 milliLiter(s) (60 mL/Hr) IV Continuous <Continuous>      TELEMETRY: 	    ECG:  	  RADIOLOGY:  OTHER: 	  	  LABS:	 	    CARDIAC MARKERS:                                9.0    9.78  )-----------( 358      ( 26 Jun 2019 10:18 )             29.1     06-27    133<L>  |  97  |  22  ----------------------------<  178<H>  3.9   |  25  |  1.07    Ca    8.7      27 Jun 2019 07:24      proBNP: Serum Pro-Brain Natriuretic Peptide: 715 pg/mL (06-24 @ 16:44)    Lipid Profile: Cholesterol 153  LDL 90  HDL 51  TG 60    HgA1c:   TSH:         Assessment and plan  ---------------------------  cad,. s/p mi/htn  continue current meds  awaiting biopsy ?bronch for diagnosis  dvt prophylaxis  ?brain MRI r/o mets  echo
CARDIOLOGY     PROGRESS  NOTE   ________________________________________________    CHIEF COMPLAINT:Patient is a 88y old  Female who presents with a chief complaint of SOB (28 Jun 2019 09:15)  no complain.  	  REVIEW OF SYSTEMS:  CONSTITUTIONAL: No fever, weight loss, or fatigue  EYES: No eye pain, visual disturbances, or discharge  ENT:  No difficulty hearing, tinnitus, vertigo; No sinus or throat pain  NECK: No pain or stiffness  RESPIRATORY: No cough, wheezing, chills or hemoptysis; + Shortness of Breath  CARDIOVASCULAR: No chest pain, palpitations, passing out, dizziness, or leg swelling  GASTROINTESTINAL: No abdominal or epigastric pain. No nausea, vomiting, or hematemesis; No diarrhea or constipation. No melena or hematochezia.  GENITOURINARY: No dysuria, frequency, hematuria, or incontinence  NEUROLOGICAL: No headaches, memory loss, loss of strength, numbness, or tremors  SKIN: No itching, burning, rashes, or lesions   LYMPH Nodes: No enlarged glands  ENDOCRINE: No heat or cold intolerance; No hair loss  MUSCULOSKELETAL: No joint pain or swelling; No muscle, back, or extremity pain  PSYCHIATRIC: No depression, anxiety, mood swings, or difficulty sleeping  HEME/LYMPH: No easy bruising, or bleeding gums  ALLERGY AND IMMUNOLOGIC: No hives or eczema	    [ ] All others negative	  [ ] Unable to obtain    PHYSICAL EXAM:  T(C): 37.1 (06-28-19 @ 00:50), Max: 37.1 (06-28-19 @ 00:50)  HR: 78 (06-28-19 @ 00:50) (68 - 78)  BP: 141/53 (06-28-19 @ 00:50) (141/53 - 151/57)  RR: 18 (06-28-19 @ 00:50) (18 - 18)  SpO2: 91% (06-28-19 @ 00:50) (91% - 94%)  Wt(kg): --  I&O's Summary    27 Jun 2019 07:01  -  28 Jun 2019 07:00  --------------------------------------------------------  IN: 940 mL / OUT: 0 mL / NET: 940 mL        Appearance: Normal	  HEENT:   Normal oral mucosa, PERRL, EOMI	  Lymphatic: No lymphadenopathy  Cardiovascular: Normal S1 S2, No JVD, + murmurs, No edema  Respiratory: rhonchi  Psychiatry: A & O x 3, Mood & affect appropriate  Gastrointestinal:  Soft, Non-tender, + BS	  Skin: No rashes, No ecchymoses, No cyanosis	  Neurologic: Non-focal  Extremities: Normal range of motion, No clubbing, cyanosis or edema  Vascular: Peripheral pulses palpable 2+ bilaterally    MEDICATIONS  (STANDING):  ALBUTerol/ipratropium for Nebulization 3 milliLiter(s) Nebulizer every 6 hours  benzonatate 200 milliGRAM(s) Oral three times a day  buDESOnide   0.5 milliGRAM(s) Respule 0.5 milliGRAM(s) Inhalation every 12 hours  cholecalciferol 1000 Unit(s) Oral daily  donepezil 5 milliGRAM(s) Oral at bedtime  enoxaparin Injectable 40 milliGRAM(s) SubCutaneous daily  guaiFENesin/dextromethorphan  Syrup 10 milliLiter(s) Oral four times a day  hydrALAZINE 50 milliGRAM(s) Oral every 8 hours  metoprolol tartrate 50 milliGRAM(s) Oral two times a day  multivitamin 1 Tablet(s) Oral daily  predniSONE   Tablet 50 milliGRAM(s) Oral daily  senna 2 Tablet(s) Oral at bedtime  sertraline 25 milliGRAM(s) Oral daily  simvastatin 10 milliGRAM(s) Oral at bedtime  sodium chloride 0.9%. 1000 milliLiter(s) (60 mL/Hr) IV Continuous <Continuous>      TELEMETRY: 	    ECG:  	  RADIOLOGY:  OTHER: 	  	  LABS:	 	    CARDIAC MARKERS:                                8.0    13.67 )-----------( 323      ( 28 Jun 2019 09:31 )             25.0     06-28    135  |  99  |  28<H>  ----------------------------<  88  3.9   |  25  |  1.01    Ca    8.3<L>      28 Jun 2019 06:44      proBNP: Serum Pro-Brain Natriuretic Peptide: 715 pg/mL (06-24 @ 16:44)    Lipid Profile: Cholesterol 153  LDL 90  HDL 51  TG 60    HgA1c:   TSH:         Assessment and plan  ---------------------------  awaiting biopsy if family agrees.  cad/htn  continue current meds  dvt prophylaxis
CARDIOLOGY     PROGRESS  NOTE   ________________________________________________    CHIEF COMPLAINT:Patient is a 88y old  Female who presents with a chief complaint of SOB (28 Jun 2019 17:47)    	  REVIEW OF SYSTEMS:  CONSTITUTIONAL: No fever, weight loss, or fatigue  EYES: No eye pain, visual disturbances, or discharge  ENT:  No difficulty hearing, tinnitus, vertigo; No sinus or throat pain  NECK: No pain or stiffness  RESPIRATORY: No cough, wheezing, chills or hemoptysis; No Shortness of Breath  CARDIOVASCULAR: No chest pain, palpitations, passing out, dizziness, or leg swelling  GASTROINTESTINAL: No abdominal or epigastric pain. No nausea, vomiting, or hematemesis; No diarrhea or constipation. No melena or hematochezia.  GENITOURINARY: No dysuria, frequency, hematuria, or incontinence  NEUROLOGICAL: No headaches, memory loss, loss of strength, numbness, or tremors  SKIN: No itching, burning, rashes, or lesions   LYMPH Nodes: No enlarged glands  ENDOCRINE: No heat or cold intolerance; No hair loss  MUSCULOSKELETAL: No joint pain or swelling; No muscle, back, or extremity pain  PSYCHIATRIC: No depression, anxiety, mood swings, or difficulty sleeping  HEME/LYMPH: No easy bruising, or bleeding gums  ALLERGY AND IMMUNOLOGIC: No hives or eczema	    [ ] All others negative	  [ ] Unable to obtain    PHYSICAL EXAM:  T(C): 36.7 (06-29-19 @ 08:20), Max: 37.1 (06-28-19 @ 16:09)  HR: 64 (06-29-19 @ 08:20) (64 - 88)  BP: 150/70 (06-29-19 @ 08:20) (150/70 - 176/72)  RR: 18 (06-29-19 @ 08:20) (18 - 18)  SpO2: 91% (06-29-19 @ 08:20) (91% - 93%)  Wt(kg): --  I&O's Summary    28 Jun 2019 07:01  -  29 Jun 2019 07:00  --------------------------------------------------------  IN: 300 mL / OUT: 0 mL / NET: 300 mL        Appearance: Normal	  HEENT:   Normal oral mucosa, PERRL, EOMI	  Lymphatic: No lymphadenopathy  Cardiovascular: Normal S1 S2, No JVD, + murmurs, No edema  Respiratory: Lungs clear to auscultation	  Psychiatry: A & O x 3, Mood & affect appropriate  Gastrointestinal:  Soft, Non-tender, + BS	  Skin: No rashes, No ecchymoses, No cyanosis	  Neurologic: Non-focal  Extremities: Normal range of motion, No clubbing, cyanosis or edema  Vascular: Peripheral pulses palpable 2+ bilaterally    MEDICATIONS  (STANDING):  ALBUTerol/ipratropium for Nebulization 3 milliLiter(s) Nebulizer every 6 hours  benzonatate 200 milliGRAM(s) Oral three times a day  buDESOnide   0.5 milliGRAM(s) Respule 0.5 milliGRAM(s) Inhalation every 12 hours  cholecalciferol 1000 Unit(s) Oral daily  donepezil 5 milliGRAM(s) Oral at bedtime  enoxaparin Injectable 40 milliGRAM(s) SubCutaneous daily  guaiFENesin/dextromethorphan  Syrup 10 milliLiter(s) Oral four times a day  hydrALAZINE 50 milliGRAM(s) Oral every 8 hours  metoprolol tartrate 50 milliGRAM(s) Oral two times a day  multivitamin 1 Tablet(s) Oral daily  predniSONE   Tablet 50 milliGRAM(s) Oral daily  senna 2 Tablet(s) Oral at bedtime  sertraline 25 milliGRAM(s) Oral daily  simvastatin 10 milliGRAM(s) Oral at bedtime  sodium chloride 0.9%. 1000 milliLiter(s) (60 mL/Hr) IV Continuous <Continuous>      TELEMETRY: 	    ECG:  	  RADIOLOGY:  OTHER: 	  	  LABS:	 	    CARDIAC MARKERS:                                8.0    13.67 )-----------( 323      ( 28 Jun 2019 09:31 )             25.0     06-28    135  |  99  |  28<H>  ----------------------------<  88  3.9   |  25  |  1.01    Ca    8.3<L>      28 Jun 2019 06:44      proBNP: Serum Pro-Brain Natriuretic Peptide: 715 pg/mL (06-24 @ 16:44)    Lipid Profile: Cholesterol 153  LDL 90  HDL 51  TG 60    HgA1c:   TSH:         Assessment and plan  ---------------------------  awaiting Pulmonary work up  cad continue all cardiac meds  taper steroid  will adjust bp meds  dvt prophylaxis
CARDIOLOGY     PROGRESS  NOTE   ________________________________________________    CHIEF COMPLAINT:Patient is a 88y old  Female who presents with a chief complaint of SOB (29 Jun 2019 11:34)  no complain.  	  REVIEW OF SYSTEMS:  CONSTITUTIONAL: No fever, weight loss, or fatigue  EYES: No eye pain, visual disturbances, or discharge  ENT:  No difficulty hearing, tinnitus, vertigo; No sinus or throat pain  NECK: No pain or stiffness  RESPIRATORY: No cough, wheezing, chills or hemoptysis; No Shortness of Breath  CARDIOVASCULAR: No chest pain, palpitations, passing out, dizziness, or leg swelling  GASTROINTESTINAL: No abdominal or epigastric pain. No nausea, vomiting, or hematemesis; No diarrhea or constipation. No melena or hematochezia.  GENITOURINARY: No dysuria, frequency, hematuria, or incontinence  NEUROLOGICAL: No headaches, memory loss, loss of strength, numbness, or tremors  SKIN: No itching, burning, rashes, or lesions   LYMPH Nodes: No enlarged glands  ENDOCRINE: No heat or cold intolerance; No hair loss  MUSCULOSKELETAL: No joint pain or swelling; No muscle, back, or extremity pain  PSYCHIATRIC: No depression, anxiety, mood swings, or difficulty sleeping  HEME/LYMPH: No easy bruising, or bleeding gums  ALLERGY AND IMMUNOLOGIC: No hives or eczema	    [ ] All others negative	  [ ] Unable to obtain    PHYSICAL EXAM:  T(C): 36.7 (06-30-19 @ 08:12), Max: 37.1 (06-29-19 @ 16:02)  HR: 60 (06-30-19 @ 08:12) (60 - 86)  BP: 179/75 (06-30-19 @ 08:12) (150/65 - 182/76)  RR: 18 (06-30-19 @ 08:12) (18 - 18)  SpO2: 98% (06-30-19 @ 08:12) (91% - 98%)  Wt(kg): --  I&O's Summary    29 Jun 2019 07:01  -  30 Jun 2019 07:00  --------------------------------------------------------  IN: 890 mL / OUT: 0 mL / NET: 890 mL        Appearance: Normal	  HEENT:   Normal oral mucosa, PERRL, EOMI	  Lymphatic: No lymphadenopathy  Cardiovascular: Normal S1 S2, No JVD, +murmurs, No edema  Respiratory: Lungs clear to auscultation	  Gastrointestinal:  Soft, Non-tender, + BS	  Skin: No rashes, No ecchymoses, No cyanosis	  Neurologic: Non-focal  Extremities: Normal range of motion, No clubbing, cyanosis or edema  Vascular: Peripheral pulses palpable 2+ bilaterally    MEDICATIONS  (STANDING):  ALBUTerol/ipratropium for Nebulization 3 milliLiter(s) Nebulizer every 6 hours  benzonatate 200 milliGRAM(s) Oral three times a day  buDESOnide   0.5 milliGRAM(s) Respule 0.5 milliGRAM(s) Inhalation every 12 hours  cholecalciferol 1000 Unit(s) Oral daily  donepezil 5 milliGRAM(s) Oral at bedtime  enoxaparin Injectable 40 milliGRAM(s) SubCutaneous daily  guaiFENesin/dextromethorphan  Syrup 10 milliLiter(s) Oral four times a day  hydrALAZINE 50 milliGRAM(s) Oral every 8 hours  metoprolol tartrate 50 milliGRAM(s) Oral two times a day  multivitamin 1 Tablet(s) Oral daily  predniSONE   Tablet 50 milliGRAM(s) Oral daily  senna 2 Tablet(s) Oral at bedtime  sertraline 25 milliGRAM(s) Oral daily  simvastatin 10 milliGRAM(s) Oral at bedtime  sodium chloride 0.9%. 1000 milliLiter(s) (60 mL/Hr) IV Continuous <Continuous>      TELEMETRY: 	    ECG:  	  RADIOLOGY:  OTHER: 	  	  LABS:	 	    CARDIAC MARKERS:                                8.0    13.67 )-----------( 323      ( 28 Jun 2019 09:31 )             25.0     06-30    136  |  99  |  19  ----------------------------<  76  3.7   |  25  |  1.05    Ca    8.7      30 Jun 2019 07:19      proBNP: Serum Pro-Brain Natriuretic Peptide: 715 pg/mL (06-24 @ 16:44)    Lipid Profile: Cholesterol 153  LDL 90  HDL 51  TG 60    HgA1c:   TSH:     < from: Transthoracic Echocardiogram (04.27.15 @ 06:47) >  Conclusions:  1. Normal Left Ventricular Systolic Function,  (EF = 55 to  60%)  2. Grade II diastolic dysfunction  3. RV systolic pressure is mildly increased.=35mmhg    < end of copied text >      Assessment and plan  ---------------------------  awaiting lung biopsy  increase bp will add norvasc  echo  dvt prophylaxis
CARDIOLOGY     PROGRESS  NOTE   ________________________________________________    CHIEF COMPLAINT:Patient is a 88y old  Female who presents with a chief complaint of SOB (30 Jun 2019 13:20)  no complain.  	  REVIEW OF SYSTEMS:  CONSTITUTIONAL: No fever, weight loss, or fatigue  EYES: No eye pain, visual disturbances, or discharge  ENT:  No difficulty hearing, tinnitus, vertigo; No sinus or throat pain  NECK: No pain or stiffness  RESPIRATORY: No cough, wheezing, chills or hemoptysis; No Shortness of Breath  CARDIOVASCULAR: No chest pain, palpitations, passing out, dizziness, or leg swelling  GASTROINTESTINAL: No abdominal or epigastric pain. No nausea, vomiting, or hematemesis; No diarrhea or constipation. No melena or hematochezia.  GENITOURINARY: No dysuria, frequency, hematuria, or incontinence  NEUROLOGICAL: No headaches, memory loss, loss of strength, numbness, or tremors  SKIN: No itching, burning, rashes, or lesions   LYMPH Nodes: No enlarged glands  ENDOCRINE: No heat or cold intolerance; No hair loss  MUSCULOSKELETAL: No joint pain or swelling; No muscle, back, or extremity pain  PSYCHIATRIC: No depression, anxiety, mood swings, or difficulty sleeping  HEME/LYMPH: No easy bruising, or bleeding gums  ALLERGY AND IMMUNOLOGIC: No hives or eczema	    [ ] All others negative	  [ ] Unable to obtain    PHYSICAL EXAM:  T(C): 37.9 (07-01-19 @ 09:06), Max: 37.9 (07-01-19 @ 09:06)  HR: 62 (07-01-19 @ 09:06) (60 - 69)  BP: 163/72 (07-01-19 @ 09:06) (152/60 - 163/72)  RR: 18 (07-01-19 @ 09:06) (18 - 18)  SpO2: 89% (07-01-19 @ 09:06) (89% - 94%)  Wt(kg): --  I&O's Summary    30 Jun 2019 07:01  -  01 Jul 2019 07:00  --------------------------------------------------------  IN: 970 mL / OUT: 0 mL / NET: 970 mL        Appearance: Normal	  HEENT:   Normal oral mucosa, PERRL, EOMI	  Lymphatic: No lymphadenopathy  Cardiovascular: Normal S1 S2, No JVD, =murmurs, No edema  Respiratory: Lungs clear to auscultation	  Psychiatry: A & O x 3, Mood & affect appropriate  Gastrointestinal:  Soft, Non-tender, + BS	  Skin: No rashes, No ecchymoses, No cyanosis	  Neurologic: Non-focal  Extremities: Normal range of motion, No clubbing, cyanosis or edema  Vascular: Peripheral pulses palpable 2+ bilaterally    MEDICATIONS  (STANDING):  ALBUTerol/ipratropium for Nebulization 3 milliLiter(s) Nebulizer every 6 hours  amLODIPine   Tablet 5 milliGRAM(s) Oral daily  benzonatate 200 milliGRAM(s) Oral three times a day  buDESOnide   0.5 milliGRAM(s) Respule 0.5 milliGRAM(s) Inhalation every 12 hours  cholecalciferol 1000 Unit(s) Oral daily  donepezil 5 milliGRAM(s) Oral at bedtime  enoxaparin Injectable 40 milliGRAM(s) SubCutaneous daily  guaiFENesin/dextromethorphan  Syrup 10 milliLiter(s) Oral four times a day  hydrALAZINE 50 milliGRAM(s) Oral every 8 hours  metoprolol tartrate 50 milliGRAM(s) Oral two times a day  multivitamin 1 Tablet(s) Oral daily  predniSONE   Tablet 50 milliGRAM(s) Oral daily  senna 2 Tablet(s) Oral at bedtime  sertraline 25 milliGRAM(s) Oral daily  simvastatin 10 milliGRAM(s) Oral at bedtime      TELEMETRY: 	    ECG:  	  RADIOLOGY:  OTHER: 	  	  LABS:	 	    CARDIAC MARKERS:                                9.0    12.56 )-----------( 356      ( 30 Jun 2019 09:17 )             28.2     06-30    136  |  99  |  19  ----------------------------<  76  3.7   |  25  |  1.05    Ca    8.7      30 Jun 2019 07:19      proBNP: Serum Pro-Brain Natriuretic Peptide: 715 pg/mL (06-24 @ 16:44)    Lipid Profile: Cholesterol 153  LDL 90  HDL 51  TG 60    HgA1c:   TSH:         Assessment and plan  ---------------------------  2will increase norvasc slowly if increase bp  continue current meds  awaiting biopsy
DARRELL STEELERANATHAN  MRN-[id number]    Patient is a 88y old  Female who presents with a chief complaint of SOB (04 Jul 2019 18:39)      ROs-  comfortable  seen at [facility]    Current Meds  MEDICATIONS  (STANDING):  ALBUTerol/ipratropium for Nebulization 3 milliLiter(s) Nebulizer every 6 hours  amLODIPine   Tablet 5 milliGRAM(s) Oral daily  benzonatate 200 milliGRAM(s) Oral three times a day  buDESOnide   0.5 milliGRAM(s) Respule 0.5 milliGRAM(s) Inhalation every 12 hours  cholecalciferol 1000 Unit(s) Oral daily  donepezil 5 milliGRAM(s) Oral at bedtime  hydrALAZINE 50 milliGRAM(s) Oral every 8 hours  metoprolol tartrate 50 milliGRAM(s) Oral two times a day  multivitamin 1 Tablet(s) Oral daily  senna 2 Tablet(s) Oral at bedtime  sertraline 25 milliGRAM(s) Oral daily  simvastatin 10 milliGRAM(s) Oral at bedtime    MEDICATIONS  (PRN):  guaiFENesin/dextromethorphan  Syrup 10 milliLiter(s) Oral every 6 hours PRN Cough      Vitals  Vital Signs Last 24 Hrs  T(C): 36.9 (05 Jul 2019 08:03), Max: 37.1 (04 Jul 2019 16:22)  T(F): 98.5 (05 Jul 2019 08:03), Max: 98.8 (04 Jul 2019 23:52)  HR: 64 (05 Jul 2019 08:03) (64 - 83)  BP: 128/69 (05 Jul 2019 08:03) (118/63 - 152/75)  BP(mean): --  RR: 18 (05 Jul 2019 08:03) (18 - 18)  SpO2: 92% (05 Jul 2019 08:03) (92% - 96%)    Physical Exam    Constitutional: NAD    Lab  CBC Full  -  ( 05 Jul 2019 08:55 )  WBC Count : x  RBC Count : 2.86 M/uL  Hemoglobin : x  Hematocrit : x  Platelet Count - Automated : x  Mean Cell Volume : x  Mean Cell Hemoglobin : x  Mean Cell Hemoglobin Concentration : x  Auto Neutrophil # : x  Auto Lymphocyte # : x  Auto Monocyte # : x  Auto Eosinophil # : x  Auto Basophil # : x  Auto Neutrophil % : x  Auto Lymphocyte % : x  Auto Monocyte % : x  Auto Eosinophil % : x  Auto Basophil % : x              Rad:    Assessment/Plan
Follow-up Pulm Progress Note    No new respiratory events overnight.  Denies increased SOB, chest pain, cough or mucus.    Medications:  MEDICATIONS  (STANDING):  ALBUTerol/ipratropium for Nebulization 3 milliLiter(s) Nebulizer every 6 hours  amLODIPine   Tablet 5 milliGRAM(s) Oral daily  benzonatate 200 milliGRAM(s) Oral three times a day  buDESOnide   0.5 milliGRAM(s) Respule 0.5 milliGRAM(s) Inhalation every 12 hours  cholecalciferol 1000 Unit(s) Oral daily  donepezil 5 milliGRAM(s) Oral at bedtime  enoxaparin Injectable 40 milliGRAM(s) SubCutaneous daily  guaiFENesin/dextromethorphan  Syrup 10 milliLiter(s) Oral four times a day  hydrALAZINE 50 milliGRAM(s) Oral every 8 hours  metoprolol tartrate 50 milliGRAM(s) Oral two times a day  multivitamin 1 Tablet(s) Oral daily  senna 2 Tablet(s) Oral at bedtime  sertraline 25 milliGRAM(s) Oral daily  simvastatin 10 milliGRAM(s) Oral at bedtime    MEDICATIONS  (PRN):      Vent settings (if applicable)      Vital Signs Last 24 Hrs  T(C): 36.1 (03 Jul 2019 07:57), Max: 36.8 (02 Jul 2019 21:19)  T(F): 97 (03 Jul 2019 07:57), Max: 98.2 (02 Jul 2019 21:19)  HR: 70 (03 Jul 2019 07:57) (67 - 75)  BP: 136/70 (03 Jul 2019 07:57) (127/74 - 169/65)  BP(mean): --  RR: 18 (03 Jul 2019 07:57) (18 - 18)  SpO2: 97% (03 Jul 2019 07:57) (95% - 98%)          07-02 @ 07:01  -  07-03 @ 07:00  --------------------------------------------------------  IN: 560 mL / OUT: 0 mL / NET: 560 mL          LABS:                        8.6    12.45 )-----------( 353      ( 02 Jul 2019 08:32 )             27.3     07-03    137  |  97  |  33<H>  ----------------------------<  85  3.8   |  27  |  0.99    Ca    8.7      03 Jul 2019 07:41            CAPILLARY BLOOD GLUCOSE        PT/INR - ( 01 Jul 2019 10:21 )   PT: 11.2 sec;   INR: 0.99 ratio                   CULTURES:        Physical Examination:  Awake and alert, generally comfortable  HEENT: unremarkable  PULM: Clear to auscultation bilaterally, no significant sputum production  CVS: Regular rate and rhythm, no murmurs, rubs, or gallops  Abd:  soft, non tender  Extrem: No CCE    RADIOLOGY REVIEWED  CXR:    CT chest:
Follow-up Pulm Progress Note    No new respiratory events overnight.  Denies increased SOB, chest pain, cough or mucus.  comfprtable in chair without complaints    Medications:  MEDICATIONS  (STANDING):  ALBUTerol/ipratropium for Nebulization 3 milliLiter(s) Nebulizer every 6 hours  amLODIPine   Tablet 5 milliGRAM(s) Oral daily  benzonatate 200 milliGRAM(s) Oral three times a day  buDESOnide   0.5 milliGRAM(s) Respule 0.5 milliGRAM(s) Inhalation every 12 hours  cholecalciferol 1000 Unit(s) Oral daily  donepezil 5 milliGRAM(s) Oral at bedtime  guaiFENesin/dextromethorphan  Syrup 10 milliLiter(s) Oral four times a day  hydrALAZINE 50 milliGRAM(s) Oral every 8 hours  metoprolol tartrate 50 milliGRAM(s) Oral two times a day  multivitamin 1 Tablet(s) Oral daily  senna 2 Tablet(s) Oral at bedtime  sertraline 25 milliGRAM(s) Oral daily  simvastatin 10 milliGRAM(s) Oral at bedtime    MEDICATIONS  (PRN):      Vent settings (if applicable)      Vital Signs Last 24 Hrs  T(C): 36.7 (04 Jul 2019 08:29), Max: 37.6 (04 Jul 2019 00:17)  T(F): 98 (04 Jul 2019 08:29), Max: 99.7 (04 Jul 2019 00:17)  HR: 77 (04 Jul 2019 08:29) (67 - 85)  BP: 116/58 (04 Jul 2019 08:29) (116/58 - 149/66)  BP(mean): --  RR: 18 (04 Jul 2019 08:29) (18 - 18)  SpO2: 95% (04 Jul 2019 08:29) (95% - 99%)          07-03 @ 07:01  -  07-04 @ 07:00  --------------------------------------------------------  IN: 480 mL / OUT: 0 mL / NET: 480 mL          LABS:                        9.1    12.71 )-----------( 348      ( 04 Jul 2019 10:24 )             28.6     07-03    137  |  97  |  33<H>  ----------------------------<  85  3.8   |  27  |  0.99    Ca    8.7      03 Jul 2019 07:41            CAPILLARY BLOOD GLUCOSE                  CULTURES:    CT FNA- non small cell, consistent with squamous    Physical Examination:  Awake and alert, generally comfortable  HEENT: unremarkable  PULM: Clear to auscultation bilaterally, no significant sputum production  CVS: Regular rate and rhythm, no murmurs, rubs, or gallops  Abd:  soft, non tender  Extrem: No CCE    RADIOLOGY REVIEWED  CXR:    CT chest:
Follow-up Pulm Progress Note    No new respiratory events overnight.  Denies increased SOB, chest pain, cough or mucus.  cough improving    Medications:  MEDICATIONS  (STANDING):  ALBUTerol/ipratropium for Nebulization 3 milliLiter(s) Nebulizer every 6 hours  amLODIPine   Tablet 5 milliGRAM(s) Oral daily  benzonatate 200 milliGRAM(s) Oral three times a day  buDESOnide   0.5 milliGRAM(s) Respule 0.5 milliGRAM(s) Inhalation every 12 hours  cholecalciferol 1000 Unit(s) Oral daily  donepezil 5 milliGRAM(s) Oral at bedtime  enoxaparin Injectable 40 milliGRAM(s) SubCutaneous daily  guaiFENesin/dextromethorphan  Syrup 10 milliLiter(s) Oral four times a day  hydrALAZINE 50 milliGRAM(s) Oral every 8 hours  metoprolol tartrate 50 milliGRAM(s) Oral two times a day  multivitamin 1 Tablet(s) Oral daily  senna 2 Tablet(s) Oral at bedtime  sertraline 25 milliGRAM(s) Oral daily  simvastatin 10 milliGRAM(s) Oral at bedtime    MEDICATIONS  (PRN):      Vent settings (if applicable)      Vital Signs Last 24 Hrs  T(C): 37.3 (02 Jul 2019 08:02), Max: 37.9 (01 Jul 2019 09:06)  T(F): 99.2 (02 Jul 2019 08:02), Max: 100.2 (01 Jul 2019 09:06)  HR: 65 (02 Jul 2019 08:02) (62 - 86)  BP: 123/67 (02 Jul 2019 08:02) (110/64 - 177/65)  BP(mean): --  RR: 18 (02 Jul 2019 08:02) (16 - 18)  SpO2: 96% (02 Jul 2019 08:02) (89% - 99%)          07-01 @ 07:01  -  07-02 @ 07:00  --------------------------------------------------------  IN: 0 mL / OUT: 0 mL / NET: 0 mL          LABS:                        8.6    12.45 )-----------( 353      ( 02 Jul 2019 08:32 )             27.3     07-02    134<L>  |  95<L>  |  22  ----------------------------<  87  3.4<L>   |  28  |  1.13    Ca    8.3<L>      02 Jul 2019 05:36            CAPILLARY BLOOD GLUCOSE        PT/INR - ( 01 Jul 2019 10:21 )   PT: 11.2 sec;   INR: 0.99 ratio                   CULTURES:        Physical Examination:  Awake and alert, generally comfortable  HEENT: unremarkable  PULM: Clear to auscultation bilaterally, no significant sputum production  CVS: Regular rate and rhythm, no murmurs, rubs, or gallops  Abd:  soft, non tender  Extrem: No CCE    RADIOLOGY REVIEWED  CXR:    CT chest:
Follow-up Pulm Progress Note    No new respiratory events overnight.  Denies increased SOB, chest pain, or mucus.  notes coughing at times while she eats    Medications:  MEDICATIONS  (STANDING):  ALBUTerol/ipratropium for Nebulization 3 milliLiter(s) Nebulizer every 6 hours  benzonatate 200 milliGRAM(s) Oral three times a day  buDESOnide   0.5 milliGRAM(s) Respule 0.5 milliGRAM(s) Inhalation every 12 hours  cholecalciferol 1000 Unit(s) Oral daily  donepezil 5 milliGRAM(s) Oral at bedtime  enoxaparin Injectable 40 milliGRAM(s) SubCutaneous daily  guaiFENesin/dextromethorphan  Syrup 10 milliLiter(s) Oral four times a day  hydrALAZINE 50 milliGRAM(s) Oral every 8 hours  metoprolol tartrate 50 milliGRAM(s) Oral two times a day  multivitamin 1 Tablet(s) Oral daily  predniSONE   Tablet 50 milliGRAM(s) Oral daily  senna 2 Tablet(s) Oral at bedtime  sertraline 25 milliGRAM(s) Oral daily  simvastatin 10 milliGRAM(s) Oral at bedtime  sodium chloride 0.9%. 1000 milliLiter(s) (60 mL/Hr) IV Continuous <Continuous>    MEDICATIONS  (PRN):      Vent settings (if applicable)      Vital Signs Last 24 Hrs  T(C): 37.1 (28 Jun 2019 00:50), Max: 37.1 (28 Jun 2019 00:50)  T(F): 98.8 (28 Jun 2019 00:50), Max: 98.8 (28 Jun 2019 00:50)  HR: 78 (28 Jun 2019 00:50) (68 - 78)  BP: 141/53 (28 Jun 2019 00:50) (141/53 - 151/57)  BP(mean): --  RR: 18 (28 Jun 2019 00:50) (18 - 18)  SpO2: 91% (28 Jun 2019 00:50) (91% - 94%)          06-27 @ 07:01  -  06-28 @ 07:00  --------------------------------------------------------  IN: 940 mL / OUT: 0 mL / NET: 940 mL          LABS:                        9.0    9.78  )-----------( 358      ( 26 Jun 2019 10:18 )             29.1     06-28    135  |  99  |  28<H>  ----------------------------<  88  3.9   |  25  |  1.01    Ca    8.3<L>      28 Jun 2019 06:44            CAPILLARY BLOOD GLUCOSE                  CULTURES:        Physical Examination:  Awake and alert, generally comfortable  HEENT: unremarkable  PULM: Clear to auscultation bilaterally, no significant sputum production  CVS: Regular rate and rhythm, no murmurs, rubs, or gallops  Abd:  soft, non tender  Extrem: No CCE    RADIOLOGY REVIEWED  CXR:    CT chest:
Follow-up Pulm Progress Note    No new respiratory events overnight.  overall comfortable    Medications:  MEDICATIONS  (STANDING):  amLODIPine   Tablet 5 milliGRAM(s) Oral daily  benzonatate 200 milliGRAM(s) Oral three times a day  buDESOnide  80 MICROgram(s)/formoterol 4.5 MICROgram(s) Inhaler 2 Puff(s) Inhalation two times a day  cholecalciferol 1000 Unit(s) Oral daily  donepezil 5 milliGRAM(s) Oral at bedtime  hydrALAZINE 50 milliGRAM(s) Oral every 8 hours  metoprolol tartrate 50 milliGRAM(s) Oral two times a day  multivitamin 1 Tablet(s) Oral daily  senna 2 Tablet(s) Oral at bedtime  sertraline 25 milliGRAM(s) Oral daily  simvastatin 10 milliGRAM(s) Oral at bedtime    MEDICATIONS  (PRN):  ALBUTerol    90 MICROgram(s) HFA Inhaler 2 Puff(s) Inhalation every 6 hours PRN Shortness of Breath and/or Wheezing  guaiFENesin/dextromethorphan  Syrup 10 milliLiter(s) Oral every 6 hours PRN Cough      Vent settings (if applicable)      Vital Signs Last 24 Hrs  T(C): 36.9 (05 Jul 2019 08:03), Max: 37.1 (04 Jul 2019 16:22)  T(F): 98.5 (05 Jul 2019 08:03), Max: 98.8 (04 Jul 2019 23:52)  HR: 64 (05 Jul 2019 08:03) (64 - 83)  BP: 128/69 (05 Jul 2019 08:03) (118/63 - 152/75)  BP(mean): --  RR: 18 (05 Jul 2019 08:03) (18 - 18)  SpO2: 92% (05 Jul 2019 08:03) (92% - 96%)          07-04 @ 07:01  -  07-05 @ 07:00  --------------------------------------------------------  IN: 540 mL / OUT: 0 mL / NET: 540 mL          LABS:                        9.1    12.71 )-----------( 348      ( 04 Jul 2019 10:24 )             28.6                 CAPILLARY BLOOD GLUCOSE                  CULTURES:        Physical Examination:  Awake and alert, generally comfortable  HEENT: unremarkable  PULM: Clear to auscultation bilaterally, no significant sputum production  CVS: Regular rate and rhythm, no murmurs, rubs, or gallops  Abd:  soft, non tender  Extrem: No CCE    RADIOLOGY REVIEWED  CXR:    CT chest:
[facility] - [facility] - PROGRESS NOTE    CHIEF COMPLAINT: hypoxemia; COPD exacerbation; lung nodules/masses; intrathoracic adenopathy    INTERVAL HISTORY: awake, alert, confused sitting in the chair; reports having a good breakfast; decreased cough without sputum production; mild chest congestion and wheeze; no fevers, chills or sweats; no chest pain/pressure or palpitations    REVIEW OF SYSTEMS:  Constitutional: As per interval history  HEENT: Within normal limits  CV: As per interval history  Resp: As per interval history  GI: Within normal limits   : Within normal limits  Musculoskeletal: Within normal limits  Skin: Within normal limits  Neurological: confusion  Psychiatric: Within normal limits  Endocrine: Within normal limits  Hematologic/Lymphatic: Within normal limits  Allergic/Immunologic: Within normal limits    MEDICATIONS:     Pulmonary "  ALBUTerol/ipratropium for Nebulization 3 milliLiter(s) Nebulizer every 6 hours  benzonatate 200 milliGRAM(s) Oral three times a day  buDESOnide   0.5 milliGRAM(s) Respule 0.5 milliGRAM(s) Inhalation every 12 hours  guaiFENesin/dextromethorphan  Syrup 10 milliLiter(s) Oral four times a day    Anti-microbials:    Cardiovascular:  hydrALAZINE 50 milliGRAM(s) Oral every 8 hours  metoprolol tartrate 50 milliGRAM(s) Oral two times a day    Other:  cholecalciferol 1000 Unit(s) Oral daily  donepezil 5 milliGRAM(s) Oral at bedtime  enoxaparin Injectable 40 milliGRAM(s) SubCutaneous daily  methylPREDNISolone sodium succinate Injectable 20 milliGRAM(s) IV Push every 6 hours  multivitamin 1 Tablet(s) Oral daily  senna 2 Tablet(s) Oral at bedtime  sertraline 25 milliGRAM(s) Oral daily  simvastatin 10 milliGRAM(s) Oral at bedtime  sodium chloride 0.9%. 1000 milliLiter(s) IV Continuous <Continuous>    MEDICATIONS  (PRN):    OBJECTIVE:    I&O's Detail    26 Jun 2019 07:01  -  27 Jun 2019 07:00  --------------------------------------------------------  IN:    Oral Fluid: 480 mL    sodium chloride 0.9%.: 720 mL  Total IN: 1200 mL    OUT:    Voided: 2100 mL  Total OUT: 2100 mL    Total NET: -900 mL      27 Jun 2019 07:01  -  27 Jun 2019 13:32  --------------------------------------------------------  IN:    Oral Fluid: 320 mL  Total IN: 320 mL    OUT:  Total OUT: 0 mL    Total NET: 320 mL    PHYSICAL EXAM:       ICU Vital Signs Last 24 Hrs  T(C): 36.7 (27 Jun 2019 08:00), Max: 36.8 (27 Jun 2019 05:50)  T(F): 98.1 (27 Jun 2019 08:00), Max: 98.2 (27 Jun 2019 05:50)  HR: 72 (27 Jun 2019 08:00) (59 - 75)  BP: 152/64 (27 Jun 2019 08:00) (152/64 - 186/72)  BP(mean): --  ABP: --  ABP(mean): --  RR: 18 (27 Jun 2019 08:00) (18 - 18)  SpO2: 95% (27 Jun 2019 08:00) (95% - 99%) on 2lpm nasal canula     General: Awake. Alert. Cooperative. No distress. Appears stated age. Confused. 	  HEENT: Atraumatic. Normocephalic. Anicteric. Normal oral mucosa. PERRL. EOMI.  Neck: Supple. Trachea midline. Thyroid without enlargement/tenderness/nodules. No carotid bruit. No JVD.	  Cardiovascular: Regular rate and rhythm. S1 S2 normal. II/VI systolic murmur  Respiratory: Respirations unlabored. Minimal rhonchi and wheeze. Right basilar rales. No curvature.  Abdomen: Soft. Non-tender. Non-distended. No organomegaly. No masses. Normal bowel sounds.  Extremities: Warm to touch. No clubbing or cyanosis. No pedal edema.  Pulses: 2+ peripheral pulses all extremities.	  Skin: Right breast with mild thickening of the skin with fullness to palpaion  Lymph Nodes: Cervical, supraclavicular and axillary nodes normal  Neurological: Motor and sensory examination equal and normal. A and O x 1  Psychiatry: Appropriate mood and affect.    LABS:                          9.0    9.78  )-----------( 358      ( 26 Jun 2019 10:18 )             29.1     CBC    WBC  9.78 <==, 9.25 <==, 9.2 <==    Hemoglobin  9.0 <<==, 8.2 <<==, 10.2 <<==    Hematocrit  29.1 <==, 26.9 <==, 30.8 <==    Platelets  358 <==, 338 <==, 369 <==      133<L>  |  97  |  22  ----------------------------<  178<H>    06-27  3.9   |  25  |  1.07      LYTES    sodium  133 <==, 136 <==, 136 <==, 132 <==    potassium   3.9 <==, 4.1 <==, 4.0 <==, 4.6 <==    chloride  97 <==, 98 <==, 99 <==, 95 <==    carbon dioxide  25 <==, 28 <==, 24 <==, 24 <==    =============================================================================================  RENAL FUNCTION:    Creatinine:   1.07  <<==, 0.85  <<==, 0.81  <<==, 0.84  <<==    BUN:   22 <==, 12 <==, 12 <==, 15 <==    ============================================================================================    calcium   8.7 <==, 8.7 <==, 8.6 <==, 9.5 <==    phos   3.3 <==    mag   1.9 <==    ============================================================================================  LFTs    AST:   10 <== , 14 <==     ALT:  7  <== , 8  <==     AP:  57  <=, 69  <=    Bili:  0.2  <=, 0.3  <=      PT/INR - ( 24 Jun 2019 16:44 )   PT: 12.0 sec;   INR: 1.04 ratio      PTT - ( 24 Jun 2019 16:44 )  PTT:29.8 sec    Venous Blood Gas:  06-24 @ 16:44  7.40/48/23/29/32  VBG Lactate: 1.2    Serum Pro-Brain Natriuretic Peptide: 715 pg/mL (06-24 @ 16:44)    D-Dimer Assay, Quantitative: 356 ng/mL DDU (06-24 @ 16:44)    MICROBIOLOGY:     Rapid Respiratory Viral Panel (06.26.19 @ 13:12)    Rapid RVP Result: NotDetec: This Respiratory Panel uses polymerase chain reaction (PCR) to detect for  adenovirus; coronavirus (HKU1, NL63, 229E, OC43); human metapneumovirus  (hMPV); human enterovirus/rhinovirus (Entero/RV); influenza A; influenza  A/H1; influenza A/H3; influenza A/H1-2009; influenza B; parainfluenza  viruses 1, 2, 3, 4; respiratory syncytial virus; Mycoplasma pneumoniae;  and Chlamydophila pneumoniae.    RADIOLOGY:  [x ] Chest radiographs reviewed and interpreted by me    EXAM:  XR CHEST AP OR PA 1V                          PROCEDURE DATE:  06/24/2019      INTERPRETATION:  CLINICAL INFORMATION: Cough.    EXAM: Frontal radiograph of the chest.    COMPARISON: Chest radiograph from 4/13/2018    FINDINGS:    The cardiac silhouette is normal in size. There is a questionable trace   left pleural effusion. There are no focal consolidations or pleural   effusions. There is probable mild pulmonary vascular congestion.   Calcifications overlie the bilateral breasts.      IMPRESSION:    Questionable trace left pleural effusion and probable mild pulmonary   vascular congestion. Please correlate with the CT scan performed the same   day as this exam.    GLORIA DENNY M.D., RADIOLOGY RESIDENT  This document has been electronically signed.  INOCENCIA CONTI M.D., ATTENDING RADIOLOGIST  This document has been electronically signed. Jun 25 2019  9:42AM      ---------------------------------------------------------------------------------------------------------------  EXAM:  CT CHEST                          PROCEDURE DATE:  06/24/2019      INTERPRETATION:  Clinical information: Evaluate for pneumonia. Exam is   compared to previous study of 4/4/2018.    CT scan of the chest was obtained without administration of intravenous   contrast.    Once again, asymmetric nodularity containing calcification is noted   within the right breast. Appearance is unchanged when compared to   previous exam.    Multiple lymph nodes are present in the anterior mediastinum, pretracheal   space, AP window, right hilum and the subcarinal region. One of the   largest lymph node is present in the subcarinal region and measures   approximately 3.5 x 2.1 cm.     Heart is normal in size. Calcification of the aortic valve and the   coronary arteries is noted. No pericardial effusion is noted.     No central endobronchial lesions are noted. Two lobulated nodules are   present in the right lower lobe. The larger one measures approximately   2.8 x 2.3 cm and is causing mild compression/narrowing of the posterior   segment of the right lower lobe bronchus. The second right lower lobe   nodule measures 2 x 1.8 cm. A 0.7 cm solid nodule in the apical segment   of the right upper lobe has increased in size when compared to previous   exam. Patient is status post wedge resection in the left lower lobe. An   associated nodular opacity is once again noted and is unchanged when   compared to previous exam. Bilateral apical thickening/scarring is noted.   No pleural effusions are noted.    Small hiatal hernia is noted.    Below the diaphragm, visualized portions of the abdomen demonstrate small   low-attenuation lesion in the left kidney which is too small to be   adequately characterized on this exam. Thickening of bothadrenal glands   is noted.     Degenerative changes of the spine as well as loss of height of one of the   lower vertebral bodies is noted.    Impression: 2.8 cm nodule in the right lower lobe is noted. Primary   consideration is lung carcinoma.    Additional nodule in the right lower lobe most likely represents   metastasis.    Mediastinal/right hilar adenopathy.    Small nodule in the apical segment of the right upper lobe has increased   in size when compared to previous exam.    JOHN LEWIS M.D., ATTENDING RADIOLOGIST  This document has been electronically signed. Jun 25 2019  9:27AM  ---------------------------------------------------------------------------------------------------------------    EXAM:  CT ABDOMEN AND PELVIS IC                          EXAM:  CT ANGIO CHEST (W)AW IC                          PROCEDURE DATE:  06/25/2019      INTERPRETATION:  CLINICAL INFORMATION: Shortness of breath and hypoxia.   Lung mass. Question PE. Metastatic workup     COMPARISON: CT chest 6/24/2019 and CT abdomen and pelvis 4/13/2018.    PROCEDURE:   CT Angiography of the Chest was performed followed by portal venous phase   imaging of the Abdomen and Pelvis.  IV Contrast: 90 ml of Omnipaque 350 was injected intravenously. 10 ml   were discarded.  Oral contrast: None.  Sagittal and coronal reformats were performed as well as 3D (MIP)   reconstructions.    FINDINGS:    CHEST:     LUNGS AND LARGE AIRWAYS: Collapsed of the central airways, likely due to   expiratory phase. Status post left lower lobe wedge resection. Emphysema.   Unchanged biapical scarring. Redemonstrated right lower lobe lobulated   nodules, unchanged. The larger one measures 2.7 x 2.5 cm (series 3, image   93) and the smaller one measures 2.0 x 1.9 cm (series 3, image 103).   Right basilar passive atelectasis.  PLEURA: Trace left and small right pleural effusion.  VESSELS: No pulmonary embolism. There is narrowing of the right lower   lobe posterior segmental pulmonary artery secondary to extrinsic   compression from a surrounding right lower lobe pulmonary nodule as   described above. The aorta and pulmonary artery are normal in size.   Aortic atherosclerotic calcifications.  HEART: Heart size is normal. No pericardial effusion. Aortic valve and   coronary artery calcifications.  MEDIASTINUM AND BLUE: Unchanged mediastinal and right hilar   lymphadenopathy. For reference a subcarinal lymph node measures 3.6 x 2.0   cm (series 3, image 66).  CHEST WALL AND LOWER NECK: Unchanged asymmetric right breast nodularity   with coarse calcifications.    ABDOMEN AND PELVIS:    LIVER: Left hepatic hypodensity in the falciform ligament, likely focal   fat. Tiny subcentimeter right hepatic dome hypodensity, too small to   characterize.  BILE DUCTS: Normal caliber.  GALLBLADDER: Cholelithiasis  SPLEEN: Not visualized.  PANCREAS: Within normal limits.  ADRENALS: Mild thickening of bilateral adrenals.  KIDNEYS/URETERS: Bilateral renal cysts. Additional bilateral   subcentimeter hypodensities, too small to characterize. The kidneys   enhance symmetrically. No hydronephrosis.    BLADDER: Within normal limits.  REPRODUCTIVE ORGANS: Hysterectomy. No adnexal masses.    BOWEL: No bowel obstruction. Sigmoid diverticulosis without   diverticulitis.  Appendix is not visualized.  PERITONEUM: No ascites.  VESSELS:  Atherosclerosis.  RETROPERITONEUM: No lymphadenopathy.    ABDOMINAL WALL: Small foci of subcutaneous air in the ventral left lower   abdominal wall subcutaneous tissue, likely related to subcutaneous   injections.  BONES: Redemonstrated compression deformity of the T12 vertebral body,   which is progressed as compared to prior. Degenerative changes of the   spine.    IMPRESSION:     No pulmonary embolism.    Narrowing of the right lower lobe posterior segmental pulmonary artery   secondary to extrinsic compression from a surrounding right lower lobe   pulmonary nodule.    Compression deformity of the T12 vertebral body which is increased as   compared to prior CT 4/13/2018.    JERRY WALKER M.D., RADIOLGY RESIDENT  This document has been electronically signed.  JOHN LEWIS M.D., ATTENDING RADIOLOGIST  This document has been electronically signed. Jun 25 2019  4:40PM  ---------------------------------------------------------------------------------------------------------------    EXAM:  CT BRAIN                          PROCEDURE DATE:  06/26/2019      INTERPRETATION:  History: Metastatic breast cancer. Rule out intracranial   metastatic disease..    Description: A noncontrast head CT was performed. 5 mm axial images were   performed from the skull base to the vertex.    Comparison is made to a head CT study from 04/02/2018.    Evaluation for the presence or absence of intracranial metastatic disease   is limited and incomplete without intravenous contrast. Consider   follow-up contrast-enhanced CT or MRI if clinically warranted, and if   there are no contrast or MRI contraindications. No large intracranial   mass is noted within the limitations of this noncontrast study.    There is no evidence for acute infarct, acute hemorrhage, mass effect,   calvarial fracture, or hydrocephalus.    Mild patchy hypodensity without mass effect is noted in the   periventricular white matter which most likely represents chronic   microvascular ischemic changes given the patient's age.    Cerebral volume loss is present with secondary proportional prominence of   the sulci and ventricles.    No lytic or blastic calvarial lesions are noted. The visualized portions   of the paranasal sinuses and mastoid air cells are clear.    IMPRESSION:    1. No acute intracranial pathology is noted. If the patient has new and   persistent symptoms, consider short interval follow-up head CT or brain   MRI follow-up if there are no MRI contraindications.  2. Evaluation for the presence or absence of intracranial metastatic   disease is limited and incomplete without intravenous contrast. Consider   follow-up contrast-enhanced CT or MRI if clinically warranted, and if   there are no contrast or MRI contraindications.    GALDINO DRAKE M.D., ATTENDING RADIOLOGIST  This document has been electronically signed. Jun 26 2019 11:16AM  ---------------------------------------------------------------------------------------------------------------

## 2019-07-05 NOTE — DISCHARGE NOTE NURSING/CASE MANAGEMENT/SOCIAL WORK - NSDCDPATPORTLINK_GEN_ALL_CORE
You can access the [facility] Patient Portal, offered by [facility], by registering with the following website: [email or web address]

## 2019-10-28 NOTE — CONSULT NOTE ADULT - ASSESSMENT
88y F from home, lives alone with 27x7 HHA , walks independently/cane, no family( her neighbor for 13 yrs is involved in care)  with significant PMHx of Htn, dementia  recent diagnosis of Right lung cancer (started radiation and chemo 1 months ago) and no significant Pshx  presenting to the ED with right sided chest pain and SOB since morning. Close friend Ms Mckenna Dominguez  aware of all medical conditions and is very involved in patient's care, is at bedside. As per pt ,she get radiation therapy with Dr Mora( [phone] days a wk and so far has completed 4 rounds, last radiation Treatment was on Friday Pt was also started on chemotherapy last Monday but couldn't tolerated it well. Pt states she is having pain under the right breast since morning, 10/10 in intensity, non radiating, increases with inspiration, and associated with some SOB productive cough and chest congestion . Pt denies any  fever, sweating, palpitations dizziness.   chest pain, doubt cardiac atypical  check cardiac enzyme  i doubt pt is a candidate for any aggressive measures  continue current meds  cta negative for PE  DVT prophylaxis  ECG in am

## 2019-10-28 NOTE — H&P ADULT - NSHPPHYSICALEXAM_GEN_ALL_CORE
GENERAL: NAD, thin , not in distress   EYES: EOMI, PERRLA,   NECK: Supple, No JVD  CHEST/LUNG: Clear to auscultation b/l  No rales, rhonchi, wheezing , rales. Rash over the anterior chest   HEART: Regular rate and rhythm; No murmurs, +ve S1 S2   ABDOMEN: Soft, Nontender, Nondistended; Bowel sounds present  NERVOUS SYSTEM:  Alert & Oriented X3, normal sensations and normal strength     EXTREMITIES:   No clubbing, cyanosis, or edema  LYMPH NODES : non palpable   PSYCH: normal mood and affect

## 2019-10-28 NOTE — ED PROVIDER NOTE - OBJECTIVE STATEMENT
Pt is a 88y F with significant PMHx of HTN, recent diagnosis of lung cancer (started radiation and chemo 2 months ago) and no significant PSHx presenting to the ED with chest pain, spitting up and SOB this morning. Pt has friend and home attendant present and they state pt was unable to tolerate the chemotherapy. She is interested in home hospice. Pt has NKDA and currently denies any additional complaints at this time. Pt is a 88y F with significant PMHx of HTN, recent diagnosis of lung cancer (started radiation and chemo 2 months ago) and no significant PSHx presenting to the ED with chest pain, spitting up and SOB this morning. Pt has friend and home attendant present and they state pt was unable to tolerate the chemotherapy. Plan for now is radiation.  She is interested in home hospice. Pt has NKDA and currently denies any additional complaints at this time.

## 2019-10-28 NOTE — H&P ADULT - PROBLEM SELECTOR PLAN 2
s/p chemo x1 and 4 radiation rounds   -CTA chest : R lung mass with mets   - c/w robitussin DM  for cough   - smoking cessation   - Palliative consult Dr Hills and SW for home hospice

## 2019-10-28 NOTE — H&P ADULT - NSHPREVIEWOFSYSTEMS_GEN_ALL_CORE
REVIEW OF SYSTEMS:  CONSTITUTIONAL: No jose r or chills  EYES/ENT: No visual changes;  No vertigo or throat pain   RESPIRATORY: productive cough , chest congestion , SOB +   CARDIOVASCULAR: R sided chest pain   GASTROINTESTINAL: No abdominal  pain. No nausea, vomiting. No diarrhea or constipation. No melena or hematochezia.  GENITOURINARY: No dysuria, frequency or hematuria  NEUROLOGICAL: No numbness or weakness  SKIN: No itching, rashes

## 2019-10-28 NOTE — CONSULT NOTE ADULT - ASSESSMENT
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  CTA was neg for PE.

## 2019-10-28 NOTE — H&P ADULT - PROBLEM SELECTOR PLAN 6
IMPROVE VTE Individual Risk Assessment  RISK                                                                Points  [  ] Previous VTE                                                  3  [  ] Thrombophilia                                               2  [  ] Lower limb paralysis                                      2  [  ] Current Cancer                                              2         (within 6 months)  [  ] Immobilization > 24 hrs                                1  [  ] ICU/CCU stay > 24 hours                              1  [  ] Age > 60                                                      1  IMPROVE VTE Score ___2______  DVT ppx : lovenox   GI ppx :PPI

## 2019-10-28 NOTE — CONSULT NOTE ADULT - SUBJECTIVE AND OBJECTIVE BOX
Patient is a 88y old  Female who presents with a chief complaint of Right sided chest pain and SOB (28 Oct 2019 18:51)      HPI:  88y F from home, lives alone with 27x7 A , walks independently/cane,no family( her neighbour for 13 yrs is involved in care)  with significant PMHx of HTN,Dementia  recent diagnosis of Right lung cancer (started radiation and chemo 1 months ago) and no significant PSHx presenting to the ED with right sided chest pain and SOB since morning. Close friend Ms Mckenna Dominguez  aware of all medical conditions and is very involved in patient's care,is at bedside. As per pt ,she get radiation therapy with Dr Mora( [phone] days a wk and so far has completed 4 rounds,last radiation Treatment was on friday, Pt was also started on chemotherapy last monday. Pt states she is having pain under the right breast since morning, 10/10 in intensity, non radiating, increases with inspiration, and associated with some SOB productive cough and chest congestion . Pt denies any  fever, sweating,palpitations, dizziness. Pt is interested in home hospice.     ED course : Vitals : stable . Labs unremarkable. ECG : NSR HR 91 ,T1 -ve . CTA chest : No PE , R lung mass with mets and CALI and small R pl eff.     SH : 2 PPDx 63 yrs ,quitted last wk. denies any drinking or drug use. Lives alone. Close friend Ms Mckenna Dominguez  aware of all medical conditions and is very involved in patient's care,      24 hour Home attendant/aide Mery from [facility] central assisted living agency. phone  [phone]  and [phone]. Call María or Barbara.    GO : wants to be Full code for now (28 Oct 2019 13:57)       ROS:  Negative except for:    PAST MEDICAL & SURGICAL HISTORY:  Lung cancer  HTN (hypertension)  No significant past surgical history      SOCIAL HISTORY:    FAMILY HISTORY:      MEDICATIONS  (STANDING):  amLODIPine   Tablet 5 milliGRAM(s) Oral daily  budesonide  80 MICROgram(s)/formoterol 4.5 MICROgram(s) Inhaler 2 Puff(s) Inhalation two times a day  donepezil 5 milliGRAM(s) Oral at bedtime  enoxaparin Injectable 40 milliGRAM(s) SubCutaneous daily  guaifenesin/dextromethorphan  Syrup 10 milliLiter(s) Oral every 6 hours  hydrALAZINE 50 milliGRAM(s) Oral every 8 hours  hydrocortisone 1% Cream 1 Application(s) Topical two times a day  lidocaine   Patch 1 Patch Transdermal daily  pantoprazole    Tablet 40 milliGRAM(s) Oral before breakfast  senna 2 Tablet(s) Oral at bedtime  sertraline 25 milliGRAM(s) Oral daily  simvastatin 10 milliGRAM(s) Oral at bedtime    MEDICATIONS  (PRN):  acetaminophen   Tablet .. 650 milliGRAM(s) Oral every 6 hours PRN Mild Pain (1 - 3)  albuterol/ipratropium for Nebulization. 3 milliLiter(s) Nebulizer every 6 hours PRN Shortness of Breath and/or Wheezing  traMADol 25 milliGRAM(s) Oral every 8 hours PRN Moderate Pain (4 - 6)      Allergies    No Known Allergies    Intolerances        Vital Signs Last 24 Hrs  T(C): 36.7 (28 Oct 2019 15:13), Max: 36.9 (28 Oct 2019 08:57)  T(F): 98 (28 Oct 2019 15:13), Max: 98.5 (28 Oct 2019 08:57)  HR: 96 (28 Oct 2019 15:13) (68 - 96)  BP: 128/61 (28 Oct 2019 15:13) (128/61 - 145/78)  BP(mean): --  RR: 18 (28 Oct 2019 15:13) (18 - 20)  SpO2: 99% (28 Oct 2019 15:13) (97% - 100%)    PHYSICAL EXAM  General: adult in NAD  HEENT: clear oropharynx, anicteric sclera, pink conjunctiva  Neck: supple  CV: normal S1/S2 with no murmur rubs or gallops  Lungs: positive air movement b/l ant lungs,clear to auscultation, no wheezes, no rales  Abdomen: soft non-tender non-distended, no hepatosplenomegaly  Ext: no clubbing cyanosis or edema  Skin: no rashes and no petechiae  Neuro: alert and oriented X 4, no focal deficits      LABS:                          9.7    4.14  )-----------( 210      ( 28 Oct 2019 09:32 )             30.5         Mean Cell Volume : 93.8 fl  Mean Cell Hemoglobin : 29.8 pg  Mean Cell Hemoglobin Concentration : 31.8 gm/dL  Auto Neutrophil # : 3.81 K/uL  Auto Lymphocyte # : 0.12 K/uL  Auto Monocyte # : 0.12 K/uL  Auto Eosinophil # : 0.04 K/uL  Auto Basophil # : 0.00 K/uL  Auto Neutrophil % : 93.0 %  Auto Lymphocyte % : 3.0 %  Auto Monocyte % : 3.0 %  Auto Eosinophil % : 1.0 %  Auto Basophil % : 0.0 %      Serial CBC's  10-28 @ 09:32  Hct-30.5 / Hgb-9.7 / Plat-210 / RBC-3.25 / WBC-4.14      10-28    136  |  103  |  18  ----------------------------<  100<H>  4.0   |  27  |  0.78    Ca    8.8      28 Oct 2019 09:31    TPro  7.3  /  Alb  2.8<L>  /  TBili  0.5  /  DBili  x   /  AST  28  /  ALT  22  /  AlkPhos  73  10-28      PT/INR - ( 28 Oct 2019 09:31 )   PT: 11.1 sec;   INR: 1.00 ratio         PTT - ( 28 Oct 2019 09:31 )  PTT:28.6 sec                BLOOD SMEAR INTERPRETATION:       RADIOLOGY & ADDITIONAL STUDIES:  < from: CT Angio Chest w/ IV Cont (10.28.19 @ 11:42) >  Indication: Lung cancer. Shortness of breath.    Technique: Axial multidetector CT images of the chest are acquired from   the thoracic inlet to the upper abdomen following the administration of   IV contrast ( 60cc Omnipaque-350, 40cc discarded) according to our   pulmonary embolism protocol. Subsequent MIP is also reconstructed for   evaluation.    Comparison: None.    Findings: No evidence for aortic dissection. 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    No suspicious filling defect in the pulmonary arteries to suggest   pulmonary embolism. There is a 3.6 x 2.7 x 4.6 cm right lower lobe lung   mass with associated encasement and narrowing of the right lower lobe   segmental/subsegmental pulmonary arteries.    Small right pleural effusion. Trace pericardial effusion. Mild   cardiomegaly. There is an enlarged 4.0 x 2.2 cm subcarinal lymph node.   Enlarged 2.5 cm right hilar lymph node. No enlarged axillary lymph node.   Coarse calcifications in the breasts bilaterally.    The trachea and central bronchi are patent.    No evidence for pneumothorax. Biapical pleural-parenchymal scarring.   Calcified left pleural plaque. Multiple lung nodules in the right upper   lobe and the right lower lobe measuring up to 1.7 cm in the right lower   lobe, suggestive of metastasis. 1.4 cm left lower lobe subpleural round   atelectasis versus metastasis. Emphysematous changes in the left lower   lobe. Small bulla in the left upper lobe.    Limited sections through the upper abdomen demonstrate nonspecific   adrenal thickening bilaterally. There is a 1.5 cm left adrenal nodule;   metastasis cannot be excluded. There is a small cyst in the left kidney.    Age indeterminate severe compression fracture at T12 vertebra with 4 mm   retropulsion, causing a mild central canal stenosis at that level.    Impression:    Age indeterminate severe compression fracture at T12 vertebra with 4 mm   retropulsion, causing a mild central canal stenosis at that level.    No suspicious filling defect in the pulmonary arteries to suggest   pulmonary embolism. 3.6 x 2.7 x 4.6 cm right lower lobe lung mass with     < end of copied text >  < from: CT Angio Chest w/ IV Cont (10.28.19 @ 11:42) >  associated encasement and narrowing of the right lower lobe   segmental/subsegmental pulmonary arteries. This lung mass may may   represent patient's known lung cancer. Right upper lobe and right lower   lobe lung nodules, suggestive of metastasis. 1.4 cm left lower lobe   subpleural round atelectasis versus metastasis.    Enlarged mediastinal and right hilar lymph node, suggestive of metastatic   lymphadenopathy.    Small right pleural effusion. Trace pericardial effusion.    1.5 cm left adrenal nodule; metastasis cannot be excluded.    < end of copied text >

## 2019-10-28 NOTE — ED PROVIDER NOTE - MUSCULOSKELETAL, MLM
Spine appears normal, range of motion is not limited, no muscle or joint tenderness. No calf swelling/tenderness.

## 2019-10-28 NOTE — H&P ADULT - PROBLEM SELECTOR PLAN 1
pt p/w right sided chest pain associated with SOB   - unlikely cardiac , ECG : NSR , T1 T2 -ve so ACS ruled out   -likely 2/2 R lung cancer   - Pain control with tylenol and lidocaine patch   - f/u Echo   - cardio f/u Dr Li pt p/w right sided chest pain associated with SOB   - unlikely cardiac , ECG : NSR , T1 T2 -ve so ACS ruled out   -likely 2/2 R lung cancer   - Pain control with tylenol and lidocaine patch   - f/u Echo   - cardio f/u Dr Li  - palliative consult for pain mgmt

## 2019-10-28 NOTE — H&P ADULT - PROBLEM SELECTOR PLAN 3
Pt takes hydralazine 50 TID and amlodipine   at home   - will continue with home medications with parameters   - Monitor BP and titrate meds accordingly

## 2019-10-28 NOTE — ED PROVIDER NOTE - PROGRESS NOTE DETAILS
Close friend Ms Mckenna Alberto  aware of all medical conditions and is very involved in patient's care Patient still complaining of pain and shortness of breath. admit to telemetry. 24 hour Home attendant/aide Mery from [facility] assisted living agency. phone  [phone]  and [phone]. Call María Jimenez

## 2019-10-28 NOTE — CHART NOTE - NSCHARTNOTEFT_GEN_A_CORE
Notified by RN for tachycardia on telemetry ; reviewed, short lived, ?multiple P wave morphologies possibly concerning for MAT - primary team to follow, consider CCB/BB

## 2019-10-28 NOTE — CONSULT NOTE ADULT - SUBJECTIVE AND OBJECTIVE BOX
CHIEF COMPLAINT:Patient is a 88y old  Female who presents with a chief complaint of Right sided chest pain and SOB (28 Oct 2019 13:57)      HPI:  88y F from home, lives alone with 27x7 HHA , walks independently/cane,no family( her neighbour for 13 yrs is involved in care)  with significant PMHx of HTN,Dementia  recent diagnosis of Right lung cancer (started radiation and chemo 1 months ago) and no significant PSHx presenting to the ED with right sided chest pain and SOB since morning. Close friend Ms Mckenna Dominguez  aware of all medical conditions and is very involved in patient's care,is at bedside. As per pt ,she get radiation therapy with Dr Mora( [phone] days a wk and so far has completed 4 rounds,last radiation Treatment was on friday, Pt was also started on chemotherapy last monday but couldnt tolerated it well. Pt states she is having pain under the right breast since morning, 10/10 in intensity, non radiating, increases with inspiration, and associated with some SOB productive cough and chest congestion . Pt denies any  fever, sweating,palpitations, dizziness. Pt is interested in home hospice.     ED course : Vitals : stable . Labs unremarkable. ECG : NSR HR 91 ,T1 -ve . CTA chest : No PE , R lung mass with mets and CALI and small R pl eff.     SH : 2 PPDx 63 yrs ,quitted last wk. denies any drinking or drug use. Lives alone. Close friend Ms Mckenna Dominguez  aware of all medical conditions and is very involved in patient's care,      24 hour Home attendant/aide Mery from [facility] central assisted living agency. phone  [phone]  and [phone]. Call María or Barbara.    GO : wants to be Full code for now (28 Oct 2019 13:57)      PAST MEDICAL & SURGICAL HISTORY:  Lung cancer  HTN (hypertension)  No significant past surgical history      MEDICATIONS  (STANDING):  amLODIPine   Tablet 5 milliGRAM(s) Oral daily  budesonide  80 MICROgram(s)/formoterol 4.5 MICROgram(s) Inhaler 2 Puff(s) Inhalation two times a day  donepezil 5 milliGRAM(s) Oral at bedtime  enoxaparin Injectable 40 milliGRAM(s) SubCutaneous daily  guaifenesin/dextromethorphan  Syrup 10 milliLiter(s) Oral every 6 hours  hydrALAZINE 50 milliGRAM(s) Oral every 8 hours  lidocaine   Patch 1 Patch Transdermal daily  pantoprazole    Tablet 40 milliGRAM(s) Oral before breakfast  senna 2 Tablet(s) Oral at bedtime  sertraline 25 milliGRAM(s) Oral daily  simvastatin 10 milliGRAM(s) Oral at bedtime    MEDICATIONS  (PRN):  acetaminophen   Tablet .. 650 milliGRAM(s) Oral every 6 hours PRN Mild Pain (1 - 3)  albuterol/ipratropium for Nebulization. 3 milliLiter(s) Nebulizer every 6 hours PRN Shortness of Breath and/or Wheezing  traMADol 25 milliGRAM(s) Oral every 8 hours PRN Moderate Pain (4 - 6)      FAMILY HISTORY:      SOCIAL HISTORY:    [ ] Non-smoker  [ ] Smoker  [ ] Alcohol    Allergies    No Known Allergies    Intolerances    	    REVIEW OF SYSTEMS:  CONSTITUTIONAL: No fever, weight loss, or fatigue  EYES: No eye pain, visual disturbances, or discharge  ENT:  No difficulty hearing, tinnitus, vertigo; No sinus or throat pain  NECK: No pain or stiffness  RESPIRATORY: No cough, wheezing, chills or hemoptysis; +Shortness of Breath  CARDIOVASCULAR: + chest pain, no  palpitations, passing out, dizziness, or leg swelling  GASTROINTESTINAL: No abdominal or epigastric pain. No nausea, vomiting, or hematemesis; No diarrhea or constipation. No melena or hematochezia.  GENITOURINARY: No dysuria, frequency, hematuria, or incontinence  NEUROLOGICAL: No headaches, memory loss, loss of strength, numbness, or tremors  SKIN: No itching, burning, rashes, or lesions   LYMPH Nodes: No enlarged glands  ENDOCRINE: No heat or cold intolerance; No hair loss  MUSCULOSKELETAL: No joint pain or swelling; No muscle, back, or extremity pain  PSYCHIATRIC: No depression, anxiety, mood swings, or difficulty sleeping  HEME/LYMPH: No easy bruising, or bleeding gums  ALLERGY AND IMMUNOLOGIC: No hives or eczema	    [ ] All others negative	  [ ] Unable to obtain    PHYSICAL EXAM:  T(C): 36.7 (10-28-19 @ 15:13), Max: 36.9 (10-28-19 @ 08:57)  HR: 96 (10-28-19 @ 15:13) (68 - 96)  BP: 128/61 (10-28-19 @ 15:13) (128/61 - 145/78)  RR: 18 (10-28-19 @ 15:13) (18 - 20)  SpO2: 99% (10-28-19 @ 15:13) (97% - 100%)  Wt(kg): --  I&O's Summary      Appearance: Normal	  HEENT:   Normal oral mucosa, PERRL, EOMI	  Lymphatic: No lymphadenopathy  Cardiovascular: Normal S1 S2, No JVD,+ murmurs, No edema  Respiratory: Lungs clear to auscultation	  Psychiatry: A & O x 3, Mood & affect appropriate  Gastrointestinal:  Soft, Non-tender, + BS	  Skin: No rashes, No ecchymoses, No cyanosis	  Neurologic: Non-focal  Extremities: Normal range of motion, No clubbing, cyanosis or edema  Vascular: Peripheral pulses palpable 2+ bilaterally    TELEMETRY: 	    ECG:  	  RADIOLOGY:  OTHER: 	  	  LABS:	 	    CARDIAC MARKERS:  CARDIAC MARKERS ( 28 Oct 2019 15:24 )  <0.015 ng/mL / x     / x     / x     / x      CARDIAC MARKERS ( 28 Oct 2019 09:31 )  <0.015 ng/mL / x     / 26 U/L / x     / x                                  9.7    4.14  )-----------( 210      ( 28 Oct 2019 09:32 )             30.5     10-28    136  |  103  |  18  ----------------------------<  100<H>  4.0   |  27  |  0.78    Ca    8.8      28 Oct 2019 09:31    TPro  7.3  /  Alb  2.8<L>  /  TBili  0.5  /  DBili  x   /  AST  28  /  ALT  22  /  AlkPhos  73  10-28    proBNP: Serum Pro-Brain Natriuretic Peptide: 353 pg/mL (10-28 @ 09:31)    Lipid Profile:   HgA1c:   TSH:   PT/INR - ( 28 Oct 2019 09:31 )   PT: 11.1 sec;   INR: 1.00 ratio         PTT - ( 28 Oct 2019 09:31 )  PTT:28.6 sec    PREVIOUS DIAGNOSTIC TESTING:    < from: CT Angio Chest w/ IV Cont (10.28.19 @ 11:42) >  Age indeterminate severe compression fracture at T12 vertebra with 4 mm   retropulsion, causing a mild central canal stenosis at that level.    No suspicious filling defect in the pulmonary arteries to suggest   pulmonary embolism. 3.6 x 2.7 x 4.6 cm right lower lobe lung mass with   associated encasement and narrowing of the right lower lobe   segmental/subsegmental pulmonary arteries. This lung mass may may   represent patient's known lung cancer. Right upper lobe and right lower   lobe lung nodules, suggestive of metastasis. 1.4 cm left lower lobe   subpleural round atelectasis versus metastasis.    Enlarged mediastinal and right hilar lymph node, suggestive of metastatic   lymphadenopathy.    Small right pleural effusion. Trace pericardial effusion.    1.5 cm left adrenal nodule; metastasis cannot be excluded.    < from: Xray Chest 1 View-PORTABLE IMMEDIATE (10.28.19 @ 11:45) >  Heart is magnified by technique.    There are bilateral infiltrates centrally in the lungs.    Coarse calcifications are seen in the left breast and possibly in the   left chest.    Posttraumatic deformity right upper humerus is noted.    Troponin I, Serum (10.28.19 @ 15:24)    Troponin I, Serum: <0.015: The new reference range for Troponin-I performed on the Siemens Vista  system is 0.015-0.045 ng/mL, which includes the 99th percentile of a  healthy reference population. Studies have shown that elevated troponin  levels above the 99th percentile cutoff are associated with an increased  risk for adverse cardiac events, with the risk increasing as troponin  levels increase. As per a joint committee of the American College of  Cardiology and European Society of Cardiology, diagnosis of classic MI is  based upon the detection of a rise or fall of cardiac troponin values,  with at least one value above the 99th percentile upper reference limit,  in the appropriate clinical context.  Troponin-I (ng/mL) Interpretation  0.00-0.045 Normal range (includes the 99th percentile of a healthy  reference population)  >0.045 Elevated troponin level indicating increased risk  Note: Troponin-I and Troponin-T cannot be used interchangeably in serial  measurements. Minimally elevated Troponin results should be interpreted  in the context of clinical findings and risk factors. ng/mL

## 2019-10-28 NOTE — ED ADULT NURSE NOTE - NSIMPLEMENTINTERV_GEN_ALL_ED
Implemented All Universal Safety Interventions:  [place] to call system. Call bell, personal items and telephone within reach. Instruct patient to call for assistance. Room bathroom lighting operational. Non-slip footwear when patient is off stretcher. Physically safe environment: no spills, clutter or unnecessary equipment. Stretcher in lowest position, wheels locked, appropriate side rails in place. Implemented All Fall with Harm Risk Interventions:  [place] to call system. Call bell, personal items and telephone within reach. Instruct patient to call for assistance. Room bathroom lighting operational. Non-slip footwear when patient is off stretcher. Physically safe environment: no spills, clutter or unnecessary equipment. Stretcher in lowest position, wheels locked, appropriate side rails in place. Provide visual cue, wrist band, yellow gown, etc. Monitor gait and stability. Monitor for mental status changes and reorient to person, place, and time. Review medications for side effects contributing to fall risk. Reinforce activity limits and safety measures with patient and family. Provide visual clues: red socks.

## 2019-10-28 NOTE — H&P ADULT - ASSESSMENT
88y F from home, lives alone with 27x7 HHA , walks independently/cane,no family( her neighbour for 13 yrs is involved in care)  with significant PMHx of HTN,Dementia  recent diagnosis of Right lung cancer (started radiation and chemo 1 months ago) and no significant PSHx presenting to the ED with right sided chest pain and SOB since morning.    Pt will be admitted to tele for right sided Chest pain

## 2019-10-28 NOTE — CONSULT NOTE ADULT - PROBLEM SELECTOR RECOMMENDATION 9
adenoca of the lung involving RLL, and hilum, and subcarinal node, stage 3 b by PET scan.  There were small nodules in lung but there were no uptakes in them  she has been on RT and chemo, carboplatin plus alimta.

## 2019-10-28 NOTE — ED PROVIDER NOTE - CLINICAL SUMMARY MEDICAL DECISION MAKING FREE TEXT BOX
Pt is a 88y F presenting to the ED with chest pain and shortness of breath. Will do labs and CT scan. Will reassess.

## 2019-10-29 NOTE — PROGRESS NOTE ADULT - ASSESSMENT
_________________________________________________________________________________________  ========>>  M E D I C A L   A T T E N D I N G    F O L L O W  U P  N O T E  <<=========  -----------------------------------------------------------------------------------------------------    - Patient seen and examined by me earlier today.   - In summary,  RIENA STEELE is a 88y year old woman who originally presented with chest pain  - Patient today overall doing ok, comfortable, eating OK. + cough and associated chest discomfort, otherwise feels ok and wants to go home!     ==================>> REVIEW OF SYSTEM <<=================    GEN: no fever, no chills, pain as above   RESP: no SOB reported at rest , + chronic wet cough  CVS: no chest pain, no palpitations, no edema  GI: no abdominal pain, no nausea, no constipation, no diarrhea  : no dysuria, no frequency, no hematuria  Neuro: no headache, no dizziness  Derm : no itching, no rash    ==================>> PHYSICAL EXAM <<=================    GEN: A&O X 2 , NAD , comfortable. pleasant   HEENT: NCAT, PERRL, MMM, hearing intact  Neck: supple , no JVD  CVS: S1S2 , regular , limited   PULM: bilateral diffuse rhonchi  ABD.: soft. non tender, non distended,  bowel sounds present  Extrem: intact pulses , no edema   PSYCH : normal mood,  not anxious      ==================>> MEDICATIONS <<====================    MEDICATIONS  (STANDING):  amLODIPine   Tablet 5 milliGRAM(s) Oral daily  budesonide  80 MICROgram(s)/formoterol 4.5 MICROgram(s) Inhaler 2 Puff(s) Inhalation two times a day  donepezil 5 milliGRAM(s) Oral at bedtime  enoxaparin Injectable 40 milliGRAM(s) SubCutaneous daily  guaifenesin/dextromethorphan  Syrup 10 milliLiter(s) Oral every 6 hours  hydrALAZINE 50 milliGRAM(s) Oral every 8 hours  hydrocortisone 1% Cream 1 Application(s) Topical two times a day  lidocaine   Patch 1 Patch Transdermal daily  pantoprazole    Tablet 40 milliGRAM(s) Oral before breakfast  senna 2 Tablet(s) Oral at bedtime  sertraline 25 milliGRAM(s) Oral daily  simvastatin 10 milliGRAM(s) Oral at bedtime    MEDICATIONS  (PRN):  acetaminophen   Tablet .. 650 milliGRAM(s) Oral every 6 hours PRN Mild Pain (1 - 3)  albuterol/ipratropium for Nebulization. 3 milliLiter(s) Nebulizer every 6 hours PRN Shortness of Breath and/or Wheezing  traMADol 25 milliGRAM(s) Oral every 8 hours PRN Moderate Pain (4 - 6)    ==================>> VITAL SIGNS <<==================    T(C): 36.8 (10-29-19 @ 11:02), Max: 37.1 (10-28-19 @ 20:42)  HR: 75 (10-29-19 @ 11:02) (75 - 96)   BP: 101/45 (10-29-19 @ 11:02) (96/38 - 128/61)  RR: 18 (10-29-19 @ 11:02) (17 - 18)  SpO2: 100% (10-29-19 @ 11:02) (99% - 100%)      ==================>> LAB AND IMAGING <<==================                        8.2    2.93  )-----------( 133      ( 29 Oct 2019 06:48 )             26.0     Hemoglobin:   8.2 <<==,  9.7 <<==       135  |  101  |  21<H>  ----------------------------<  87  3.7   |  30  |  0.89    Sodium:   135  <==, 136  <==    Ca    8.4      29 Oct 2019 06:48  Phos  3.0     10-29  Mg     2.0     10-29    TPro  7.3  /  Alb  2.8<L>  /  TBili  0.5  /  DBili  x   /  AST  28  /  ALT  22  /  AlkPhos  73  10-28    PT/INR - ( 28 Oct 2019 09:31 )   PT: 11.1 sec;   INR: 1.00 ratio       PTT - ( 28 Oct 2019 09:31 )  PTT:28.6 sec              CARDIAC MARKERS ( 28 Oct 2019 15:24 )  <0.015 ng/mL / x     / x     / x     / x      CARDIAC MARKERS ( 28 Oct 2019 09:31 )  <0.015 ng/mL / x     / 26 U/L / x     / x           TSH:      0.85   (10-29-19)           Lipid profile:  (10-29-19)     Total: 140     LDL  : 49     HDL  :82     TG   :45     HgA1C: 5.3  (10-29-19)            ___________________________________________________________________________________  ===============>>  A S S E S S M E N T   A N D   P L A N <<===============  ------------------------------------------------------------------------------------------    · Assessment		  88y F from home, lives alone with 27x7 HHA , walks independently/cane,no family( her neighbour for 13 yrs is involved in care)  with significant PMHx of HTN,Dementia  recent diagnosis of Right lung cancer (started radiation and chemo 1 months ago) and no significant PSHx presenting to the ED with right sided chest pain and SOB since morning.    Pt will be admitted to tele for right sided Chest pain     Problem/Plan - 1:  ·  Problem: Chest pain, likely non-cardiac     right sided chest pain associated with SOB likely due to lung cancer   -likely 2/2 R lung cancer   - Pain control with tylenol and lidocaine patch   - cardio appreciated  - onc appreciated  - palliative consult for pain mgmt and possible hospice?    Problem/Plan - 2:  ·  Problem: Lung cancerwith metastasis      s/p chemo x1 and 4 radiation rounds   -CTA chest : R lung mass with mets   - c/w robitussin DM  for cough   - Palliative consult Dr Hills and SW for possible home hospice.     Problem/Plan - 3:  ·  Problem: HTN       Pt takes hydralazine 50 TID and amlodipine   at home   - will continue with home medications with parameters   - Monitor BP and titrate meds accordingly.     Problem/Plan - 4:  ·  Problem: Shortness of breath.  Plan: Likely 2/2 pain and lung cancer   - c/w Duonebs and symbicort  - CTA : no PE.     Problem/Plan - 5:  ·  Problem: Dementia, old age.  Plan: c/w home meds.     -GI/DVT Prophylaxis.    --------------------------------------------  Case discussed with pt, staff   Education given on findings and plan of care  ___________________________  CARSON Zamora: [phone]

## 2019-10-29 NOTE — CONSULT NOTE ADULT - SUBJECTIVE AND OBJECTIVE BOX
HPI:  88y F from home, lives alone with 27x7 A , walks independently/cane,no family( her neighbour for 13 yrs is involved in care)  with significant PMHx of HTN,Dementia  recent diagnosis of Right lung cancer (started radiation and chemo 1 months ago) and no significant PSHx presenting to the ED with right sided chest pain and SOB since morning. Close friend Ms Mckenna Dominguez  aware of all medical conditions and is very involved in patient's care,is at bedside. As per pt ,she get radiation therapy with Dr Mora( [phone] days a wk and so far has completed 4 rounds,last radiation Treatment was on friday, Pt was also started on chemotherapy last monday but couldnt tolerated it well. Pt states she is having pain under the right breast since morning, 10/10 in intensity, non radiating, increases with inspiration, and associated with some SOB productive cough and chest congestion . Pt denies any  fever, sweating,palpitations, dizziness. Pt is interested in home hospice.     ED course : Vitals : stable . Labs unremarkable. ECG : NSR HR 91 ,T1 -ve . CTA chest : No PE , R lung mass with mets and CALI and small R pl eff.     SH : 2 PPDx 63 yrs ,quitted last wk. denies any drinking or drug use. Lives alone. Close friend Ms Mckenna Dominguez  aware of all medical conditions and is very involved in patient's care,      24 hour Home attendant/aide Mery from [facility] assisted living agency. phone  [phone]  and [phone]. Call María or Barbara.    GOC : wants to be Full code for now (28 Oct 2019 13:57)      PAST MEDICAL & SURGICAL HISTORY:  Lung cancer  HTN (hypertension)  No significant past surgical history      SOCIAL HISTORY:    Admitted from:  home assisted living [facility]   Substance abuse history:              Tobacco hx:                  Alcohol hx:              Home Opioid hx:  Orthodoxy:                                    Preferred Language:    Surrogate/HCP/Guardian:            Phone#:    FAMILY HISTORY:    Baseline ADLs (prior to admission):    Allergies    No Known Allergies    Intolerances      Present Symptoms:   Dyspnea:   Nausea/Vomiting:   Anxiety:  Depressed   Fatigue:  Loss of appetite:   Pain:                                location:          Review of Systems: [All others negative or Unable to obtain due to poor mentation]    MEDICATIONS  (STANDING):  amLODIPine   Tablet 5 milliGRAM(s) Oral daily  budesonide  80 MICROgram(s)/formoterol 4.5 MICROgram(s) Inhaler 2 Puff(s) Inhalation two times a day  donepezil 5 milliGRAM(s) Oral at bedtime  enoxaparin Injectable 40 milliGRAM(s) SubCutaneous daily  guaifenesin/dextromethorphan  Syrup 10 milliLiter(s) Oral every 6 hours  hydrALAZINE 50 milliGRAM(s) Oral every 8 hours  hydrocortisone 1% Cream 1 Application(s) Topical two times a day  lidocaine   Patch 1 Patch Transdermal daily  pantoprazole    Tablet 40 milliGRAM(s) Oral before breakfast  senna 2 Tablet(s) Oral at bedtime  sertraline 25 milliGRAM(s) Oral daily  simvastatin 10 milliGRAM(s) Oral at bedtime    MEDICATIONS  (PRN):  acetaminophen   Tablet .. 650 milliGRAM(s) Oral every 6 hours PRN Mild Pain (1 - 3)  albuterol/ipratropium for Nebulization. 3 milliLiter(s) Nebulizer every 6 hours PRN Shortness of Breath and/or Wheezing  traMADol 25 milliGRAM(s) Oral every 8 hours PRN Moderate Pain (4 - 6)      PHYSICAL EXAM:    Vital Signs Last 24 Hrs  T(C): 36.8 (29 Oct 2019 11:02), Max: 37.1 (28 Oct 2019 20:42)  T(F): 98.3 (29 Oct 2019 11:02), Max: 98.7 (28 Oct 2019 20:42)  HR: 75 (29 Oct 2019 11:02) (75 - 96)  BP: 101/45 (29 Oct 2019 11:02) (96/38 - 128/61)  BP(mean): --  RR: 18 (29 Oct 2019 11:02) (17 - 18)  SpO2: 100% (29 Oct 2019 11:02) (99% - 100%)    General: alert  oriented x ____    [lethargic distressed cachexia  nonverbal  unarousable verbal]  Karnofsky Performance Score/Palliative Performance Status Version2:     %    HEENT: normal  dry mouth  ET tube/trach oral lesions:  Lungs: comfortable tachypnea/labored breathing  excessive secretions  CV: normal  tachycardia  GI: normal  distended  tender  incontinent               PEG/NG/OG tube  constipation  last BM:   : normal  incontinent  oliguria/anuria  hyman  Musculoskeletal: normal  weakness  edema             ambulatory  bedbound/wheelchair bound  Skin: normal  pressure ulcers: stage: edema: other:  Neuro: no deficits cognitive impairment dsyphagia/dysarthria paresis: other:  Oral intake ability: unable/only mouth care [minimal moderate full capability]  Diet: [NPO]    LABS:                        8.2    2.93  )-----------( 133      ( 29 Oct 2019 06:48 )             26.0     10-29    135  |  101  |  21<H>  ----------------------------<  87  3.7   |  30  |  0.89    Ca    8.4      29 Oct 2019 06:48  Phos  3.0     10-29  Mg     2.0     10-29    TPro  7.3  /  Alb  2.8<L>  /  TBili  0.5  /  DBili  x   /  AST  28  /  ALT  22  /  AlkPhos  73  10-28        RADIOLOGY & ADDITIONAL STUDIES:    ADVANCE DIRECTIVES: HPI:  88y F from home, lives alone with 27x7 HHA , walks independently/cane,no family( her neighbour for 13 yrs is involved in care)  with significant PMHx of HTN,Dementia  recent diagnosis of Right lung cancer (started radiation and chemo 1 months ago) and no significant PSHx presenting to the ED with right sided chest pain and SOB since morning. Close friend Ms Mckenna Dominguez  aware of all medical conditions and is very involved in patient's care,is at bedside. As per pt ,she get radiation therapy with Dr Mora( [phone] days a wk and so far has completed 4 rounds,last radiation Treatment was on friday, Pt was also started on chemotherapy last monday but couldnt tolerated it well. Pt states she is having pain under the right breast since morning, 10/10 in intensity, non radiating, increases with inspiration, and associated with some SOB productive cough and chest congestion . Pt denies any  fever, sweating,palpitations, dizziness. Pt is interested in home hospice.     ED course : Vitals : stable . Labs unremarkable. ECG : NSR HR 91 ,T1 -ve . CTA chest : No PE , R lung mass with mets and CALI and small R pl eff.     SH : 2 PPDx 63 yrs ,quitted last wk. denies any drinking or drug use. Lives alone. Close friend Ms Mckenna Dominguez  aware of all medical conditions and is very involved in patient's care,      24 hour Home attendant/aide Mery from [facility] central assisted living agency. phone  [phone]  and [phone]. Call María or Barbara.    Interval hx:       PAST MEDICAL & SURGICAL HISTORY:  Lung cancer  HTN (hypertension)  No significant past surgical history      SOCIAL HISTORY:    Admitted from:  Lives at home alone; has 24/7 A  No family.    Restorationist:   Episcopal                                   Surrogate/HCP/Guardian:  Cat Galeano           Phone#:  [phone]  Friend: Mckenna Dominguez   Phone# [phone]    FAMILY HISTORY:    Baseline ADLs (prior to admission): Ambulatory    Allergies    No Known Allergies    Intolerances      Present Symptoms:    Unable to obtain due to poor mentation]    MEDICATIONS  (STANDING):  amLODIPine   Tablet 5 milliGRAM(s) Oral daily  budesonide  80 MICROgram(s)/formoterol 4.5 MICROgram(s) Inhaler 2 Puff(s) Inhalation two times a day  donepezil 5 milliGRAM(s) Oral at bedtime  enoxaparin Injectable 40 milliGRAM(s) SubCutaneous daily  guaifenesin/dextromethorphan  Syrup 10 milliLiter(s) Oral every 6 hours  hydrALAZINE 50 milliGRAM(s) Oral every 8 hours  hydrocortisone 1% Cream 1 Application(s) Topical two times a day  lidocaine   Patch 1 Patch Transdermal daily  pantoprazole    Tablet 40 milliGRAM(s) Oral before breakfast  senna 2 Tablet(s) Oral at bedtime  sertraline 25 milliGRAM(s) Oral daily  simvastatin 10 milliGRAM(s) Oral at bedtime    MEDICATIONS  (PRN):  acetaminophen   Tablet .. 650 milliGRAM(s) Oral every 6 hours PRN Mild Pain (1 - 3)  albuterol/ipratropium for Nebulization. 3 milliLiter(s) Nebulizer every 6 hours PRN Shortness of Breath and/or Wheezing  traMADol 25 milliGRAM(s) Oral every 8 hours PRN Moderate Pain (4 - 6)      PHYSICAL EXAM:    Vital Signs Last 24 Hrs  T(C): 36.8 (29 Oct 2019 11:02), Max: 37.1 (28 Oct 2019 20:42)  T(F): 98.3 (29 Oct 2019 11:02), Max: 98.7 (28 Oct 2019 20:42)  HR: 75 (29 Oct 2019 11:02) (75 - 96)  BP: 101/45 (29 Oct 2019 11:02) (96/38 - 128/61)  BP(mean): --  RR: 18 (29 Oct 2019 11:02) (17 - 18)  SpO2: 100% (29 Oct 2019 11:02) (99% - 100%)    General: cachetic, AOx1-2, NAD  Karnofsky Performance Score/Palliative Performance Status Version2: 40    %    HEENT: NCAT, moist mucous membranes  Lungs: unlabored, + NC  CV: S1S2, RRR  GI: soft, non tender  : urinating  Musculoskeletal: no edema  Skin: fragile, no rash or lesions noted  Neuro: AO1-2, unable to follow commands  Oral intake ability: poor po intake  Diet: regular    LABS:                        8.2    2.93  )-----------( 133      ( 29 Oct 2019 06:48 )             26.0     10-29    135  |  101  |  21<H>  ----------------------------<  87  3.7   |  30  |  0.89    Ca    8.4      29 Oct 2019 06:48  Phos  3.0     10-29  Mg     2.0     10-29    TPro  7.3  /  Alb  2.8<L>  /  TBili  0.5  /  DBili  x   /  AST  28  /  ALT  22  /  AlkPhos  73  10-28        RADIOLOGY & ADDITIONAL STUDIES:  Reviewed    ADVANCE DIRECTIVES: FULL CODE HPI:  88y F from home, lives alone with 27x7 HHA , walks independently/cane,no family( her neighbour for 13 yrs is involved in care)  with significant PMHx of HTN,Dementia  recent diagnosis of Right lung cancer (started radiation and chemo 1 months ago) and no significant PSHx presenting to the ED with right sided chest pain and SOB since morning. Close friend Ms Mckenna Dominguez  aware of all medical conditions and is very involved in patient's care,is at bedside. As per pt ,she get radiation therapy with Dr Mora( [phone] days a wk and so far has completed 4 rounds,last radiation Treatment was on friday, Pt was also started on chemotherapy last monday but couldnt tolerated it well. Pt states she is having pain under the right breast since morning, 10/10 in intensity, non radiating, increases with inspiration, and associated with some SOB productive cough and chest congestion . Pt denies any  fever, sweating,palpitations, dizziness. Pt is interested in home hospice.     ED course : Vitals : stable . Labs unremarkable. ECG : NSR HR 91 ,T1 -ve . CTA chest : No PE , R lung mass with mets and CALI and small R pl eff.     SH : 2 PPDx 63 yrs ,quitted last wk. denies any drinking or drug use. Lives alone. Close friend Ms Mckenna Dominguez  aware of all medical conditions and is very involved in patient's care,      24 hour Home attendant/aide Mery from [facility] central assisted living agency. phone  [phone]  and [phone]. Call María or Barbara.    Interval hx: Patient seen and examined at the bedside, no apparent distress.  Palliative care to establish goals of care.      PAST MEDICAL & SURGICAL HISTORY:  Lung cancer  HTN (hypertension)  No significant past surgical history      SOCIAL HISTORY:    Admitted from:  Lives at home alone; has 24/7 HHA  No family.    Tobacco: 2 ppd x63 years  Yazidism:   Amish                                   Surrogate/HCP/Guardian:  Cat Galeano           Phone#:  [phone]; ([phone])  Friend: Mckenna Dominguez   Phone# [phone]    FAMILY HISTORY:    Baseline ADLs (prior to admission): Ambulatory    Allergies    No Known Allergies    Intolerances      Present Symptoms:    Unable to obtain due to poor mentation]    MEDICATIONS  (STANDING):  amLODIPine   Tablet 5 milliGRAM(s) Oral daily  budesonide  80 MICROgram(s)/formoterol 4.5 MICROgram(s) Inhaler 2 Puff(s) Inhalation two times a day  donepezil 5 milliGRAM(s) Oral at bedtime  enoxaparin Injectable 40 milliGRAM(s) SubCutaneous daily  guaifenesin/dextromethorphan  Syrup 10 milliLiter(s) Oral every 6 hours  hydrALAZINE 50 milliGRAM(s) Oral every 8 hours  hydrocortisone 1% Cream 1 Application(s) Topical two times a day  lidocaine   Patch 1 Patch Transdermal daily  pantoprazole    Tablet 40 milliGRAM(s) Oral before breakfast  senna 2 Tablet(s) Oral at bedtime  sertraline 25 milliGRAM(s) Oral daily  simvastatin 10 milliGRAM(s) Oral at bedtime    MEDICATIONS  (PRN):  acetaminophen   Tablet .. 650 milliGRAM(s) Oral every 6 hours PRN Mild Pain (1 - 3)  albuterol/ipratropium for Nebulization. 3 milliLiter(s) Nebulizer every 6 hours PRN Shortness of Breath and/or Wheezing  traMADol 25 milliGRAM(s) Oral every 8 hours PRN Moderate Pain (4 - 6)      PHYSICAL EXAM:    Vital Signs Last 24 Hrs  T(C): 36.8 (29 Oct 2019 11:02), Max: 37.1 (28 Oct 2019 20:42)  T(F): 98.3 (29 Oct 2019 11:02), Max: 98.7 (28 Oct 2019 20:42)  HR: 75 (29 Oct 2019 11:02) (75 - 96)  BP: 101/45 (29 Oct 2019 11:02) (96/38 - 128/61)  BP(mean): --  RR: 18 (29 Oct 2019 11:02) (17 - 18)  SpO2: 100% (29 Oct 2019 11:02) (99% - 100%)    General: cachetic, AOx1-2, NAD  Karnofsky Performance Score/Palliative Performance Status Version2: 40    %    HEENT: NCAT, moist mucous membranes  Lungs: unlabored, + NC  CV: S1S2, RRR  GI: soft, non tender  : urinating  Musculoskeletal: no edema  Skin: fragile, no rash or lesions noted  Neuro: AO1-2, unable to follow commands  Oral intake ability: poor po intake  Diet: regular    LABS:                        8.2    2.93  )-----------( 133      ( 29 Oct 2019 06:48 )             26.0     10-29    135  |  101  |  21<H>  ----------------------------<  87  3.7   |  30  |  0.89    Ca    8.4      29 Oct 2019 06:48  Phos  3.0     10-29  Mg     2.0     10-29    TPro  7.3  /  Alb  2.8<L>  /  TBili  0.5  /  DBili  x   /  AST  28  /  ALT  22  /  AlkPhos  73  10-28        RADIOLOGY & ADDITIONAL STUDIES:  Reviewed    ADVANCE DIRECTIVES: FULL CODE

## 2019-10-29 NOTE — CONSULT NOTE ADULT - PROBLEM SELECTOR RECOMMENDATION 3
Diagnosed in August 2019; s/p chemo and radiation.  AOx2, reports no knowledge of oncology hx.  Per Dr. Mora  (rad/onc) pt has poor prognosis.  Per Dr. Douglas nodules are new findings suggestive of mets.   Palliative care to discuss further course of treatment with guardian    CT chest shows  Right upper lobe and right lower lobe lung nodules, suggestive of metastasis.

## 2019-10-29 NOTE — CONSULT NOTE ADULT - PROBLEM SELECTOR RECOMMENDATION 4
Patient Aox2, at the time of exam; she was not able to express why she was hospitalized.  She said no one told her she had lung cancer; no insight as to clinical status.  Met with Mckenna Dominguez who has been patient's primary source of support ; she expressed the pt has no living relatives.  She has been coordinating all her medical care.  Tried reaching out to guardian Cat Galeano ([phone]) left contact information.  Palliative care will follow.

## 2019-10-29 NOTE — ED ADULT TRIAGE NOTE - WEIGHT IN KG
Vascular Surgery Progress Note     Subjective/24hour Events: No acute events overnight. Resting comfortably. Pain controlled. On BiPAP. No N/V, or CP.    Vital Signs:  Vital Signs Last 24 Hrs  T(C): 36.9 (29 Oct 2019 14:15), Max: 36.9 (29 Oct 2019 13:10)  T(F): 98.5 (29 Oct 2019 14:15), Max: 98.5 (29 Oct 2019 13:10)  HR: 94 (29 Oct 2019 18:29) (88 - 120)  BP: 129/93 (29 Oct 2019 14:15) (126/58 - 147/66)  BP(mean): --  RR: 22 (29 Oct 2019 18:29) (18 - 23)  SpO2: 95% (29 Oct 2019 18:29) (90% - 96%)    CAPILLARY BLOOD GLUCOSE      POCT Blood Glucose.: 138 mg/dL (29 Oct 2019 17:15)  POCT Blood Glucose.: 176 mg/dL (29 Oct 2019 11:55)  POCT Blood Glucose.: 149 mg/dL (29 Oct 2019 07:47)  POCT Blood Glucose.: 118 mg/dL (28 Oct 2019 21:22)      I&O's Detail    28 Oct 2019 07:01  -  29 Oct 2019 07:00  --------------------------------------------------------  IN:    bumetanide Infusion: 172.5 mL    IV PiggyBack: 215 mL    Other: 500 mL  Total IN: 887.5 mL    OUT:    Other: 3500 mL    Voided: 300 mL  Total OUT: 3800 mL    Total NET: -2912.5 mL      29 Oct 2019 07:01  -  29 Oct 2019 19:54  --------------------------------------------------------  IN:    Other: 400 mL  Total IN: 400 mL    OUT:    Other: 587 mL  Total OUT: 587 mL    Total NET: -187 mL            MEDICATIONS  (STANDING):  albuterol/ipratropium for Nebulization 3 milliLiter(s) Nebulizer every 6 hours  aspirin enteric coated 81 milliGRAM(s) Oral daily  buMETAnide Infusion 1.5 mG/Hr (7.5 mL/Hr) IV Continuous <Continuous>  calcium gluconate IVPB 1 Gram(s) IV Intermittent daily  chlorhexidine 4% Liquid 1 Application(s) Topical daily  dextrose 5%. 1000 milliLiter(s) (50 mL/Hr) IV Continuous <Continuous>  dextrose 50% Injectable 12.5 Gram(s) IV Push once  dextrose 50% Injectable 25 Gram(s) IV Push once  dextrose 50% Injectable 25 Gram(s) IV Push once  epoetin terence Injectable 80402 Unit(s) SubCutaneous <User Schedule>  ergocalciferol 60662 Unit(s) Oral <User Schedule>  insulin lispro (HumaLOG) corrective regimen sliding scale   SubCutaneous three times a day before meals  metolazone 5 milliGRAM(s) Oral <User Schedule>  nystatin Powder 1 Application(s) Topical two times a day  pantoprazole  Injectable 40 milliGRAM(s) IV Push daily  piperacillin/tazobactam IVPB.. 3.375 Gram(s) IV Intermittent every 12 hours  sevelamer carbonate 800 milliGRAM(s) Oral three times a day with meals  sodium chloride 3%  Inhalation 4 milliLiter(s) Inhalation three times a day    MEDICATIONS  (PRN):  dextrose 40% Gel 15 Gram(s) Oral once PRN Blood Glucose LESS THAN 70 milliGRAM(s)/deciliter  glucagon  Injectable 1 milliGRAM(s) IntraMuscular once PRN Glucose LESS THAN 70 milligrams/deciliter  sodium chloride 0.65% Nasal 2 Spray(s) Both Nostrils four times a day PRN Nasal Congestion  tetracaine/benzocaine/butamben Spray 1 Spray(s) Topical daily PRN AVF        Physical Exam:  Gen: NAD  HEENT: right IJ shiley in place  LS: nml respiratory effort  Card: pulse regularly present  GI: abd soft, nontender  Ext: warm, LUE welling slightly decreased from yesterday      Labs:    10-29    142  |  100  |  46<H>  ----------------------------<  133<H>  3.8   |  20<L>  |  3.05<H>    Ca    8.7      29 Oct 2019 05:45  Phos  4.9     10-29  Mg     1.8     10-29                              8.6    12.72 )-----------( 352      ( 29 Oct 2019 05:45 )             30.3               Imaging:  EXAM:  XR CHEST PORTABLE ROUTINE 1V      PROCEDURE DATE:  Oct 28 2019     INTERPRETATION:  CLINICAL INFORMATION: Left lobe opacity.    TECHNIQUE: AP view of the chest.    COMPARISON: Chest radiograph from 10/27/2019.    IMPRESSION:    Right IJ central venous catheter terminates in the SVC. Redemonstration   of left hemithorax complete opacification, mildly increased from   10/27/2019 with some associated volume loss suggestive of left lung   atelectasis. Right lung interstitial opacities may be due to pulmonary   edema, increased. 40.8

## 2019-10-29 NOTE — CONSULT NOTE ADULT - PROBLEM SELECTOR RECOMMENDATION 2
at right axilla with sob.  CTA is neg for PE  there is no pain or tenderness anymore.  The sob has subsided too  there is rash at chest wall, ?due to RT or alimta?
2/2 comorbidities.  Albumin 2.8.  Poor po intake.  Diet as tolerated.    Nutrition eval.

## 2019-10-29 NOTE — CONSULT NOTE ADULT - PROBLEM SELECTOR RECOMMENDATION 9
Early onset.  AOx2, reports no knowledge of oncology hx.    PT eval Early onset.  Forgetful at times.  No insight as to oncology history.  No behavior issues currently.

## 2019-10-30 NOTE — CHART NOTE - NSCHARTNOTEFT_GEN_A_CORE
AMANDA Veloz went to the bedside to introduce herself to the patient.  Patient appeared guarded initially, however began conversing primarily about returning home.  Patient seems pleasantly confused and has demonstrates little insight into her dx., past treatment and or prognosis.  AMANDA Veloz has been informed by her team that the patient has a guardian.  AMANDA Veloz relayed this to the medicine .  AMANDA Veloz will remain available as needed .

## 2019-10-30 NOTE — PROGRESS NOTE ADULT - ASSESSMENT
88y F from home, lives alone with 27x7 HHA , walks independently/cane,no family( her neighbour for 13 yrs is involved in care)  with significant PMHx of HTN,Dementia  recent diagnosis of Right lung cancer (started radiation and chemo 1 months ago) and no significant PSHx presenting to the ED with right sided chest pain and SOB since morning.    Pt will be admitted to tele for right sided Chest pain 88y F from home, lives alone with 27x7 HHA , walks independently/cane,no family( her neighbour for 13 yrs is involved in care)  with significant PMHx of HTN,Dementia  recent diagnosis of Right lung cancer (started radiation and chemo 1 months ago) and no significant PSHx presenting to the ED with right sided chest pain and SOB since morning.    Pt will be admitted to tele for right sided Chest pain       Pt is pending for PT eval   for HHA increase hours

## 2019-10-30 NOTE — PROGRESS NOTE ADULT - PROBLEM SELECTOR PLAN 4
Likely 2/2 pain and lung cancer   - c/w Duonebs and symbicort  - CTA : no PE
Likely 2/2 pain and lung cancer   - c/w Duonebs and symbicort  - CTA : no PE

## 2019-10-30 NOTE — PROGRESS NOTE ADULT - ASSESSMENT
_________________________________________________________________________________________  ========>>  M E D I C A L   A T T E N D I N G    F O L L O W  U P  N O T E  <<=========  -----------------------------------------------------------------------------------------------------    - Patient seen and examined by me earlier today.   - In summary,  IRENA STEELE is a 88y year old woman who originally presented with chest pain  - Patient today overall doing ok, comfortable, eating OK. + chronic cough and associated chest discomfort, otherwise feels ok and wants to go home!     ==================>> REVIEW OF SYSTEM <<=================    GEN: no fever, no chills, pain as above   RESP: no SOB reported at rest , + chronic wet cough  CVS: no chest pain, no palpitations, no edema  GI: no abdominal pain, no nausea, no constipation, no diarrhea  : no dysuria, no frequency, no hematuria  Neuro: no headache, no dizziness  Derm : no itching, no rash    ==================>> PHYSICAL EXAM <<=================    GEN: A&O X 2 , NAD , comfortable. pleasant   HEENT: NCAT, PERRL, MMM, hearing intact  Neck: supple , no JVD  CVS: S1S2 , regular , limited   PULM: bilateral diffuse rhonchi  ABD.: soft. non tender, non distended,  bowel sounds present  Extrem: intact pulses , no edema   PSYCH : normal mood,  not anxious       ==================>> MEDICATIONS <<====================    amLODIPine   Tablet 5 milliGRAM(s) Oral daily  budesonide  80 MICROgram(s)/formoterol 4.5 MICROgram(s) Inhaler 2 Puff(s) Inhalation two times a day  donepezil 5 milliGRAM(s) Oral at bedtime  enoxaparin Injectable 40 milliGRAM(s) SubCutaneous daily  guaifenesin/dextromethorphan  Syrup 10 milliLiter(s) Oral every 6 hours  hydrALAZINE 50 milliGRAM(s) Oral every 8 hours  hydrocortisone 1% Cream 1 Application(s) Topical two times a day  levoFLOXacin  Tablet 250 milliGRAM(s) Oral every 24 hours  lidocaine   Patch 1 Patch Transdermal daily  pantoprazole    Tablet 40 milliGRAM(s) Oral before breakfast  predniSONE   Tablet 20 milliGRAM(s) Oral daily  senna 2 Tablet(s) Oral at bedtime  sertraline 25 milliGRAM(s) Oral daily  simvastatin 10 milliGRAM(s) Oral at bedtime    MEDICATIONS  (PRN):  acetaminophen   Tablet .. 650 milliGRAM(s) Oral every 6 hours PRN Mild Pain (1 - 3)  albuterol/ipratropium for Nebulization. 3 milliLiter(s) Nebulizer every 6 hours PRN Shortness of Breath and/or Wheezing  traMADol 25 milliGRAM(s) Oral every 8 hours PRN Moderate Pain (4 - 6)    ==================>> VITAL SIGNS <<==================    Vital Signs Last 24 Hrs  T(C): 37.1 (10-30-19 @ 11:15)  T(F): 98.7 (10-30-19 @ 11:15), Max: 98.7 (10-30-19 @ 11:15)  HR: 82 (10-30-19 @ 11:15) (82 - 98)  BP: 100/57 (10-30-19 @ 11:15)  RR: 18 (10-30-19 @ 11:15) (18 - 18)  SpO2: 100% (10-30-19 @ 11:15) (97% - 100%)       ==================>> LAB AND IMAGING <<==================                        8.6    2.69  )-----------( 71       ( 30 Oct 2019 12:35 )             27.4        10-30    137  |  102  |  26<H>  ----------------------------<  107<H>  3.7   |  30  |  0.92    Ca    8.1<L>      30 Oct 2019 05:57  Phos  3.0     10-29  Mg     2.0     10-29    TPro  6.3  /  Alb  2.1<L>  /  TBili  0.3  /  DBili  x   /  AST  47<H>  /  ALT  30  /  AlkPhos  76  10-30    WBC count:   2.69 <<== ,  2.50 <<== ,  2.93 <<== ,  4.14 <<==   Hemoglobin:   8.6 <<==,  7.9 <<==,  8.2 <<==,  9.7 <<==  platelets:  71 <==, 84 <==, 133 <==, 210 <==    Creatinine:  0.92  <<==, 0.89  <<==, 0.78  <<==  Sodium:   137  <==, 135  <==, 136  <==       AST:          47 <== , 28 <==      ALT:        30  <== , 22  <==      AP:        76  <=, 73  <=     Bili:        0.3  <=, 0.5  <=    ___________________________________________________________________________________  ===============>>  A S S E S S M E N T   A N D   P L A N <<===============  ------------------------------------------------------------------------------------------    · Assessment		  88y F from home, lives alone with 27x7 HHA , walks independently/cane,no family( her neighbour for 13 yrs is involved in care)  with significant PMHx of HTN,Dementia  recent diagnosis of Right lung cancer (started radiation and chemo 1 months ago) and no significant PSHx presenting to the ED with right sided chest pain and SOB since morning.    Pt will be admitted to tele for right sided Chest pain     Problem/Plan - 1:  ·  Problem: Chest pain, likely non-cardiac     right sided chest pain associated with SOB likely due to lung cancer   -likely 2/2 R lung cancer   - Pain control with tylenol and lidocaine patch   - cardio appreciated  - onc appreciated      pt started on oral abx by onc for possible bronchitis   - palliative follow up for pain mgmt and possible hospice?    Problem/Plan - 2:  ·  Problem: Lung cancer with metastasis      s/p chemo x1 and 4 radiation rounds   -CTA chest : R lung mass with mets   - c/w robitussin DM  for cough   - Palliative consult Dr Hills and SW for possible home hospice.     Problem/Plan - 3:  ·  Problem: HTN       Pt takes hydralazine 50 TID and amlodipine   at home   - will continue with home medications with parameters   - Monitor BP and titrate meds accordingly.     Problem/Plan - 4:  ·  Problem: Shortness of breath.  Plan: Likely 2/2 pain and lung cancer   - c/w Duonebs and symbicort  - CTA : no PE.     Problem/Plan - 5:  ·  Problem: Dementia, old age.  Plan: c/w home meds.     -GI/DVT Prophylaxis.    Dispo planing   --------------------------------------------  Case discussed with pt, staff   Education given on findings and plan of care  ___________________________  H. CARLOS Hankins.  Pager: [phone]

## 2019-10-30 NOTE — PROGRESS NOTE ADULT - PROBLEM SELECTOR PLAN 6
IMPROVE VTE Individual Risk Assessment  RISK                                                                Points  [  ] Previous VTE                                                  3  [  ] Thrombophilia                                               2  [  ] Lower limb paralysis                                      2  [  ] Current Cancer                                              2         (within 6 months)  [  ] Immobilization > 24 hrs                                1  [  ] ICU/CCU stay > 24 hours                              1  [  ] Age > 60                                                      1  IMPROVE VTE Score ___2______  DVT ppx : lovenox   GI ppx :PPI
IMPROVE VTE Individual Risk Assessment  RISK                                                                Points  [  ] Previous VTE                                                  3  [  ] Thrombophilia                                               2  [  ] Lower limb paralysis                                      2  [  ] Current Cancer                                              2         (within 6 months)  [  ] Immobilization > 24 hrs                                1  [  ] ICU/CCU stay > 24 hours                              1  [  ] Age > 60                                                      1  IMPROVE VTE Score ___2______  DVT ppx : lovenox   GI ppx :PPI

## 2019-10-31 NOTE — PROGRESS NOTE ADULT - ASSESSMENT
_________________________________________________________________________________________  ========>>  M E D I C A L   A T T E N D I N G    F O L L O W  U P  N O T E  <<=========  -----------------------------------------------------------------------------------------------------    - Patient seen and examined by me earlier today.   - In summary,  IRENA STEELE is a 88y year old woman who originally presented with chest pain  - Patient today overall doing ok, comfortable, eating OK. cough improved     ==================>> REVIEW OF SYSTEM <<=================    GEN: no fever, no chills, no pain   RESP: no SOB reported at rest , + chronic wet cough, improved   CVS: no chest pain, no palpitations, no edema  GI: no abdominal pain, no nausea, no constipation, no diarrhea  : no dysuria, no frequency, no hematuria  Neuro: no headache, no dizziness  Derm : no itching, no rash    ==================>> PHYSICAL EXAM <<=================    GEN: A&O X 2 , NAD , comfortable. pleasant , in chair   HEENT: NCAT, PERRL, MMM, hearing intact  Neck: supple , no JVD  CVS: S1S2 , regular , limited   PULM: bilateral diffuse rhonchi  ABD.: soft. non tender, non distended,  bowel sounds present  Extrem: intact pulses , no edema   PSYCH : normal mood,  not anxious       ==================>> MEDICATIONS <<====================    amLODIPine   Tablet 5 milliGRAM(s) Oral daily  budesonide  80 MICROgram(s)/formoterol 4.5 MICROgram(s) Inhaler 2 Puff(s) Inhalation two times a day  donepezil 5 milliGRAM(s) Oral at bedtime  enoxaparin Injectable 40 milliGRAM(s) SubCutaneous daily  filgrastim-sndz Injectable 300 MICROGram(s) SubCutaneous daily  guaifenesin/dextromethorphan  Syrup 10 milliLiter(s) Oral every 6 hours  hydrALAZINE 50 milliGRAM(s) Oral every 8 hours  hydrocortisone 1% Cream 1 Application(s) Topical two times a day  levoFLOXacin  Tablet 250 milliGRAM(s) Oral every 24 hours  lidocaine   Patch 1 Patch Transdermal daily  pantoprazole    Tablet 40 milliGRAM(s) Oral before breakfast  predniSONE   Tablet 20 milliGRAM(s) Oral daily  senna 2 Tablet(s) Oral at bedtime  sertraline 25 milliGRAM(s) Oral daily  simvastatin 10 milliGRAM(s) Oral at bedtime    MEDICATIONS  (PRN):  acetaminophen   Tablet .. 650 milliGRAM(s) Oral every 6 hours PRN Mild Pain (1 - 3)  albuterol/ipratropium for Nebulization. 3 milliLiter(s) Nebulizer every 6 hours PRN Shortness of Breath and/or Wheezing  traMADol 25 milliGRAM(s) Oral every 8 hours PRN Moderate Pain (4 - 6)    ==================>> VITAL SIGNS <<==================    Vital Signs Last 24 Hrs  T(C): 36.6 (10-31-19 @ 15:46)  T(F): 97.8 (10-31-19 @ 15:46), Max: 98.6 (10-30-19 @ 20:05)  HR: 83 (10-31-19 @ 15:46) (81 - 107)  BP: 125/50 (10-31-19 @ 15:46)  RR: 18 (10-31-19 @ 15:46) (18 - 20)  SpO2: 96% (10-31-19 @ 15:46) (95% - 99%)       ==================>> LAB AND IMAGING <<==================                        7.8    1.90  )-----------( 51       ( 31 Oct 2019 06:40 )             24.8       135  |  102  |  26<H>  ----------------------------<  103<H>  4.0   |  28  |  0.81    Ca    8.6      31 Oct 2019 06:40    TPro  6.3  /  Alb  2.1<L>  /  TBili  0.3  /  DBili  x   /  AST  47<H>  /  ALT  30  /  AlkPhos  76  10-30    WBC count:   1.90 <<== ,  2.69 <<== ,  2.50 <<== ,  2.93 <<== ,  4.14 <<==   Hemoglobin:   7.8 <<==,  8.6 <<==,  7.9 <<==,  8.2 <<==,  9.7 <<==  platelets:  51 <==, 71 <==, 84 <==, 133 <==, 210 <==    Creatinine:  0.81  <<==, 0.92  <<==, 0.89  <<==, 0.78  <<==  Sodium:   135  <==, 137  <==, 135  <==, 136  <==       AST:          47 <== , 28 <==      ALT:        30  <== , 22  <==      AP:        76  <=, 73  <=     Bili:        0.3  <=, 0.5  <=    ___________________________________________________________________________________  ===============>>  A S S E S S M E N T   A N D   P L A N <<===============  ------------------------------------------------------------------------------------------    · Assessment		  88y F from home, lives alone with 27x7 HHA , walks independently/cane,no family( her neighbour for 13 yrs is involved in care)  with significant PMHx of HTN,Dementia  recent diagnosis of Right lung cancer (started radiation and chemo 1 months ago) and no significant PSHx presenting to the ED with right sided chest pain and SOB since morning.    Problem/Plan - 1:  ·  Problem: Chest pain, likely non-cardiac, likely due to cancer and associated cough   - Pain control with tylenol and lidocaine patch   - cardio appreciated  - onc appreciated      pt started on oral abx by onc for possible bronchitis   - palliative follow up for pain mgmt and possible hospice?  - appreciated CM.  follow up    Problem/Plan - 2:  ·  Problem: Lung cancer with metastasis      s/p chemo x1 and 4 radiation rounds          with assocated pancytopenia   -CTA chest : R lung mass with mets   - c/w robitussin DM  for cough   - Palliative f/u    Problem/Plan - 3:  ·  Problem: HTN       Pt takes hydralazine 50 TID and amlodipine   at home   - will continue with home medications with parameters   - Monitor BP and titrate meds accordingly.     Problem/Plan - 4:  ·  Problem: Shortness of breath.  Plan: Likely 2/2 pain and lung cancer   - c/w Duonebs and symbicort  - CTA : no PE.     Problem/Plan - 5:  ·  Problem: Dementia, old age.  Plan: c/w home meds.     -GI/DVT Prophylaxis.    Dispo planing   --------------------------------------------  Case discussed with pt, staff   Education given on findings and plan of care  ___________________________  H. CARLOS Hankins.  Pager: [phone]

## 2019-10-31 NOTE — DISCHARGE NOTE PROVIDER - CARE PROVIDER_API CALL
Inez Douglas)  Internal Medicine; Medical Oncology  [address] [place] [address]  Phone: [phone]  Fax: [phone]  Follow Up Time: Manasa Hankins (DO)  Internal Medicine  [address] [place] [id number]  Phone: [phone]  Fax: [phone]  Follow Up Time:

## 2019-10-31 NOTE — DISCHARGE NOTE PROVIDER - NSDCMRMEDTOKEN_GEN_ALL_CORE_FT
amLODIPine 5 mg oral tablet: 1 tab(s) orally once a day  donepezil 5 mg oral tablet: 1 tab(s) orally once a day (at bedtime)  hydrALAZINE 50 mg oral tablet: 1 tab(s) orally every 8 hours  Senna 8.6 mg oral tablet: 2 tab(s) orally once a day (at bedtime)  sertraline 25 mg oral tablet: 1 tab(s) orally once a day  simvastatin 10 mg oral tablet: 1 tab(s) orally once a day (at bedtime)  Symbicort 80 mcg-4.5 mcg/inh inhalation aerosol: 2 puff(s) inhaled 2 times a day  Ventolin HFA 90 mcg/inh inhalation aerosol: 2 puff(s) inhaled 4 times a day, As Needed amLODIPine 5 mg oral tablet: 1 tab(s) orally once a day  donepezil 5 mg oral tablet: 1 tab(s) orally once a day (at bedtime)  guaifenesin-dextromethorphan 100 mg-10 mg/5 mL oral liquid: 10 milliliter(s) orally every 6 hours  home oxygen: 2 liter(s) inhaled 3 to 4 times a day   hydrALAZINE 50 mg oral tablet: 1 tab(s) orally every 8 hours  pantoprazole 40 mg oral delayed release tablet: 1 tab(s) orally once a day (before a meal)  Senna 8.6 mg oral tablet: 2 tab(s) orally once a day (at bedtime)  sertraline 25 mg oral tablet: 1 tab(s) orally once a day  simvastatin 10 mg oral tablet: 1 tab(s) orally once a day (at bedtime)  Symbicort 80 mcg-4.5 mcg/inh inhalation aerosol: 2 puff(s) inhaled 2 times a day  Ventolin HFA 90 mcg/inh inhalation aerosol: 2 puff(s) inhaled 4 times a day, As Needed amLODIPine 5 mg oral tablet: 1 tab(s) orally once a day  donepezil 5 mg oral tablet: 1 tab(s) orally once a day (at bedtime)  hydrALAZINE 50 mg oral tablet: 1 tab(s) orally every 8 hours  simvastatin 10 mg oral tablet: 1 tab(s) orally once a day (at bedtime) acetaminophen 325 mg oral tablet: 2 tab(s) orally every 6 hours, As needed, Mild Pain (1 - 3)  donepezil 5 mg oral tablet: 1 tab(s) orally once a day (at bedtime)  Flovent HFA 44 mcg/inh inhalation aerosol: 2 puff(s) inhaled 2 times a day   guaifenesin-dextromethorphan 100 mg-10 mg/5 mL oral liquid: 10 milliliter(s) orally every 6 hours  home oxygen: 2 liter(s) inhaled 3 to 4 times a day   hydrocortisone 1% topical cream: 1 application topically 2 times a day  ipratropium-albuterol 0.5 mg-2.5 mg/3 mLinhalation solution: 3 milliliter(s) inhaled every 6 hours, As needed, Shortness of Breath and/or Wheezing  pantoprazole 40 mg oral delayed release tablet: 1 tab(s) orally once a day (before a meal)  predniSONE 10 mg oral tablet: 1 tab(s) orally 2 times a day  for first3 days  predniSONE 10 mg oral tablet: 1 tab(s) orally once a day for next 3 days AND THEN STOP acetaminophen 325 mg oral tablet: 2 tab(s) orally every 6 hours, As needed, Mild Pain (1 - 3)  donepezil 5 mg oral tablet: 1 tab(s) orally once a day (at bedtime)  Flovent HFA 44 mcg/inh inhalation aerosol: 2 puff(s) inhaled 2 times a day   guaifenesin-dextromethorphan 100 mg-10 mg/5 mL oral liquid: 10 milliliter(s) orally every 6 hours  home oxygen: 2 liter(s) inhaled 3 to 4 times a day   hydrocortisone 1% topical cream: 1 application topically 2 times a day  pantoprazole 40 mg oral delayed release tablet: 1 tab(s) orally once a day (before a meal)  predniSONE 10 mg oral tablet: 1 tab(s) orally 2 times a day  for first3 days  predniSONE 10 mg oral tablet: 1 tab(s) orally once a day for next 3 days AND THEN STOP  Symbicort 160 mcg-4.5 mcg/inh inhalation aerosol: 2 puff(s) inhaled 2 times a day

## 2019-10-31 NOTE — CHART NOTE - NSCHARTNOTEFT_GEN_A_CORE
AMANDA Veloz contacted the patient's guardian Cat [phone] to introduce herself and request she fax the guardianship documents as this  did not find them in the patient's chart.  AMANDA Veloz provided fax [phone].  Cat stated she needs to speak with the patient's  Glory Jack and organize a conference call with , , [facility] NP Ubaldo Corbin, AMANDA Tolentino, herself and the .  Once Cat has spoken with Glory Jack she will follow up with the P[facility] team regarding the conference call and fax the paperwork in the interim.  AMANDA Veloz will remain available as needed.

## 2019-10-31 NOTE — DISCHARGE NOTE PROVIDER - HOSPITAL COURSE
88y F from home, lives alone with 27x7 HHA , walks independently/cane,no family( her neighbour for 13 yrs is involved in care)  with significant PMHx of HTN,Dementia  recent diagnosis of Right lung cancer (started radiation and chemo 1 months ago) and no significant PSHx presenting to the ED with right sided chest pain and SOB since morning. Close friend Ms Mckenna Dominguez  aware of all medical conditions and is very involved in patient's care,is at bedside. As per pt ,she get radiation therapy with Dr Mora( [phone])5 days a wk and so far has completed 4 rounds,last radiation Treatment was on friday, Pt was also started on chemotherapy last monday but couldnt tolerated it well. Pt states she is having pain under the right breast since morning, 10/10 in intensity, non radiating, increases with inspiration, and associated with some SOB productive cough and chest congestion . Pt denies any  fever, sweating,palpitations, dizziness. Pt is interested in home hospice.     During the course in hospital cardiac enzymes were negative. Cardiology Dr Li was consulted. Pain was non cardiac in origin. Oncology Dr Douglas was consulted. Palliative was also consulted and decision for hospice was deferred as she has [id number]. Goals of care are to be decided by isabel. She was also treated for bronchitis with levaquin. She is to follow up with Dr douglas as outpatient for chemotherapy.    Given patient's improved clinical status and current hemodynamic stability, decision was made to discharge the patient.    Patient is stable for discharge per attending and is advised to follow up with PCP as outpatient    Please refer to patient's complete medical chart with documents for a full hospital course, for this is only a brief summary. 88y F from home, lives alone with 27x7 HHA , walks independently/cane,no family( her neighbour for 13 yrs is involved in care)  with significant PMHx of HTN,Dementia  recent diagnosis of Right lung cancer (started radiation and chemo 1 months ago) and no significant PSHx presenting to the ED with right sided chest pain and SOB since morning. Close friend Ms Mckenna Dominguez  aware of all medical conditions and is very involved in patient's care,is at bedside. As per pt ,she gets radiation therapy with Dr Mora( [phone])5 days a wk and so far has completed 4 rounds, last radiation Treatment was on before admission, Pt was also started on chemotherapy last Monday but could not tolerate it well. Pt stated she is having pain under the right breast 10/10 in intensity, non radiating, increases with inspiration, and associated with some SOB productive cough and chest congestion . Pt denied any  fever, sweating,palpitations, dizziness. Pt is interested in home hospice.     During the course in hospital cardiac enzymes were negative. Cardiology Dr Li was consulted. Pain was non cardiac in origin. She was  treated for bronchitis with Levaquin. Oncology Dr Duoglas was consulted. Palliative was also consulted and decision for hospice was deferred as she had [id number]. Later on, goals of care were changed. Pt was made DNR/DNI and in house hospice plan was finalized after discussing with guardian. Dr Douglas signed off the case as pt is going for hospice     and palliative were onboard. Home Oxygen was arranged for pt and pt was also taught to use Symbicort inhaler at home by the respiratory therapist.    Please refer to patient's complete medical chart with documents for a full hospital course, for this is only a brief summary.

## 2019-10-31 NOTE — DISCHARGE NOTE PROVIDER - NSDCCAREPROVSEEN_GEN_ALL_CORE_FT
Manasa Hankins Liliana Vanessa  Hoorbod Delshadfar  Bess Rondon  Hoorbod Delshadfar  Hoorbod Delshadfar  [facility] Sousa [facility]  Nic Venegas  Unknown Doctor  No Name Given Loyda  Ordering Physician  Indrawati Mahipal Li-Teh Wu Anne Pierrelouis Benette Severino Michele Johnson Marie Prosper [facility]  Aroldo Billrufina Jane

## 2019-11-01 NOTE — CHART NOTE - NSCHARTNOTEFT_GEN_A_CORE
PC  participated in a conference call with , , PC NP Ubaldo Crobin, legal guardian Cat Galeano, and patient's  Vanda Jack [phone].  Dr. Rondon provided a clinical update reviewing the patient's diagnoses, prognosis, benefit versus burden of cancer directed treatment and functional status.  After a lengthy discussion the patient's legal guardian Cat Galeano and  Vanda Jack agreed with medical recommendations for DNR/DNI and home hospice to focus upon comfort and quality of life.  Both legal guardian and  were educated regarding MOLST form which was completed and this  faxed it to their attention at [phone].   contacted  Medicine  regarding discharge Monday and this  informed .  MLTC will need to be reinstated and home hospice will need to be initiated. Pc Sw provided emotional support to the patient and her long time friend Monica Dominguez who was at the bedside.  Monica verbalized understanding that this  and NP were speaking on the telephone with the patient's  and could not meet with her sooner.   stated she needs to leave for work, however she will remain involved as well as visit the patient over the weekend.  Both patient and friend were visibly tearful when discussing the patient wanting to return home.  According to , she had organized a welcome home dinner for the patient with their neighbors at a restaurant this evening.   informed the patient they will make the holidays very special this year.  PC  thanked  for partnering with the staff and providing support to the patient.  Both parties agreed to speak on Monday as  [phone] would like to know what time the patient will be returning home.  PC Sw will remain available as needed for continued support and collaboration with the unit .

## 2019-11-01 NOTE — GOALS OF CARE CONVERSATION - ADVANCED CARE PLANNING - CONVERSATION DETAILS
[facility] ([facility]):    Notified of home hospice referral at  1530p today for tentative Monday discharge. Patient has legal guardian. Guardianship paperwork subsequently secured from  PCT NICKI Cherry.and then forwarded to [facility]  Director of  this evening  for review on Monday.     This referral will be reviewed further on Monday. Please contact [facility] Referral Center at [phone] for follow up.    Pari Ward RN, PN  Hospice Inpatient Specialist

## 2019-11-01 NOTE — PROGRESS NOTE ADULT - ASSESSMENT
_________________________________________________________________________________________  ========>>  M E D I C A L   A T T E N D I N G    F O L L O W  U P  N O T E  <<=========  -----------------------------------------------------------------------------------------------------    - Patient seen and examined by me earlier today.   - In summary,  IRENA STEELE is a 88y year old woman who originally presented with chest pain  - Patient today overall doing ok, comfortable, eating OK. cough improved     ==================>> REVIEW OF SYSTEM <<=================    GEN: no fever, no chills, no pain   RESP: no SOB reported at rest , + chronic wet cough, improved   CVS: no chest pain, no palpitations, no edema  GI: no abdominal pain, no nausea, no constipation, no diarrhea  : no dysuria, no frequency, no hematuria  Neuro: no headache, no dizziness  Derm : no itching, no rash    ==================>> PHYSICAL EXAM <<=================    GEN: A&O X 2 , NAD , comfortable. pleasant , in chair   HEENT: NCAT, PERRL, MMM, hearing intact  Neck: supple , no JVD  CVS: S1S2 , regular , limited   PULM: bilateral diffuse rhonchi  ABD.: soft. non tender, non distended,  bowel sounds present  Extrem: intact pulses , no edema   PSYCH : normal mood,  not anxious      ==================>> MEDICATIONS <<====================    amLODIPine   Tablet 5 milliGRAM(s) Oral daily  budesonide  80 MICROgram(s)/formoterol 4.5 MICROgram(s) Inhaler 2 Puff(s) Inhalation two times a day  donepezil 5 milliGRAM(s) Oral at bedtime  enoxaparin Injectable 40 milliGRAM(s) SubCutaneous daily  guaifenesin/dextromethorphan  Syrup 10 milliLiter(s) Oral every 6 hours  hydrALAZINE 50 milliGRAM(s) Oral every 8 hours  hydrocortisone 1% Cream 1 Application(s) Topical two times a day  levoFLOXacin  Tablet 250 milliGRAM(s) Oral every 24 hours  lidocaine   Patch 1 Patch Transdermal daily  pantoprazole    Tablet 40 milliGRAM(s) Oral before breakfast  predniSONE   Tablet 20 milliGRAM(s) Oral daily  senna 2 Tablet(s) Oral at bedtime  sertraline 25 milliGRAM(s) Oral daily  simvastatin 10 milliGRAM(s) Oral at bedtime    MEDICATIONS  (PRN):  acetaminophen   Tablet .. 650 milliGRAM(s) Oral every 6 hours PRN Mild Pain (1 - 3)  albuterol/ipratropium for Nebulization. 3 milliLiter(s) Nebulizer every 6 hours PRN Shortness of Breath and/or Wheezing  traMADol 25 milliGRAM(s) Oral every 8 hours PRN Moderate Pain (4 - 6)    ==================>> VITAL SIGNS <<==================    Vital Signs Last 24 Hrs  T(C): 36.2 (11-01-19 @ 15:39)  T(F): 97.2 (11-01-19 @ 15:39), Max: 98.2 (10-31-19 @ 23:00)  HR: 92 (11-01-19 @ 15:39) (69 - 107)  BP: 134/64 (11-01-19 @ 15:39)  RR: 18 (11-01-19 @ 15:39) (18 - 18)  SpO2: 99% (11-01-19 @ 15:39) (94% - 99%)       ==================>> LAB AND IMAGING <<==================                        7.9    7.72  )-----------( 35       ( 01 Nov 2019 06:34 )             25.2        136  |  102  |  26<H>  ----------------------------<  79  3.4<L>   |  28  |  0.88    Ca    8.7      01 Nov 2019 06:34    WBC count:   7.72 <<== ,  1.90 <<== ,  2.69 <<== ,  2.50 <<== ,  2.93 <<== ,  4.14 <<==   Hemoglobin:   7.9 <<==,  7.8 <<==,  8.6 <<==,  7.9 <<==,  8.2 <<==,  9.7 <<==  platelets:  35 <==, 51 <==, 71 <==, 84 <==, 133 <==, 210 <==    Creatinine:  0.88  <<==, 0.81  <<==, 0.92  <<==, 0.89  <<==, 0.78  <<==  Sodium:   136  <==, 135  <==, 137  <==, 135  <==, 136  <==       AST:          47 <== , 28 <==      ALT:        30  <== , 22  <==      AP:        76  <=, 73  <=     Bili:        0.3  <=, 0.5  <=        ___________________________________________________________________________________  ===============>>  A S S E S S M E N T   A N D   P L A N <<===============  ------------------------------------------------------------------------------------------    · Assessment		  88y F from home, lives alone with 27x7 HHA , walks independently/cane,no family( her neighbour for 13 yrs is involved in care)  with significant PMHx of HTN,Dementia  recent diagnosis of Right lung cancer (started radiation and chemo 1 months ago) and no significant PSHx presenting to the ED with right sided chest pain and SOB since morning.    Problem/Plan - 1:  ·  Problem: Chest pain, likely non-cardiac, likely due to cancer and associated cough   - Pain control with tylenol and lidocaine patch   - cardio appreciated  - onc appreciated      pt started on oral abx by onc for possible bronchitis     Problem/Plan - 2:  ·  Problem: Lung cancer with metastasis      s/p chemo x1 and 4 radiation rounds          with assocated pancytopenia   -CTA chest : R lung mass with mets   - c/w robitussin DM  for cough   - onc and Palliative f/u appreciated     Problem/Plan - 3:  ·  Problem: HTN       Pt takes hydralazine 50 TID and amlodipine   at home   - will continue with home medications with parameters   - Monitor BP and titrate meds accordingly.     Problem/Plan - 4:  ·  Problem: Shortness of breath.  Plan: Likely 2/2 pain and lung cancer   - c/w Duonebs and symbicort  - CTA : no PE.     Problem/Plan - 5:  ·  Problem: Dementia, old age.  Plan: c/w home meds.     -GI/DVT Prophylaxis.    Dispo planing     I had multiple conversations today re discharge planing     pt likely to go home Monday with HHA, O2, and likely hospice service     pt's guardian is involved in decision making     --------------------------------------------  Case discussed with pt, HS, specialities, CM   Education given on findings and plan of care  ___________________________  H. CARLOS Hankins.  Pager: [phone]

## 2019-11-02 NOTE — DIETITIAN INITIAL EVALUATION ADULT. - NS FNS WEIGHT USED FOR CALC
current/Ht=5' 2"? (appears)    GLJ=121 lb   Current lt=734.3 lb 10/29/19; 107.5 lb 11/1/19   BMI=19.7

## 2019-11-02 NOTE — DIETITIAN INITIAL EVALUATION ADULT. - PHYSICAL APPEARANCE
debilitated/cachectic debilitated/other (specify)/cachectic per MD skin intact   Nutrition Focused Physical Exam not feasible at present

## 2019-11-02 NOTE — DIETITIAN INITIAL EVALUATION ADULT. - OTHER INFO
Pt lives alone at home, alert, verbally responsive, but poor historian; poor po intake, 25 to 50% intake per flow sheet; Palliative consult noted, pending discharge to home with Hospice; Discussed with RN Pt lives alone at home, alert, very sleepy tody, poor historian reported; poor po intake, 25 to 50% intake per flow sheet, unclear recent wt changes; Palliative consult noted, pending discharge to home with Hospice; Discussed with RN Pt lives alone at home, alert, very sleepy tody, poor historian reported; poor po intake x ? duration, 25 to 50% intake per flow sheet, unclear recent wt changes; Palliative consult noted, pending discharge to home with Hospice; Discussed with RN

## 2019-11-02 NOTE — DIETITIAN INITIAL EVALUATION ADULT. - FACTORS AFF FOOD INTAKE
Adventist/ethnic/cultural/personal food preferences/persistent lack of appetite/acute on chronic comorbidities, metastatic caner with radiation and chemotherapy

## 2019-11-02 NOTE — PROGRESS NOTE ADULT - ASSESSMENT
h/o lung ca metastatic   comfortable on bed  not in any distress.   vsstable afebrile physical lungs clear abd benign, cns awake non focal  hgb 7.9 to 8.2   plate 19  watch hgb    heme onc on board  anemia get anemia profile  poor prognosis  palleative care

## 2019-11-02 NOTE — DIETITIAN INITIAL EVALUATION ADULT. - DIET TYPE
mechanical soft/Ensure Enlive  1can ( 240ml ) x bid ( 700 kcal, 40 g protein); Least restricted diet due to medical conditions and advanced age

## 2019-11-02 NOTE — DIETITIAN INITIAL EVALUATION ADULT. - PERTINENT LABORATORY DATA
11-02 Na138 mmol/L Glu 75 mg/dL K+ 3.2 mmol/L<L> Cr  0.86 mg/dL BUN 23 mg/dL<H>   10-29 Phos 3.0 mg/dL   10-30 Alb 2.1 g/dL<L>     10-29 [id number] 5.3 %   10-29 Chol 140 mg/dL LDL 49 mg/dL HDL 82 mg/dL Trig 45 mg/dL

## 2019-11-02 NOTE — DIETITIAN INITIAL EVALUATION ADULT. - PERTINENT MEDS FT
MEDICATIONS  (STANDING):  amLODIPine   Tablet 5 milliGRAM(s) Oral daily  budesonide  80 MICROgram(s)/formoterol 4.5 MICROgram(s) Inhaler 2 Puff(s) Inhalation two times a day  donepezil 5 milliGRAM(s) Oral at bedtime  enoxaparin Injectable 40 milliGRAM(s) SubCutaneous daily  guaifenesin/dextromethorphan  Syrup 10 milliLiter(s) Oral every 6 hours  hydrALAZINE 50 milliGRAM(s) Oral every 8 hours  hydrocortisone 1% Cream 1 Application(s) Topical two times a day  levoFLOXacin  Tablet 250 milliGRAM(s) Oral every 24 hours  lidocaine   Patch 1 Patch Transdermal daily  pantoprazole    Tablet 40 milliGRAM(s) Oral before breakfast  predniSONE   Tablet 20 milliGRAM(s) Oral daily  senna 2 Tablet(s) Oral at bedtime  sertraline 25 milliGRAM(s) Oral daily  simvastatin 10 milliGRAM(s) Oral at bedtime

## 2019-11-02 NOTE — CHART NOTE - NSCHARTNOTEFT_GEN_A_CORE
Upon Nutritional Assessment by the Registered Dietitian your patient was determined to meet criteria / has evidence of the following diagnosis/diagnoses:          [ ]  Mild Protein Calorie Malnutrition        [ X ]  Moderate Protein Calorie Malnutrition        [ ] Severe Protein Calorie Malnutrition        [ ] Unspecified Protein Calorie Malnutrition        [ ] Underweight / BMI <19        [ ] Morbid Obesity / BMI > 40      Findings as based on:  •  Comprehensive nutrition assessment and consultation  •  Calorie counts (nutrient intake analysis)  •  Food acceptance and intake status from observations by staff  •  Follow up  •  Patient education  •  Intervention secondary to interdisciplinary rounds  •   concerns      Treatment:    The following diet has been recommended: change diet to Mechanical Soft diet, with Ensure Enlive  1can ( 240ml ) x bid ( 700 kcal, 40 g protein)       PROVIDER Section:     By signing this assessment you are acknowledging and agree with the diagnosis/diagnoses assigned by the Registered Dietitian    Comments:

## 2019-11-03 NOTE — PROGRESS NOTE ADULT - ASSESSMENT
_________________________________________________________________________________________  ========>>  M E D I C A L   A T T E N D I N G    F O L L O W  U P  N O T E  <<=========  -----------------------------------------------------------------------------------------------------  - Patient seen and examined by me earlier today.   (covering today)   - In summary,  IRENA STEELE is a 88y year old woman who originally presented with CP  - Patient today overall doing ok, comfortable, eating OK.     ==================>> REVIEW OF SYSTEM <<=================    GEN: no fever, no chills, no pain   RESP: no SOB reported at rest , + chronic wet cough, improved   CVS: no chest pain, no palpitations, no edema  GI: no abdominal pain, no nausea, no constipation, no diarrhea  : no dysuria, no frequency, no hematuria  Neuro: no headache, no dizziness  Derm : no itching, no rash    ==================>> PHYSICAL EXAM <<=================    GEN: A&O X 2 , NAD , comfortable. pleasant , in chair   HEENT: NCAT, PERRL, MMM, hearing intact  Neck: supple , no JVD  CVS: S1S2 , regular , limited   PULM: bilateral diffuse rhonchi  ABD.: soft. non tender, non distended,  bowel sounds present  Extrem: intact pulses , no edema   PSYCH : normal mood,  not anxious      ==================>> MEDICATIONS <<====================    MEDICATIONS  (STANDING):  amLODIPine   Tablet 5 milliGRAM(s) Oral daily  budesonide  80 MICROgram(s)/formoterol 4.5 MICROgram(s) Inhaler 2 Puff(s) Inhalation two times a day  donepezil 5 milliGRAM(s) Oral at bedtime  enoxaparin Injectable 40 milliGRAM(s) SubCutaneous daily  guaifenesin/dextromethorphan  Syrup 10 milliLiter(s) Oral every 6 hours  hydrALAZINE 50 milliGRAM(s) Oral every 8 hours  hydrocortisone 1% Cream 1 Application(s) Topical two times a day  levoFLOXacin  Tablet 250 milliGRAM(s) Oral every 24 hours  lidocaine   Patch 1 Patch Transdermal daily  pantoprazole    Tablet 40 milliGRAM(s) Oral before breakfast  predniSONE   Tablet 20 milliGRAM(s) Oral daily  senna 2 Tablet(s) Oral at bedtime  sertraline 25 milliGRAM(s) Oral daily  simvastatin 10 milliGRAM(s) Oral at bedtime    MEDICATIONS  (PRN):  acetaminophen   Tablet .. 650 milliGRAM(s) Oral every 6 hours PRN Mild Pain (1 - 3)  albuterol/ipratropium for Nebulization. 3 milliLiter(s) Nebulizer every 6 hours PRN Shortness of Breath and/or Wheezing  traMADol 25 milliGRAM(s) Oral every 8 hours PRN Moderate Pain (4 - 6)    ==================>> VITAL SIGNS <<==================    T(C): 36.9 (11-03-19 @ 10:53), Max: 36.9 (11-02-19 @ 23:10)  HR: 75 (11-03-19 @ 10:53) (75 - 97)  BP: 123/66 (11-03-19 @ 10:53) (121/64 - 156/77)  RR: 16 (11-03-19 @ 10:53) (16 - 18)  SpO2: 96% (11-03-19 @ 10:53) (95% - 100%)     I&O's Summary    02 Nov 2019 08:01  -  03 Nov 2019 07:00  --------------------------------------------------------  IN: 236 mL / OUT: 0 mL / NET: 236 mL     ==================>> LAB AND IMAGING <<==================                        7.7    9.71  )-----------( 10       ( 03 Nov 2019 05:55 )             24.2        Hemoglobin:   7.7 <<==,  8.2 <<==,  7.9 <<==,  7.8 <<==,  8.6 <<==,  7.9 <<==  platelets:  10 <==, 19 <==, 35 <==, 51 <==, 71 <==, 84 <==, 133 <==    139  |  106  |  24<H>  ----------------------------<  75  3.4<L>   |  29  |  0.88    Ca    8.6      03 Nov 2019 05:55    TSH:      0.85   (10-29-19)           Lipid profile:  (10-29-19)     Total: 140     LDL  : 49     HDL  :82     TG   :45     HgA1C: 5.3  (10-29-19)            ___________________________________________________________________________________  ===============>>  A S S E S S M E N T   A N D   P L A N <<===============  ------------------------------------------------------------------------------------------    · Assessment		  88y F from home, lives alone with 27x7 HHA , walks independently/cane,no family( her neighbour for 13 yrs is involved in care)  with significant PMHx of HTN,Dementia  recent diagnosis of Right lung cancer (started radiation and chemo 1 months ago) and no significant PSHx presenting to the ED with right sided chest pain and SOB     Problem/Plan - 1:  ·  Problem: Chest pain, likely non-cardiac, likely due to cancer and associated cough   - Pain Rx PRN  - cardio appreciated  - onc appreciated      pt started on oral abx by onc for possible bronchitis >> 5 days total and DC        taper off sterids     Problem/Plan - 2:  ·  Problem: Lung cancer with metastasis      s/p chemo x1 and 4 radiation rounds          with assocated pancytopenia   -CTA chest : R lung mass with mets   - c/w robitussin DM  for cough   - onc and Palliative f/u appreciated     Problem/Plan - 3:  ·  Problem: HTN       Pt takes hydralazine 50 TID and amlodipine   at home   - will continue with home medications with parameters   - Monitor BP and titrate meds accordingly.     Problem/Plan - 4:  ·  Problem: Shortness of breath.  Plan: Likely 2/2 pain and lung cancer   - c/w Duonebs and symbicort  - CTA : no PE.     Problem/Plan - 5:  ·  Problem: Dementia, old age.  Plan: c/w home meds.     -GI/DVT Prophylaxis.    Dispo planing      pt likely to go home Monday with HHA, O2, and likely home hospice service     pt's guardian is involved in decision making     --------------------------------------------  Case discussed with staff   Education given on findings and plan of care  ___________________________  H. CARLOS Hankins.  Pager: [phone] _________________________________________________________________________________________  ========>>  M E D I C A L   A T T E N D I N G    F O L L O W  U P  N O T E  <<=========  -----------------------------------------------------------------------------------------------------  - Patient seen and examined by me earlier today.     - In summary,  IRENA STEELE is a 88y year old woman who originally presented with CP  - Patient today overall doing ok, comfortable, eating OK.     ==================>> REVIEW OF SYSTEM <<=================    GEN: no fever, no chills, no pain   RESP: no SOB reported at rest , + chronic wet cough, improved   CVS: no chest pain, no palpitations, no edema  GI: no abdominal pain, no nausea, no constipation, no diarrhea  : no dysuria, no frequency, no hematuria  Neuro: no headache, no dizziness  Derm : no itching, no rash    ==================>> PHYSICAL EXAM <<=================    GEN: A&O X 2 , NAD , comfortable. pleasant , in chair   HEENT: NCAT, PERRL, MMM, hearing intact  Neck: supple , no JVD  CVS: S1S2 , regular , limited   PULM: bilateral diffuse rhonchi  ABD.: soft. non tender, non distended,  bowel sounds present  Extrem: intact pulses , no edema   PSYCH : normal mood,  not anxious      ==================>> MEDICATIONS <<====================    MEDICATIONS  (STANDING):  amLODIPine   Tablet 5 milliGRAM(s) Oral daily  budesonide  80 MICROgram(s)/formoterol 4.5 MICROgram(s) Inhaler 2 Puff(s) Inhalation two times a day  donepezil 5 milliGRAM(s) Oral at bedtime  enoxaparin Injectable 40 milliGRAM(s) SubCutaneous daily  guaifenesin/dextromethorphan  Syrup 10 milliLiter(s) Oral every 6 hours  hydrALAZINE 50 milliGRAM(s) Oral every 8 hours  hydrocortisone 1% Cream 1 Application(s) Topical two times a day  levoFLOXacin  Tablet 250 milliGRAM(s) Oral every 24 hours  lidocaine   Patch 1 Patch Transdermal daily  pantoprazole    Tablet 40 milliGRAM(s) Oral before breakfast  predniSONE   Tablet 20 milliGRAM(s) Oral daily  senna 2 Tablet(s) Oral at bedtime  sertraline 25 milliGRAM(s) Oral daily  simvastatin 10 milliGRAM(s) Oral at bedtime    MEDICATIONS  (PRN):  acetaminophen   Tablet .. 650 milliGRAM(s) Oral every 6 hours PRN Mild Pain (1 - 3)  albuterol/ipratropium for Nebulization. 3 milliLiter(s) Nebulizer every 6 hours PRN Shortness of Breath and/or Wheezing  traMADol 25 milliGRAM(s) Oral every 8 hours PRN Moderate Pain (4 - 6)    ==================>> VITAL SIGNS <<==================    T(C): 36.9 (11-03-19 @ 10:53), Max: 36.9 (11-02-19 @ 23:10)  HR: 75 (11-03-19 @ 10:53) (75 - 97)  BP: 123/66 (11-03-19 @ 10:53) (121/64 - 156/77)  RR: 16 (11-03-19 @ 10:53) (16 - 18)  SpO2: 96% (11-03-19 @ 10:53) (95% - 100%)     I&O's Summary    02 Nov 2019 08:01  -  03 Nov 2019 07:00  --------------------------------------------------------  IN: 236 mL / OUT: 0 mL / NET: 236 mL     ==================>> LAB AND IMAGING <<==================                        7.7    9.71  )-----------( 10       ( 03 Nov 2019 05:55 )             24.2        Hemoglobin:   7.7 <<==,  8.2 <<==,  7.9 <<==,  7.8 <<==,  8.6 <<==,  7.9 <<==  platelets:  10 <==, 19 <==, 35 <==, 51 <==, 71 <==, 84 <==, 133 <==    139  |  106  |  24<H>  ----------------------------<  75  3.4<L>   |  29  |  0.88    Ca    8.6      03 Nov 2019 05:55    TSH:      0.85   (10-29-19)           Lipid profile:  (10-29-19)     Total: 140     LDL  : 49     HDL  :82     TG   :45     HgA1C: 5.3  (10-29-19)            ___________________________________________________________________________________  ===============>>  A S S E S S M E N T   A N D   P L A N <<===============  ------------------------------------------------------------------------------------------    · Assessment		  88y F from home, lives alone with 27x7 HHA , walks independently/cane,no family( her neighbour for 13 yrs is involved in care)  with significant PMHx of HTN,Dementia  recent diagnosis of Right lung cancer (started radiation and chemo 1 months ago) and no significant PSHx presenting to the ED with right sided chest pain and SOB     Problem/Plan - 1:  ·  Problem: Chest pain, likely non-cardiac, likely due to cancer and associated cough   - Pain Rx PRN  - cardio appreciated  - onc appreciated      pt started on oral abx by onc for possible bronchitis >> 5 days total and DC        taper off sterids     Problem/Plan - 2:  ·  Problem: Lung cancer with metastasis      s/p chemo x1 and 4 radiation rounds          with assocated pancytopenia   -CTA chest : R lung mass with mets   - c/w robitussin DM  for cough   - onc and Palliative f/u appreciated     Problem/Plan - 3:  ·  Problem: HTN       Pt takes hydralazine 50 TID and amlodipine   at home   - will continue with home medications with parameters   - Monitor BP and titrate meds accordingly.     Problem/Plan - 4:  ·  Problem: Shortness of breath.  Plan: Likely 2/2 pain and lung cancer   - c/w Duonebs and symbicort  - CTA : no PE.     Problem/Plan - 5:  ·  Problem: Dementia, old age.  Plan: c/w home meds.     -GI/DVT Prophylaxis.    Dispo planing      pt likely to go home Monday with HHA, O2, and likely home hospice service     pt's guardian is involved in decision making     --------------------------------------------  Case discussed with staff   Education given on findings and plan of care  ___________________________  H. CARLOS Hankins.  Pager: [phone]

## 2019-11-03 NOTE — PROGRESS NOTE ADULT - ASSESSMENT
_________________________________________________________________________________________  ========>>  M E D I C A L   A T T E N D I N G    F O L L O W  U P  N O T E  <<=========  -----------------------------------------------------------------------------------------------------    - Patient seen and examined by me earlier today.   - In summary,  IRENA STEELE is a 88y year old woman who originally presented with chest pain  - Patient today overall doing ok, comfortable, eating OK. cough improved     ==================>> REVIEW OF SYSTEM <<=================    GEN: no fever, no chills, no pain   RESP: no SOB reported at rest , + chronic wet cough, improved   CVS: no chest pain, no palpitations, no edema  GI: no abdominal pain, no nausea, no constipation, no diarrhea  : no dysuria, no frequency, no hematuria  Neuro: no headache, no dizziness  Derm : no itching, no rash    ==================>> PHYSICAL EXAM <<=================    GEN: A&O X 2 , NAD , comfortable. pleasant , in chair   HEENT: NCAT, PERRL, MMM, hearing intact  Neck: supple , no JVD  CVS: S1S2 , regular , limited   PULM: bilateral diffuse rhonchi  ABD.: soft. non tender, non distended,  bowel sounds present  Extrem: intact pulses , no edema   PSYCH : normal mood,  not anxious      ==================>> MEDICATIONS <<====================    amLODIPine   Tablet 5 milliGRAM(s) Oral daily  budesonide  80 MICROgram(s)/formoterol 4.5 MICROgram(s) Inhaler 2 Puff(s) Inhalation two times a day  donepezil 5 milliGRAM(s) Oral at bedtime  enoxaparin Injectable 40 milliGRAM(s) SubCutaneous daily  guaifenesin/dextromethorphan  Syrup 10 milliLiter(s) Oral every 6 hours  hydrALAZINE 50 milliGRAM(s) Oral every 8 hours  hydrocortisone 1% Cream 1 Application(s) Topical two times a day  levoFLOXacin  Tablet 250 milliGRAM(s) Oral every 24 hours  lidocaine   Patch 1 Patch Transdermal daily  pantoprazole    Tablet 40 milliGRAM(s) Oral before breakfast  predniSONE   Tablet 20 milliGRAM(s) Oral daily  senna 2 Tablet(s) Oral at bedtime  sertraline 25 milliGRAM(s) Oral daily  simvastatin 10 milliGRAM(s) Oral at bedtime    MEDICATIONS  (PRN):  acetaminophen   Tablet .. 650 milliGRAM(s) Oral every 6 hours PRN Mild Pain (1 - 3)  albuterol/ipratropium for Nebulization. 3 milliLiter(s) Nebulizer every 6 hours PRN Shortness of Breath and/or Wheezing  traMADol 25 milliGRAM(s) Oral every 8 hours PRN Moderate Pain (4 - 6)    ==================>> VITAL SIGNS <<==================    Vital Signs Last 24 Hrs  T(C): 36.2 (11-01-19 @ 15:39)  T(F): 97.2 (11-01-19 @ 15:39), Max: 98.2 (10-31-19 @ 23:00)  HR: 92 (11-01-19 @ 15:39) (69 - 107)  BP: 134/64 (11-01-19 @ 15:39)  RR: 18 (11-01-19 @ 15:39) (18 - 18)  SpO2: 99% (11-01-19 @ 15:39) (94% - 99%)       ==================>> LAB AND IMAGING <<==================                        7.9    7.72  )-----------( 35       ( 01 Nov 2019 06:34 )             25.2        136  |  102  |  26<H>  ----------------------------<  79  3.4<L>   |  28  |  0.88    Ca    8.7      01 Nov 2019 06:34    WBC count:   7.72 <<== ,  1.90 <<== ,  2.69 <<== ,  2.50 <<== ,  2.93 <<== ,  4.14 <<==   Hemoglobin:   7.9 <<==,  7.8 <<==,  8.6 <<==,  7.9 <<==,  8.2 <<==,  9.7 <<==  platelets:  35 <==, 51 <==, 71 <==, 84 <==, 133 <==, 210 <==    Creatinine:  0.88  <<==, 0.81  <<==, 0.92  <<==, 0.89  <<==, 0.78  <<==  Sodium:   136  <==, 135  <==, 137  <==, 135  <==, 136  <==       AST:          47 <== , 28 <==      ALT:        30  <== , 22  <==      AP:        76  <=, 73  <=     Bili:        0.3  <=, 0.5  <=        ___________________________________________________________________________________  ===============>>  A S S E S S M E N T   A N D   P L A N <<===============  ------------------------------------------------------------------------------------------    · Assessment		  88y F from home, lives alone with 27x7 HHA , walks independently/cane,no family( her neighbour for 13 yrs is involved in care)  with significant PMHx of HTN,Dementia  recent diagnosis of Right lung cancer (started radiation and chemo 1 months ago) and no significant PSHx presenting to the ED with right sided chest pain and SOB since morning.    Problem/Plan - 1:  ·  Problem: Chest pain, likely non-cardiac, likely due to cancer and associated cough   - Pain control with tylenol and lidocaine patch   - cardio appreciated  - onc appreciated      pt started on oral abx by onc for possible bronchitis     Problem/Plan - 2:  ·  Problem: Lung cancer with metastasis      s/p chemo x1 and 4 radiation rounds          with assocated pancytopenia   -CTA chest : R lung mass with mets   - c/w robitussin DM  for cough   - onc and Palliative f/u appreciated , waiting for hospice placement,   - Pt is vitally stable, Afebrile  Platelet count 10k in the morning, got 1 platelet tranfusion    Problem/Plan - 3:  ·  Problem: HTN       Pt takes hydralazine 50 TID and amlodipine   at home   - will continue with home medications with parameters   - Monitor BP and titrate meds accordingly.     Problem/Plan - 4:  ·  Problem: Shortness of breath.  Plan: Likely 2/2 pain and lung cancer   - c/w Duonebs and symbicort  - CTA : no PE.     Problem/Plan - 5:  ·  Problem: Dementia, old age.  Plan: c/w home meds.     -GI/DVT Prophylaxis.    Dispo planing     I had multiple conversations today re discharge planing     pt likely to go home Monday with HHA, O2, and likely hospice service     pt's guardian is involved in decision making     --------------------------------------------  Case discussed with pt, HS, specialities, CM   Education given on findings and plan of care  ___________________________  H. CARLOS Hankins.  Pager: [phone]

## 2019-11-04 NOTE — PROGRESS NOTE ADULT - PROBLEM SELECTOR PLAN 3
from chemoRT  had platelet transfuison  Hb 7.5, will watch
Pt takes hydralazine 50 TID and amlodipine   at home   - will continue with home medications with parameters   - Monitor BP and titrate meds accordingly
Pt takes hydralazine 50 TID and amlodipine   at home   - will continue with home medications with parameters   - Monitor BP and titrate meds accordingly

## 2019-11-04 NOTE — CHART NOTE - NSCHARTNOTEFT_GEN_A_CORE
PC  was present when PC MD Garcia contacted the patient's friend Immanuel Dominguez PC  was present when Dr Jama contacted the patient's friend Immanuel Dominguez [phone] to discuss the patient's condition and overall needs.  Dr. Rondon educated Ms. Dominguez regarding the disease trajectory as well as the importance of considering functional status when choosing disease directed treatment.  In addition, Dr. Rondon observed the importance of quality of life.  After discussing these concerns at length Ms. Dominguez stated she did not want to speak on the patient's behalf regarding "wishes" and or medical decisions.  Ms. Dominguez verbalized understanding of the patient's declining health and stated she would be available to assist with medication administration, when she is not working, if the patient were on home hospice and no longer self directed.  Dr. Rondon assured Ms. Dominguez that she is not responsible for the medical decision making as the patient has a guardian and thanked her for the emotional support she has provided the patient.   stated she will contact  tomorrow when she comes to visit the patient for further discussion.  No addl PC Sw needs were expressed.  PC Sw will remain available as needed.

## 2019-11-04 NOTE — PROGRESS NOTE ADULT - ASSESSMENT
_________________________________________________________________________________________  ========>>  M E D I C A L   A T T E N D I N G    F O L L O W  U P  N O T E  <<=========  -----------------------------------------------------------------------------------------------------    - Patient seen and examined by me earlier today.   - In summary,  IRENA STEELE is a 88y year old woman who originally presented with chest pain  - Patient today overall doing ok, comfortable, eating OK. cough improved     ==================>> REVIEW OF SYSTEM <<=================    GEN: no fever, no chills, no pain   RESP: no SOB reported at rest , + chronic wet cough, improved   CVS: no chest pain, no palpitations, no edema  GI: no abdominal pain, no nausea, no constipation, no diarrhea  : no dysuria, no frequency, no hematuria  Neuro: no headache, no dizziness  Derm : no itching, no rash    ==================>> PHYSICAL EXAM <<=================    GEN: A&O X 2 , NAD , comfortable. pleasant , in chair   HEENT: NCAT, PERRL, MMM, hearing intact  Neck: supple , no JVD  CVS: S1S2 , regular , limited   PULM: bilateral diffuse rhonchi  ABD.: soft. non tender, non distended,  bowel sounds present  Extrem: intact pulses , no edema   PSYCH : normal mood,  not anxious      ==================>> MEDICATIONS <<====================    amLODIPine   Tablet 5 milliGRAM(s) Oral daily  budesonide  80 MICROgram(s)/formoterol 4.5 MICROgram(s) Inhaler 2 Puff(s) Inhalation two times a day  donepezil 5 milliGRAM(s) Oral at bedtime  enoxaparin Injectable 40 milliGRAM(s) SubCutaneous daily  guaifenesin/dextromethorphan  Syrup 10 milliLiter(s) Oral every 6 hours  hydrALAZINE 50 milliGRAM(s) Oral every 8 hours  hydrocortisone 1% Cream 1 Application(s) Topical two times a day  levoFLOXacin  Tablet 250 milliGRAM(s) Oral every 24 hours  lidocaine   Patch 1 Patch Transdermal daily  pantoprazole    Tablet 40 milliGRAM(s) Oral before breakfast  predniSONE   Tablet 20 milliGRAM(s) Oral daily  senna 2 Tablet(s) Oral at bedtime  sertraline 25 milliGRAM(s) Oral daily  simvastatin 10 milliGRAM(s) Oral at bedtime    MEDICATIONS  (PRN):  acetaminophen   Tablet .. 650 milliGRAM(s) Oral every 6 hours PRN Mild Pain (1 - 3)  albuterol/ipratropium for Nebulization. 3 milliLiter(s) Nebulizer every 6 hours PRN Shortness of Breath and/or Wheezing  traMADol 25 milliGRAM(s) Oral every 8 hours PRN Moderate Pain (4 - 6)    ==================>> VITAL SIGNS <<==================    Vital Signs Last 24 Hrs  T(C): 36.2 (11-01-19 @ 15:39)  T(F): 97.2 (11-01-19 @ 15:39), Max: 98.2 (10-31-19 @ 23:00)  HR: 92 (11-01-19 @ 15:39) (69 - 107)  BP: 134/64 (11-01-19 @ 15:39)  RR: 18 (11-01-19 @ 15:39) (18 - 18)  SpO2: 99% (11-01-19 @ 15:39) (94% - 99%)       ==================>> LAB AND IMAGING <<==================                        7.9    7.72  )-----------( 35       ( 01 Nov 2019 06:34 )             25.2        136  |  102  |  26<H>  ----------------------------<  79  3.4<L>   |  28  |  0.88    Ca    8.7      01 Nov 2019 06:34    WBC count:   7.72 <<== ,  1.90 <<== ,  2.69 <<== ,  2.50 <<== ,  2.93 <<== ,  4.14 <<==   Hemoglobin:   7.9 <<==,  7.8 <<==,  8.6 <<==,  7.9 <<==,  8.2 <<==,  9.7 <<==  platelets:  35 <==, 51 <==, 71 <==, 84 <==, 133 <==, 210 <==    Creatinine:  0.88  <<==, 0.81  <<==, 0.92  <<==, 0.89  <<==, 0.78  <<==  Sodium:   136  <==, 135  <==, 137  <==, 135  <==, 136  <==       AST:          47 <== , 28 <==      ALT:        30  <== , 22  <==      AP:        76  <=, 73  <=     Bili:        0.3  <=, 0.5  <=        ___________________________________________________________________________________  ===============>>  A S S E S S M E N T   A N D   P L A N <<===============  ------------------------------------------------------------------------------------------    · Assessment		  88y F from home, lives alone with 27x7 HHA , walks independently/cane,no family( her neighbour for 13 yrs is involved in care)  with significant PMHx of HTN,Dementia  recent diagnosis of Right lung cancer (started radiation and chemo 1 months ago) and no significant PSHx presenting to the ED with right sided chest pain and SOB since morning.    Problem/Plan - 1:  ·  Problem: Chest pain, likely non-cardiac, likely due to cancer and associated cough   - Pain control with tylenol and lidocaine patch   - cardio appreciated  - onc appreciated      pt started on oral abx by onc for possible bronchitis     Problem/Plan - 2:  ·  Problem: Lung cancer with metastasis      s/p chemo x1 and 4 radiation rounds          with assocated pancytopenia   -CTA chest : R lung mass with mets   - c/w robitussin DM  for cough   - onc and Palliative f/u appreciated , waiting for hospice placement,   - Pt is vitally stable, Afebrile  Platelet count 10k in the morning, got 1 platelet tranfusion    Problem/Plan - 3:  ·  Problem: HTN       Pt takes hydralazine 50 TID and amlodipine   at home   - will continue with home medications with parameters   - Monitor BP and titrate meds accordingly.     Problem/Plan - 4:  ·  Problem: Shortness of breath.  Plan: Likely 2/2 pain and lung cancer   - c/w Duonebs and symbicort  - CTA : no PE.     Problem/Plan - 5:  ·  Problem: Dementia, old age.  Plan: c/w home meds.     -GI/DVT Prophylaxis.    D/C planing        pt likely to go home with HHA, O2, and likely hospice service     pt's guardian is involved in decision making    onboard

## 2019-11-04 NOTE — PROGRESS NOTE ADULT - ASSESSMENT
_________________________________________________________________________________________  ========>>  M E D I C A L   A T T E N D I N G    F O L L O W  U P  N O T E  <<=========  -----------------------------------------------------------------------------------------------------    - Patient seen and examined by me earlier today.   - In summary,  IRENA STEELE is a 88y year old woman who originally presented with CP  - Patient today overall doing ok, comfortable, eating OK. no other events noted overnight     ==================>> REVIEW OF SYSTEM <<=================    GEN: no fever, no chills, no pain   RESP: no SOB reported at rest , + chronic wet cough, improved   CVS: no chest pain, no palpitations, no edema  GI: no abdominal pain, no nausea, no constipation, no diarrhea  : no dysuria, no frequency, no hematuria  Neuro: no headache, no dizziness  Derm : no itching, no rash    ==================>> PHYSICAL EXAM <<=================    GEN: A&O X 2 , NAD , comfortable. pleasant , in chair   HEENT: NCAT, PERRL, MMM, hearing intact  Neck: supple , no JVD  CVS: S1S2 , regular , limited   PULM: bilateral diffuse rhonchi  ABD.: soft. non tender, non distended,  bowel sounds present  Extrem: intact pulses , no edema   PSYCH : normal mood,  not anxious        ==================>> MEDICATIONS <<====================    amLODIPine   Tablet 5 milliGRAM(s) Oral daily  budesonide  80 MICROgram(s)/formoterol 4.5 MICROgram(s) Inhaler 2 Puff(s) Inhalation two times a day  donepezil 5 milliGRAM(s) Oral at bedtime  enoxaparin Injectable 40 milliGRAM(s) SubCutaneous daily  guaifenesin/dextromethorphan  Syrup 10 milliLiter(s) Oral every 6 hours  hydrALAZINE 50 milliGRAM(s) Oral every 8 hours  hydrocortisone 1% Cream 1 Application(s) Topical two times a day  lidocaine   Patch 1 Patch Transdermal daily  pantoprazole    Tablet 40 milliGRAM(s) Oral before breakfast  predniSONE   Tablet 20 milliGRAM(s) Oral daily  senna 2 Tablet(s) Oral at bedtime  sertraline 25 milliGRAM(s) Oral daily  simvastatin 10 milliGRAM(s) Oral at bedtime    MEDICATIONS  (PRN):  acetaminophen   Tablet .. 650 milliGRAM(s) Oral every 6 hours PRN Mild Pain (1 - 3)  albuterol/ipratropium for Nebulization. 3 milliLiter(s) Nebulizer every 6 hours PRN Shortness of Breath and/or Wheezing  traMADol 25 milliGRAM(s) Oral every 8 hours PRN Moderate Pain (4 - 6)    ==================>> VITAL SIGNS <<==================    Vital Signs Last 24 Hrs  T(C): 36.6 (11-04-19 @ 05:43)  T(F): 97.8 (11-04-19 @ 05:43), Max: 98.7 (11-03-19 @ 21:11)  HR: 96 (11-04-19 @ 05:43) (96 - 99)  BP: 140/59 (11-04-19 @ 05:43)  RR: 18 (11-04-19 @ 05:43) (18 - 18)  SpO2: 98% (11-04-19 @ 05:43) (98% - 100%)       ==================>> LAB AND IMAGING <<==================                        7.5    4.82  )-----------( 58       ( 04 Nov 2019 05:36 )             23.3        138  |  104  |  19<H>  ----------------------------<  77  3.3<L>   |  30  |  0.76    Ca    8.5      04 Nov 2019 05:36      WBC count:   4.82 <<== ,  9.71 <<== ,  9.14 <<== ,  7.72 <<== ,  1.90 <<== ,  2.69 <<==   Hemoglobin:   7.5 <<==,  7.7 <<==,  8.2 <<==,  7.9 <<==,  7.8 <<==,  8.6 <<==  platelets:  58 <==, 10 <==, 19 <==, 35 <==, 51 <==, 71 <==, 84 <==    Creatinine:  0.76  <<==, 0.88  <<==, 0.86  <<==, 0.88  <<==, 0.81  <<==, 0.92  <<==  Sodium:   138  <==, 139  <==, 138  <==, 136  <==, 135  <==, 137  <==    ___________________________________________________________________________________  ===============>>  A S S E S S M E N T   A N D   P L A N <<===============  ------------------------------------------------------------------------------------------    · Assessment		  88y F from home, lives alone with 27x7 HHA , walks independently/cane,no family( her neighbour for 13 yrs is involved in care)  with significant PMHx of HTN,Dementia  recent diagnosis of Right lung cancer (started radiation and chemo 1 months ago) and no significant PSHx presenting to the ED with right sided chest pain and SOB     Problem/Plan - 1:  ·  Problem: Chest pain, likely non-cardiac, likely due to cancer and associated cough   - Pain Rx PRN  - cardio appreciated  - onc appreciated      pt started on oral abx by onc for possible bronchitis >> 5 days total and DC        taper off sterids     Problem/Plan - 2:  ·  Problem: Lung cancer with metastasis      s/p chemo x1 and 4 radiation rounds          with assocated pancytopenia   -CTA chest : R lung mass with mets   - c/w robitussin DM  for cough   - onc and Palliative f/u appreciated     Problem/Plan - 3:  ·  Problem: HTN       Pt takes hydralazine 50 TID and amlodipine   at home   - will continue with home medications with parameters   - Monitor BP and titrate meds accordingly.     Problem/Plan - 4:  ·  Problem: Shortness of breath.  Plan: Likely 2/2 pain and lung cancer   - c/w Duonebs and symbicort  - CTA : no PE.     Problem/Plan - 5:  ·  Problem: Dementia, old age.  Plan: c/w home meds.     -GI/DVT Prophylaxis.    Dispo planing      pt likely to go home Monday with HHA, O2, and likely home hospice service     pt's guardian is involved in decision making     --------------------------------------------  Case discussed with staff   Education given on findings and plan of care  ___________________________  H. CARLOS Hankins.  Pager: [phone]

## 2019-11-04 NOTE — CHART NOTE - NSCHARTNOTEFT_GEN_A_CORE
QUANG Caceres contacted Eileen Del Valle acting  Director at [facility] [phone] to discuss guardianship  documents for the above named patient.   stated she received the documents and questioned who will provide medications when the patient declines and is no longer self directed.  QUANG CACERES verbalized understanding and stated she will follow up with the guardian and the patient's friend Immanuel Dominguez.   suggested the guardian may need to oversee/approve the individual who is involved with the medication administration.  QUANG Caceres concurred and stated she will follow up with the appropriate parties.  Following this discussion QUANG Caceres and Medicine Sw discussed the patient's guardianship documents.  Medicine Sw questioned whether or not the guardian has full decision making authority.  QUANG CACERES and Medicine SW left messages for the guardian and .  QUANG Caceres spoke with patient's  Vanda Jack [phone] who stated the guardian has full medical decision making authority as indicated in the guardianship documents as the patient has been deemed an incapacitated person.  QUANG Caceres consulted with the Medicine Sw again who concurred stating she also spoke with the guardian to confirm.  Quang Caceres will remain available as needed for continued collaboration with the Medicine Sw and support to the patient.

## 2019-11-05 NOTE — PROGRESS NOTE ADULT - ASSESSMENT
_________________________________________________________________________________________  ========>>  M E D I C A L   A T T E N D I N G    F O L L O W  U P  N O T E  <<=========  -----------------------------------------------------------------------------------------------------    - Patient seen and examined by me earlier today.   - In summary,  IRENA STEELE is a 88y year old woman who originally presented with chest pain  - Patient today overall doing ok, comfortable, eating OK. cough improved     ==================>> REVIEW OF SYSTEM <<=================    GEN: no fever, no chills, no pain   RESP: no SOB reported at rest , + chronic wet cough, improved   CVS: no chest pain, no palpitations, no edema  GI: no abdominal pain, no nausea, no constipation, no diarrhea  : no dysuria, no frequency, no hematuria  Neuro: no headache, no dizziness  Derm : no itching, no rash    ==================>> PHYSICAL EXAM <<=================    GEN: A&O X 2 , NAD , comfortable. pleasant , in chair   HEENT: NCAT, PERRL, MMM, hearing intact  Neck: supple , no JVD  CVS: S1S2 , regular , limited   PULM: bilateral diffuse rhonchi  ABD.: soft. non tender, non distended,  bowel sounds present  Extrem: intact pulses , no edema   PSYCH : normal mood,  not anxious      ==================>> MEDICATIONS <<====================    amLODIPine   Tablet 5 milliGRAM(s) Oral daily  budesonide  80 MICROgram(s)/formoterol 4.5 MICROgram(s) Inhaler 2 Puff(s) Inhalation two times a day  donepezil 5 milliGRAM(s) Oral at bedtime  enoxaparin Injectable 40 milliGRAM(s) SubCutaneous daily  guaifenesin/dextromethorphan  Syrup 10 milliLiter(s) Oral every 6 hours  hydrALAZINE 50 milliGRAM(s) Oral every 8 hours  hydrocortisone 1% Cream 1 Application(s) Topical two times a day  levoFLOXacin  Tablet 250 milliGRAM(s) Oral every 24 hours  lidocaine   Patch 1 Patch Transdermal daily  pantoprazole    Tablet 40 milliGRAM(s) Oral before breakfast  predniSONE   Tablet 20 milliGRAM(s) Oral daily  senna 2 Tablet(s) Oral at bedtime  sertraline 25 milliGRAM(s) Oral daily  simvastatin 10 milliGRAM(s) Oral at bedtime    MEDICATIONS  (PRN):  acetaminophen   Tablet .. 650 milliGRAM(s) Oral every 6 hours PRN Mild Pain (1 - 3)  albuterol/ipratropium for Nebulization. 3 milliLiter(s) Nebulizer every 6 hours PRN Shortness of Breath and/or Wheezing  traMADol 25 milliGRAM(s) Oral every 8 hours PRN Moderate Pain (4 - 6)    ==================>> VITAL SIGNS <<==================    Vital Signs Last 24 Hrs  T(C): 36.2 (11-01-19 @ 15:39)  T(F): 97.2 (11-01-19 @ 15:39), Max: 98.2 (10-31-19 @ 23:00)  HR: 92 (11-01-19 @ 15:39) (69 - 107)  BP: 134/64 (11-01-19 @ 15:39)  RR: 18 (11-01-19 @ 15:39) (18 - 18)  SpO2: 99% (11-01-19 @ 15:39) (94% - 99%)       ==================>> LAB AND IMAGING <<==================                        7.9    7.72  )-----------( 35       ( 01 Nov 2019 06:34 )             25.2        136  |  102  |  26<H>  ----------------------------<  79  3.4<L>   |  28  |  0.88    Ca    8.7      01 Nov 2019 06:34    WBC count:   7.72 <<== ,  1.90 <<== ,  2.69 <<== ,  2.50 <<== ,  2.93 <<== ,  4.14 <<==   Hemoglobin:   7.9 <<==,  7.8 <<==,  8.6 <<==,  7.9 <<==,  8.2 <<==,  9.7 <<==  platelets:  35 <==, 51 <==, 71 <==, 84 <==, 133 <==, 210 <==    Creatinine:  0.88  <<==, 0.81  <<==, 0.92  <<==, 0.89  <<==, 0.78  <<==  Sodium:   136  <==, 135  <==, 137  <==, 135  <==, 136  <==       AST:          47 <== , 28 <==      ALT:        30  <== , 22  <==      AP:        76  <=, 73  <=     Bili:        0.3  <=, 0.5  <=        ___________________________________________________________________________________  ===============>>  A S S E S S M E N T   A N D   P L A N <<===============  ------------------------------------------------------------------------------------------    · Assessment		  88y F from home, lives alone with 27x7 HHA , walks independently/cane,no family( her neighbour for 13 yrs is involved in care)  with significant PMHx of HTN,Dementia  recent diagnosis of Right lung cancer (started radiation and chemo 1 months ago) and no significant PSHx presenting to the ED with right sided chest pain and SOB since morning.    Problem/Plan - 1:  ·  Problem: Chest pain, likely non-cardiac, likely due to cancer and associated cough   - Pain control with tylenol and lidocaine patch   - cardio appreciated  - onc appreciated      pt started on oral abx by onc for possible bronchitis     Problem/Plan - 2:  ·  Problem: Lung cancer with metastasis      s/p chemo x1 and 4 radiation rounds          with assocated pancytopenia   -CTA chest : R lung mass with mets   - c/w robitussin DM  for cough   - onc and Palliative f/u appreciated , waiting for hospice placement,   - Pt is vitally stable, Afebrile  Platelet count 10k in the morning, got 1 platelet tranfusion    Problem/Plan - 3:  ·  Problem: HTN       Pt takes hydralazine 50 TID and amlodipine   at home   - will continue with home medications with parameters   - Monitor BP and titrate meds accordingly.     Problem/Plan - 4:  ·  Problem: Shortness of breath.  Plan: Likely 2/2 pain and lung cancer   - c/w Duonebs and symbicort  - CTA : no PE.     Problem/Plan - 5:  ·  Problem: Dementia, old age.  Plan: c/w home meds.     -GI/DVT Prophylaxis.    D/C planing        pt likely to go home with HHA, O2, and likely hospice service   on board for home hospice arrangements and also pt's friend is informed about her medical needs

## 2019-11-05 NOTE — GOALS OF CARE CONVERSATION - ADVANCED CARE PLANNING - CONVERSATION DETAILS
[facility]:    This writer collaborated with Palliative Team (Dr. Bess Rondon and PCT SW Neda Cherry) and NICKI Feliciano today, to continue  discussions regarding POC for  safe home discharge  to  home hospice. Further discussions pending between SW and  Legal Guardian to establish who  will be designated person(s) responsible for administration of ATC and PRN meds (and supplemental oxygen and neb treatments, as needed)  if patient is not self directed or is unable to self administer own meds. Patient currently lives alone with 24/7 [facility] aides.    Per Jesus Feliciano and Neda Cherry, Guardian / ofifce is closed today for the holiday, so unable to follow up on above mentioned. This RN will continue working  with Palliative/SW team on this referral on Wednesday, 11/6/19,     [facility] MD approval was received today for hospice dx of Lung Cancer, PENDING safe discharge plan of designated person(s) for  med administration, reviewing/signing of consents between [facility] and Legal Guardian/,  and ordering/delivery of any DME, as requested by Legal Guardian      Please contact [facility] at [phone] for any further questions.    Pari Ward RN, [facility]  Hospice Inpatient Specialist

## 2019-11-05 NOTE — CHART NOTE - NSCHARTNOTEFT_GEN_A_CORE
PC  spoke with [facility] liaison regarding status of the referral.  According to the Medicine Sw's note from the previous day it appears the guardians' office is closed today for the holiday, Therefore, the PC team is unable to discuss who will be designated to provide medication to the patient in her home when she is no longer self directed.  In addition, further discussion needs to take place with the guardian following a discussion with the patient's friend yesterday evening, specifically addressing hospice.  AMANDA Veloz relayed this to the Medicine Magdiel and will remain available for continued collaboration and support to the patient.

## 2019-11-05 NOTE — PROGRESS NOTE ADULT - ASSESSMENT
_________________________________________________________________________________________  ========>>  M E D I C A L   A T T E N D I N G    F O L L O W  U P  N O T E  <<=========  -----------------------------------------------------------------------------------------------------    - Patient seen and examined by me earlier today.   - In summary,  IRENA STEELE is a 88y year old woman who originally presented with CP  - Patient today overall doing ok, comfortable, eating OK. no other events noted overnight     ==================>> REVIEW OF SYSTEM <<=================    GEN: no fever, no chills, no pain   RESP: no SOB reported at rest , + chronic wet cough, improved   CVS: no chest pain, no palpitations, no edema  GI: no abdominal pain, no nausea, no constipation, no diarrhea  : no dysuria, no frequency, no hematuria  Neuro: no headache, no dizziness  Derm : no itching, no rash    ==================>> PHYSICAL EXAM <<=================    GEN: A&O X 2 , NAD , comfortable. pleasant , in chair   HEENT: NCAT, PERRL, MMM, hearing intact  Neck: supple , no JVD  CVS: S1S2 , regular , limited   PULM: overall MUCH improved   ABD.: soft. non tender, non distended,  bowel sounds present  Extrem: intact pulses , no edema   PSYCH : normal mood,  not anxious        ==================>> MEDICATIONS <<====================    amLODIPine   Tablet 5 milliGRAM(s) Oral daily  budesonide  80 MICROgram(s)/formoterol 4.5 MICROgram(s) Inhaler 2 Puff(s) Inhalation two times a day  donepezil 5 milliGRAM(s) Oral at bedtime  enoxaparin Injectable 40 milliGRAM(s) SubCutaneous daily  guaifenesin/dextromethorphan  Syrup 10 milliLiter(s) Oral every 6 hours  hydrALAZINE 50 milliGRAM(s) Oral every 8 hours  hydrocortisone 1% Cream 1 Application(s) Topical two times a day  lidocaine   Patch 1 Patch Transdermal daily  pantoprazole    Tablet 40 milliGRAM(s) Oral before breakfast  predniSONE   Tablet 20 milliGRAM(s) Oral daily  senna 2 Tablet(s) Oral at bedtime  sertraline 25 milliGRAM(s) Oral daily  simvastatin 10 milliGRAM(s) Oral at bedtime    MEDICATIONS  (PRN):  acetaminophen   Tablet .. 650 milliGRAM(s) Oral every 6 hours PRN Mild Pain (1 - 3)  albuterol/ipratropium for Nebulization. 3 milliLiter(s) Nebulizer every 6 hours PRN Shortness of Breath and/or Wheezing    ==================>> VITAL SIGNS <<==================    Vital Signs Last 24 Hrs  T(C): 36.9 (11-05-19 @ 13:38)  T(F): 98.5 (11-05-19 @ 13:38), Max: 98.5 (11-04-19 @ 21:03)  HR: 100 (11-05-19 @ 13:38) (90 - 100)  BP: 110/68 (11-05-19 @ 13:38)  RR: 18 (11-05-19 @ 13:38) (18 - 18)  SpO2: 97% (11-05-19 @ 13:38) (95% - 97%)       ==================>> LAB AND IMAGING <<==================                        7.5    4.82  )-----------( 58       ( 04 Nov 2019 05:36 )             23.3        Hemoglobin:   7.5 <<==,  7.7 <<==,  8.2 <<==,  7.9 <<==  platelets:  58 <==, 10 <==, 19 <==, 35 <==, 51 <==    138  |  104  |  19<H>  ----------------------------<  77  3.3<L>   |  30  |  0.76    Ca    8.5      04 Nov 2019 05:36    Creatinine:  0.76  <<==, 0.88  <<==, 0.86  <<==, 0.88  <<==, 0.81  <<==  Sodium:   138  <==, 139  <==, 138  <==, 136  <==, 135  <==    ___________________________________________________________________________________  ===============>>  A S S E S S M E N T   A N D   P L A N <<===============  ------------------------------------------------------------------------------------------    · Assessment		  88y F from home, lives alone with 27x7 HHA , walks independently/cane,no family( her neighbour for 13 yrs is involved in care)  with significant PMHx of HTN,Dementia  recent diagnosis of Right lung cancer (started radiation and chemo 1 months ago) and no significant PSHx presenting to the ED with right sided chest pain and SOB     Problem/Plan - 1:  ·  Problem: Chest pain, likely non-cardiac, likely due to cancer and associated cough   - Pain Rx PRN  - cardio appreciated  - onc appreciated      pt started on oral abx by onc for possible bronchitis >> 5 days total and DC        taper off sterids and DC     Problem/Plan - 2:  ·  Problem: Lung cancer with metastasis      s/p chemo x1 and 4 radiation rounds          with assocated pancytopenia   -CTA chest : R lung mass with mets   - onc and Palliative f/u appreciated      likely to go home with home hospice     Problem/Plan - 3:  ·  Problem: HTN       Pt takes hydralazine 50 TID and amlodipine   at home   - will continue with home medications with parameters   - Monitor BP and titrate meds accordingly.     Problem/Plan - 4:  ·  Problem: Shortness of breath.  Plan: Likely 2/2 pain and lung cancer   - c/w Duonebs and symbicort  - CTA : no PE.     Problem/Plan - 5:  ·  Problem: Dementia, old age.  Plan: c/w home meds.     -GI/DVT Prophylaxis.    Dispo planing      pt likely to go home Monday with HHA, O2, and likely home hospice service     pt's guardian is involved in decision making     --------------------------------------------  Case discussed with staff , palliative team   Education given on findings and plan of care  ___________________________  H. CARLOS Hankins.  Pager: [phone]

## 2019-11-06 NOTE — PROGRESS NOTE ADULT - PROBLEM SELECTOR PROBLEM 2
Bronchitis
Bronchitis
Cytopenia
Lung cancer
Lung cancer
Lung cancer, primary, with metastasis from lung to other site, right
Shortness of breath
Lung cancer, primary, with metastasis from lung to other site, right
Shortness of breath
Lung cancer, primary, with metastasis from lung to other site, right

## 2019-11-06 NOTE — PROGRESS NOTE ADULT - PROBLEM SELECTOR PROBLEM 1
Chest pain
Cytopenia
Lung cancer
Shortness of breath
Chest pain, non-cardiac
Lung cancer
Chest pain, non-cardiac

## 2019-11-06 NOTE — CHART NOTE - NSCHARTNOTEFT_GEN_A_CORE
AMANDA Veloz made multiple calls back and forth to the patient's guardian Cat [phone] regarding coverage for medication administration when the patient is no longer self directed.  Cat stated she will assist with this process as will the patient's friend Immanuel Dominguez.  AMANDA Veloz relayed this to [facility] RN liaison who is conferring with [facility].  AMANDA Veloz will remain available as needed for continued support and collaboration with the Medicine Sw.

## 2019-11-06 NOTE — PROGRESS NOTE ADULT - PROBLEM SELECTOR PLAN 2
probably from bronchitis and exacerbation of COPD  she is a active smoker
?stage 3b or 4  she has a guardian who makes decision for her  will decide to continue chemo and RT or not
PET scan only showed cancer at RLL, hilum and subcarinal nodes  no other mets  chemoRT is half way through  guardian is to decide to continue treatment or hospice care
and COPD exacerbation  on steroid and levaquin  improving
due to chemoRT  Hb is still 7.5, but stable  WBC abd platelet are stable
much better now  the vague infiltrate at RLL probably due to RT  she is on low dose steroid and levaquin
on chemox2 and RT x3 weeks  ? stage 3b or 4  will decide to continue treatment or not  CBC is coming down, secondary to chemo
on levaquin and prednisone  still has cough with sputum  she may have RT pneumonitis too  but she is on steroid now
s/p chemo x1 and 4 radiation rounds   -CTA chest : R lung mass with mets   - c/w robitussin DM  for cough   - smoking cessation   - Palliative consult Dr Hills and SW for home hospice  Dr gallegos was consulted, continue with current chemo and radiation regimen
s/p chemo x1 and 4 radiation rounds   -CTA chest : R lung mass with mets   - c/w robitussin DM  for cough   - smoking cessation   - Palliative consult Dr Hills and SW for home hospice  Dr gallegos was consulted, continue with current chemo and radiation regimen  hb , wbc, platelets dropped today,  will repeat cbc  probably might be because of chemotherapy she last received

## 2019-11-06 NOTE — PROGRESS NOTE ADULT - PROBLEM SELECTOR PLAN 1
I think the cancer is limited to RLL and hilum and subcarinal node  the left lung nodule ws neg in PET, most likley a scar tissue  the guardian will decide to continue treatment or not after discussing with hospital staff
at least stage 3b  half way through chemo RT  treatment on hold pending guardian's decision
due to chemo RT  will follow
half way through induction chemoRT  now guardian wants hospice  will stop all treatmnet
right sided chest pain resolved  no sob  no PE
s/p chemoRT  but not completed  guardian is to decide to continue or not  her neighbor who takes care of her said she herself wants to live and wish to continue treatment.  I and Dr Mora had discussed with guardian already
with sputum production  ?bronchitis vs pneumonia  will repeat CXR  on antibiotics  may need some steroid
pt still has cp  ACS ruled out  CP seems to be non cardiac in origin. She recently started radiation which might be causing the chest pain due to inflammation  Pain management with tylenol and percocet
stage 3b at least  on chemoRT  the pet scan was neg for lesions outside RLL  the nodule reported by CT probably due to a scar  discussed with guardian program, they will decide to continue treatment or not
pt still has cp  ACS ruled out  CP seems to be non cardiac in origin. She recently started radiation which might be causing the chest pain due to inflammation  Pain management with tylenol and lidocaine patch  she is complaining of cough with white sputum which got worse after chemotherapy

## 2019-11-06 NOTE — GOALS OF CARE CONVERSATION - ADVANCED CARE PLANNING - CONVERSATION DETAILS
[facility]  ([facility]):    Collaborated with PCT NICKI Cherry and [facility] NICKI Mtz today on this referral. As per direction of PCT, this writer made  TCT patient's guardian (Cat) this evening regarding clarification of designated person(s) who would administer meds if patient unable to self direct or self administer meds. Per Cat, patient's friend/neighbor  (Immanuel Dominguez) is available Mon, Tues, Thursday and Friday from 9a-12p,. This writer inquired if anyone else  would be available if needed on the remaining 21hrs on each of  those days, as well as on Sun, Wed, Sat. Per Cat, "we are still working on that".     Assessed  patient at bedside and discussed observations with   Day Shift RN , Cruzito Riddle, PCT NICKI Cherry and  [facility] Home office team. Further discussions by N home office to be held on 11/7/19. Please contact [phone] (ask for Referral Center ) on this patient for status of referral on 11/7/19.    Pari Ward, RN, CHPN, OCN  Hospice Inpatient Specialist.

## 2019-11-06 NOTE — PROGRESS NOTE ADULT - ASSESSMENT
_________________________________________________________________________________________  ========>>  M E D I C A L   A T T E N D I N G    F O L L O W  U P  N O T E  <<=========  -----------------------------------------------------------------------------------------------------    - Patient seen and examined by me earlier today.   - In summary,  IRENA STEELE is a 88y year old woman who originally presented with CP  - Patient today overall doing ok, comfortable, eating OK. no other events noted overnight     ==================>> REVIEW OF SYSTEM <<=================    GEN: no fever, no chills, no pain   RESP: no SOB reported at rest , + chronic wet cough, improved   CVS: no chest pain, no palpitations, no edema  GI: no abdominal pain, no nausea, no constipation, no diarrhea  : no dysuria, no frequency, no hematuria  Neuro: no headache, no dizziness  Derm : no itching, no rash    ==================>> PHYSICAL EXAM <<=================    GEN: A&O X 2 , NAD , comfortable. pleasant , in chair   HEENT: NCAT, PERRL, MMM, hearing intact  Neck: supple , no JVD  CVS: S1S2 , regular , limited   PULM: overall MUCH improved   ABD.: soft. non tender, non distended,  bowel sounds present  Extrem: intact pulses , no edema   PSYCH : normal mood,  not anxious        ==================>> MEDICATIONS <<====================    amLODIPine   Tablet 5 milliGRAM(s) Oral daily  budesonide  80 MICROgram(s)/formoterol 4.5 MICROgram(s) Inhaler 2 Puff(s) Inhalation two times a day  donepezil 5 milliGRAM(s) Oral at bedtime  enoxaparin Injectable 40 milliGRAM(s) SubCutaneous daily  guaifenesin/dextromethorphan  Syrup 10 milliLiter(s) Oral every 6 hours  hydrALAZINE 50 milliGRAM(s) Oral every 8 hours  hydrocortisone 1% Cream 1 Application(s) Topical two times a day  lidocaine   Patch 1 Patch Transdermal daily  pantoprazole    Tablet 40 milliGRAM(s) Oral before breakfast  predniSONE   Tablet 20 milliGRAM(s) Oral daily  senna 2 Tablet(s) Oral at bedtime  sertraline 25 milliGRAM(s) Oral daily  simvastatin 10 milliGRAM(s) Oral at bedtime    MEDICATIONS  (PRN):  acetaminophen   Tablet .. 650 milliGRAM(s) Oral every 6 hours PRN Mild Pain (1 - 3)  albuterol/ipratropium for Nebulization. 3 milliLiter(s) Nebulizer every 6 hours PRN Shortness of Breath and/or Wheezing    ==================>> VITAL SIGNS <<==================    Vital Signs Last 24 Hrs  T(C): 36.7 (11-06-19 @ 13:07)  T(F): 98 (11-06-19 @ 13:07), Max: 98.5 (11-05-19 @ 13:38)  HR: 90 (11-06-19 @ 13:07) (90 - 100)  BP: 130/63 (11-06-19 @ 13:07)  RR: 18 (11-06-19 @ 13:07) (18 - 18)  SpO2: 98% (11-06-19 @ 13:07) (97% - 98%)       ==================>> LAB AND IMAGING <<==================                        7.5    4.82  )-----------( 58       ( 04 Nov 2019 05:36 )             23.3        Hemoglobin:   7.5 <<==,  7.7 <<==,  8.2 <<==  platelets:  58 <==, 10 <==, 19 <==, 35 <==    138  |  104  |  19<H>  ----------------------------<  77  3.3<L>   |  30  |  0.76    Ca    8.5      04 Nov 2019 05:36    Creatinine:  0.76  <<==, 0.88  <<==, 0.86  <<==, 0.88  <<==, 0.81  <<==  Sodium:   138  <==, 139  <==, 138  <==, 136  <==, 135  <==    ___________________________________________________________________________________  ===============>>  A S S E S S M E N T   A N D   P L A N <<===============  ------------------------------------------------------------------------------------------    · Assessment		  88y F from home, lives alone with 27x7 HHA , walks independently/cane,no family( her neighbour for 13 yrs is involved in care)  with significant PMHx of HTN,Dementia  recent diagnosis of Right lung cancer (started radiation and chemo 1 months ago) and no significant PSHx presenting to the ED with right sided chest pain and SOB     Problem/Plan - 1:  ·  Problem: Chest pain, likely non-cardiac, likely due to cancer and associated cough   - Pain Rx PRN  - cardio appreciated  - onc appreciated      pt started on oral abx by onc for possible bronchitis >> finished         taper off sterids and DC     Problem/Plan - 2:  ·  Problem: Lung cancer with metastasis      s/p chemo x1 and 4 radiation rounds          with associated pancytopenia   -CTA chest : R lung mass with mets   - onc and Palliative f/u appreciated      likely to go home with home hospice     Problem/Plan - 3:  ·  Problem: HTN       Pt takes hydralazine 50 TID and amlodipine   at home   - will continue with home medications with parameters   - Monitor BP and titrate meds accordingly.     Problem/Plan - 4:  ·  Problem: Shortness of breath.  Plan: Likely 2/2 pain and lung cancer   - c/w Duonebs and symbicort  - CTA : no PE.     Problem/Plan - 5:  ·  Problem: Dementia, old age.  Plan: c/w home meds.     -GI/DVT Prophylaxis.    Dispo planing      pt likely to go home with HHA, O2, and likely home hospice service     pt's guardian is involved in decision making      CM note appreciated     --------------------------------------------  Case discussed with pt, staff , CM  Education given on findings and plan of care  ___________________________  HMary Hankins D.O.  Pager: [phone]

## 2019-11-06 NOTE — PROGRESS NOTE ADULT - ASSESSMENT
_________________________________________________________________________________________  ========>>  M E D I C A L   A T T E N D I N G    F O L L O W  U P  N O T E  <<=========  -----------------------------------------------------------------------------------------------------    - Patient seen and examined by me earlier today.   - In summary,  IRENA STEELE is a 88y year old woman who originally presented with chest pain  - Patient today overall doing ok, comfortable, eating OK. cough improved     ==================>> REVIEW OF SYSTEM <<=================    GEN: no fever, no chills, no pain   RESP: no SOB reported at rest , + chronic wet cough, improved   CVS: no chest pain, no palpitations, no edema  GI: no abdominal pain, no nausea, no constipation, no diarrhea  : no dysuria, no frequency, no hematuria  Neuro: no headache, no dizziness  Derm : no itching, no rash    ==================>> PHYSICAL EXAM <<=================    GEN: A&O X 2 , NAD , comfortable. pleasant , in chair   HEENT: NCAT, PERRL, MMM, hearing intact  Neck: supple , no JVD  CVS: S1S2 , regular , limited   PULM: bilateral diffuse rhonchi  ABD.: soft. non tender, non distended,  bowel sounds present  Extrem: intact pulses , no edema   PSYCH : normal mood,  not anxious      ==================>> MEDICATIONS <<====================    amLODIPine   Tablet 5 milliGRAM(s) Oral daily  budesonide  80 MICROgram(s)/formoterol 4.5 MICROgram(s) Inhaler 2 Puff(s) Inhalation two times a day  donepezil 5 milliGRAM(s) Oral at bedtime  enoxaparin Injectable 40 milliGRAM(s) SubCutaneous daily  guaifenesin/dextromethorphan  Syrup 10 milliLiter(s) Oral every 6 hours  hydrALAZINE 50 milliGRAM(s) Oral every 8 hours  hydrocortisone 1% Cream 1 Application(s) Topical two times a day  levoFLOXacin  Tablet 250 milliGRAM(s) Oral every 24 hours  lidocaine   Patch 1 Patch Transdermal daily  pantoprazole    Tablet 40 milliGRAM(s) Oral before breakfast  predniSONE   Tablet 20 milliGRAM(s) Oral daily  senna 2 Tablet(s) Oral at bedtime  sertraline 25 milliGRAM(s) Oral daily  simvastatin 10 milliGRAM(s) Oral at bedtime    MEDICATIONS  (PRN):  acetaminophen   Tablet .. 650 milliGRAM(s) Oral every 6 hours PRN Mild Pain (1 - 3)  albuterol/ipratropium for Nebulization. 3 milliLiter(s) Nebulizer every 6 hours PRN Shortness of Breath and/or Wheezing  traMADol 25 milliGRAM(s) Oral every 8 hours PRN Moderate Pain (4 - 6)    ==================>> VITAL SIGNS <<==================              ___________________________________________________________________________________  ===============>>  A S S E S S M E N T   A N D   P L A N <<===============  ------------------------------------------------------------------------------------------    · Assessment		  88y F from home, lives alone with 27x7 HHA , walks independently/cane,no family( her neighbour for 13 yrs is involved in care)  with significant PMHx of HTN,Dementia  recent diagnosis of Right lung cancer (started radiation and chemo 1 months ago) and no significant PSHx presenting to the ED with right sided chest pain and SOB since morning.    Problem/Plan - 1:  ·  Problem: Chest pain, likely non-cardiac, likely due to cancer and associated cough   - Pain control with tylenol and lidocaine patch   - cardio appreciated  - onc appreciated    Antibiotics course finished  Problem/Plan - 2:  ·  Problem: Lung cancer with metastasis      s/p chemo x1 and 4 radiation rounds          with assocated pancytopenia   -CTA chest : R lung mass with mets   - c/w robitussin DM  for cough   - onc and Palliative f/u appreciated , waiting for hospice placement,   - Pt is vitally stable, Afebrile    Problem/Plan - 3:  ·  Problem: HTN       Pt takes hydralazine 50 TID and amlodipine   at home   - will continue with home medications with parameters   - Monitor BP and titrate meds accordingly.     Problem/Plan - 4:  ·  Problem: Shortness of breath.  Plan: Likely 2/2 pain and lung cancer   - c/w Duonebs and oral prednisone  - CTA : no PE.     Problem/Plan - 5:  ·  Problem: Dementia, old age.  Plan: c/w home meds.     -GI/DVT Prophylaxis.    D/C planing        pt likely to go home with HHA, O2, and likely hospice service   on board for home hospice arrangements

## 2019-11-07 NOTE — PROGRESS NOTE ADULT - ASSESSMENT
_________________________________________________________________________________________  ========>>  M E D I C A L   A T T E N D I N G    F O L L O W  U P  N O T E  <<=========  -----------------------------------------------------------------------------------------------------    - Patient seen and examined by me earlier today.   - In summary,  IRENA STEELE is a 88y year old woman who originally presented with CP  - Patient today overall doing ok, comfortable, eating OK. no other events noted overnight     ==================>> REVIEW OF SYSTEM <<=================    GEN: no fever, no chills, no pain   RESP: no SOB reported at rest , + chronic wet cough, improved   CVS: no chest pain, no palpitations, no edema  GI: no abdominal pain, no nausea, no constipation, no diarrhea  : no dysuria, no frequency, no hematuria  Neuro: no headache, no dizziness  Derm : no itching, no rash    ==================>> PHYSICAL EXAM <<=================    GEN: A&O X 2 , NAD , comfortable. pleasant , in chair   HEENT: NCAT, PERRL, MMM, hearing intact  Neck: supple , no JVD  CVS: S1S2 , regular , limited   PULM: overall MUCH improved   ABD.: soft. non tender, non distended,  bowel sounds present  Extrem: intact pulses , no edema   PSYCH : normal mood,  not anxious      ==================>> MEDICATIONS <<====================    amLODIPine   Tablet 5 milliGRAM(s) Oral daily  budesonide  80 MICROgram(s)/formoterol 4.5 MICROgram(s) Inhaler 2 Puff(s) Inhalation two times a day  donepezil 5 milliGRAM(s) Oral at bedtime  enoxaparin Injectable 40 milliGRAM(s) SubCutaneous daily  guaifenesin/dextromethorphan  Syrup 10 milliLiter(s) Oral every 6 hours  hydrALAZINE 50 milliGRAM(s) Oral every 8 hours  hydrocortisone 1% Cream 1 Application(s) Topical two times a day  lidocaine   Patch 1 Patch Transdermal daily  pantoprazole    Tablet 40 milliGRAM(s) Oral before breakfast  predniSONE   Tablet 20 milliGRAM(s) Oral daily  senna 2 Tablet(s) Oral at bedtime  sertraline 25 milliGRAM(s) Oral daily  simvastatin 10 milliGRAM(s) Oral at bedtime    MEDICATIONS  (PRN):  acetaminophen   Tablet .. 650 milliGRAM(s) Oral every 6 hours PRN Mild Pain (1 - 3)  albuterol/ipratropium for Nebulization. 3 milliLiter(s) Nebulizer every 6 hours PRN Shortness of Breath and/or Wheezing    ==================>> VITAL SIGNS <<==================    Vital Signs Last 24 Hrs  T(C): 36.5 (11-07-19 @ 05:20)  T(F): 97.7 (11-07-19 @ 05:20), Max: 98.6 (11-06-19 @ 14:18)  HR: 93 (11-07-19 @ 05:20) (89 - 96)  BP: 122/50 (11-07-19 @ 05:20)  RR: 17 (11-07-19 @ 05:20) (17 - 18)  SpO2: 100% (11-07-19 @ 05:20) (97% - 100%)       ==================>> LAB AND IMAGING <<==================    no labs today  ___________________________________________________________________________________  ===============>>  A S S E S S M E N T   A N D   P L A N <<===============  ------------------------------------------------------------------------------------------    · Assessment		  88y F from home, lives alone with 27x7 HHA , walks independently/cane,no family( her neighbour for 13 yrs is involved in care)  with significant PMHx of HTN,Dementia  recent diagnosis of Right lung cancer (started radiation and chemo 1 months ago) and no significant PSHx presenting to the ED with right sided chest pain and SOB     Problem/Plan - 1:  ·  Problem: Chest pain, likely non-cardiac, likely due to cancer and associated cough   - Pain Rx PRN  - cardio appreciated  - onc appreciated      pt started on oral abx by onc for possible bronchitis >> finished         taper off sterids and DC          pt likely with chronci COPD given long smoking history: trying to educate pt on Symbicort for discharge planing     Problem/Plan - 2:  ·  Problem: Lung cancer with metastasis      s/p chemo x1 and 4 radiation rounds          with associated pancytopenia   -CTA chest : R lung mass with mets   - onc and Palliative f/u appreciated      likely to go home with home hospice     Problem/Plan - 3:  ·  Problem: HTN       Pt takes hydralazine 50 TID and amlodipine   at home   - will continue with home medications with parameters   - Monitor BP and titrate meds accordingly.     Problem/Plan - 4:  ·  Problem: Shortness of breath.  Plan: Likely 2/2 pain and lung cancer and undiagnosed COPD  - c/w Duonebs PRN and symbicort  - CTA : no PE.     Problem/Plan - 5:  ·  Problem: Dementia, old age.  Plan: c/w home meds.     -GI/DVT Prophylaxis.    Dispo planing      pt likely to go home with HHA, O2, and likely home hospice service     pt's guardian is involved in decision making      CM note appreciated     --------------------------------------------  Case discussed with pt, HS, Palliative team   Education given on findings and plan of care  ___________________________  HMary Hankins D.O.  Pager: [phone]

## 2019-11-07 NOTE — CHART NOTE - NSCHARTNOTEFT_GEN_A_CORE
PC  participated in multiple telephone calls today with Carin Lama and Susy of [phone].  As per [facility], the guardian Cat and her sup. Frances [phone] provided the signed consents and the discharge can move forward.  PC  confirmed with patient's friend Immanuel Dominguez [phone] that she will accept the DME delivery, including oxygen,  tomorrow between 9a-12p at the patient's apt.  Medicine  contacted the [facility] agency and informed the coordinator María of the patient's discharge tomorrow provided the aides are available.  In addition, Medicine Sw stated she will arrange ambulance transport home.  Potential discharge for Friday, 11-8-19 at 1 p.m.  PC  left Carin Lama of [facility] a message stating the bedside nurse demonstrated and had the patient demonstrate back utilizing the Symbicort inhaler.  Carin had requested this information.  No addl. needs were identified. PC  confirmed this plan with Cat Cooper and Frances, [facility] and Medicine .  PC  will remain available as needed.

## 2019-11-07 NOTE — PROGRESS NOTE ADULT - SUBJECTIVE AND OBJECTIVE BOX
CARDIOLOGY     PROGRESS  NOTE   ________________________________________________    CHIEF COMPLAINT:Patient is a 88y old  Female who presents with a chief complaint of Right sided chest pain and SOB (31 Oct 2019 16:56)  doing better, no complain.  	  REVIEW OF SYSTEMS:  CONSTITUTIONAL: No fever, weight loss, or fatigue  EYES: No eye pain, visual disturbances, or discharge  ENT:  No difficulty hearing, tinnitus, vertigo; No sinus or throat pain  NECK: No pain or stiffness  RESPIRATORY: No cough, wheezing, chills or hemoptysis; No Shortness of Breath  CARDIOVASCULAR: No chest pain, palpitations, passing out, dizziness, or leg swelling  GASTROINTESTINAL: No abdominal or epigastric pain. No nausea, vomiting, or hematemesis; No diarrhea or constipation. No melena or hematochezia.  GENITOURINARY: No dysuria, frequency, hematuria, or incontinence  NEUROLOGICAL: No headaches, memory loss, loss of strength, numbness, or tremors  SKIN: No itching, burning, rashes, or lesions   LYMPH Nodes: No enlarged glands  ENDOCRINE: No heat or cold intolerance; No hair loss  MUSCULOSKELETAL: No joint pain or swelling; No muscle, back, or extremity pain  PSYCHIATRIC: No depression, anxiety, mood swings, or difficulty sleeping  HEME/LYMPH: No easy bruising, or bleeding gums  ALLERGY AND IMMUNOLOGIC: No hives or eczema	    [ ] All others negative	  [ ] Unable to obtain    PHYSICAL EXAM:  T(C): 36.6 (10-31-19 @ 15:46), Max: 37 (10-30-19 @ 20:05)  HR: 83 (10-31-19 @ 15:46) (81 - 107)  BP: 125/50 (10-31-19 @ 15:46) (106/49 - 133/71)  RR: 18 (10-31-19 @ 15:46) (18 - 20)  SpO2: 96% (10-31-19 @ 15:46) (95% - 99%)  Wt(kg): --  I&O's Summary    30 Oct 2019 07:01  -  31 Oct 2019 07:00  --------------------------------------------------------  IN: 150 mL / OUT: 0 mL / NET: 150 mL    31 Oct 2019 07:01  -  31 Oct 2019 17:30  --------------------------------------------------------  IN: 200 mL / OUT: 0 mL / NET: 200 mL        Appearance: Normal	  HEENT:   Normal oral mucosa, PERRL, EOMI	  Lymphatic: No lymphadenopathy  Cardiovascular: Normal S1 S2, No JVD, + murmurs, No edema  Respiratoryrhonchi  Psychiatry: A & O x 3, Mood & affect appropriate  Gastrointestinal:  Soft, Non-tender, + BS	  Skin: No rashes, No ecchymoses, No cyanosis	  Neurologic: Non-focal  Extremities: Normal range of motion, No clubbing, cyanosis or edema  Vascular: Peripheral pulses palpable 2+ bilaterally    MEDICATIONS  (STANDING):  amLODIPine   Tablet 5 milliGRAM(s) Oral daily  budesonide  80 MICROgram(s)/formoterol 4.5 MICROgram(s) Inhaler 2 Puff(s) Inhalation two times a day  donepezil 5 milliGRAM(s) Oral at bedtime  enoxaparin Injectable 40 milliGRAM(s) SubCutaneous daily  filgrastim-sndz Injectable 300 MICROGram(s) SubCutaneous daily  guaifenesin/dextromethorphan  Syrup 10 milliLiter(s) Oral every 6 hours  hydrALAZINE 50 milliGRAM(s) Oral every 8 hours  hydrocortisone 1% Cream 1 Application(s) Topical two times a day  levoFLOXacin  Tablet 250 milliGRAM(s) Oral every 24 hours  lidocaine   Patch 1 Patch Transdermal daily  pantoprazole    Tablet 40 milliGRAM(s) Oral before breakfast  predniSONE   Tablet 20 milliGRAM(s) Oral daily  senna 2 Tablet(s) Oral at bedtime  sertraline 25 milliGRAM(s) Oral daily  simvastatin 10 milliGRAM(s) Oral at bedtime      TELEMETRY: 	    ECG:  	  RADIOLOGY:  OTHER: 	  	  LABS:	 	    CARDIAC MARKERS:                                7.8    1.90  )-----------( 51       ( 31 Oct 2019 06:40 )             24.8     10-31    135  |  102  |  26<H>  ----------------------------<  103<H>  4.0   |  28  |  0.81    Ca    8.6      31 Oct 2019 06:40    TPro  6.3  /  Alb  2.1<L>  /  TBili  0.3  /  DBili  x   /  AST  47<H>  /  ALT  30  /  AlkPhos  76  10-30    proBNP: Serum Pro-Brain Natriuretic Peptide: 353 pg/mL (10-28 @ 09:31)    Lipid Profile: Cholesterol 140  LDL 49  HDL 82  TG 45    HgA1c: Hemoglobin A1C, Whole Blood: 5.3 % (10-29 @ 09:50)    TSH: Thyroid Stimulating Hormone, Serum: 0.85 uU/mL (10-29 @ 06:48)          Assessment and plan  ---------------------------  dizziness.   chest pain, doubt cardiac atypical  check cardiac enzyme  i doubt pt is a candidate for any aggressive measures  continue current meds  cta negative for PE  DVT prophylaxis  pain meds  agree with hospice/palliative care
CARDIOLOGY     PROGRESS  NOTE   ________________________________________________    CHIEF COMPLAINT:Patient is a 88y old  Female who presents with a chief complaint of Right sided chest pain and SOB (06 Nov 2019 11:56)  no complain.  	  REVIEW OF SYSTEMS:  CONSTITUTIONAL: No fever, weight loss, or fatigue  EYES: No eye pain, visual disturbances, or discharge  ENT:  No difficulty hearing, tinnitus, vertigo; No sinus or throat pain  NECK: No pain or stiffness  RESPIRATORY: No cough, wheezing, chills or hemoptysis; + Shortness of Breath  CARDIOVASCULAR: No chest pain, palpitations, passing out, dizziness, or leg swelling  GASTROINTESTINAL: No abdominal or epigastric pain. No nausea, vomiting, or hematemesis; No diarrhea or constipation. No melena or hematochezia.  GENITOURINARY: No dysuria, frequency, hematuria, or incontinence  NEUROLOGICAL: No headaches, memory loss, loss of strength, numbness, or tremors  SKIN: No itching, burning, rashes, or lesions   LYMPH Nodes: No enlarged glands  ENDOCRINE: No heat or cold intolerance; No hair loss  MUSCULOSKELETAL: No joint pain or swelling; No muscle, back, or extremity pain  PSYCHIATRIC: No depression, anxiety, mood swings, or difficulty sleeping  HEME/LYMPH: No easy bruising, or bleeding gums  ALLERGY AND IMMUNOLOGIC: No hives or eczema	    [ ] All others negative	  [ ] Unable to obtain    PHYSICAL EXAM:  T(C): 36.6 (11-06-19 @ 05:10), Max: 36.9 (11-05-19 @ 13:38)  HR: 97 (11-06-19 @ 05:10) (95 - 100)  BP: 130/60 (11-06-19 @ 05:10) (110/68 - 130/60)  RR: 18 (11-06-19 @ 05:10) (18 - 18)  SpO2: 98% (11-06-19 @ 05:10) (97% - 98%)  Wt(kg): --  I&O's Summary      Appearance: Normal	  HEENT:   Normal oral mucosa, PERRL, EOMI	  Lymphatic: No lymphadenopathy  Cardiovascular: Normal S1 S2, No JVD, = murmurs, No edema  Respiratory: Lungs clear to auscultation	  Psychiatry: A & O x 3, Mood & affect appropriate  Gastrointestinal:  Soft, Non-tender, + BS	  Skin: No rashes, No ecchymoses, No cyanosis	  Neurologic: Non-focal  Extremities: Normal range of motion, No clubbing, cyanosis or edema  Vascular: Peripheral pulses palpable 2+ bilaterally    MEDICATIONS  (STANDING):  amLODIPine   Tablet 5 milliGRAM(s) Oral daily  budesonide  80 MICROgram(s)/formoterol 4.5 MICROgram(s) Inhaler 2 Puff(s) Inhalation two times a day  donepezil 5 milliGRAM(s) Oral at bedtime  enoxaparin Injectable 40 milliGRAM(s) SubCutaneous daily  guaifenesin/dextromethorphan  Syrup 10 milliLiter(s) Oral every 6 hours  hydrALAZINE 50 milliGRAM(s) Oral every 8 hours  hydrocortisone 1% Cream 1 Application(s) Topical two times a day  lidocaine   Patch 1 Patch Transdermal daily  pantoprazole    Tablet 40 milliGRAM(s) Oral before breakfast  predniSONE   Tablet 20 milliGRAM(s) Oral daily  senna 2 Tablet(s) Oral at bedtime  sertraline 25 milliGRAM(s) Oral daily  simvastatin 10 milliGRAM(s) Oral at bedtime      TELEMETRY: 	    ECG:  	  RADIOLOGY:  OTHER: 	  	  LABS:	 	    CARDIAC MARKERS:                  proBNP: Serum Pro-Brain Natriuretic Peptide: 353 pg/mL (10-28 @ 09:31)    Lipid Profile: Cholesterol 140  LDL 49  HDL 82  TG 45    HgA1c: Hemoglobin A1C, Whole Blood: 5.3 % (10-29 @ 09:50)    TSH: Thyroid Stimulating Hormone, Serum: 0.85 uU/mL (10-29 @ 06:48)          Assessment and plan  ---------------------------  metastatic lung ca  htn  ashd  continue current meds  dvt prophylaxis  dc planning ?hospice care
CARDIOLOGY     PROGRESS  NOTE   ________________________________________________    CHIEF COMPLAINT:Patient is a 88y old  Female who presents with a chief complaint of Right sided chest pain and SOB (07 Nov 2019 13:26)  no complain.  	  REVIEW OF SYSTEMS:  CONSTITUTIONAL: No fever, weight loss, or fatigue  EYES: No eye pain, visual disturbances, or discharge  ENT:  No difficulty hearing, tinnitus, vertigo; No sinus or throat pain  NECK: No pain or stiffness  RESPIRATORY: No cough, wheezing, chills or hemoptysis; No Shortness of Breath  CARDIOVASCULAR: No chest pain, palpitations, passing out, dizziness, or leg swelling  GASTROINTESTINAL: No abdominal or epigastric pain. No nausea, vomiting, or hematemesis; No diarrhea or constipation. No melena or hematochezia.  GENITOURINARY: No dysuria, frequency, hematuria, or incontinence  NEUROLOGICAL: No headaches, memory loss, loss of strength, numbness, or tremors  SKIN: No itching, burning, rashes, or lesions   LYMPH Nodes: No enlarged glands  ENDOCRINE: No heat or cold intolerance; No hair loss  MUSCULOSKELETAL: No joint pain or swelling; No muscle, back, or extremity pain  PSYCHIATRIC: No depression, anxiety, mood swings, or difficulty sleeping  HEME/LYMPH: No easy bruising, or bleeding gums  ALLERGY AND IMMUNOLOGIC: No hives or eczema	    [ ] All others negative	  [ ] Unable to obtain    PHYSICAL EXAM:  T(C): 36.7 (11-07-19 @ 15:10), Max: 36.7 (11-07-19 @ 15:10)  HR: 88 (11-07-19 @ 13:19) (88 - 93)  BP: 108/54 (11-07-19 @ 13:19) (108/54 - 122/50)  RR: 16 (11-07-19 @ 15:10) (16 - 18)  SpO2: 99% (11-07-19 @ 15:10) (97% - 100%)  Wt(kg): --  I&O's Summary      Appearance: Normal	  HEENT:   Normal oral mucosa, PERRL, EOMI	  Lymphatic: No lymphadenopathy  Cardiovascular: Normal S1 S2, No JVD, +murmurs, No edema  Respiratory: Lungs clear to auscultation	  Psychiatry: A & O x 3, Mood & affect appropriate  Gastrointestinal:  Soft, Non-tender, + BS	  Skin: No rashes, No ecchymoses, No cyanosis	  Neurologic: Non-focal  Extremities: Normal range of motion, No clubbing, cyanosis or edema  Vascular: Peripheral pulses palpable 2+ bilaterally    MEDICATIONS  (STANDING):  amLODIPine   Tablet 5 milliGRAM(s) Oral daily  budesonide  80 MICROgram(s)/formoterol 4.5 MICROgram(s) Inhaler 2 Puff(s) Inhalation two times a day  donepezil 5 milliGRAM(s) Oral at bedtime  enoxaparin Injectable 40 milliGRAM(s) SubCutaneous daily  guaifenesin/dextromethorphan  Syrup 10 milliLiter(s) Oral every 6 hours  hydrALAZINE 50 milliGRAM(s) Oral every 8 hours  hydrocortisone 1% Cream 1 Application(s) Topical two times a day  lidocaine   Patch 1 Patch Transdermal daily  pantoprazole    Tablet 40 milliGRAM(s) Oral before breakfast  predniSONE   Tablet 20 milliGRAM(s) Oral daily  senna 2 Tablet(s) Oral at bedtime  sertraline 25 milliGRAM(s) Oral daily  simvastatin 10 milliGRAM(s) Oral at bedtime      TELEMETRY: 	    ECG:  	  RADIOLOGY:  OTHER: 	  	  LABS:	 	    CARDIAC MARKERS:                  proBNP: Serum Pro-Brain Natriuretic Peptide: 353 pg/mL (10-28 @ 09:31)    Lipid Profile: Cholesterol 140  LDL 49  HDL 82  TG 45    HgA1c: Hemoglobin A1C, Whole Blood: 5.3 % (10-29 @ 09:50)    TSH: Thyroid Stimulating Hormone, Serum: 0.85 uU/mL (10-29 @ 06:48)          Assessment and plan  ---------------------------  metastatic lung ca  htn  ashd  continue current meds  dvt prophylaxis  dc planning ?hospice care
CARDIOLOGY     PROGRESS  NOTE   ________________________________________________    CHIEF COMPLAINT:Patient is a 88y old  Female who presents with a chief complaint of Right sided chest pain and SOB (30 Oct 2019 13:52)  doing better, pain is better controlled  	  REVIEW OF SYSTEMS:  CONSTITUTIONAL: No fever, weight loss, or fatigue  EYES: No eye pain, visual disturbances, or discharge  ENT:  No difficulty hearing, tinnitus, vertigo; No sinus or throat pain  NECK: No pain or stiffness  RESPIRATORY: No cough, wheezing, chills or hemoptysis; No Shortness of Breath  CARDIOVASCULAR: No chest pain, palpitations, passing out, dizziness, or leg swelling  GASTROINTESTINAL: No abdominal or epigastric pain. No nausea, vomiting, or hematemesis; No diarrhea or constipation. No melena or hematochezia.  GENITOURINARY: No dysuria, frequency, hematuria, or incontinence  NEUROLOGICAL: No headaches, memory loss, loss of strength, numbness, or tremors  SKIN: No itching, burning, rashes, or lesions   LYMPH Nodes: No enlarged glands  ENDOCRINE: No heat or cold intolerance; No hair loss  MUSCULOSKELETAL: No joint pain or swelling; No muscle, back, or extremity pain  PSYCHIATRIC: No depression, anxiety, mood swings, or difficulty sleeping  HEME/LYMPH: No easy bruising, or bleeding gums  ALLERGY AND IMMUNOLOGIC: No hives or eczema	    [ ] All others negative	  [ ] Unable to obtain    PHYSICAL EXAM:  T(C): 36.8 (10-30-19 @ 16:27), Max: 37.1 (10-30-19 @ 11:15)  HR: 87 (10-30-19 @ 16:27) (82 - 98)  BP: 110/64 (10-30-19 @ 16:27) (100/57 - 122/55)  RR: 18 (10-30-19 @ 16:27) (18 - 18)  SpO2: 99% (10-30-19 @ 16:27) (97% - 100%)  Wt(kg): --  I&O's Summary    30 Oct 2019 07:01  -  30 Oct 2019 19:24  --------------------------------------------------------  IN: 150 mL / OUT: 0 mL / NET: 150 mL        Appearance: Normal	  HEENT:   Normal oral mucosa, PERRL, EOMI	  Lymphatic: No lymphadenopathy  Cardiovascular: Normal S1 S2, No JVD, + murmurs, No edema  Respiratory: Lungs clear to auscultation	  Psychiatry: A & O x 3, Mood & affect appropriate  Gastrointestinal:  Soft, Non-tender, + BS	  Skin: No rashes, No ecchymoses, No cyanosis	  Neurologic: Non-focal  Extremities: Normal range of motion, No clubbing, cyanosis or edema  Vascular: Peripheral pulses palpable 2+ bilaterally    MEDICATIONS  (STANDING):  amLODIPine   Tablet 5 milliGRAM(s) Oral daily  budesonide  80 MICROgram(s)/formoterol 4.5 MICROgram(s) Inhaler 2 Puff(s) Inhalation two times a day  donepezil 5 milliGRAM(s) Oral at bedtime  enoxaparin Injectable 40 milliGRAM(s) SubCutaneous daily  guaifenesin/dextromethorphan  Syrup 10 milliLiter(s) Oral every 6 hours  hydrALAZINE 50 milliGRAM(s) Oral every 8 hours  hydrocortisone 1% Cream 1 Application(s) Topical two times a day  levoFLOXacin  Tablet 250 milliGRAM(s) Oral every 24 hours  lidocaine   Patch 1 Patch Transdermal daily  pantoprazole    Tablet 40 milliGRAM(s) Oral before breakfast  predniSONE   Tablet 20 milliGRAM(s) Oral daily  senna 2 Tablet(s) Oral at bedtime  sertraline 25 milliGRAM(s) Oral daily  simvastatin 10 milliGRAM(s) Oral at bedtime      TELEMETRY: 	    ECG:  	  RADIOLOGY:  OTHER: 	  	  LABS:	 	    CARDIAC MARKERS:                                8.6    2.69  )-----------( 71       ( 30 Oct 2019 12:35 )             27.4     10-30    137  |  102  |  26<H>  ----------------------------<  107<H>  3.7   |  30  |  0.92    Ca    8.1<L>      30 Oct 2019 05:57  Phos  3.0     10-29  Mg     2.0     10-29    TPro  6.3  /  Alb  2.1<L>  /  TBili  0.3  /  DBili  x   /  AST  47<H>  /  ALT  30  /  AlkPhos  76  10-30    proBNP: Serum Pro-Brain Natriuretic Peptide: 353 pg/mL (10-28 @ 09:31)    Lipid Profile: Cholesterol 140  LDL 49  HDL 82  TG 45    HgA1c: Hemoglobin A1C, Whole Blood: 5.3 % (10-29 @ 09:50)    TSH: Thyroid Stimulating Hormone, Serum: 0.85 uU/mL (10-29 @ 06:48)      < from: Xray Chest 1 View-PORTABLE IMMEDIATE (10.28.19 @ 11:45) >    Heart is magnified by technique.    There are bilateral infiltrates centrally in the lungs.    Coarse calcifications are seen in the left breast and possibly in the   left chest.    Posttraumatic deformity right upper humerus is noted.    IMPRESSION: As above.      < end of copied text >      Assessment and plan  ---------------------------  dizziness.   chest pain, doubt cardiac atypical  check cardiac enzyme  i doubt pt is a candidate for any aggressive measures  continue current meds  cta negative for PE  DVT prophylaxis  pain meds
CARDIOLOGY     PROGRESS  NOTE   ________________________________________________    CHIEF Compliant is a 88y old  Female who presents with a chief complaint of Right sided chest pain and SOB (04 Nov 2019 09:33)  no complain.  	  REVIEW OF SYSTEMS:  CONSTITUTIONAL: No fever, weight loss, or fatigue  EYES: No eye pain, visual disturbances, or discharge  ENT:  No difficulty hearing, tinnitus, vertigo; No sinus or throat pain  NECK: No pain or stiffness  RESPIRATORY: No cough, wheezing, chills or hemoptysis; No Shortness of Breath  CARDIOVASCULAR: No chest pain, palpitations, passing out, dizziness, or leg swelling  GASTROINTESTINAL: No abdominal or epigastric pain. No nausea, vomiting, or hematemesis; No diarrhea or constipation. No melena or hematochezia.  GENITOURINARY: No dysuria, frequency, hematuria, or incontinence  NEUROLOGICAL: No headaches, memory loss, loss of strength, numbness, or tremors  SKIN: No itching, burning, rashes, or lesions   LYMPH Nodes: No enlarged glands  ENDOCRINE: No heat or cold intolerance; No hair loss  MUSCULOSKELETAL: No joint pain or swelling; No muscle, back, or extremity pain  PSYCHIATRIC: No depression, anxiety, mood swings, or difficulty sleeping  HEME/LYMPH: No easy bruising, or bleeding gums  ALLERGY AND IMMUNOLOGIC: No hives or eczema	    [ ] All others negative	  [ ] Unable to obtain    PHYSICAL EXAM:  T(C): 36.6 (11-04-19 @ 05:43), Max: 37.1 (11-03-19 @ 21:11)  HR: 96 (11-04-19 @ 05:43) (75 - 99)  BP: 140/59 (11-04-19 @ 05:43) (123/66 - 140/59)  RR: 18 (11-04-19 @ 05:43) (16 - 18)  SpO2: 98% (11-04-19 @ 05:43) (96% - 100%)  Wt(kg): --  I&O's Summary    03 Nov 2019 07:01  -  04 Nov 2019 07:00  --------------------------------------------------------  IN: 0 mL / OUT: 2 mL / NET: -2 mL        Appearance: Normal	  HEENT:   Normal oral mucosa, PERRL, EOMI	  Lymphatic: No lymphadenopathy  Cardiovascular: Normal S1 S2, No JVD, + murmurs, No edema  Respiratory: rales	  Psychiatry: A & O x 3, Mood & affect appropriate  Gastrointestinal:  Soft, Non-tender, + BS	  Skin: No rashes, No ecchymoses, No cyanosis	  Neurologic: Non-focal  Extremities: Normal range of motion, No clubbing, cyanosis or edema  Vascular: Peripheral pulses palpable 2+ bilaterally    MEDICATIONS  (STANDING):  amLODIPine   Tablet 5 milliGRAM(s) Oral daily  budesonide  80 MICROgram(s)/formoterol 4.5 MICROgram(s) Inhaler 2 Puff(s) Inhalation two times a day  donepezil 5 milliGRAM(s) Oral at bedtime  enoxaparin Injectable 40 milliGRAM(s) SubCutaneous daily  guaifenesin/dextromethorphan  Syrup 10 milliLiter(s) Oral every 6 hours  hydrALAZINE 50 milliGRAM(s) Oral every 8 hours  hydrocortisone 1% Cream 1 Application(s) Topical two times a day  lidocaine   Patch 1 Patch Transdermal daily  pantoprazole    Tablet 40 milliGRAM(s) Oral before breakfast  predniSONE   Tablet 20 milliGRAM(s) Oral daily  senna 2 Tablet(s) Oral at bedtime  sertraline 25 milliGRAM(s) Oral daily  simvastatin 10 milliGRAM(s) Oral at bedtime      TELEMETRY: 	    ECG:  	  RADIOLOGY:  OTHER: 	  	  LABS:	 	    CARDIAC MARKERS:                                7.5    4.82  )-----------( 58       ( 04 Nov 2019 05:36 )             23.3     11-04    138  |  104  |  19<H>  ----------------------------<  77  3.3<L>   |  30  |  0.76    Ca    8.5      04 Nov 2019 05:36      proBNP: Serum Pro-Brain Natriuretic Peptide: 353 pg/mL (10-28 @ 09:31)    Lipid Profile: Cholesterol 140  LDL 49  HDL 82  TG 45    HgA1c: Hemoglobin A1C, Whole Blood: 5.3 % (10-29 @ 09:50)    TSH: Thyroid Stimulating Hormone, Serum: 0.85 uU/mL (10-29 @ 06:48)    < from: Xray Chest 1 View-PORTABLE IMMEDIATE (10.31.19 @ 12:30) >  Progression of right infiltrates.    < end of copied text >        Assessment and plan  ---------------------------  no further chest pain  bp is controlled over all  continue dvt prophylaxis  keep K>4  heme on follow up  increase ambulation  fu cbc keep hgb>8 ot with known cad
HPI:  88y F from home, lives alone with 27x7 A , walks independently/cane,no family( her neighbour for 13 yrs is involved in care)  with significant PMHx of HTN,Dementia  recent diagnosis of Right lung cancer (started radiation and chemo 1 months ago) and no significant PSHx presenting to the ED with right sided chest pain and SOB since morning. Close friend Ms Mckenna Dominguez  aware of all medical conditions and is very involved in patient's care,is at bedside. As per pt ,she get radiation therapy with Dr Mora( [phone])5 days a wk and so far has completed 4 rounds,last radiation Treatment was on friday, Pt was also started on chemotherapy last monday but couldnt tolerated it well. Pt states she is having pain under the right breast since morning, 10/10 in intensity, non radiating, increases with inspiration, and associated with some SOB productive cough and chest congestion . Pt denies any  fever, sweating,palpitations, dizziness. Pt is interested in home hospice.     ED course : Vitals : stable . Labs unremarkable. ECG : NSR HR 91 ,T1 -ve . CTA chest : No PE , R lung mass with mets and CALI and small R pl eff.     SH : 2 PPDx 63 yrs ,quitted last wk. denies any drinking or drug use. Lives alone. Close friend Ms Mckenna Dominguez  aware of all medical conditions and is very involved in patient's care,      24 hour Home attendant/aide Mery from [facility] assisted living [facility]. phone  [phone]  and [phone]. Call María or Barbara.    GOC : wants to be Full code for now (28 Oct 2019 13:57)      Patient is a 88y old  Female who presents with a chief complaint of Right sided chest pain and SOB (02 Nov 2019 15:28)      INTERVAL HPI/OVERNIGHT EVENTS:  T(C): 36.4 (11-02-19 @ 19:40), Max: 36.7 (11-01-19 @ 23:12)  HR: 95 (11-02-19 @ 19:40) (81 - 108)  BP: 156/77 (11-02-19 @ 19:40) (105/52 - 156/77)  RR: 18 (11-02-19 @ 19:40) (16 - 18)  SpO2: 97% (11-02-19 @ 19:40) (94% - 98%)  Wt(kg): --  I&O's Summary    02 Nov 2019 08:01  -  02 Nov 2019 20:31  --------------------------------------------------------  IN: 236 mL / OUT: 0 mL / NET: 236 mL        REVIEW OF SYSTEMS: denies fever, chills, SOB, palpitations, chest pain, abdominal pain, nausea, vomitting, diarrhea, constipation, dizziness    MEDICATIONS  (STANDING):  amLODIPine   Tablet 5 milliGRAM(s) Oral daily  budesonide  80 MICROgram(s)/formoterol 4.5 MICROgram(s) Inhaler 2 Puff(s) Inhalation two times a day  donepezil 5 milliGRAM(s) Oral at bedtime  enoxaparin Injectable 40 milliGRAM(s) SubCutaneous daily  guaifenesin/dextromethorphan  Syrup 10 milliLiter(s) Oral every 6 hours  hydrALAZINE 50 milliGRAM(s) Oral every 8 hours  hydrocortisone 1% Cream 1 Application(s) Topical two times a day  levoFLOXacin  Tablet 250 milliGRAM(s) Oral every 24 hours  lidocaine   Patch 1 Patch Transdermal daily  pantoprazole    Tablet 40 milliGRAM(s) Oral before breakfast  predniSONE   Tablet 20 milliGRAM(s) Oral daily  senna 2 Tablet(s) Oral at bedtime  sertraline 25 milliGRAM(s) Oral daily  simvastatin 10 milliGRAM(s) Oral at bedtime    MEDICATIONS  (PRN):  acetaminophen   Tablet .. 650 milliGRAM(s) Oral every 6 hours PRN Mild Pain (1 - 3)  albuterol/ipratropium for Nebulization. 3 milliLiter(s) Nebulizer every 6 hours PRN Shortness of Breath and/or Wheezing  traMADol 25 milliGRAM(s) Oral every 8 hours PRN Moderate Pain (4 - 6)      PHYSICAL EXAM:  GENERAL: NAD, well-groomed, well-developed  HEAD:  Atraumatic, Normocephalic  EYES: EOMI, PERRLA, conjunctiva and sclera clear  ENMT: No tonsillar erythema, exudates, or enlargement; Moist mucous membranes, Good dentition, No lesions  NECK: Supple, No JVD, Normal thyroid  NERVOUS SYSTEM:  Alert & Oriented X3, Good concentration; Motor Strength 5/5 B/L upper and lower extremities; DTRs 2+ intact and symmetric  CHEST/LUNG: Clear to percussion bilaterally; No rales, rhonchi, wheezing, or rubs  HEART: Regular rate and rhythm; No murmurs, rubs, or gallops  ABDOMEN: Soft, Nontender, Nondistended; Bowel sounds present  EXTREMITIES:  2+ Peripheral Pulses, No clubbing, cyanosis, or edema  LYMPH: No lymphadenopathy noted  SKIN: No rashes or lesions  LABS:                        8.2    9.14  )-----------( 19       ( 02 Nov 2019 06:52 )             24.9     11-02    138  |  103  |  23<H>  ----------------------------<  75  3.2<L>   |  27  |  0.86    Ca    8.6      02 Nov 2019 06:52          CAPILLARY BLOOD GLUCOSE      POCT Blood Glucose.: 85 mg/dL (02 Nov 2019 07:51)
PGY 1 Note discussed with primary attending    Patient is a 88y old  Female who presents with a chief complaint of Right sided chest pain and SOB (02 Nov 2019 20:31)      INTERVAL HPI/OVERNIGHT EVENTS: offers no new complaints; current symptoms resolving    MEDICATIONS  (STANDING):  amLODIPine   Tablet 5 milliGRAM(s) Oral daily  budesonide  80 MICROgram(s)/formoterol 4.5 MICROgram(s) Inhaler 2 Puff(s) Inhalation two times a day  donepezil 5 milliGRAM(s) Oral at bedtime  enoxaparin Injectable 40 milliGRAM(s) SubCutaneous daily  guaifenesin/dextromethorphan  Syrup 10 milliLiter(s) Oral every 6 hours  hydrALAZINE 50 milliGRAM(s) Oral every 8 hours  hydrocortisone 1% Cream 1 Application(s) Topical two times a day  levoFLOXacin  Tablet 250 milliGRAM(s) Oral every 24 hours  lidocaine   Patch 1 Patch Transdermal daily  pantoprazole    Tablet 40 milliGRAM(s) Oral before breakfast  predniSONE   Tablet 20 milliGRAM(s) Oral daily  senna 2 Tablet(s) Oral at bedtime  sertraline 25 milliGRAM(s) Oral daily  simvastatin 10 milliGRAM(s) Oral at bedtime    MEDICATIONS  (PRN):  acetaminophen   Tablet .. 650 milliGRAM(s) Oral every 6 hours PRN Mild Pain (1 - 3)  albuterol/ipratropium for Nebulization. 3 milliLiter(s) Nebulizer every 6 hours PRN Shortness of Breath and/or Wheezing  traMADol 25 milliGRAM(s) Oral every 8 hours PRN Moderate Pain (4 - 6)      __________________________________________________  REVIEW OF SYSTEMS:    CONSTITUTIONAL: No fever,   EYES: no acute visual disturbances  NECK: No pain or stiffness  RESPIRATORY: No cough; No shortness of breath  CARDIOVASCULAR: No chest pain, no palpitations  GASTROINTESTINAL: No pain. No nausea or vomiting; No diarrhea   NEUROLOGICAL: No headache or numbness, no tremors  MUSCULOSKELETAL: No joint pain, no muscle pain  GENITOURINARY: no dysuria, no frequency, no hesitancy  PSYCHIATRY: no depression , no anxiety  ALL OTHER  ROS negative        Vital Signs Last 24 Hrs  T(C): 36.9 (03 Nov 2019 10:53), Max: 36.9 (02 Nov 2019 23:10)  T(F): 98.5 (03 Nov 2019 10:53), Max: 98.5 (02 Nov 2019 23:10)  HR: 75 (03 Nov 2019 10:53) (75 - 97)  BP: 123/66 (03 Nov 2019 10:53) (121/64 - 156/77)  BP(mean): --  RR: 16 (03 Nov 2019 10:53) (16 - 18)  SpO2: 96% (03 Nov 2019 10:53) (95% - 100%)    ________________________________________________  PHYSICAL EXAM:  GENERAL: NAD  HEENT: Normocephalic;  conjunctivae and sclerae clear; moist mucous membranes;   NECK : supple  CHEST/LUNG: Clear to auscultation bilaterally with good air entry   HEART: S1 S2  regular; no murmurs, gallops or rubs  ABDOMEN: Soft, Nontender, Nondistended; Bowel sounds present  EXTREMITIES: no cyanosis; no edema; no calf tenderness  SKIN: warm and dry; no rash  NERVOUS SYSTEM:  Awake and alert; Oriented  to place, person and time ; no new deficits    _________________________________________________  LABS:                        7.7    9.71  )-----------( 10       ( 03 Nov 2019 05:55 )             24.2     11-03    139  |  106  |  24<H>  ----------------------------<  75  3.4<L>   |  29  |  0.88    Ca    8.6      03 Nov 2019 05:55          CAPILLARY BLOOD GLUCOSE            RADIOLOGY & ADDITIONAL TESTS:    Imaging Personally Reviewed:  YES/NO    Consultant(s) Notes Reviewed:   YES/ No    Care Discussed with Consultants :     Plan of care was discussed with patient and /or primary care giver; all questions and concerns were addressed and care was aligned with patient's wishes.
PGY 1 Note discussed with primary attending    Patient is a 88y old  Female who presents with a chief complaint of Right sided chest pain and SOB (04 Nov 2019 11:44)      INTERVAL HPI/OVERNIGHT EVENTS: offers no new complaints; current symptoms resolving    MEDICATIONS  (STANDING):  amLODIPine   Tablet 5 milliGRAM(s) Oral daily  budesonide  80 MICROgram(s)/formoterol 4.5 MICROgram(s) Inhaler 2 Puff(s) Inhalation two times a day  donepezil 5 milliGRAM(s) Oral at bedtime  enoxaparin Injectable 40 milliGRAM(s) SubCutaneous daily  guaifenesin/dextromethorphan  Syrup 10 milliLiter(s) Oral every 6 hours  hydrALAZINE 50 milliGRAM(s) Oral every 8 hours  hydrocortisone 1% Cream 1 Application(s) Topical two times a day  lidocaine   Patch 1 Patch Transdermal daily  pantoprazole    Tablet 40 milliGRAM(s) Oral before breakfast  predniSONE   Tablet 20 milliGRAM(s) Oral daily  senna 2 Tablet(s) Oral at bedtime  sertraline 25 milliGRAM(s) Oral daily  simvastatin 10 milliGRAM(s) Oral at bedtime    MEDICATIONS  (PRN):  acetaminophen   Tablet .. 650 milliGRAM(s) Oral every 6 hours PRN Mild Pain (1 - 3)  albuterol/ipratropium for Nebulization. 3 milliLiter(s) Nebulizer every 6 hours PRN Shortness of Breath and/or Wheezing  traMADol 25 milliGRAM(s) Oral every 8 hours PRN Moderate Pain (4 - 6)      __________________________________________________  REVIEW OF SYSTEMS:    CONSTITUTIONAL: No fever,   EYES: no acute visual disturbances  NECK: No pain or stiffness  RESPIRATORY: No cough; No shortness of breath  CARDIOVASCULAR: No chest pain, no palpitations  GASTROINTESTINAL: No pain. No nausea or vomiting; No diarrhea   NEUROLOGICAL: No headache or numbness, no tremors  MUSCULOSKELETAL: No joint pain, no muscle pain  GENITOURINARY: no dysuria, no frequency, no hesitancy  PSYCHIATRY: no depression , no anxiety  ALL OTHER  ROS negative        Vital Signs Last 24 Hrs  T(C): 36.6 (04 Nov 2019 05:43), Max: 37.1 (03 Nov 2019 21:11)  T(F): 97.8 (04 Nov 2019 05:43), Max: 98.7 (03 Nov 2019 21:11)  HR: 96 (04 Nov 2019 05:43) (96 - 99)  BP: 140/59 (04 Nov 2019 05:43) (127/64 - 140/59)  BP(mean): --  RR: 18 (04 Nov 2019 05:43) (18 - 18)  SpO2: 98% (04 Nov 2019 05:43) (98% - 100%)    ________________________________________________  PHYSICAL EXAM:  GENERAL: NAD  HEENT: Normocephalic;  conjunctivae and sclerae clear; moist mucous membranes;   NECK : supple  CHEST/LUNG: Clear to auscultation bilaterally with good air entry   HEART: S1 S2  regular; no murmurs, gallops or rubs  ABDOMEN: Soft, Nontender, Nondistended; Bowel sounds present  EXTREMITIES: no cyanosis; no edema; no calf tenderness  SKIN: warm and dry; no rash  NERVOUS SYSTEM:  Awake and alert; Oriented  to place, person and time ; no new deficits    _________________________________________________  LABS:                        7.5    4.82  )-----------( 58       ( 04 Nov 2019 05:36 )             23.3     11-04    138  |  104  |  19<H>  ----------------------------<  77  3.3<L>   |  30  |  0.76    Ca    8.5      04 Nov 2019 05:36          CAPILLARY BLOOD GLUCOSE            RADIOLOGY & ADDITIONAL TESTS:    Imaging Personally Reviewed:  YES    Consultant(s) Notes Reviewed:   YES    Care Discussed with Consultants :     Plan of care was discussed with patient and /or primary care giver; all questions and concerns were addressed and care was aligned with patient's wishes.
PGY 1 Note discussed with primary attending    Patient is a 88y old  Female who presents with a chief complaint of Right sided chest pain and SOB (04 Nov 2019 14:21)      INTERVAL HPI/OVERNIGHT EVENTS: offers no new complaints; current symptoms resolving    MEDICATIONS  (STANDING):  amLODIPine   Tablet 5 milliGRAM(s) Oral daily  budesonide  80 MICROgram(s)/formoterol 4.5 MICROgram(s) Inhaler 2 Puff(s) Inhalation two times a day  donepezil 5 milliGRAM(s) Oral at bedtime  enoxaparin Injectable 40 milliGRAM(s) SubCutaneous daily  guaifenesin/dextromethorphan  Syrup 10 milliLiter(s) Oral every 6 hours  hydrALAZINE 50 milliGRAM(s) Oral every 8 hours  hydrocortisone 1% Cream 1 Application(s) Topical two times a day  lidocaine   Patch 1 Patch Transdermal daily  pantoprazole    Tablet 40 milliGRAM(s) Oral before breakfast  predniSONE   Tablet 20 milliGRAM(s) Oral daily  senna 2 Tablet(s) Oral at bedtime  sertraline 25 milliGRAM(s) Oral daily  simvastatin 10 milliGRAM(s) Oral at bedtime    MEDICATIONS  (PRN):  acetaminophen   Tablet .. 650 milliGRAM(s) Oral every 6 hours PRN Mild Pain (1 - 3)  albuterol/ipratropium for Nebulization. 3 milliLiter(s) Nebulizer every 6 hours PRN Shortness of Breath and/or Wheezing      __________________________________________________  REVIEW OF SYSTEMS:    CONSTITUTIONAL: No fever,   EYES: no acute visual disturbances  NECK: No pain or stiffness  RESPIRATORY: Decreased breath sounds , mild wheezing  CARDIOVASCULAR: No chest pain, no palpitations  GASTROINTESTINAL: No pain. No nausea or vomiting; No diarrhea   NEUROLOGICAL: No headache or numbness, no tremors  MUSCULOSKELETAL: No joint pain, no muscle pain  GENITOURINARY: no dysuria, no frequency, no hesitancy  PSYCHIATRY: no depression , no anxiety  ALL OTHER  ROS negative        Vital Signs Last 24 Hrs  T(C): 36.9 (05 Nov 2019 05:22), Max: 37 (04 Nov 2019 14:30)  T(F): 98.5 (05 Nov 2019 05:22), Max: 98.6 (04 Nov 2019 14:30)  HR: 90 (05 Nov 2019 05:22) (90 - 96)  BP: 127/66 (05 Nov 2019 05:22) (114/66 - 151/75)  BP(mean): --  RR: 18 (05 Nov 2019 05:22) (18 - 18)  SpO2: 97% (05 Nov 2019 05:22) (95% - 97%)    ________________________________________________  PHYSICAL EXAM:  GENERAL: NAD  HEENT: Normocephalic;  conjunctivae and sclerae clear; moist mucous membranes;   NECK : supple  CHEST/LUNG: Clear to auscultation bilaterally with good air entry   HEART: S1 S2  regular; no murmurs, gallops or rubs  ABDOMEN: Soft, Nontender, Nondistended; Bowel sounds present  EXTREMITIES: no cyanosis; no edema; no calf tenderness  SKIN: warm and dry; no rash  NERVOUS SYSTEM:  Awake and alert; Oriented  to place, person and time ; no new deficits    _________________________________________________  LABS:                        7.5    4.82  )-----------( 58       ( 04 Nov 2019 05:36 )             23.3     11-04    138  |  104  |  19<H>  ----------------------------<  77  3.3<L>   |  30  |  0.76    Ca    8.5      04 Nov 2019 05:36          CAPILLARY BLOOD GLUCOSE            RADIOLOGY & ADDITIONAL TESTS:    Imaging Personally Reviewed:  YES    Consultant(s) Notes Reviewed:   YES    Care Discussed with Consultants :     Plan of care was discussed with patient and /or primary care giver; all questions and concerns were addressed and care was aligned with patient's wishes.
PGY 1 Note discussed with primary attending    Patient is a 88y old  Female who presents with a chief complaint of Right sided chest pain and SOB (05 Nov 2019 20:54)      INTERVAL HPI/OVERNIGHT EVENTS: offers no new complaints; current symptoms resolving    MEDICATIONS  (STANDING):  amLODIPine   Tablet 5 milliGRAM(s) Oral daily  budesonide  80 MICROgram(s)/formoterol 4.5 MICROgram(s) Inhaler 2 Puff(s) Inhalation two times a day  donepezil 5 milliGRAM(s) Oral at bedtime  enoxaparin Injectable 40 milliGRAM(s) SubCutaneous daily  guaifenesin/dextromethorphan  Syrup 10 milliLiter(s) Oral every 6 hours  hydrALAZINE 50 milliGRAM(s) Oral every 8 hours  hydrocortisone 1% Cream 1 Application(s) Topical two times a day  lidocaine   Patch 1 Patch Transdermal daily  pantoprazole    Tablet 40 milliGRAM(s) Oral before breakfast  predniSONE   Tablet 20 milliGRAM(s) Oral daily  senna 2 Tablet(s) Oral at bedtime  sertraline 25 milliGRAM(s) Oral daily  simvastatin 10 milliGRAM(s) Oral at bedtime    MEDICATIONS  (PRN):  acetaminophen   Tablet .. 650 milliGRAM(s) Oral every 6 hours PRN Mild Pain (1 - 3)  albuterol/ipratropium for Nebulization. 3 milliLiter(s) Nebulizer every 6 hours PRN Shortness of Breath and/or Wheezing      __________________________________________________  REVIEW OF SYSTEMS:    CONSTITUTIONAL: No fever,   EYES: no acute visual disturbances  NECK: No pain or stiffness  RESPIRATORY: No cough; No shortness of breath  CARDIOVASCULAR: No chest pain, no palpitations  GASTROINTESTINAL: No pain. No nausea or vomiting; No diarrhea   NEUROLOGICAL: No headache or numbness, no tremors  MUSCULOSKELETAL: No joint pain, no muscle pain  GENITOURINARY: no dysuria, no frequency, no hesitancy  PSYCHIATRY: no depression , no anxiety  ALL OTHER  ROS negative        Vital Signs Last 24 Hrs  T(C): 36.6 (06 Nov 2019 05:10), Max: 36.9 (05 Nov 2019 13:38)  T(F): 97.8 (06 Nov 2019 05:10), Max: 98.5 (05 Nov 2019 13:38)  HR: 97 (06 Nov 2019 05:10) (95 - 100)  BP: 130/60 (06 Nov 2019 05:10) (110/68 - 130/60)  BP(mean): --  RR: 18 (06 Nov 2019 05:10) (18 - 18)  SpO2: 98% (06 Nov 2019 05:10) (97% - 98%)    ________________________________________________  PHYSICAL EXAM:  GENERAL: NAD  HEENT: Normocephalic;  conjunctivae and sclerae clear; moist mucous membranes;   NECK : supple  CHEST/LUNG: Clear to auscultation bilaterally with good air entry   HEART: S1 S2  regular; no murmurs, gallops or rubs  ABDOMEN: Soft, Nontender, Nondistended; Bowel sounds present  EXTREMITIES: no cyanosis; no edema; no calf tenderness  SKIN: warm and dry; no rash  NERVOUS SYSTEM:  Awake and alert; Oriented  to place, person and time ; no new deficits    _________________________________________________  LABS:              CAPILLARY BLOOD GLUCOSE            RADIOLOGY & ADDITIONAL TESTS:    Imaging Personally Reviewed:  YES/NO    Consultant(s) Notes Reviewed:   YES/ No    Care Discussed with Consultants :     Plan of care was discussed with patient and /or primary care giver; all questions and concerns were addressed and care was aligned with patient's wishes.
PGY 1 Note discussed with primary attending    Patient is a 88y old  Female who presents with a chief complaint of Right sided chest pain and SOB (07 Nov 2019 12:09)      INTERVAL HPI/OVERNIGHT EVENTS: offers no new complaints; current symptoms resolving    MEDICATIONS  (STANDING):  amLODIPine   Tablet 5 milliGRAM(s) Oral daily  budesonide  80 MICROgram(s)/formoterol 4.5 MICROgram(s) Inhaler 2 Puff(s) Inhalation two times a day  donepezil 5 milliGRAM(s) Oral at bedtime  enoxaparin Injectable 40 milliGRAM(s) SubCutaneous daily  guaifenesin/dextromethorphan  Syrup 10 milliLiter(s) Oral every 6 hours  hydrALAZINE 50 milliGRAM(s) Oral every 8 hours  hydrocortisone 1% Cream 1 Application(s) Topical two times a day  lidocaine   Patch 1 Patch Transdermal daily  pantoprazole    Tablet 40 milliGRAM(s) Oral before breakfast  predniSONE   Tablet 20 milliGRAM(s) Oral daily  senna 2 Tablet(s) Oral at bedtime  sertraline 25 milliGRAM(s) Oral daily  simvastatin 10 milliGRAM(s) Oral at bedtime    MEDICATIONS  (PRN):  acetaminophen   Tablet .. 650 milliGRAM(s) Oral every 6 hours PRN Mild Pain (1 - 3)  albuterol/ipratropium for Nebulization. 3 milliLiter(s) Nebulizer every 6 hours PRN Shortness of Breath and/or Wheezing      __________________________________________________  REVIEW OF SYSTEMS:    CONSTITUTIONAL: No fever, productive cough  EYES: no acute visual disturbances  NECK: No pain or stiffness  RESPIRATORY: No cough; No shortness of breath  CARDIOVASCULAR: No chest pain, no palpitations  GASTROINTESTINAL: No pain. No nausea or vomiting; No diarrhea   NEUROLOGICAL: No headache or numbness, no tremors  MUSCULOSKELETAL: No joint pain, no muscle pain  GENITOURINARY: no dysuria, no frequency, no hesitancy  PSYCHIATRY: no depression , no anxiety  ALL OTHER  ROS negative        Vital Signs Last 24 Hrs  T(C): 36.5 (07 Nov 2019 05:20), Max: 37 (06 Nov 2019 14:18)  T(F): 97.7 (07 Nov 2019 05:20), Max: 98.6 (06 Nov 2019 14:18)  HR: 88 (07 Nov 2019 13:19) (88 - 96)  BP: 108/54 (07 Nov 2019 13:19) (108/54 - 122/50)  BP(mean): --  RR: 17 (07 Nov 2019 05:20) (17 - 18)  SpO2: 100% (07 Nov 2019 05:20) (97% - 100%)    ________________________________________________  PHYSICAL EXAM:  GENERAL: NAD  HEENT: Normocephalic;  conjunctivae and sclerae clear; moist mucous membranes;   NECK : supple  CHEST/LUNG: Wheezing on Right Side with decreased breath sounds plus some ronchi  HEART: S1 S2  regular; no murmurs, gallops or rubs  ABDOMEN: Soft, Nontender, Nondistended; Bowel sounds present  EXTREMITIES: no cyanosis; no edema; no calf tenderness  SKIN: warm and dry; no rash  NERVOUS SYSTEM:  Awake and alert; Oriented  to place, person and time ; no new deficits    _________________________________________________  LABS:              CAPILLARY BLOOD GLUCOSE            RADIOLOGY & ADDITIONAL TESTS:    Imaging Personally Reviewed:  YES    Consultant(s) Notes Reviewed:   YES    Care Discussed with Consultants :     Plan of care was discussed with patient and /or primary care giver; all questions and concerns were addressed and care was aligned with patient's wishes.
PGY 1 Note discussed with supervising resident and primary attending    Patient is a 88y old  Female who presents with a chief complaint of Right sided chest pain and SOB (29 Oct 2019 11:18)      INTERVAL HPI/OVERNIGHT EVENTS:  Pt is still feeling sob and has cough  She still has chest pain    MEDICATIONS  (STANDING):  amLODIPine   Tablet 5 milliGRAM(s) Oral daily  budesonide  80 MICROgram(s)/formoterol 4.5 MICROgram(s) Inhaler 2 Puff(s) Inhalation two times a day  donepezil 5 milliGRAM(s) Oral at bedtime  enoxaparin Injectable 40 milliGRAM(s) SubCutaneous daily  guaifenesin/dextromethorphan  Syrup 10 milliLiter(s) Oral every 6 hours  hydrALAZINE 50 milliGRAM(s) Oral every 8 hours  hydrocortisone 1% Cream 1 Application(s) Topical two times a day  lidocaine   Patch 1 Patch Transdermal daily  pantoprazole    Tablet 40 milliGRAM(s) Oral before breakfast  senna 2 Tablet(s) Oral at bedtime  sertraline 25 milliGRAM(s) Oral daily  simvastatin 10 milliGRAM(s) Oral at bedtime    MEDICATIONS  (PRN):  acetaminophen   Tablet .. 650 milliGRAM(s) Oral every 6 hours PRN Mild Pain (1 - 3)  albuterol/ipratropium for Nebulization. 3 milliLiter(s) Nebulizer every 6 hours PRN Shortness of Breath and/or Wheezing  traMADol 25 milliGRAM(s) Oral every 8 hours PRN Moderate Pain (4 - 6)      __________________________________________________  REVIEW OF SYSTEMS:    CONSTITUTIONAL: No fever,   EYES: no acute visual disturbances  NECK: No pain or stiffness  RESPIRATORY: No cough; No shortness of breath  CARDIOVASCULAR: No chest pain, no palpitations  GASTROINTESTINAL: No pain. No nausea or vomiting; No diarrhea   NEUROLOGICAL: No headache or numbness, no tremors  MUSCULOSKELETAL: No joint pain, no muscle pain  GENITOURINARY: no dysuria, no frequency, no hesitancy  PSYCHIATRY: no depression , no anxiety  ALL OTHER  ROS negative        Vital Signs Last 24 Hrs  T(C): 36.8 (29 Oct 2019 11:02), Max: 37.1 (28 Oct 2019 20:42)  T(F): 98.3 (29 Oct 2019 11:02), Max: 98.7 (28 Oct 2019 20:42)  HR: 75 (29 Oct 2019 11:02) (75 - 96)  BP: 101/45 (29 Oct 2019 11:02) (96/38 - 128/61)  BP(mean): --  RR: 18 (29 Oct 2019 11:02) (17 - 18)  SpO2: 100% (29 Oct 2019 11:02) (99% - 100%)    ________________________________________________  PHYSICAL EXAM:  GENERAL: NAD  HEENT: Normocephalic;  conjunctivae and sclerae clear; moist mucous membranes;   NECK : supple  CHEST/LUNG: Clear to auscultation bilaterally with good air entry   HEART: S1 S2  regular; no murmurs, gallops or rubs  ABDOMEN: Soft, Nontender, Nondistended; Bowel sounds present  EXTREMITIES: no cyanosis; no edema; no calf tenderness  SKIN: warm and dry; no rash  NERVOUS SYSTEM:  Awake and alert; Oriented  to place, person and time ; no new deficits    _________________________________________________  LABS:                        8.2    2.93  )-----------( 133      ( 29 Oct 2019 06:48 )             26.0     10-29    135  |  101  |  21<H>  ----------------------------<  87  3.7   |  30  |  0.89    Ca    8.4      29 Oct 2019 06:48  Phos  3.0     10-29  Mg     2.0     10-29    TPro  7.3  /  Alb  2.8<L>  /  TBili  0.5  /  DBili  x   /  AST  28  /  ALT  22  /  AlkPhos  73  10-28    PT/INR - ( 28 Oct 2019 09:31 )   PT: 11.1 sec;   INR: 1.00 ratio         PTT - ( 28 Oct 2019 09:31 )  PTT:28.6 sec    CAPILLARY BLOOD GLUCOSE            RADIOLOGY & ADDITIONAL TESTS:    Imaging Personally Reviewed:  YES/NO    Consultant(s) Notes Reviewed:   YES/ No    Care Discussed with Consultants :     Plan of care was discussed with patient and /or primary care giver; all questions and concerns were addressed and care was aligned with patient's wishes.
awake  no chest pain or sob  no fever or chills      MEDICATIONS  (STANDING):  amLODIPine   Tablet 5 milliGRAM(s) Oral daily  budesonide  80 MICROgram(s)/formoterol 4.5 MICROgram(s) Inhaler 2 Puff(s) Inhalation two times a day  donepezil 5 milliGRAM(s) Oral at bedtime  enoxaparin Injectable 40 milliGRAM(s) SubCutaneous daily  guaifenesin/dextromethorphan  Syrup 10 milliLiter(s) Oral every 6 hours  hydrALAZINE 50 milliGRAM(s) Oral every 8 hours  hydrocortisone 1% Cream 1 Application(s) Topical two times a day  lidocaine   Patch 1 Patch Transdermal daily  pantoprazole    Tablet 40 milliGRAM(s) Oral before breakfast  senna 2 Tablet(s) Oral at bedtime  sertraline 25 milliGRAM(s) Oral daily  simvastatin 10 milliGRAM(s) Oral at bedtime    MEDICATIONS  (PRN):  acetaminophen   Tablet .. 650 milliGRAM(s) Oral every 6 hours PRN Mild Pain (1 - 3)  albuterol/ipratropium for Nebulization. 3 milliLiter(s) Nebulizer every 6 hours PRN Shortness of Breath and/or Wheezing  traMADol 25 milliGRAM(s) Oral every 8 hours PRN Moderate Pain (4 - 6)      Allergies    No Known Allergies    Intolerances        Vital Signs Last 24 Hrs  T(C): 36.6 (29 Oct 2019 15:55), Max: 37.1 (28 Oct 2019 20:42)  T(F): 97.8 (29 Oct 2019 15:55), Max: 98.7 (28 Oct 2019 20:42)  HR: 85 (29 Oct 2019 15:55) (75 - 95)  BP: 107/49 (29 Oct 2019 15:55) (96/38 - 125/46)  BP(mean): --  RR: 18 (29 Oct 2019 15:55) (17 - 18)  SpO2: 100% (29 Oct 2019 15:55) (99% - 100%)    PHYSICAL EXAM  General: adult in NAD  HEENT: clear oropharynx, anicteric sclera, pink conjunctiva  Neck: supple  CV: normal S1/S2 with no murmur rubs or gallops  Lungs: positive air movement b/l ant lungs,clear to auscultation, no wheezes, no rales  Abdomen: soft non-tender non-distended, no hepatosplenomegaly  Ext: no clubbing cyanosis or edema  Skin: no rashes and no petechiae  Neuro: alert and oriented X 4, no focal deficits  LABS:                          8.2    2.93  )-----------( 133      ( 29 Oct 2019 06:48 )             26.0         Mean Cell Volume : 93.5 fl  Mean Cell Hemoglobin : 29.5 pg  Mean Cell Hemoglobin Concentration : 31.5 gm/dL  Auto Neutrophil # : 2.58 K/uL  Auto Lymphocyte # : 0.21 K/uL  Auto Monocyte # : 0.12 K/uL  Auto Eosinophil # : 0.03 K/uL  Auto Basophil # : 0.00 K/uL  Auto Neutrophil % : 88.0 %  Auto Lymphocyte % : 7.0 %  Auto Monocyte % : 4.0 %  Auto Eosinophil % : 1.0 %  Auto Basophil % : 0.0 %    Serial CBC  Hematocrit 26.0  Hemoglobin 8.2  Plat 133  RBC 2.78  WBC 2.93  Serial CBC  Hematocrit 30.5  Hemoglobin 9.7  Plat 210  RBC 3.25  WBC 4.14    10-29    135  |  101  |  21<H>  ----------------------------<  87  3.7   |  30  |  0.89    Ca    8.4      29 Oct 2019 06:48  Phos  3.0     10-29  Mg     2.0     10-29    TPro  7.3  /  Alb  2.8<L>  /  TBili  0.5  /  DBili  x   /  AST  28  /  ALT  22  /  AlkPhos  73  10-28      PT/INR - ( 28 Oct 2019 09:31 )   PT: 11.1 sec;   INR: 1.00 ratio         PTT - ( 28 Oct 2019 09:31 )  PTT:28.6 sec    Vitamin B12, Serum: 740 pg/mL (10-29 @ 09:53)            BLOOD SMEAR INTERPRETATION:       RADIOLOGY & ADDITIONAL STUDIES:
comfortable  no sob at rest  still cough  no fever or chills    MEDICATIONS  (STANDING):  amLODIPine   Tablet 5 milliGRAM(s) Oral daily  budesonide  80 MICROgram(s)/formoterol 4.5 MICROgram(s) Inhaler 2 Puff(s) Inhalation two times a day  donepezil 5 milliGRAM(s) Oral at bedtime  enoxaparin Injectable 40 milliGRAM(s) SubCutaneous daily  filgrastim-sndz Injectable 300 MICROGram(s) SubCutaneous daily  guaifenesin/dextromethorphan  Syrup 10 milliLiter(s) Oral every 6 hours  hydrALAZINE 50 milliGRAM(s) Oral every 8 hours  hydrocortisone 1% Cream 1 Application(s) Topical two times a day  levoFLOXacin  Tablet 250 milliGRAM(s) Oral every 24 hours  lidocaine   Patch 1 Patch Transdermal daily  pantoprazole    Tablet 40 milliGRAM(s) Oral before breakfast  predniSONE   Tablet 20 milliGRAM(s) Oral daily  senna 2 Tablet(s) Oral at bedtime  sertraline 25 milliGRAM(s) Oral daily  simvastatin 10 milliGRAM(s) Oral at bedtime    MEDICATIONS  (PRN):  acetaminophen   Tablet .. 650 milliGRAM(s) Oral every 6 hours PRN Mild Pain (1 - 3)  albuterol/ipratropium for Nebulization. 3 milliLiter(s) Nebulizer every 6 hours PRN Shortness of Breath and/or Wheezing  traMADol 25 milliGRAM(s) Oral every 8 hours PRN Moderate Pain (4 - 6)      Allergies    No Known Allergies    Intolerances        Vital Signs Last 24 Hrs  T(C): 36.7 (01 Nov 2019 07:47), Max: 36.8 (31 Oct 2019 23:00)  T(F): 98.1 (01 Nov 2019 07:47), Max: 98.2 (31 Oct 2019 23:00)  HR: 107 (01 Nov 2019 07:47) (69 - 107)  BP: 121/56 (01 Nov 2019 07:47) (103/57 - 125/50)  BP(mean): --  RR: 18 (01 Nov 2019 07:47) (18 - 18)  SpO2: 94% (01 Nov 2019 07:47) (94% - 96%)    PHYSICAL EXAM  General: adult in NAD  HEENT: clear oropharynx, anicteric sclera, pink conjunctiva  Neck: supple  CV: normal S1/S2 with no murmur rubs or gallops  Lungs: positive air movement b/l ant lungs,clear to auscultation, no wheezes, no rales  Abdomen: soft non-tender non-distended, no hepatosplenomegaly  Ext: no clubbing cyanosis or edema  Skin: no rashes and no petechiae  Neuro: alert and oriented X 4, no focal deficits  LABS:                          7.9    7.72  )-----------( 35       ( 01 Nov 2019 06:34 )             25.2         Mean Cell Volume : 92.6 fl  Mean Cell Hemoglobin : 29.0 pg  Mean Cell Hemoglobin Concentration : 31.3 gm/dL  Auto Neutrophil # : x  Auto Lymphocyte # : x  Auto Monocyte # : x  Auto Eosinophil # : x  Auto Basophil # : x  Auto Neutrophil % : x  Auto Lymphocyte % : x  Auto Monocyte % : x  Auto Eosinophil % : x  Auto Basophil % : x    Serial CBC  Hematocrit 25.2  Hemoglobin 7.9  Plat 35  RBC 2.72  WBC 7.72  Serial CBC  Hematocrit 24.8  Hemoglobin 7.8  Plat 51  RBC 2.65  WBC 1.90  Serial CBC  Hematocrit 27.4  Hemoglobin 8.6  Plat 71  RBC 2.90  WBC 2.69  Serial CBC  Hematocrit 25.0  Hemoglobin 7.9  Plat 84  RBC 2.66  WBC 2.50  Serial CBC  Hematocrit 26.0  Hemoglobin 8.2  Plat 133  RBC 2.78  WBC 2.93    11-01    136  |  102  |  26<H>  ----------------------------<  79  3.4<L>   |  28  |  0.88    Ca    8.7      01 Nov 2019 06:34            Vitamin B12, Serum: 740 pg/mL (10-29 @ 09:53)            BLOOD SMEAR INTERPRETATION:       RADIOLOGY & ADDITIONAL STUDIES:
condition stable  no sob at rest  no pain    MEDICATIONS  (STANDING):  amLODIPine   Tablet 5 milliGRAM(s) Oral daily  budesonide  80 MICROgram(s)/formoterol 4.5 MICROgram(s) Inhaler 2 Puff(s) Inhalation two times a day  donepezil 5 milliGRAM(s) Oral at bedtime  enoxaparin Injectable 40 milliGRAM(s) SubCutaneous daily  guaifenesin/dextromethorphan  Syrup 10 milliLiter(s) Oral every 6 hours  hydrALAZINE 50 milliGRAM(s) Oral every 8 hours  hydrocortisone 1% Cream 1 Application(s) Topical two times a day  levoFLOXacin  Tablet 250 milliGRAM(s) Oral every 24 hours  lidocaine   Patch 1 Patch Transdermal daily  pantoprazole    Tablet 40 milliGRAM(s) Oral before breakfast  predniSONE   Tablet 20 milliGRAM(s) Oral daily  senna 2 Tablet(s) Oral at bedtime  sertraline 25 milliGRAM(s) Oral daily  simvastatin 10 milliGRAM(s) Oral at bedtime    MEDICATIONS  (PRN):  acetaminophen   Tablet .. 650 milliGRAM(s) Oral every 6 hours PRN Mild Pain (1 - 3)  albuterol/ipratropium for Nebulization. 3 milliLiter(s) Nebulizer every 6 hours PRN Shortness of Breath and/or Wheezing  traMADol 25 milliGRAM(s) Oral every 8 hours PRN Moderate Pain (4 - 6)      Allergies    No Known Allergies    Intolerances        Vital Signs Last 24 Hrs  T(C): 36.6 (02 Nov 2019 11:42), Max: 36.7 (01 Nov 2019 23:12)  T(F): 97.8 (02 Nov 2019 11:42), Max: 98 (01 Nov 2019 23:12)  HR: 86 (02 Nov 2019 11:42) (81 - 108)  BP: 129/99 (02 Nov 2019 11:42) (105/52 - 134/64)  BP(mean): --  RR: 18 (02 Nov 2019 11:42) (18 - 18)  SpO2: 94% (02 Nov 2019 11:42) (93% - 99%)    PHYSICAL EXAM  General: adult in NAD  HEENT: clear oropharynx, anicteric sclera, pink conjunctiva  Neck: supple  CV: normal S1/S2 with no murmur rubs or gallops  Lungs: positive air movement b/l ant lungs,clear to auscultation, no wheezes, no rales  Abdomen: soft non-tender non-distended, no hepatosplenomegaly  Ext: no clubbing cyanosis or edema  Skin: no rashes and no petechiae  Neuro: alert and oriented X 4, no focal deficits  LABS:                          8.2    9.14  )-----------( 19       ( 02 Nov 2019 06:52 )             24.9         Mean Cell Volume : 90.9 fl  Mean Cell Hemoglobin : 29.9 pg  Mean Cell Hemoglobin Concentration : 32.9 gm/dL  Auto Neutrophil # : x  Auto Lymphocyte # : x  Auto Monocyte # : x  Auto Eosinophil # : x  Auto Basophil # : x  Auto Neutrophil % : x  Auto Lymphocyte % : x  Auto Monocyte % : x  Auto Eosinophil % : x  Auto Basophil % : x    Serial CBC  Hematocrit 24.9  Hemoglobin 8.2  Plat 19  RBC 2.74  WBC 9.14  Serial CBC  Hematocrit 25.2  Hemoglobin 7.9  Plat 35  RBC 2.72  WBC 7.72  Serial CBC  Hematocrit 24.8  Hemoglobin 7.8  Plat 51  RBC 2.65  WBC 1.90  Serial CBC  Hematocrit 27.4  Hemoglobin 8.6  Plat 71  RBC 2.90  WBC 2.69  Serial CBC  Hematocrit 25.0  Hemoglobin 7.9  Plat 84  RBC 2.66  WBC 2.50    11-02    138  |  103  |  23<H>  ----------------------------<  75  3.2<L>   |  27  |  0.86    Ca    8.6      02 Nov 2019 06:52            Vitamin B12, Serum: 740 pg/mL (10-29 @ 09:53)            BLOOD SMEAR INTERPRETATION:       RADIOLOGY & ADDITIONAL STUDIES:
doing well  no sob  does not require O2 now  still cough but much less    ROS:  Negative except for:    MEDICATIONS  (STANDING):  amLODIPine   Tablet 5 milliGRAM(s) Oral daily  budesonide  80 MICROgram(s)/formoterol 4.5 MICROgram(s) Inhaler 2 Puff(s) Inhalation two times a day  donepezil 5 milliGRAM(s) Oral at bedtime  enoxaparin Injectable 40 milliGRAM(s) SubCutaneous daily  guaifenesin/dextromethorphan  Syrup 10 milliLiter(s) Oral every 6 hours  hydrALAZINE 50 milliGRAM(s) Oral every 8 hours  hydrocortisone 1% Cream 1 Application(s) Topical two times a day  lidocaine   Patch 1 Patch Transdermal daily  pantoprazole    Tablet 40 milliGRAM(s) Oral before breakfast  predniSONE   Tablet 20 milliGRAM(s) Oral daily  senna 2 Tablet(s) Oral at bedtime  sertraline 25 milliGRAM(s) Oral daily  simvastatin 10 milliGRAM(s) Oral at bedtime    MEDICATIONS  (PRN):  acetaminophen   Tablet .. 650 milliGRAM(s) Oral every 6 hours PRN Mild Pain (1 - 3)  albuterol/ipratropium for Nebulization. 3 milliLiter(s) Nebulizer every 6 hours PRN Shortness of Breath and/or Wheezing      Allergies    No Known Allergies    Intolerances        Vital Signs Last 24 Hrs  T(C): 36.5 (06 Nov 2019 21:00), Max: 37 (06 Nov 2019 14:18)  T(F): 97.7 (06 Nov 2019 21:00), Max: 98.6 (06 Nov 2019 14:18)  HR: 89 (06 Nov 2019 21:00) (89 - 97)  BP: 109/55 (06 Nov 2019 21:00) (109/55 - 130/63)  BP(mean): --  RR: 18 (06 Nov 2019 21:00) (17 - 18)  SpO2: 97% (06 Nov 2019 21:00) (97% - 98%)    PHYSICAL EXAM  General: adult in NAD  HEENT: clear oropharynx, anicteric sclera, pink conjunctiva  Neck: supple  CV: normal S1/S2 with no murmur rubs or gallops  Lungs: positive air movement b/l ant lungs,clear to auscultation, no wheezes, no rales  Abdomen: soft non-tender non-distended, no hepatosplenomegaly  Ext: no clubbing cyanosis or edema  Skin: no rashes and no petechiae  Neuro: alert and oriented X 4, no focal deficits  LABS:          Serial CBC  Hematocrit 23.3  Hemoglobin 7.5  Plat 58  RBC 2.56  WBC 4.82  Serial CBC  Hematocrit 24.2  Hemoglobin 7.7  Plat 10  RBC 2.63  WBC 9.71                Folate, Serum: 6.2 ng/mL (11-03 @ 11:54)  Vitamin B12, Serum: 783 pg/mL (11-03 @ 11:54)  Ferritin, Serum: 546 ng/mL (11-03 @ 11:54)  Iron - Total Binding Capacity.: 172 ug/dL (11-03 @ 05:55)            BLOOD SMEAR INTERPRETATION:       RADIOLOGY & ADDITIONAL STUDIES:
feel fine  cough is much less, no sob  no fever or chills  no pain    MEDICATIONS  (STANDING):  amLODIPine   Tablet 5 milliGRAM(s) Oral daily  budesonide  80 MICROgram(s)/formoterol 4.5 MICROgram(s) Inhaler 2 Puff(s) Inhalation two times a day  donepezil 5 milliGRAM(s) Oral at bedtime  enoxaparin Injectable 40 milliGRAM(s) SubCutaneous daily  guaifenesin/dextromethorphan  Syrup 10 milliLiter(s) Oral every 6 hours  hydrALAZINE 50 milliGRAM(s) Oral every 8 hours  hydrocortisone 1% Cream 1 Application(s) Topical two times a day  lidocaine   Patch 1 Patch Transdermal daily  pantoprazole    Tablet 40 milliGRAM(s) Oral before breakfast  predniSONE   Tablet 20 milliGRAM(s) Oral daily  senna 2 Tablet(s) Oral at bedtime  sertraline 25 milliGRAM(s) Oral daily  simvastatin 10 milliGRAM(s) Oral at bedtime    MEDICATIONS  (PRN):  acetaminophen   Tablet .. 650 milliGRAM(s) Oral every 6 hours PRN Mild Pain (1 - 3)  albuterol/ipratropium for Nebulization. 3 milliLiter(s) Nebulizer every 6 hours PRN Shortness of Breath and/or Wheezing  traMADol 25 milliGRAM(s) Oral every 8 hours PRN Moderate Pain (4 - 6)      Allergies    No Known Allergies    Intolerances        Vital Signs Last 24 Hrs  T(C): 36.6 (04 Nov 2019 05:43), Max: 37.1 (03 Nov 2019 21:11)  T(F): 97.8 (04 Nov 2019 05:43), Max: 98.7 (03 Nov 2019 21:11)  HR: 96 (04 Nov 2019 05:43) (75 - 99)  BP: 140/59 (04 Nov 2019 05:43) (123/66 - 140/59)  BP(mean): --  RR: 18 (04 Nov 2019 05:43) (16 - 18)  SpO2: 98% (04 Nov 2019 05:43) (96% - 100%)    PHYSICAL EXAM  General: adult in NAD  HEENT: clear oropharynx, anicteric sclera, pink conjunctiva  Neck: supple  CV: normal S1/S2 with no murmur rubs or gallops  Lungs: positive air movement b/l ant lungs,clear to auscultation, no wheezes, no rales  Abdomen: soft non-tender non-distended, no hepatosplenomegaly  Ext: no clubbing cyanosis or edema  Skin: no rashes and no petechiae  Neuro: alert and oriented X 4, no focal deficits  LABS:                          7.5    4.82  )-----------( 58       ( 04 Nov 2019 05:36 )             23.3         Mean Cell Volume : 91.0 fl  Mean Cell Hemoglobin : 29.3 pg  Mean Cell Hemoglobin Concentration : 32.2 gm/dL  Auto Neutrophil # : x  Auto Lymphocyte # : x  Auto Monocyte # : x  Auto Eosinophil # : x  Auto Basophil # : x  Auto Neutrophil % : x  Auto Lymphocyte % : x  Auto Monocyte % : x  Auto Eosinophil % : x  Auto Basophil % : x    Serial CBC  Hematocrit 23.3  Hemoglobin 7.5  Plat 58  RBC 2.56  WBC 4.82  Serial CBC  Hematocrit 24.2  Hemoglobin 7.7  Plat 10  RBC 2.63  WBC 9.71  Serial CBC  Hematocrit 24.9  Hemoglobin 8.2  Plat 19  RBC 2.74  WBC 9.14  Serial CBC  Hematocrit 25.2  Hemoglobin 7.9  Plat 35  RBC 2.72  WBC 7.72    11-04    138  |  104  |  19<H>  ----------------------------<  77  3.3<L>   |  30  |  0.76    Ca    8.5      04 Nov 2019 05:36            Folate, Serum: 6.2 ng/mL (11-03 @ 11:54)  Vitamin B12, Serum: 783 pg/mL (11-03 @ 11:54)  Ferritin, Serum: 546 ng/mL (11-03 @ 11:54)  Iron - Total Binding Capacity.: 172 ug/dL (11-03 @ 05:55)  Vitamin B12, Serum: 740 pg/mL (10-29 @ 09:53)            BLOOD SMEAR INTERPRETATION:       RADIOLOGY & ADDITIONAL STUDIES:
feel ok  no sob  cough less  sputum less  no pain    MEDICATIONS  (STANDING):  amLODIPine   Tablet 5 milliGRAM(s) Oral daily  budesonide  80 MICROgram(s)/formoterol 4.5 MICROgram(s) Inhaler 2 Puff(s) Inhalation two times a day  donepezil 5 milliGRAM(s) Oral at bedtime  enoxaparin Injectable 40 milliGRAM(s) SubCutaneous daily  guaifenesin/dextromethorphan  Syrup 10 milliLiter(s) Oral every 6 hours  hydrALAZINE 50 milliGRAM(s) Oral every 8 hours  hydrocortisone 1% Cream 1 Application(s) Topical two times a day  lidocaine   Patch 1 Patch Transdermal daily  pantoprazole    Tablet 40 milliGRAM(s) Oral before breakfast  predniSONE   Tablet 20 milliGRAM(s) Oral daily  senna 2 Tablet(s) Oral at bedtime  sertraline 25 milliGRAM(s) Oral daily  simvastatin 10 milliGRAM(s) Oral at bedtime    MEDICATIONS  (PRN):  acetaminophen   Tablet .. 650 milliGRAM(s) Oral every 6 hours PRN Mild Pain (1 - 3)  albuterol/ipratropium for Nebulization. 3 milliLiter(s) Nebulizer every 6 hours PRN Shortness of Breath and/or Wheezing      Allergies    No Known Allergies    Intolerances        Vital Signs Last 24 Hrs  T(C): 36.9 (05 Nov 2019 13:38), Max: 36.9 (04 Nov 2019 21:03)  T(F): 98.5 (05 Nov 2019 13:38), Max: 98.5 (04 Nov 2019 21:03)  HR: 100 (05 Nov 2019 13:38) (90 - 100)  BP: 110/68 (05 Nov 2019 13:38) (110/68 - 151/75)  BP(mean): --  RR: 18 (05 Nov 2019 13:38) (18 - 18)  SpO2: 97% (05 Nov 2019 13:38) (95% - 97%)    PHYSICAL EXAM  General: adult in NAD  HEENT: clear oropharynx, anicteric sclera, pink conjunctiva  Neck: supple  CV: normal S1/S2 with no murmur rubs or gallops  Lungs: positive air movement b/l ant lungs,clear to auscultation, no wheezes, no rales  Abdomen: soft non-tender non-distended, no hepatosplenomegaly  Ext: no clubbing cyanosis or edema  Skin: no rashes and no petechiae  Neuro: alert and oriented X 4, no focal deficits  LABS:                          7.5    4.82  )-----------( 58       ( 04 Nov 2019 05:36 )             23.3         Mean Cell Volume : 91.0 fl  Mean Cell Hemoglobin : 29.3 pg  Mean Cell Hemoglobin Concentration : 32.2 gm/dL  Auto Neutrophil # : x  Auto Lymphocyte # : x  Auto Monocyte # : x  Auto Eosinophil # : x  Auto Basophil # : x  Auto Neutrophil % : x  Auto Lymphocyte % : x  Auto Monocyte % : x  Auto Eosinophil % : x  Auto Basophil % : x    Serial CBC  Hematocrit 23.3  Hemoglobin 7.5  Plat 58  RBC 2.56  WBC 4.82  Serial CBC  Hematocrit 24.2  Hemoglobin 7.7  Plat 10  RBC 2.63  WBC 9.71  Serial CBC  Hematocrit 24.9  Hemoglobin 8.2  Plat 19  RBC 2.74  WBC 9.14    11-04    138  |  104  |  19<H>  ----------------------------<  77  3.3<L>   |  30  |  0.76    Ca    8.5      04 Nov 2019 05:36            Folate, Serum: 6.2 ng/mL (11-03 @ 11:54)  Vitamin B12, Serum: 783 pg/mL (11-03 @ 11:54)  Ferritin, Serum: 546 ng/mL (11-03 @ 11:54)  Iron - Total Binding Capacity.: 172 ug/dL (11-03 @ 05:55)            BLOOD SMEAR INTERPRETATION:       RADIOLOGY & ADDITIONAL STUDIES:
still has cough and sputum production  still has some SOB  no chest pain  no fever or chills    MEDICATIONS  (STANDING):  amLODIPine   Tablet 5 milliGRAM(s) Oral daily  budesonide  80 MICROgram(s)/formoterol 4.5 MICROgram(s) Inhaler 2 Puff(s) Inhalation two times a day  donepezil 5 milliGRAM(s) Oral at bedtime  enoxaparin Injectable 40 milliGRAM(s) SubCutaneous daily  guaifenesin/dextromethorphan  Syrup 10 milliLiter(s) Oral every 6 hours  hydrALAZINE 50 milliGRAM(s) Oral every 8 hours  hydrocortisone 1% Cream 1 Application(s) Topical two times a day  lidocaine   Patch 1 Patch Transdermal daily  pantoprazole    Tablet 40 milliGRAM(s) Oral before breakfast  senna 2 Tablet(s) Oral at bedtime  sertraline 25 milliGRAM(s) Oral daily  simvastatin 10 milliGRAM(s) Oral at bedtime    MEDICATIONS  (PRN):  acetaminophen   Tablet .. 650 milliGRAM(s) Oral every 6 hours PRN Mild Pain (1 - 3)  albuterol/ipratropium for Nebulization. 3 milliLiter(s) Nebulizer every 6 hours PRN Shortness of Breath and/or Wheezing  traMADol 25 milliGRAM(s) Oral every 8 hours PRN Moderate Pain (4 - 6)      Allergies    No Known Allergies    Intolerances        Vital Signs Last 24 Hrs  T(C): 37.1 (30 Oct 2019 11:15), Max: 37.1 (30 Oct 2019 11:15)  T(F): 98.7 (30 Oct 2019 11:15), Max: 98.7 (30 Oct 2019 11:15)  HR: 82 (30 Oct 2019 11:15) (82 - 98)  BP: 100/57 (30 Oct 2019 11:15) (100/57 - 122/55)  BP(mean): --  RR: 18 (30 Oct 2019 11:15) (18 - 18)  SpO2: 100% (30 Oct 2019 11:15) (97% - 100%)    PHYSICAL EXAM  General: adult in NAD  HEENT: clear oropharynx, anicteric sclera, pink conjunctiva  Neck: supple  CV: normal S1/S2 with no murmur rubs or gallops  Lungs: positive air movement b/l ant lungs,clear to auscultation, no wheezes, no rales  Abdomen: soft non-tender non-distended, no hepatosplenomegaly  Ext: no clubbing cyanosis or edema  Skin: no rashes and no petechiae  Neuro: alert and oriented X 4, no focal deficits  LABS:                          7.9    2.50  )-----------( 84       ( 30 Oct 2019 05:57 )             25.0         Mean Cell Volume : 94.0 fl  Mean Cell Hemoglobin : 29.7 pg  Mean Cell Hemoglobin Concentration : 31.6 gm/dL  Auto Neutrophil # : x  Auto Lymphocyte # : x  Auto Monocyte # : x  Auto Eosinophil # : x  Auto Basophil # : x  Auto Neutrophil % : x  Auto Lymphocyte % : x  Auto Monocyte % : x  Auto Eosinophil % : x  Auto Basophil % : x    Serial CBC  Hematocrit 25.0  Hemoglobin 7.9  Plat 84  RBC 2.66  WBC 2.50  Serial CBC  Hematocrit 26.0  Hemoglobin 8.2  Plat 133  RBC 2.78  WBC 2.93  Serial CBC  Hematocrit 30.5  Hemoglobin 9.7  Plat 210  RBC 3.25  WBC 4.14    10-30    137  |  102  |  26<H>  ----------------------------<  107<H>  3.7   |  30  |  0.92    Ca    8.1<L>      30 Oct 2019 05:57  Phos  3.0     10-29  Mg     2.0     10-29    TPro  6.3  /  Alb  2.1<L>  /  TBili  0.3  /  DBili  x   /  AST  47<H>  /  ALT  30  /  AlkPhos  76  10-30          Vitamin B12, Serum: 740 pg/mL (10-29 @ 09:53)            BLOOD SMEAR INTERPRETATION:       RADIOLOGY & ADDITIONAL STUDIES:
awake,  no sob, cough is less,   no pain    MEDICATIONS  (STANDING):  amLODIPine   Tablet 5 milliGRAM(s) Oral daily  budesonide  80 MICROgram(s)/formoterol 4.5 MICROgram(s) Inhaler 2 Puff(s) Inhalation two times a day  donepezil 5 milliGRAM(s) Oral at bedtime  enoxaparin Injectable 40 milliGRAM(s) SubCutaneous daily  filgrastim-sndz Injectable 300 MICROGram(s) SubCutaneous daily  guaifenesin/dextromethorphan  Syrup 10 milliLiter(s) Oral every 6 hours  hydrALAZINE 50 milliGRAM(s) Oral every 8 hours  hydrocortisone 1% Cream 1 Application(s) Topical two times a day  levoFLOXacin  Tablet 250 milliGRAM(s) Oral every 24 hours  lidocaine   Patch 1 Patch Transdermal daily  pantoprazole    Tablet 40 milliGRAM(s) Oral before breakfast  predniSONE   Tablet 20 milliGRAM(s) Oral daily  senna 2 Tablet(s) Oral at bedtime  sertraline 25 milliGRAM(s) Oral daily  simvastatin 10 milliGRAM(s) Oral at bedtime    MEDICATIONS  (PRN):  acetaminophen   Tablet .. 650 milliGRAM(s) Oral every 6 hours PRN Mild Pain (1 - 3)  albuterol/ipratropium for Nebulization. 3 milliLiter(s) Nebulizer every 6 hours PRN Shortness of Breath and/or Wheezing  traMADol 25 milliGRAM(s) Oral every 8 hours PRN Moderate Pain (4 - 6)      Allergies    No Known Allergies    Intolerances        Vital Signs Last 24 Hrs  T(C): 36.6 (31 Oct 2019 15:46), Max: 37 (30 Oct 2019 20:05)  T(F): 97.8 (31 Oct 2019 15:46), Max: 98.6 (30 Oct 2019 20:05)  HR: 83 (31 Oct 2019 15:46) (81 - 107)  BP: 125/50 (31 Oct 2019 15:46) (106/49 - 133/71)  BP(mean): --  RR: 18 (31 Oct 2019 15:46) (18 - 20)  SpO2: 96% (31 Oct 2019 15:46) (95% - 99%)    PHYSICAL EXAM  General: adult in NAD  HEENT: clear oropharynx, anicteric sclera, pink conjunctiva  Neck: supple  CV: normal S1/S2 with no murmur rubs or gallops  Lungs: positive air movement b/l ant lungs,clear to auscultation, no wheezes, no rales  Abdomen: soft non-tender non-distended, no hepatosplenomegaly  Ext: no clubbing cyanosis or edema  Skin: no rashes and no petechiae  Neuro: alert and oriented X 4, no focal deficits  LABS:                          7.8    1.90  )-----------( 51       ( 31 Oct 2019 06:40 )             24.8         Mean Cell Volume : 93.6 fl  Mean Cell Hemoglobin : 29.4 pg  Mean Cell Hemoglobin Concentration : 31.5 gm/dL  Auto Neutrophil # : x  Auto Lymphocyte # : x  Auto Monocyte # : x  Auto Eosinophil # : x  Auto Basophil # : x  Auto Neutrophil % : x  Auto Lymphocyte % : x  Auto Monocyte % : x  Auto Eosinophil % : x  Auto Basophil % : x    Serial CBC  Hematocrit 24.8  Hemoglobin 7.8  Plat 51  RBC 2.65  WBC 1.90  Serial CBC  Hematocrit 27.4  Hemoglobin 8.6  Plat 71  RBC 2.90  WBC 2.69  Serial CBC  Hematocrit 25.0  Hemoglobin 7.9  Plat 84  RBC 2.66  WBC 2.50  Serial CBC  Hematocrit 26.0  Hemoglobin 8.2  Plat 133  RBC 2.78  WBC 2.93  Serial CBC  Hematocrit 30.5  Hemoglobin 9.7  Plat 210  RBC 3.25  WBC 4.14    10-31    135  |  102  |  26<H>  ----------------------------<  103<H>  4.0   |  28  |  0.81    Ca    8.6      31 Oct 2019 06:40    TPro  6.3  /  Alb  2.1<L>  /  TBili  0.3  /  DBili  x   /  AST  47<H>  /  ALT  30  /  AlkPhos  76  10-30          Vitamin B12, Serum: 740 pg/mL (10-29 @ 09:53)            BLOOD SMEAR INTERPRETATION:       RADIOLOGY & ADDITIONAL STUDIES:
PGY 1 Note discussed with supervising resident and primary attending    Patient is a 88y old  Female who presents with a chief complaint of Right sided chest pain and SOB (29 Oct 2019 19:05)      INTERVAL HPI/OVERNIGHT EVENTS:   Pt is complaining of cough with white sputum  Pain is better than yesterday but still present    MEDICATIONS  (STANDING):  amLODIPine   Tablet 5 milliGRAM(s) Oral daily  budesonide  80 MICROgram(s)/formoterol 4.5 MICROgram(s) Inhaler 2 Puff(s) Inhalation two times a day  donepezil 5 milliGRAM(s) Oral at bedtime  enoxaparin Injectable 40 milliGRAM(s) SubCutaneous daily  guaifenesin/dextromethorphan  Syrup 10 milliLiter(s) Oral every 6 hours  hydrALAZINE 50 milliGRAM(s) Oral every 8 hours  hydrocortisone 1% Cream 1 Application(s) Topical two times a day  lidocaine   Patch 1 Patch Transdermal daily  pantoprazole    Tablet 40 milliGRAM(s) Oral before breakfast  senna 2 Tablet(s) Oral at bedtime  sertraline 25 milliGRAM(s) Oral daily  simvastatin 10 milliGRAM(s) Oral at bedtime    MEDICATIONS  (PRN):  acetaminophen   Tablet .. 650 milliGRAM(s) Oral every 6 hours PRN Mild Pain (1 - 3)  albuterol/ipratropium for Nebulization. 3 milliLiter(s) Nebulizer every 6 hours PRN Shortness of Breath and/or Wheezing  traMADol 25 milliGRAM(s) Oral every 8 hours PRN Moderate Pain (4 - 6)      __________________________________________________  REVIEW OF SYSTEMS:    CONSTITUTIONAL: No fever,   EYES: no acute visual disturbances  NECK: No pain or stiffness  RESPIRATORY: No cough; No shortness of breath  CARDIOVASCULAR: No chest pain, no palpitations  GASTROINTESTINAL: No pain. No nausea or vomiting; No diarrhea   NEUROLOGICAL: No headache or numbness, no tremors  MUSCULOSKELETAL: No joint pain, no muscle pain  GENITOURINARY: no dysuria, no frequency, no hesitancy  PSYCHIATRY: no depression , no anxiety  ALL OTHER  ROS negative        Vital Signs Last 24 Hrs  T(C): 37.1 (30 Oct 2019 11:15), Max: 37.1 (30 Oct 2019 11:15)  T(F): 98.7 (30 Oct 2019 11:15), Max: 98.7 (30 Oct 2019 11:15)  HR: 82 (30 Oct 2019 11:15) (82 - 98)  BP: 100/57 (30 Oct 2019 11:15) (100/57 - 122/55)  BP(mean): --  RR: 18 (30 Oct 2019 11:15) (18 - 18)  SpO2: 100% (30 Oct 2019 11:15) (97% - 100%)    ________________________________________________  PHYSICAL EXAM:  GENERAL: NAD  HEENT: Normocephalic;  conjunctivae and sclerae clear; moist mucous membranes;   NECK : supple  CHEST/LUNG: Clear to auscultation bilaterally with good air entry   HEART: S1 S2  regular; no murmurs, gallops or rubs  ABDOMEN: Soft, Nontender, Nondistended; Bowel sounds present  EXTREMITIES: no cyanosis; no edema; no calf tenderness  SKIN: warm and dry; no rash  NERVOUS SYSTEM:  Awake and alert; Oriented  to place, person and time ; no new deficits    _________________________________________________  LABS:                        7.9    2.50  )-----------( 84       ( 30 Oct 2019 05:57 )             25.0     10-30    137  |  102  |  26<H>  ----------------------------<  107<H>  3.7   |  30  |  0.92    Ca    8.1<L>      30 Oct 2019 05:57  Phos  3.0     10-29  Mg     2.0     10-29    TPro  6.3  /  Alb  2.1<L>  /  TBili  0.3  /  DBili  x   /  AST  47<H>  /  ALT  30  /  AlkPhos  76  10-30        CAPILLARY BLOOD GLUCOSE            RADIOLOGY & ADDITIONAL TESTS:    Imaging Personally Reviewed:  YES/NO    Consultant(s) Notes Reviewed:   YES/ No    Care Discussed with Consultants :     Plan of care was discussed with patient and /or primary care giver; all questions and concerns were addressed and care was aligned with patient's wishes.

## 2019-11-07 NOTE — PROGRESS NOTE ADULT - ASSESSMENT
_________________________________________________________________________________________  ========>>  M E D I C A L   A T T E N D I N G    F O L L O W  U P  N O T E  <<=========  -----------------------------------------------------------------------------------------------------    - Patient seen and examined by me earlier today.   - In summary,  IRENA STEELE is a 88y year old woman who originally presented with chest pain  - Patient today overall doing ok, comfortable, eating OK. cough improved     ==================>> REVIEW OF SYSTEM <<=================    GEN: no fever, no chills, no pain   RESP: no SOB reported at rest , + chronic wet cough, improved   CVS: no chest pain, no palpitations, no edema  GI: no abdominal pain, no nausea, no constipation, no diarrhea  : no dysuria, no frequency, no hematuria  Neuro: no headache, no dizziness  Derm : no itching, no rash    ==================>> PHYSICAL EXAM <<=================    GEN: A&O X 2 , NAD , comfortable. pleasant , in chair   HEENT: NCAT, PERRL, MMM, hearing intact  Neck: supple , no JVD  CVS: S1S2 , regular , limited   PULM: wheezing on Right side along with ronchi  ABD.: soft. non tender, non distended,  bowel sounds present  Extrem: intact pulses , no edema   PSYCH : normal mood,  not anxious      ==================>> MEDICATIONS <<====================    amLODIPine   Tablet 5 milliGRAM(s) Oral daily  budesonide  80 MICROgram(s)/formoterol 4.5 MICROgram(s) Inhaler 2 Puff(s) Inhalation two times a day  donepezil 5 milliGRAM(s) Oral at bedtime  enoxaparin Injectable 40 milliGRAM(s) SubCutaneous daily  guaifenesin/dextromethorphan  Syrup 10 milliLiter(s) Oral every 6 hours  hydrALAZINE 50 milliGRAM(s) Oral every 8 hours  hydrocortisone 1% Cream 1 Application(s) Topical two times a day  levoFLOXacin  Tablet 250 milliGRAM(s) Oral every 24 hours  lidocaine   Patch 1 Patch Transdermal daily  pantoprazole    Tablet 40 milliGRAM(s) Oral before breakfast  predniSONE   Tablet 20 milliGRAM(s) Oral daily  senna 2 Tablet(s) Oral at bedtime  sertraline 25 milliGRAM(s) Oral daily  simvastatin 10 milliGRAM(s) Oral at bedtime    MEDICATIONS  (PRN):  acetaminophen   Tablet .. 650 milliGRAM(s) Oral every 6 hours PRN Mild Pain (1 - 3)  albuterol/ipratropium for Nebulization. 3 milliLiter(s) Nebulizer every 6 hours PRN Shortness of Breath and/or Wheezing  traMADol 25 milliGRAM(s) Oral every 8 hours PRN Moderate Pain (4 - 6)    ==================>> VITAL SIGNS <<==================              ___________________________________________________________________________________  ===============>>  A S S E S S M E N T   A N D   P L A N <<===============  ------------------------------------------------------------------------------------------    · Assessment		  88y F from home, lives alone with 27x7 HHA , walks independently/cane,no family( her neighbour for 13 yrs is involved in care)  with significant PMHx of HTN,Dementia  recent diagnosis of Right lung cancer (started radiation and chemo 1 months ago) and no significant PSHx presenting to the ED with right sided chest pain and SOB since morning.    Problem/Plan - 1:  ·  Problem: Chest pain, likely non-cardiac, likely due to cancer and associated cough   - Pain control with tylenol and lidocaine patch   - cardio appreciated  - onc appreciated    Antibiotics course finished  Problem/Plan - 2:  ·  Problem: Lung cancer with metastasis      s/p chemo x1 and 4 radiation rounds          with assocated pancytopenia   -CTA chest : R lung mass with mets   - c/w robitussin DM  for cough   - onc and Palliative f/u appreciated , waiting for hospice placement,   - Pt is vitally stable, Afebrile    Problem/Plan - 3:  ·  Problem: HTN       Pt takes hydralazine 50 TID and amlodipine   at home   - will continue with home medications with parameters   - Monitor BP and titrate meds accordingly.     Problem/Plan - 4:  ·  Problem: Shortness of breath.  Plan: Likely 2/2 pain and lung cancer   - c/w Duonebs and oral prednisone  - CTA : no PE.     Problem/Plan - 5:  ·  Problem: Dementia, old age.  Plan: c/w home meds.     -GI/DVT Prophylaxis.    D/C planing        pt likely to go home with HHA, O2, and likely hospice service   on board for home hospice arrangements  Educate pt to use symbicort at home as she has COPD and she needs to continue with inhaler  Consulted respiratory therapist

## 2019-11-08 NOTE — PROGRESS NOTE ADULT - PROVIDER SPECIALTY LIST ADULT
Cardiology
Heme/Onc
Internal Medicine
Cardiology
Heme/Onc
Internal Medicine

## 2019-11-08 NOTE — GOALS OF CARE CONVERSATION - ADVANCED CARE PLANNING - CONVERSATION DETAILS
[facility]    Patient approved for home hospice with [facility] with   discharge from [facility] to home set up for 1300pm by NICKI Carnes. Discussed final details of discharge with patient's Legal Guardian (Cat Galeano) today. She confirms receipt of all DME this AM (bed/JACK, walker, and oxygen) and was instructed to call [facility] once patient arrives home. Admission RN (with need to prepour meds at that time )  and oxygen safety visit requested for this evening. Comfort pack to be  ordered for delivery today. Related/.nonrelated meds reviewed with Dr. Bustos, with related  meds to be sent to [facility] today. [facility] aide and aide coordinator are confirmed to be in the home at 1pm to receive patient home.    Please call [facility] at [phone] for any additional questions.    Pari Ward RN, [facility]  Hospice Inpatient Specialist

## 2019-11-08 NOTE — DISCHARGE NOTE NURSING/CASE MANAGEMENT/SOCIAL WORK - PATIENT PORTAL LINK FT
You can access the FollowMyHealth Patient Portal offered by [facility] by registering at the following website: [email or web address]. By joining [facility]’s FollowMyHealth portal, you will also be able to view your health information using other applications (apps) compatible with our system.

## 2019-11-08 NOTE — PROGRESS NOTE ADULT - REASON FOR ADMISSION
Right sided chest pain and SOB

## 2019-11-08 NOTE — CHART NOTE - NSCHARTNOTEFT_GEN_A_CORE
PC  received a telephone call from the patient's guardian Cat Galeano [phone] confirming the patient's discharge for today.  AMANDA Veloz contacted [facility] while Cat was holding and comfirmed the TC aides ar prepared to begin today.  the patient's friend is receiving the DME's at the patient's apt. and will the key for the hha's to enter the home with the patent.  Medicine Magdiel stated she has arranged ambulance transport for today at 1 p.m.  PC  relayed this to the patient's guardian who expressed appreciation for the teams' efforts.  No addl. needs were expressed.  Subsequently, this  confirmed with [facility] RN liaison as well as [facility] that there are no outstanding barriers to discharge.  AMANDA Veloz spoke with the patient briefly and reminded her of her discharge today.  PC  signing off.

## 2019-11-08 NOTE — PROGRESS NOTE ADULT - ASSESSMENT
M E D I C A L   A T T E N D I N G    F O L L O W    U P   N O T E                                     ------------------------------------------------------------------------------------------------    patient evaluated by me, case discussed with team, chart, medications, and physical exam reviewed, labs / tests  and vitals reviewed by me, as bellow.   Patient is stable for discharge today.  See discharge document for full note.      ==================>> MEDICATIONS <<====================    amLODIPine   Tablet 5 milliGRAM(s) Oral daily  budesonide  80 MICROgram(s)/formoterol 4.5 MICROgram(s) Inhaler 2 Puff(s) Inhalation two times a day  donepezil 5 milliGRAM(s) Oral at bedtime  enoxaparin Injectable 40 milliGRAM(s) SubCutaneous daily  guaifenesin/dextromethorphan  Syrup 10 milliLiter(s) Oral every 6 hours  hydrALAZINE 50 milliGRAM(s) Oral every 8 hours  hydrocortisone 1% Cream 1 Application(s) Topical two times a day  lidocaine   Patch 1 Patch Transdermal daily  pantoprazole    Tablet 40 milliGRAM(s) Oral before breakfast  predniSONE   Tablet 20 milliGRAM(s) Oral daily  senna 2 Tablet(s) Oral at bedtime  sertraline 25 milliGRAM(s) Oral daily  simvastatin 10 milliGRAM(s) Oral at bedtime    MEDICATIONS  (PRN):  acetaminophen   Tablet .. 650 milliGRAM(s) Oral every 6 hours PRN Mild Pain (1 - 3)  albuterol/ipratropium for Nebulization. 3 milliLiter(s) Nebulizer every 6 hours PRN Shortness of Breath and/or Wheezing    ==================>> VITAL SIGNS <<==================    T(C): 36.7 (11-08-19 @ 04:50), Max: 37 (11-07-19 @ 20:38)  HR: 100 (11-08-19 @ 04:50) (99 - 100)  BP: 127/49 (11-08-19 @ 04:50) (124/51 - 127/49)  RR: 17 (11-08-19 @ 04:50) (16 - 17)  SpO2: 98% (11-08-19 @ 04:50) (96% - 98%)      ==================>> LAB AND IMAGING <<==================                        7.3    4.41  )-----------( 76       ( 08 Nov 2019 06:51 )             22.7        11-08    139  |  101  |  24<H>  ----------------------------<  76  3.7   |  29  |  0.85    Ca    8.5      08 Nov 2019 06:51       TSH:      0.85   (10-29-19)           Lipid profile:  (10-29-19)     Total: 140     LDL  : 49     HDL  :82     TG   :45     HgA1C: 5.3  (10-29-19)          WBC count:   4.41 <<== ,  4.82 <<==   Hemoglobin:   7.3 <<==,  7.5 <<==  platelets:  76 <==, 58 <==, 10 <==    Creatinine:  0.85  <<==, 0.76  <<==, 0.88  <<==  Sodium:   139  <==, 138  <==, 139  <==

## 2019-11-08 NOTE — DISCHARGE NOTE NURSING/CASE MANAGEMENT/SOCIAL WORK - NSDCFUADDAPPT_GEN_ALL_CORE_FT
[facility]  Phone [phone]  Fax [phone]    [facility]  Phone [phone]  Fax [phone]

## 2019-11-25 NOTE — ED ADULT NURSE NOTE - CCCP TRG CHIEF CMPLNT
HHA CALLED 911 FOR THEY HAVE NO ELECTRIC POWER IN THEIR APARTMENT AND PT NEED OXYGEN/see chief complaint quote

## 2019-11-25 NOTE — ED ADULT TRIAGE NOTE - CCCP TRG CHIEF CMPLNT
see chief complaint quote/HHA CALLED 911 FOR THEY HAVE NO ELECTRIC POWER IN THEIR APARTMENT AND PT NEED OXYGEN

## 2019-11-25 NOTE — ED ADULT NURSE NOTE - NSIMPLEMENTINTERV_GEN_ALL_ED
Implemented All Fall with Harm Risk Interventions:  [place] to call system. Call bell, personal items and telephone within reach. Instruct patient to call for assistance. Room bathroom lighting operational. Non-slip footwear when patient is off stretcher. Physically safe environment: no spills, clutter or unnecessary equipment. Stretcher in lowest position, wheels locked, appropriate side rails in place. Provide visual cue, wrist band, yellow gown, etc. Monitor gait and stability. Monitor for mental status changes and reorient to person, place, and time. Review medications for side effects contributing to fall risk. Reinforce activity limits and safety measures with patient and family. Provide visual clues: red socks.

## 2019-11-26 PROBLEM — C34.90 MALIGNANT NEOPLASM OF UNSPECIFIED PART OF UNSPECIFIED BRONCHUS OR LUNG: Chronic | Status: ACTIVE | Noted: 2019-01-01

## 2019-11-26 PROBLEM — I10 ESSENTIAL (PRIMARY) HYPERTENSION: Chronic | Status: ACTIVE | Noted: 2019-01-01

## 2019-11-26 NOTE — ED PROVIDER NOTE - OBJECTIVE STATEMENT
BIBEMS, endorsed on triage pt without electricity in apartment and is O2 dependadnt. pt is nonverbal, appears clincially dehydrated with dry oral membranes.

## 2019-11-26 NOTE — H&P ADULT - HISTORY OF PRESENT ILLNESS
Pt is 88y F from home hospice with [facility], bedbound, nonverbal, no family (her neighbour for 13 yrs is involved in care)  with significant PMHx of HTN, Dementia  recent diagnosis of Right lung cancer, and no significant PSHx presenting to the ED after building power short circuited. Pt is on 24/7 home O2 with home hospice so HHA panicked when power was lost and called 911 in spite of fact she was told not to call 911 for hospice patient. She was never in distress even while briefly without supplemental o2. In ED she was found to be clinically dry and decision was made to admit her to medicine despite hospice status. Close friend/neighbor Ms Mckenna Dominguez  aware of all medical conditions and is very involved in patient's care is at bedside and providing history. As per Ms. Dominguez,  quickly restored power, but EMS had already arrived.

## 2019-11-26 NOTE — GOALS OF CARE CONVERSATION - ADVANCED CARE PLANNING - CONVERSATION DETAILS
Patient seen by Dr. Rondon. Patient remain in hospital for Antibiotic therapy and observation for Lethargy.

## 2019-11-26 NOTE — ED PROVIDER NOTE - PSH
H/O abdominal hysterectomy    H/O heart artery stent  2004  History of appendectomy    History of tonsillectomy    No significant past surgical history    S/P hernia repair  inguinal , right - February 2106

## 2019-11-26 NOTE — GOALS OF CARE CONVERSATION - ADVANCED CARE PLANNING - NS PRO AD PATIENT TYPE
Medical Orders for Life-Sustaining Treatment (MOLST)/Do Not Resuscitate (DNR) Post-Care Instructions: I reviewed with the patient in detail post-care instructions. Patient is to keep the treatment areaas dry overnight, and then apply bacitracin twice daily until healed. Patient may apply hydrogen peroxide soaks to remove any crusting.

## 2019-11-26 NOTE — ED PROVIDER NOTE - PMH
CAD (coronary artery disease)    Heart attack  2004  HTN (hypertension)    HTN (hypertension)    Hyperlipidemia    Lung cancer    Smoking greater than 25 pack years    Stenosis of left carotid artery    Stented coronary artery  x 1 - 2004  Vitamin D deficiency

## 2019-11-26 NOTE — H&P ADULT - NSHPPHYSICALEXAM_GEN_ALL_CORE
elderly, no acute distress  lethargic, unable to speak clearly now but pt was given ativan 1mg and morphine 1mg x1 early morning  RRR S1S2  abd soft NTND  GI/ incontinent  weakness x4

## 2019-11-26 NOTE — GOALS OF CARE CONVERSATION - ADVANCED CARE PLANNING - CONVERSATION DETAILS
[facility] to  [facility] after she was brought to the ED because of loss of electricity at home. Patient comfortable and calm. No symptoms of pain, SOB or agitation. Continues to be on hospice and plan is  for patient to be Discharged home.

## 2019-11-27 NOTE — PROGRESS NOTE ADULT - PROBLEM SELECTOR PLAN 5
on baseline FAST 7 and interactive, needing full supervision and most ADL assistance but now lethargic and in respiratory distress   supportive care at this time.    Spoke with Cat the guardian on the phone and updated her on above plan of care. Her direct contact number is [phone] advanced - ECOG 3 - supportive care only  pt to resume home hospice when more alert and awake and stable  currently inpatient hospice eligible.

## 2019-11-27 NOTE — GOALS OF CARE CONVERSATION - ADVANCED CARE PLANNING - CONVERSATION DETAILS
Remains lethargic. Unable to verbalize needs. Unable to follow commands.  Morphine ordered for symptom management for SOB.   Presently calm and without distress.

## 2019-11-27 NOTE — PROGRESS NOTE ADULT - PROBLEM SELECTOR PLAN 1
now with agitation, wheezing and SOB - portable CXR r/o fluid overload vs vs infiltrates  will repeat BMP and CBC - no fevers  hold IVFs  start duonebs, ivp morphine prn

## 2019-11-27 NOTE — PROGRESS NOTE ADULT - PROBLEM SELECTOR PROBLEM 4
Malignant neoplasm of lung, unspecified laterality, unspecified part of lung Slow transit constipation

## 2019-11-27 NOTE — PROGRESS NOTE ADULT - PROBLEM SELECTOR PLAN 6
on baseline FAST 7 and interactive, needing full supervision and most ADL assistance but now lethargic and in respiratory distress   supportive care at this time.    Spoke with Cat the guardian on the phone and updated her on above plan of care. Her direct contact number is [phone]

## 2019-11-27 NOTE — PROGRESS NOTE ADULT - MENTAL STATUS
still lethargic, but more alert and awake, responds to questions but not very verbal or follows commands well

## 2019-11-27 NOTE — PROGRESS NOTE ADULT - PROBLEM SELECTOR PLAN 4
advanced - ECOG 3 - supportive care only  pt to resume home hospice when more alert and awake and stable  currently inpatient hospice eligible. on dulcolax WI but still no BM, will do mineral oil enema x1 now.

## 2019-11-27 NOTE — PROGRESS NOTE ADULT - SUBJECTIVE AND OBJECTIVE BOX
CEDRIC IRENA                    88y  Female    Allergies    No Known Allergies    Intolerances    Symptoms:  Pain (1-10):  Dyspnea: moderate  Nausea/Vomiting: none  Secretions: none  Agitation: moderate   Symptom Requiring Inpatient Hospice Admission: respiratory distress    Overnight events/interim history: still lethargic and agitated, requiring morphine and ativan    HPI:  Pt is 88y F from home hospice with [facility], bedbound, nonverbal, no family (her neighbour for 13 yrs is involved in care)  with significant PMHx of HTN, Dementia  recent diagnosis of Right lung cancer, and no significant PSHx presenting to the ED after building power short circuited. Pt is on 24/7 home O2 with home hospice so HHA panicked when power was lost and called 911 in spite of fact she was told not to call 911 for hospice patient. She was never in distress even while briefly without supplemental o2. In ED she was found to be clinically dry and decision was made to admit her to medicine despite hospice status. Close friend/neighbor Ms Mckenna Dominguez  aware of all medical conditions and is very involved in patient's care is at bedside and providing history. As per Ms. Dominguez,  quickly restored power, but EMS had already arrived. (26 Nov 2019 02:33)            MEDICATIONS  (STANDING):  albuterol/ipratropium for Nebulization 3 milliLiter(s) Nebulizer every 6 hours  cefTRIAXone   IVPB 1000 milliGRAM(s) IV Intermittent every 24 hours  cefTRIAXone   IVPB      sertraline 25 milliGRAM(s) Oral daily  sodium chloride 0.9%. 1000 milliLiter(s) (65 mL/Hr) IV Continuous <Continuous>  tiotropium 18 MICROgram(s) Capsule 1 Capsule(s) Inhalation daily    MEDICATIONS  (PRN):  bisacodyl Suppository 10 milliGRAM(s) Rectal daily PRN Constipation  LORazepam   Injectable 0.5 milliGRAM(s) IV Push every 1 hour PRN Agitation  morphine  - Injectable 1 milliGRAM(s) IV Push every 1 hour PRN respiratory distress, labored breathing      CBC Full  -  ( 26 Nov 2019 06:59 )  WBC Count : 18.69 K/uL  Hemoglobin : 9.2 g/dL  Hematocrit : 30.0 %  Platelet Count - Automated : 438 K/uL  Mean Cell Volume : 98.7 fl  Mean Cell Hemoglobin : 30.3 pg  Mean Cell Hemoglobin Concentration : 30.7 gm/dL  Auto Neutrophil # : SEE NOTE K/uL  Auto Lymphocyte # : SEE NOTE K/uL  Auto Monocyte # : SEE NOTE K/uL  Auto Eosinophil # : SEE NOTE K/uL  Auto Basophil # : SEE NOTE K/uL  Auto Neutrophil % : SEE NOTE %  Auto Lymphocyte % : SEE NOTE %  Auto Monocyte % : SEE NOTE %  Auto Eosinophil % : SEE NOTE %  Auto Basophil % : SEE NOTE %                         Vital Signs Last 24 Hrs  T(C): 36.7 (27 Nov 2019 08:34), Max: 36.8 (26 Nov 2019 23:36)  T(F): 98 (27 Nov 2019 08:34), Max: 98.2 (26 Nov 2019 23:36)  HR: 137 (27 Nov 2019 08:34) (105 - 137)  BP: 147/88 (27 Nov 2019 08:34) (104/64 - 147/88)  BP(mean): --  RR: 20 (27 Nov 2019 08:34) (17 - 20)  SpO2: 92% (27 Nov 2019 08:34) (92% - 94%)    General: alert  oriented x 0    [lethargic distressed cachexia  nonverbal ]  Karnofsky Performance Score/Palliative Performance Status Version2:  30 %    HEENT: dry mouth, thin layer of patchy beige oral lesions:  Lungs: mild tachypnea/labored breathing  CV: S1S2, tachycardia  GI: soft non distended non tender  incontinent  : incontinent hyman  Musculoskeletal: weakness x4 at this time  Neuro: severe cognitive impairment dsyphagia  Oral intake ability: [minimal     Code Status: DNR/DNI

## 2019-11-27 NOTE — PROGRESS NOTE ADULT - PROBLEM SELECTOR PROBLEM 5
Late onset Alzheimer's disease without behavioral disturbance Malignant neoplasm of lung, unspecified laterality, unspecified part of lung

## 2019-11-27 NOTE — PROGRESS NOTE ADULT - PROBLEM SELECTOR PLAN 2
lethargy likely due to recent ativan and morphine doses - c/w normal saline @65 for now. will start LEvaquin, d/c ceftriaxone - pt has rash generalized (likely caused by ceftriaxone)  recent hospitalization so likely will cover for HCAP

## 2019-11-28 NOTE — PROGRESS NOTE ADULT - PROBLEM SELECTOR PLAN 4
s/p mineral oil enema 11/27; continues dulcolax ME s/p mineral oil enema 11/27 with BM reported; continues dulcolax MD

## 2019-11-28 NOTE — PROGRESS NOTE ADULT - PROBLEM SELECTOR PLAN 2
Ceftriazone d/c'd yesterday due to generalized rash; now on levaquin to cover HCAP (pt with recent hospitalization).

## 2019-11-28 NOTE — PROGRESS NOTE ADULT - SUBJECTIVE AND OBJECTIVE BOX
DARRELL STEELEIWONA                    88y  Female    No Known Allergies    Symptoms: unable to assess at time of exam.     Symptom Requiring Inpatient Hospice Admission:    Overnight events/interim history:    HPI:  Pt is 88y F from home hospice with [facility], bedbound, nonverbal, no family (her neighbour for 13 yrs is involved in care)  with significant PMHx of HTN, Dementia  recent diagnosis of Right lung cancer, and no significant PSHx presenting to the ED after building power short circuited. Pt is on 24/7 home O2 with home hospice so HHA panicked when power was lost and called 911 in spite of fact she was told not to call 911 for hospice patient. She was never in distress even while briefly without supplemental o2. In ED she was found to be clinically dry and decision was made to admit her to medicine despite hospice status. Close friend/neighbor Ms Mckenna Dominguez  aware of all medical conditions and is very involved in patient's care is at bedside and providing history. As per Ms. Dominguez,  quickly restored power, but EMS had already arrived. (26 Nov 2019 02:33)     Patient restless overnight - received ativan and morphine for sx management.          MEDICATIONS  (STANDING):  albuterol/ipratropium for Nebulization 3 milliLiter(s) Nebulizer every 6 hours  hydrocortisone 2.5% Lotion 1 Application(s) Topical two times a day  levoFLOXacin IVPB 250 milliGRAM(s) IV Intermittent every 24 hours  levoFLOXacin IVPB      sertraline 25 milliGRAM(s) Oral daily    MEDICATIONS  (PRN):  bisacodyl Suppository 10 milliGRAM(s) Rectal daily PRN Constipation  glycopyrrolate Injectable 0.4 milliGRAM(s) IV Push every 4 hours PRN secretions  LORazepam   Injectable 0.5 milliGRAM(s) IV Push every 1 hour PRN Agitation  morphine  - Injectable 1 milliGRAM(s) IV Push every 1 hour PRN respiratory distress, labored breathing      CBC Full  -  ( 27 Nov 2019 17:06 )  WBC Count : 23.20 K/uL  Hemoglobin : 9.4 g/dL  Hematocrit : 30.4 %  Platelet Count - Automated : 441 K/uL  Mean Cell Volume : 96.8 fl  Mean Cell Hemoglobin : 29.9 pg  Mean Cell Hemoglobin Concentration : 30.9 gm/dL  Auto Neutrophil # : x  Auto Lymphocyte # : x  Auto Monocyte # : x  Auto Eosinophil # : x  Auto Basophil # : x  Auto Neutrophil % : x  Auto Lymphocyte % : x  Auto Monocyte % : x  Auto Eosinophil % : x  Auto Basophil % : x                         Vital Signs Last 24 Hrs  T(C): 36.2 (28 Nov 2019 00:09), Max: 36.7 (27 Nov 2019 08:34)  T(F): 97.2 (28 Nov 2019 00:09), Max: 98 (27 Nov 2019 08:34)  HR: 113 (28 Nov 2019 00:09) (113 - 137)  BP: 130/84 (28 Nov 2019 00:09) (130/84 - 147/88)  BP(mean): --  RR: 18 (28 Nov 2019 00:09) (18 - 20)  SpO2: 98% (28 Nov 2019 00:09) (92% - 98%)    General: patient sleeping in bed; awakens briefly with garbled speech, lethargic. A&O x 0; cachexia.  Karnofsky Performance Score/Palliative Performance Status Version2:  30 %    HEENT: dry mouth, thin layer of patchy beige oral lesions  Lungs: comfortable; nasal canula  CV: S1S2, tachycardia  GI: soft non distended non tender  incontinent  : incontinent hyman  Musculoskeletal: weakness x 4; unable to follow commands.    Neuro: severe cognitive impairment dysphagia  Oral intake ability: minimal     Code Status: DNR/DNI

## 2019-11-28 NOTE — PROGRESS NOTE ADULT - PROBLEM SELECTOR PLAN 6
Baseline FAST 7 and interactive, needing full supervision and most ADL assistance but now lethargic and in respiratory distress. Supportive care at this time. Baseline FAST 7 and interactive, needing full supervision and most ADL assistance but now lethargic and in respiratory distress with agitation/restlessness. Supportive care at this time.

## 2019-11-28 NOTE — PROGRESS NOTE ADULT - PROBLEM SELECTOR PLAN 5
advanced - ECOG 3 - supportive care only  pt to resume home hospice when more alert and awake and stable  currently inpatient hospice eligible.

## 2019-11-29 NOTE — PROGRESS NOTE ADULT - PROBLEM SELECTOR PLAN 4
s/p mineral oil enema 11/27 with BM reported; continues dulcolax ME s/p mineral oil enema 11/27 with multiple BMs reported; continues dulcolax DC

## 2019-11-29 NOTE — PROGRESS NOTE ADULT - PROBLEM SELECTOR PLAN 2
Ceftriazone d/c'd yesterday due to generalized rash; now on levaquin to cover HCAP (pt with recent hospitalization). c/w levaquin to cover HCAP (pt with recent hospitalization). WBC coming down, less respiratory distress, c/w duonebs

## 2019-11-29 NOTE — PROGRESS NOTE ADULT - PROBLEM SELECTOR PLAN 3
likely due to SOB and restlessness. Continue ativan 0.5mg IVP PRN. likely due to SOB and restlessness and constipation. Continue ativan 0.5mg IVP PRN.

## 2019-11-29 NOTE — PROGRESS NOTE ADULT - PROBLEM SELECTOR PLAN 6
Baseline FAST 7 and interactive, needing full supervision and most ADL assistance but now lethargic and in respiratory distress with agitation/restlessness. Supportive care at this time. Baseline FAST 7 and interactive, needing full supervision and most ADL assistance still lethargic and tachypnea with mild agitation/restlessness. Will continue to monitor. Supportive care at this time.

## 2019-11-29 NOTE — PROGRESS NOTE ADULT - SUBJECTIVE AND OBJECTIVE BOX
DARRELL STEELEIWONA                    88y  Female    Allergies    No Known Allergies    Intolerances        Symptoms:  Pain (1-10):  Dyspnea:  Nausea/Vomiting:  Secretions:   Agitation:  Symptom Requiring Inpatient Hospice Admission:    Overnight events/interim history:    HPI:  Pt is 88y F from home hospice with [facility], bedbound, nonverbal, no family (her neighbour for 13 yrs is involved in care)  with significant PMHx of HTN, Dementia  recent diagnosis of Right lung cancer, and no significant PSHx presenting to the ED after building power short circuited. Pt is on 24/7 home O2 with home hospice so HHA panicked when power was lost and called 911 in spite of fact she was told not to call 911 for hospice patient. She was never in distress even while briefly without supplemental o2. In ED she was found to be clinically dry and decision was made to admit her to medicine despite hospice status. Close friend/neighbor Ms Mckenna Dominguez  aware of all medical conditions and is very involved in patient's care is at bedside and providing history. As per Ms. Dominguez,  quickly restored power, but EMS had already arrived. (26 Nov 2019 02:33)            MEDICATIONS  (STANDING):  albuterol/ipratropium for Nebulization 3 milliLiter(s) Nebulizer every 6 hours  hydrocortisone 2.5% Lotion 1 Application(s) Topical two times a day  levoFLOXacin IVPB 250 milliGRAM(s) IV Intermittent every 24 hours  levoFLOXacin IVPB      sertraline 25 milliGRAM(s) Oral daily    MEDICATIONS  (PRN):  bisacodyl Suppository 10 milliGRAM(s) Rectal daily PRN Constipation  glycopyrrolate Injectable 0.4 milliGRAM(s) IV Push every 4 hours PRN secretions  LORazepam   Injectable 0.5 milliGRAM(s) IV Push every 1 hour PRN Agitation  morphine  - Injectable 1 milliGRAM(s) IV Push every 1 hour PRN respiratory distress, labored breathing      CBC Full  -  ( 29 Nov 2019 10:13 )  WBC Count : x  Hemoglobin : 10.4 g/dL  Hematocrit : 34.3 %  Platelet Count - Automated : x  Mean Cell Volume : 98.3 fl  Mean Cell Hemoglobin : 29.8 pg  Mean Cell Hemoglobin Concentration : 30.3 gm/dL  Auto Neutrophil # : x  Auto Lymphocyte # : x  Auto Monocyte # : x  Auto Eosinophil # : x  Auto Basophil # : x  Auto Neutrophil % : x  Auto Lymphocyte % : x  Auto Monocyte % : x  Auto Eosinophil % : x  Auto Basophil % : x                         Vital Signs Last 24 Hrs  T(C): 36.4 (29 Nov 2019 08:00), Max: 36.8 (28 Nov 2019 16:27)  T(F): 97.6 (29 Nov 2019 08:00), Max: 98.2 (28 Nov 2019 16:27)  HR: 78 (29 Nov 2019 08:00) (78 - 125)  BP: 127/75 (29 Nov 2019 08:00) (123/73 - 127/75)  BP(mean): --  RR: 17 (29 Nov 2019 08:00) (17 - 18)  SpO2: 100% (29 Nov 2019 08:00) (96% - 100%)    General: alert  oriented x ____    [lethargic distressed cachexia  nonverbal  unarousable verbal]  Karnofsky Performance Score/Palliative Performance Status Version2:     %    HEENT: normal  dry mouth  ET tube/trach oral lesions:  Lungs: comfortable tachypnea/labored breathing  excessive secretions  CV: RRR, S1S2, tachycardia  GI: soft non distended non tender  incontinent               PEG/NG/OG tube  constipation  last BM:   : incontinent  oliguria/anuria  hyman  Musculoskeletal: weakness  edema   ambulatory with assistance bedbound/wheelchair bound  Skin: normal turgor, pressure ulcer stage:   Neuro: no deficits, cognitive impairment dsyphagia/dysarthria paresis  Oral intake ability: unable/only mouth care [minimal moderate full capability]    Code Status: DNR/DNI IRENA STEELE                    88y  Female    Allergies    No Known Allergies    Intolerances        Symptoms:  Pain (1-10):  Dyspnea: mild  Nausea/Vomiting: none  Secretions: none  Agitation: less  Symptom Requiring Inpatient Hospice Admission: tachypnea, agitation    Overnight events/interim history: twice morphine needed    HPI:  Pt is 88y F from home hospice with [facility], bedbound, nonverbal, no family (her neighbour for 13 yrs is involved in care)  with significant PMHx of HTN, Dementia  recent diagnosis of Right lung cancer, and no significant PSHx presenting to the ED after building power short circuited. Pt is on 24/7 home O2 with home hospice so HHA panicked when power was lost and called 911 in spite of fact she was told not to call 911 for hospice patient. She was never in distress even while briefly without supplemental o2. In ED she was found to be clinically dry and decision was made to admit her to medicine despite hospice status. Close friend/neighbor Ms Mckenna Dominguez  aware of all medical conditions and is very involved in patient's care is at bedside and providing history. As per Ms. Dominguez,  quickly restored power, but EMS had already arrived. (26 Nov 2019 02:33)            MEDICATIONS  (STANDING):  albuterol/ipratropium for Nebulization 3 milliLiter(s) Nebulizer every 6 hours  hydrocortisone 2.5% Lotion 1 Application(s) Topical two times a day  levoFLOXacin IVPB 250 milliGRAM(s) IV Intermittent every 24 hours  levoFLOXacin IVPB      sertraline 25 milliGRAM(s) Oral daily    MEDICATIONS  (PRN):  bisacodyl Suppository 10 milliGRAM(s) Rectal daily PRN Constipation  glycopyrrolate Injectable 0.4 milliGRAM(s) IV Push every 4 hours PRN secretions  LORazepam   Injectable 0.5 milliGRAM(s) IV Push every 1 hour PRN Agitation  morphine  - Injectable 1 milliGRAM(s) IV Push every 1 hour PRN respiratory distress, labored breathing      CBC Full  -  ( 29 Nov 2019 10:13 )  WBC Count : x  Hemoglobin : 10.4 g/dL  Hematocrit : 34.3 %  Platelet Count - Automated : x  Mean Cell Volume : 98.3 fl  Mean Cell Hemoglobin : 29.8 pg  Mean Cell Hemoglobin Concentration : 30.3 gm/dL  Auto Neutrophil # : x  Auto Lymphocyte # : x  Auto Monocyte # : x  Auto Eosinophil # : x  Auto Basophil # : x  Auto Neutrophil % : x  Auto Lymphocyte % : x  Auto Monocyte % : x  Auto Eosinophil % : x  Auto Basophil % : x                         Vital Signs Last 24 Hrs  T(C): 36.4 (29 Nov 2019 08:00), Max: 36.8 (28 Nov 2019 16:27)  T(F): 97.6 (29 Nov 2019 08:00), Max: 98.2 (28 Nov 2019 16:27)  HR: 78 (29 Nov 2019 08:00) (78 - 125)  BP: 127/75 (29 Nov 2019 08:00) (123/73 - 127/75)  BP(mean): --  RR: 17 (29 Nov 2019 08:00) (17 - 18)  SpO2: 100% (29 Nov 2019 08:00) (96% - 100%)    General: alert  oriented x 1 [lethargic cachexia, not able to articulate well yet  Karnofsky Performance Score/Palliative Performance Status Version2: 20   %    HEENT: dry mouth, food still on tongue  Lungs: mild tachypnea/labored breathing no wheezing audible but still shallow breathing  CV: RRR, S1S2,  GI: soft non distended non tender  incontinent  last BM: yesterday - no longer constipated  : incontinent    Musculoskeletal: weakness x4 bedbound/wheelchair bound  Skin: petechial rash is less, not as red as well  Neuro: severe cognitive impairment dsyphagia/dysarthria   Oral intake ability: [minimal   Code Status: DNR/DNI

## 2019-11-29 NOTE — PROGRESS NOTE ADULT - PROBLEM SELECTOR PLAN 1
now with agitation. No fevers; on nasal canula. Continue duonebs; IVP morphine PRN. better today,   No fevers; on nasal canula. Continue duonebs; IVP morphine PRN.

## 2019-11-30 NOTE — PROGRESS NOTE ADULT - PROBLEM SELECTOR PLAN 1
improved. No fevers; on nasal canula. Continue duonebs; IVP morphine PRN and IVP robinul PRN available for sx management.

## 2019-11-30 NOTE — PROGRESS NOTE ADULT - PROBLEM SELECTOR PLAN 5
advanced - ECOG 3 - supportive care only  Plan is for patient to resume home hospice when more alert and awake and stable  currently inpatient hospice eligible.

## 2019-11-30 NOTE — PROGRESS NOTE ADULT - PROBLEM SELECTOR PLAN 6
Baseline FAST 7 and interactive, needing full supervision and most ADL assistance still lethargic and tachypnea with mild agitation/restlessness. Will continue to monitor. Supportive care at this time.

## 2019-11-30 NOTE — PROGRESS NOTE ADULT - SUBJECTIVE AND OBJECTIVE BOX
IRENA STEELE                    88y  Female    No Known Allergies    Symptoms:  Pain (1-10):  Dyspnea: mild  Nausea/Vomiting: none  Secretions: none  Agitation: less  Symptom Requiring Inpatient Hospice Admission: tachypnea, agitation    Overnight events/interim history: morphine use 2X; robinul 1X, ativan 3X    HPI:  Pt is 88y F from home hospice with [facility], bedbound, nonverbal, no family (her neighbour for 13 yrs is involved in care)  with significant PMHx of HTN, Dementia  recent diagnosis of Right lung cancer, and no significant PSHx presenting to the ED after building power short circuited. Pt is on 24/7 home O2 with home hospice so HHA panicked when power was lost and called 911 in spite of fact she was told not to call 911 for hospice patient. She was never in distress even while briefly without supplemental o2. In ED she was found to be clinically dry and decision was made to admit her to medicine despite hospice status. Close friend/neighbor Ms Mckenna Dominguez  aware of all medical conditions and is very involved in patient's care is at bedside and providing history. As per Ms. Dominguez,  quickly restored power, but EMS had already arrived. (26 Nov 2019 02:33)            MEDICATIONS  (STANDING):  albuterol/ipratropium for Nebulization 3 milliLiter(s) Nebulizer every 6 hours  hydrocortisone 2.5% Lotion 1 Application(s) Topical two times a day  levoFLOXacin IVPB 250 milliGRAM(s) IV Intermittent every 24 hours  levoFLOXacin IVPB      sertraline 25 milliGRAM(s) Oral daily    MEDICATIONS  (PRN):  bisacodyl Suppository 10 milliGRAM(s) Rectal daily PRN Constipation  glycopyrrolate Injectable 0.4 milliGRAM(s) IV Push every 4 hours PRN secretions  LORazepam   Injectable 0.5 milliGRAM(s) IV Push every 1 hour PRN Agitation  morphine  - Injectable 1 milliGRAM(s) IV Push every 1 hour PRN respiratory distress, labored breathing      CBC Full  -  ( 29 Nov 2019 10:13 )  WBC Count : 19.47 K/uL  Hemoglobin : 10.4 g/dL  Hematocrit : 34.3 %  Platelet Count - Automated : 361 K/uL  Mean Cell Volume : 98.3 fl  Mean Cell Hemoglobin : 29.8 pg  Mean Cell Hemoglobin Concentration : 30.3 gm/dL  Auto Neutrophil # : x  Auto Lymphocyte # : x  Auto Monocyte # : x  Auto Eosinophil # : x  Auto Basophil # : x  Auto Neutrophil % : x  Auto Lymphocyte % : x  Auto Monocyte % : x  Auto Eosinophil % : x  Auto Basophil % : x                         Vital Signs Last 24 Hrs  T(C): 36.1 (30 Nov 2019 08:03), Max: 37.3 (29 Nov 2019 15:47)  T(F): 97 (30 Nov 2019 08:03), Max: 99.1 (29 Nov 2019 15:47)  HR: 147 (30 Nov 2019 08:03) (72 - 147)  BP: 107/77 (30 Nov 2019 08:03) (101/74 - 129/79)  BP(mean): --  RR: 20 (30 Nov 2019 08:03) (18 - 22)  SpO2: 97% (30 Nov 2019 08:03) (96% - 98%)    General: patient slept through exam; lethargic, cachexia,    Karnofsky Performance Score/Palliative Performance Status Version2: 20   %    HEENT: dry mouth, +temporal wasting  Lungs: mild tachypnea/labored breathing no wheezing audible but still shallow breathing  CV: RRR, S1S2,  GI: soft non distended non tender  incontinent  last BM: yesterday  : incontinent    Musculoskeletal: weakness x4, bedbound/wheelchair bound  Skin: petechial rash is less, not as red as well  Neuro: severe cognitive impairment dsyphagia/dysarthria   Oral intake ability: [minimal   Code Status: DNR/DNI    Code Status: DNR/DNI IRENA STEELE                    88y  Female    No Known Allergies    Symptoms:  Pain (1-10):  Dyspnea: mild  Nausea/Vomiting: none  Secretions: minimal  Agitation: mild  Symptom Requiring Inpatient Hospice Admission: tachypnea, agitation    Overnight events/interim history: morphine use 2X; robinul 1X, ativan 3X    HPI:  Pt is 88y F from home hospice with [facility], bedbound, nonverbal, no family (her neighbour for 13 yrs is involved in care)  with significant PMHx of HTN, Dementia  recent diagnosis of Right lung cancer, and no significant PSHx presenting to the ED after building power short circuited. Pt is on 24/7 home O2 with home hospice so HHA panicked when power was lost and called 911 in spite of fact she was told not to call 911 for hospice patient. She was never in distress even while briefly without supplemental o2. In ED she was found to be clinically dry and decision was made to admit her to medicine despite hospice status. Close friend/neighbor Ms Mckenna Dominguez  aware of all medical conditions and is very involved in patient's care is at bedside and providing history. As per Ms. Dominguez,  quickly restored power, but EMS had already arrived. (26 Nov 2019 02:33)            MEDICATIONS  (STANDING):  albuterol/ipratropium for Nebulization 3 milliLiter(s) Nebulizer every 6 hours  hydrocortisone 2.5% Lotion 1 Application(s) Topical two times a day  levoFLOXacin IVPB 250 milliGRAM(s) IV Intermittent every 24 hours  levoFLOXacin IVPB      sertraline 25 milliGRAM(s) Oral daily    MEDICATIONS  (PRN):  bisacodyl Suppository 10 milliGRAM(s) Rectal daily PRN Constipation  glycopyrrolate Injectable 0.4 milliGRAM(s) IV Push every 4 hours PRN secretions  LORazepam   Injectable 0.5 milliGRAM(s) IV Push every 1 hour PRN Agitation  morphine  - Injectable 1 milliGRAM(s) IV Push every 1 hour PRN respiratory distress, labored breathing      CBC Full  -  ( 29 Nov 2019 10:13 )  WBC Count : 19.47 K/uL  Hemoglobin : 10.4 g/dL  Hematocrit : 34.3 %  Platelet Count - Automated : 361 K/uL  Mean Cell Volume : 98.3 fl  Mean Cell Hemoglobin : 29.8 pg  Mean Cell Hemoglobin Concentration : 30.3 gm/dL  Auto Neutrophil # : x  Auto Lymphocyte # : x  Auto Monocyte # : x  Auto Eosinophil # : x  Auto Basophil # : x  Auto Neutrophil % : x  Auto Lymphocyte % : x  Auto Monocyte % : x  Auto Eosinophil % : x  Auto Basophil % : x                         Vital Signs Last 24 Hrs  T(C): 36.1 (30 Nov 2019 08:03), Max: 37.3 (29 Nov 2019 15:47)  T(F): 97 (30 Nov 2019 08:03), Max: 99.1 (29 Nov 2019 15:47)  HR: 147 (30 Nov 2019 08:03) (72 - 147)  BP: 107/77 (30 Nov 2019 08:03) (101/74 - 129/79)  BP(mean): --  RR: 20 (30 Nov 2019 08:03) (18 - 22)  SpO2: 97% (30 Nov 2019 08:03) (96% - 98%)    General: patient slept through exam; lethargic, cachexia,    Karnofsky Performance Score/Palliative Performance Status Version2: 20%    HEENT: dry mouth, +temporal wasting  Lungs: mild tachypnea/labored breathing, no wheezing audible, + shallow breathing  CV: RRR, S1S2, tachycardia  GI: soft non distended non tender  incontinent; last BM yesterday  : incontinent    Musculoskeletal: weakness x4, bedbound/wheelchair bound, dependent for ADLs   Skin: petechial rash   Neuro: severe cognitive impairment dysphagia/dysarthria   Oral intake ability: minimal   Code Status: DNR/DNI

## 2019-11-30 NOTE — PROGRESS NOTE ADULT - PROBLEM SELECTOR PLAN 2
c/w levaquin to cover HCAP (pt with recent hospitalization). WBC coming down, less respiratory distress, c/w duonebs.

## 2019-12-02 NOTE — PROGRESS NOTE ADULT - PROBLEM SELECTOR PLAN 2
c/w levaquin to cover HCAP (pt with recent hospitalization). WBC coming down, less respiratory distress, c/w duonebs. c/w levaquin to cover HCAP (pt with recent hospitalization). WBC worsening, less respiratory distress, maybe be tumor burden

## 2019-12-02 NOTE — PROGRESS NOTE ADULT - PROBLEM SELECTOR PROBLEM 6
Late onset Alzheimer's disease without behavioral disturbance

## 2019-12-02 NOTE — PROGRESS NOTE ADULT - PROBLEM SELECTOR PROBLEM 1
Acute respiratory distress

## 2019-12-02 NOTE — PROGRESS NOTE ADULT - PROBLEM SELECTOR PLAN 5
advanced - ECOG 3 - supportive care only  Plan is for patient to resume home hospice when more alert and awake and stable  currently inpatient hospice eligible. advanced - ECOG 4 - supportive care only  Prognosis is now likely hours to days - currently inpatient hospice eligible.  spoke with Guardian and made aware of changes, agrees with keeping patient inpatient hospice, will reach out to Sharene as well.

## 2019-12-02 NOTE — PROGRESS NOTE ADULT - SUBJECTIVE AND OBJECTIVE BOX
DARRELL STEELEROGERSNATHAN                    88y  Female    Allergies    No Known Allergies    Intolerances        Symptoms:  Pain (1-10):  Dyspnea:  Nausea/Vomiting:  Secretions:   Agitation:  Symptom Requiring Inpatient Hospice Admission:    Overnight events/interim history:    HPI:  Pt is 88y F from home hospice with [facility], bedbound, nonverbal, no family (her neighbour for 13 yrs is involved in care)  with significant PMHx of HTN, Dementia  recent diagnosis of Right lung cancer, and no significant PSHx presenting to the ED after building power short circuited. Pt is on 24/7 home O2 with home hospice so HHA panicked when power was lost and called 911 in spite of fact she was told not to call 911 for hospice patient. She was never in distress even while briefly without supplemental o2. In ED she was found to be clinically dry and decision was made to admit her to medicine despite hospice status. Close friend/neighbor Ms Mckenna Dominguez  aware of all medical conditions and is very involved in patient's care is at bedside and providing history. As per Ms. Dominguez,  quickly restored power, but EMS had already arrived. (26 Nov 2019 02:33)            MEDICATIONS  (STANDING):  albuterol/ipratropium for Nebulization 3 milliLiter(s) Nebulizer every 6 hours  hydrocortisone 2.5% Lotion 1 Application(s) Topical two times a day  levoFLOXacin IVPB 250 milliGRAM(s) IV Intermittent every 24 hours  levoFLOXacin IVPB      sertraline 25 milliGRAM(s) Oral daily    MEDICATIONS  (PRN):  bisacodyl Suppository 10 milliGRAM(s) Rectal daily PRN Constipation  glycopyrrolate Injectable 0.4 milliGRAM(s) IV Push every 4 hours PRN secretions  LORazepam   Injectable 0.5 milliGRAM(s) IV Push every 1 hour PRN Agitation  morphine  - Injectable 1 milliGRAM(s) IV Push every 1 hour PRN respiratory distress, labored breathing                             Vital Signs Last 24 Hrs  T(C): 36.6 (02 Dec 2019 08:15), Max: 36.6 (01 Dec 2019 15:58)  T(F): 97.9 (02 Dec 2019 08:15), Max: 97.9 (02 Dec 2019 08:15)  HR: 121 (02 Dec 2019 08:15) (89 - 125)  BP: 121/69 (02 Dec 2019 08:15) (103/77 - 131/70)  BP(mean): --  RR: 17 (02 Dec 2019 08:15) (17 - 22)  SpO2: 97% (02 Dec 2019 08:15) (93% - 97%)    General: alert  oriented x ____    [lethargic distressed cachexia  nonverbal  unarousable verbal]  Karnofsky Performance Score/Palliative Performance Status Version2:     %    HEENT: normal  dry mouth  ET tube/trach oral lesions:  Lungs: comfortable tachypnea/labored breathing  excessive secretions  CV: RRR, S1S2, tachycardia  GI: soft non distended non tender  incontinent               PEG/NG/OG tube  constipation  last BM:   : incontinent  oliguria/anuria  hyman  Musculoskeletal: weakness  edema   ambulatory with assistance bedbound/wheelchair bound  Skin: normal turgor, pressure ulcer stage:   Neuro: no deficits, cognitive impairment dsyphagia/dysarthria paresis  Oral intake ability: unable/only mouth care [minimal moderate full capability]    Code Status: DNR/DNI DARRELL STEELEROGERSNATHAN                    88y  Female    Allergies    No Known Allergies    Intolerances        Symptoms:  unable to assess due to poor mentation  Symptom Requiring Inpatient Hospice Admission: encephalopathy and respiratory distress    Overnight events/interim history: drastic change from the weekend, pt is now much more lethargic with respiratory distress    HPI:  Pt is 88y F from home hospice with [facility], bedbound, nonverbal, no family (her neighbour for 13 yrs is involved in care)  with significant PMHx of HTN, Dementia  recent diagnosis of Right lung cancer, and no significant PSHx presenting to the ED after building power short circuited. Pt is on 24/7 home O2 with home hospice so HHA panicked when power was lost and called 911 in spite of fact she was told not to call 911 for hospice patient. She was never in distress even while briefly without supplemental o2. In ED she was found to be clinically dry and decision was made to admit her to medicine despite hospice status. Close friend/neighbor Ms Mckenna Dominguez  aware of all medical conditions and is very involved in patient's care is at bedside and providing history. As per Ms. Dominguez,  quickly restored power, but EMS had already arrived. (26 Nov 2019 02:33)            MEDICATIONS  (STANDING):  albuterol/ipratropium for Nebulization 3 milliLiter(s) Nebulizer every 6 hours  hydrocortisone 2.5% Lotion 1 Application(s) Topical two times a day  levoFLOXacin IVPB 250 milliGRAM(s) IV Intermittent every 24 hours  levoFLOXacin IVPB      sertraline 25 milliGRAM(s) Oral daily    MEDICATIONS  (PRN):  bisacodyl Suppository 10 milliGRAM(s) Rectal daily PRN Constipation  glycopyrrolate Injectable 0.4 milliGRAM(s) IV Push every 4 hours PRN secretions  LORazepam   Injectable 0.5 milliGRAM(s) IV Push every 1 hour PRN Agitation  morphine  - Injectable 1 milliGRAM(s) IV Push every 1 hour PRN respiratory distress, labored breathing                             Vital Signs Last 24 Hrs  T(C): 36.6 (02 Dec 2019 08:15), Max: 36.6 (01 Dec 2019 15:58)  T(F): 97.9 (02 Dec 2019 08:15), Max: 97.9 (02 Dec 2019 08:15)  HR: 121 (02 Dec 2019 08:15) (89 - 125)  BP: 121/69 (02 Dec 2019 08:15) (103/77 - 131/70)  BP(mean): --  RR: 17 (02 Dec 2019 08:15) (17 - 22)  SpO2: 97% (02 Dec 2019 08:15) (93% - 97%)    General: alert  oriented x 1 [lethargic cachexia, barely verbal   Karnofsky Performance Score/Palliative Performance Status Version2: 20   %    HEENT: dry mouth  Lungs: mild to moderate  tachypnea/labored breathing no wheezing audible but still shallow breathing  CV: RRR, S1S2,  GI: soft non distended non tender  incontinent  : incontinent    Musculoskeletal: weakness x4 bedbound/wheelchair bound  Skin: petechial rash continues to improve  Neuro: severe cognitive impairment dsyphagia/dysarthria   Oral intake ability: unable  Code Status: DNR/DNI

## 2019-12-02 NOTE — PROGRESS NOTE ADULT - PROBLEM SELECTOR PLAN 1
improved. No fevers; on nasal canula. Continue duonebs; IVP morphine PRN and IVP robinul PRN available for sx management. now worsened No fevers; on nasal canula. IVP morphine PRN and IVP robinul PRN available for sx management.

## 2019-12-02 NOTE — PROGRESS NOTE ADULT - REASON FOR ADMISSION
dehydration, suspected pneumonia

## 2019-12-03 NOTE — CHART NOTE - NSCHARTNOTEFT_GEN_A_CORE
Magdiel contacted patient's friend Monica Dominguez [phone], as per Dr. Sanabria' request, to inform her of the patient's expiration.  Ms. Dominguez thanked this  for informing her and later came to the hospital.   , NP and MD met with Monica to provide emotional support.  Ms. Dominguez expressed appreciation of the teams' support.  Ms. Dominguez stated she will speak with the guardianship agency regarding the arrangements for the patient.  Pari Ward of [facility] also came to the patient's room to speak with  and express her condolences.   Magdiel contacted patient's guardian Cat Galeano [phone] who was unavailable and spoke with her Ronald Daniels.  Frances thanked this  for informing her of Ms. Deleon's passing and stated her  arrangements were pre-arranged and prepaid.    provided the address and telephone number of the hospital which Frances stated she will share with the  home.  No additional needs were expressed.

## 2019-12-03 NOTE — DISCHARGE NOTE FOR THE EXPIRED PATIENT - HOSPITAL COURSE
Pt is 88y F from home hospice with [facility], bedbound, nonverbal, no family (her neighbour for 13 yrs is involved in care)  with significant PMHx of HTN, Dementia  recent diagnosis of Right lung cancer, and no significant PSHx presenting to the ED after building power short circuited. Pt is on 24/7 home O2 with home hospice so [facility]HA panicked when power was lost and called 911 in spite of fact she was told not to call 911 for hospice patient. She was never in distress even while briefly without supplemental O2. In ED she was found to be clinically dry and decision was made to admit her to medicine despite hospice status. Close friend/neighbor Ms Mckenna Dominguez  aware of all medical conditions and is very involved in patient's care is at bedside and providing history.   pt was transferred to in-patient hospice.   Ativan 0.5mg IV and Morphine 1mg were given Q1 PRN for agitation  Pt was noted not having spontaneous respirations at 11.10 am and she was pronounced dead at 11.14 am Pt is 88y F from home hospice with [facility], bedbound, nonverbal, no family (her neighbour for 13 yrs is involved in care)  with significant PMHx of HTN, Dementia  recent diagnosis of Right lung cancer, and no significant PSHx presenting to the ED after building power short circuited. Pt is on 24/7 home O2 with home hospice so [facility]HA panicked when power was lost and called 911 in spite of fact she was told not to call 911 for hospice patient. She was never in distress even while briefly without supplemental O2. In ED she was found to be clinically dry and decision was made to admit her to medicine for lethargy, suspected aspiration pneumonia and dehydration. Close friend/neighbor Ms Mckenna Dominguez  aware of all medical conditions and is very involved in patient's care is at bedside and providing history.   pt was transferred to in-patient hospice. Pt continued to remain lethargic with increasing symptoms.     Pt was noted not having spontaneous respirations at 11.10 am and she was pronounced dead at 11.14 am. Guardian and close friend notified. [facility] aware.

## 2020-06-08 NOTE — PATIENT PROFILE ADULT. - NSSUBSTANCEUSE_GEN_ALL_CORE_SD
never used Closure 3 Information: This tab is for additional flaps and grafts above and beyond our usual structured repairs.  Please note if you enter information here it will not currently bill and you will need to add the billing information manually.

## 2020-10-18 NOTE — DISCHARGE NOTE PROVIDER - NSDCCPCAREPLAN_GEN_ALL_CORE_FT
PRINCIPAL DISCHARGE DIAGNOSIS  Diagnosis: Chest pain  Assessment and Plan of Treatment: You presented with chest pain. cardiac enzymes were ngative. EKG showed no acute changes. You were seen by Dr Li Cardiologist. The pain was non cardiac in origin and was due to recent radiation you received. Oncologist Dr Douglas was also consulted. You are recommended to  follow up with Dr Douglas as out patient.      SECONDARY DISCHARGE DIAGNOSES  Diagnosis: Bronchitis, allergic  Assessment and Plan of Treatment: You had cough and phlegm production when you came to the hospital. You are being treated with levaquin and robutssin. Please continue to take your medications as prescribed. Please follow up with primary care doctor in a week.    Diagnosis: Lung cancer  Assessment and Plan of Treatment: You have history of lung cancer. You were receiving radiation and chemotherapy treatment. Please follow up with DR douglas for continuing the treatment. No PRINCIPAL DISCHARGE DIAGNOSIS  Diagnosis: Chest pain  Assessment and Plan of Treatment: You presented with chest pain. cardiac enzymes were ngative. EKG showed no acute changes. You were seen by Dr Li Cardiologist. The pain was non cardiac in origin and was due to recent radiation you received. Oncologist Dr Douglas was also consulted. Currently you dont have any chest pain. Please followu p with your primary care      SECONDARY DISCHARGE DIAGNOSES  Diagnosis: Bronchitis, allergic  Assessment and Plan of Treatment: You had cough and phlegm production when you came to the hospital. You were treated for bronchitis with Levaquin and Robitussin. You labs were normal and had no fever so we discontinued the antibiotics. You have a lung mass which can be the potential cause of brochitis. Please take cough syrup as needed and follow up with your primary care . We have prescribed Symbicort inhaler for you .Please use the inhaler as prescribed. You are unable to maintain your O2 saturation without supllemental oxygen so we have arranged home O2 supply for you.    Diagnosis: Lung cancer  Assessment and Plan of Treatment: You have history of lung cancer. There is no chemo or radio therapy currently as you have decided to go for hospice care at home PRINCIPAL DISCHARGE DIAGNOSIS  Diagnosis: Lung cancer  Assessment and Plan of Treatment: You have history of lung cancer. There is no chemo or radio therapy currently as you have decided to go for hospice care at home      SECONDARY DISCHARGE DIAGNOSES  Diagnosis: Pleural metastasis  Assessment and Plan of Treatment: You have metastasis in your pleura. Chemotherapy and radiotherapy is discontinued . You are being discharge with home hospice care    Diagnosis: Dementia, old age  Assessment and Plan of Treatment: Pt has dementia and she is unable to make her own decisions. Pt has a legal guardfian. Please continue taking your medications    Diagnosis: Chest pain  Assessment and Plan of Treatment: You presented with chest pain. cardiac enzymes were ngative. EKG showed no acute changes. You were seen by Dr Li Cardiologist. The pain was non cardiac in origin and was due to recent radiation you received. Oncologist Dr Douglas was also consulted. Currently you dont have any chest pain. Please followu p with your primary care    Diagnosis: Bronchitis, allergic  Assessment and Plan of Treatment: You had cough and phlegm production when you came to the hospital. You were treated for bronchitis with Levaquin and Robitussin. You labs were normal and had no fever so we discontinued the antibiotics. You have a lung mass which can be the potential cause of brochitis. Please take cough syrup as needed and follow up with your primary care . We have prescribed Symbicort inhaler for you .Please use the inhaler as prescribed. You are unable to maintain your O2 saturation without supllemental oxygen so we have arranged home O2 supply for you.

## 2021-12-22 NOTE — H&P ADULT - PROBLEM SELECTOR PROBLEM 3
I discussed with Delfina that the itching, discomfort, and discoloration is due to varicose veins and the iron depositing in the skin. We discussed that it would help to wear compression socks. I also placed a referral to IR because the patient would like to discuss intervention options.   HTN (hypertension)

## 2022-02-28 NOTE — PROGRESS NOTE ADULT - RESPIRATORY
Addended by: JAYY MACDONALD on: 2/28/2022 09:47 AM     Modules accepted: Orders    
Breath Sounds equal & clear to percussion & auscultation, no accessory muscle use

## 2022-09-28 NOTE — H&P ADULT - HISTORY OF PRESENT ILLNESS
Biopsy Photograph Reviewed: Yes 88y F from home, lives alone with [address] A , walks independently/cane,no family( her neighbour for 13 yrs is involved in care)  with significant PMHx of HTN,Dementia  recent diagnosis of Right lung cancer (started radiation and chemo 1 months ago) and no significant PSHx presenting to the ED with right sided chest pain and SOB since morning. Close friend Ms Mckenna Dominguez  aware of all medical conditions and is very involved in patient's care,is at bedside. As per pt ,she get radiation therapy with Dr Mora( [phone] days a wk and so far has completed 4 rounds,last radiation Treatment was on friday, Pt was also started on chemotherapy last monday but couldnt tolerated it well. Pt states she is having pain under the right breast since morning, 10/10 in intensity, non radiating, increases with inspiration, and associated with some SOB productive cough and chest congestion . Pt denies any  fever, sweating,palpitations, dizziness. Pt is interested in home hospice.     ED course : Vitals : stable . Labs unremarkable. ECG : NSR HR 91 ,T1 -ve . CTA chest : No PE , R lung mass with mets and CALI and small R pl eff.     SH : 2 PPDx 63 yrs ,quitted last wk. denies any drinking or drug use. Lives alone. Close friend Ms Mckenna Dominguez  aware of all medical conditions and is very involved in patient's care,      24 hour Home attendant/aide Mery from [facility] assisted living [facility]. phone  [phone]  and [phone]. Call María or Barbara.    GOC : wants to be Full code for now

## 2022-10-18 NOTE — PATIENT PROFILE ADULT. - BILL OF RIGHTS/ADMISSION INFORMATION PROVIDED TO:
-As a reminder, please remember to come 15 minutes early to your next cardiology appointment. We use this time to obtain necessary EKGs, go over medications, and obtain vital signs. This helps optimize your appointment, allowing for adequate time with KELLY Crawford or Dr. Jesus MD.      -Continue current treatment.    -Echocardiogram to assess murmur.    -Call for new/changing symptoms, questions, or concerns.    -Follow up with primary care in the future as planned.    -Return for follow up in 1 year.     Patient

## 2023-09-28 NOTE — H&P ADULT - PROBLEM SELECTOR PLAN 5
Provided [facility] Total Joint Class to patient via virtual format. Patient instructed in joint anatomy, pre-surgery checklist, discharge planning, preparing the home, devices and DME, pre-surgical optimization strategies, risks of surgery, preventing complications (including VTE, infection, pneumonia, and s/sx to report to surgeon, when to call 911), pre-surgical kit use, surgery timeline, pain management, edema reduction, constipation management, post-surgical precautions/restrictions, fall prevention, mobility guidelines, safe use of walker, being an active participant in healthcare, and what to expect after surgery.  Patient has contact info of surgeon and PCP for any follow-up questions regarding surgery, pre- or post-op instructions.     
c/w statin  f/u lipid profile

## 2024-01-02 NOTE — PROGRESS NOTE ADULT - PROBLEM SELECTOR PLAN 1
Ansley CoreasInderjitMarium is a 59 MRI spine has acute T12 compression fracture  - continue lidocaine patch, tramadol, gabapentin,  - PT recommended outpatient PT  - no surgical intervention as per orthopedics  -pending guardinship

## 2024-03-11 NOTE — PHYSICAL THERAPY INITIAL EVALUATION ADULT - CRITERIA FOR SKILLED THERAPEUTIC INTERVENTIONS
Hi Dr. Garcia,     Spoke w/ daughter, Brea to follow up on dc clinic follow up appt,  pt's name/ verified.     Daughter stated pt maybe discharged home on Wednesday, stated her mom wants to follow up with you if possible, does not want to follow up in dc clinic.     Please advise when you can see pt post dc.    Thank you.          functional limitations in following categories/risk reduction/prevention/impairments found

## 2025-04-03 NOTE — PROGRESS NOTE ADULT - PROBLEM SELECTOR PLAN 1
01.08.24 EGD and colon with Dr Chuy POLK   Stomach, antrum, biopsy:  -H. pylori gastritis.     B.   Esophagus, distal, biopsy:  -Squamous and glandular mucosa with chronic inflammation and reactive change, negative for eosinophilia, intestinal metaplasia, dysplasia or malignancy.   C.   Colon, sigmoid, polypectomy:  -Hyperplastic polyp.    09.11.24 Last Refill Nexium 20 mg QOD, #90, RF x 1    07.01.25 OV with Olga    Pt contacted via phone. A few years ago it was daily. Then, read about side effects. Developed sore throat and reflux during the night.   Started taking Omeprazole over the counter. Did not feel it was helping.  Denies n/v. Has woken up with reflux, ie, \"can't catch my breath, burning. Quite uncomfortable.\"  Advised to avoid eating 2-3 hours before bed, elevate head of bed slightly. States she is doing that already.     Message to provider on call to review. Pt requesting to have Rx Nexium 20 mg Daily rather than QOD.       - pt finishing up iv acetaminophen doses  - tramadol increased to 50mg q 6  - lidocaine patch daily  - gabapentin increased to 200mg po q 2x a day  - TLSO brace as pt can tolerate  - stool softeners  - oob  - if pain cannot be controlled - consider kyphoplasty
